# Patient Record
Sex: MALE | Race: OTHER | HISPANIC OR LATINO | URBAN - METROPOLITAN AREA
[De-identification: names, ages, dates, MRNs, and addresses within clinical notes are randomized per-mention and may not be internally consistent; named-entity substitution may affect disease eponyms.]

---

## 2018-03-25 ENCOUNTER — EMERGENCY (EMERGENCY)
Facility: HOSPITAL | Age: 33
LOS: 1 days | Discharge: HOME | End: 2018-03-25
Attending: EMERGENCY MEDICINE
Payer: COMMERCIAL

## 2018-03-25 VITALS
SYSTOLIC BLOOD PRESSURE: 113 MMHG | TEMPERATURE: 99 F | WEIGHT: 149.91 LBS | HEART RATE: 72 BPM | HEIGHT: 66 IN | OXYGEN SATURATION: 99 % | RESPIRATION RATE: 16 BRPM | DIASTOLIC BLOOD PRESSURE: 73 MMHG

## 2018-03-25 LAB
ALBUMIN SERPL ELPH-MCNC: 4.4 G/DL — SIGNIFICANT CHANGE UP (ref 3.5–5)
ALP SERPL-CCNC: 86 U/L — SIGNIFICANT CHANGE UP (ref 40–120)
ALT FLD-CCNC: 17 U/L DA — SIGNIFICANT CHANGE UP (ref 10–60)
ANION GAP SERPL CALC-SCNC: 11 MMOL/L — SIGNIFICANT CHANGE UP (ref 5–17)
AST SERPL-CCNC: 9 U/L — LOW (ref 10–40)
BASOPHILS # BLD AUTO: 0.1 K/UL — SIGNIFICANT CHANGE UP (ref 0–0.2)
BASOPHILS NFR BLD AUTO: 1.3 % — SIGNIFICANT CHANGE UP (ref 0–2)
BILIRUB SERPL-MCNC: 0.5 MG/DL — SIGNIFICANT CHANGE UP (ref 0.2–1.2)
BUN SERPL-MCNC: 17 MG/DL — SIGNIFICANT CHANGE UP (ref 7–18)
CALCIUM SERPL-MCNC: 9 MG/DL — SIGNIFICANT CHANGE UP (ref 8.4–10.5)
CHLORIDE SERPL-SCNC: 105 MMOL/L — SIGNIFICANT CHANGE UP (ref 96–108)
CO2 SERPL-SCNC: 25 MMOL/L — SIGNIFICANT CHANGE UP (ref 22–31)
CREAT SERPL-MCNC: 0.96 MG/DL — SIGNIFICANT CHANGE UP (ref 0.5–1.3)
EOSINOPHIL # BLD AUTO: 0.1 K/UL — SIGNIFICANT CHANGE UP (ref 0–0.5)
EOSINOPHIL NFR BLD AUTO: 1.1 % — SIGNIFICANT CHANGE UP (ref 0–6)
GLUCOSE SERPL-MCNC: 92 MG/DL — SIGNIFICANT CHANGE UP (ref 70–99)
HCT VFR BLD CALC: 44.5 % — SIGNIFICANT CHANGE UP (ref 39–50)
HGB BLD-MCNC: 15.1 G/DL — SIGNIFICANT CHANGE UP (ref 13–17)
LIDOCAIN IGE QN: 112 U/L — SIGNIFICANT CHANGE UP (ref 73–393)
LYMPHOCYTES # BLD AUTO: 2.3 K/UL — SIGNIFICANT CHANGE UP (ref 1–3.3)
LYMPHOCYTES # BLD AUTO: 31.2 % — SIGNIFICANT CHANGE UP (ref 13–44)
MCHC RBC-ENTMCNC: 30.3 PG — SIGNIFICANT CHANGE UP (ref 27–34)
MCHC RBC-ENTMCNC: 34 GM/DL — SIGNIFICANT CHANGE UP (ref 32–36)
MCV RBC AUTO: 89.1 FL — SIGNIFICANT CHANGE UP (ref 80–100)
MONOCYTES # BLD AUTO: 0.3 K/UL — SIGNIFICANT CHANGE UP (ref 0–0.9)
MONOCYTES NFR BLD AUTO: 4.7 % — SIGNIFICANT CHANGE UP (ref 2–14)
NEUTROPHILS # BLD AUTO: 4.5 K/UL — SIGNIFICANT CHANGE UP (ref 1.8–7.4)
NEUTROPHILS NFR BLD AUTO: 61.6 % — SIGNIFICANT CHANGE UP (ref 43–77)
PLATELET # BLD AUTO: 278 K/UL — SIGNIFICANT CHANGE UP (ref 150–400)
POTASSIUM SERPL-MCNC: 3.8 MMOL/L — SIGNIFICANT CHANGE UP (ref 3.5–5.3)
POTASSIUM SERPL-SCNC: 3.8 MMOL/L — SIGNIFICANT CHANGE UP (ref 3.5–5.3)
PROT SERPL-MCNC: 7.5 G/DL — SIGNIFICANT CHANGE UP (ref 6–8.3)
RBC # BLD: 4.99 M/UL — SIGNIFICANT CHANGE UP (ref 4.2–5.8)
RBC # FLD: 11.1 % — SIGNIFICANT CHANGE UP (ref 10.3–14.5)
SODIUM SERPL-SCNC: 141 MMOL/L — SIGNIFICANT CHANGE UP (ref 135–145)
WBC # BLD: 7.3 K/UL — SIGNIFICANT CHANGE UP (ref 3.8–10.5)
WBC # FLD AUTO: 7.3 K/UL — SIGNIFICANT CHANGE UP (ref 3.8–10.5)

## 2018-03-25 PROCEDURE — 99285 EMERGENCY DEPT VISIT HI MDM: CPT

## 2018-03-25 NOTE — ED PROVIDER NOTE - OBJECTIVE STATEMENT
31 y/o M w/ no PMhx presents c/o R sided abd pain, intermittent x 1 month. Pain not associated w/ meals. Unsure of what alleviates or worsens pain. Denies any other complaints. NKDA.

## 2018-03-26 VITALS
OXYGEN SATURATION: 100 % | HEART RATE: 69 BPM | RESPIRATION RATE: 16 BRPM | TEMPERATURE: 98 F | DIASTOLIC BLOOD PRESSURE: 61 MMHG | SYSTOLIC BLOOD PRESSURE: 112 MMHG

## 2018-03-26 LAB
APPEARANCE UR: CLEAR — SIGNIFICANT CHANGE UP
BILIRUB UR-MCNC: NEGATIVE — SIGNIFICANT CHANGE UP
COLOR SPEC: YELLOW — SIGNIFICANT CHANGE UP
DIFF PNL FLD: NEGATIVE — SIGNIFICANT CHANGE UP
GLUCOSE UR QL: NEGATIVE — SIGNIFICANT CHANGE UP
KETONES UR-MCNC: NEGATIVE — SIGNIFICANT CHANGE UP
LEUKOCYTE ESTERASE UR-ACNC: NEGATIVE — SIGNIFICANT CHANGE UP
NITRITE UR-MCNC: NEGATIVE — SIGNIFICANT CHANGE UP
PH UR: 6.5 — SIGNIFICANT CHANGE UP (ref 5–8)
PROT UR-MCNC: 15
SP GR SPEC: 1.01 — SIGNIFICANT CHANGE UP (ref 1.01–1.02)
UROBILINOGEN FLD QL: NEGATIVE — SIGNIFICANT CHANGE UP

## 2018-03-26 PROCEDURE — 74177 CT ABD & PELVIS W/CONTRAST: CPT

## 2018-03-26 PROCEDURE — 83690 ASSAY OF LIPASE: CPT

## 2018-03-26 PROCEDURE — 99284 EMERGENCY DEPT VISIT MOD MDM: CPT | Mod: 25

## 2018-03-26 PROCEDURE — 80053 COMPREHEN METABOLIC PANEL: CPT

## 2018-03-26 PROCEDURE — 81001 URINALYSIS AUTO W/SCOPE: CPT

## 2018-03-26 PROCEDURE — 85027 COMPLETE CBC AUTOMATED: CPT

## 2018-03-26 PROCEDURE — 87086 URINE CULTURE/COLONY COUNT: CPT

## 2018-03-26 PROCEDURE — 74177 CT ABD & PELVIS W/CONTRAST: CPT | Mod: 26

## 2018-03-27 LAB
CULTURE RESULTS: NO GROWTH — SIGNIFICANT CHANGE UP
SPECIMEN SOURCE: SIGNIFICANT CHANGE UP

## 2018-09-20 ENCOUNTER — EMERGENCY (EMERGENCY)
Facility: HOSPITAL | Age: 33
LOS: 1 days | Discharge: HOME | End: 2018-09-20
Attending: EMERGENCY MEDICINE
Payer: COMMERCIAL

## 2018-09-20 VITALS
HEART RATE: 75 BPM | WEIGHT: 156.09 LBS | DIASTOLIC BLOOD PRESSURE: 64 MMHG | HEIGHT: 66 IN | RESPIRATION RATE: 18 BRPM | SYSTOLIC BLOOD PRESSURE: 114 MMHG | OXYGEN SATURATION: 98 % | TEMPERATURE: 98 F

## 2018-09-20 LAB
ALBUMIN SERPL ELPH-MCNC: 4.5 G/DL — SIGNIFICANT CHANGE UP (ref 3.5–5)
ALP SERPL-CCNC: 73 U/L — SIGNIFICANT CHANGE UP (ref 40–120)
ALT FLD-CCNC: 28 U/L DA — SIGNIFICANT CHANGE UP (ref 10–60)
ANION GAP SERPL CALC-SCNC: 9 MMOL/L — SIGNIFICANT CHANGE UP (ref 5–17)
APPEARANCE UR: CLEAR — SIGNIFICANT CHANGE UP
APTT BLD: 35.6 SEC — SIGNIFICANT CHANGE UP (ref 27.5–37.4)
AST SERPL-CCNC: 14 U/L — SIGNIFICANT CHANGE UP (ref 10–40)
BACTERIA # UR AUTO: ABNORMAL /HPF
BASOPHILS # BLD AUTO: 0.1 K/UL — SIGNIFICANT CHANGE UP (ref 0–0.2)
BASOPHILS NFR BLD AUTO: 1.2 % — SIGNIFICANT CHANGE UP (ref 0–2)
BILIRUB DIRECT SERPL-MCNC: 0.2 MG/DL — SIGNIFICANT CHANGE UP (ref 0–0.2)
BILIRUB INDIRECT FLD-MCNC: 0.4 MG/DL — SIGNIFICANT CHANGE UP (ref 0.2–1)
BILIRUB SERPL-MCNC: 0.6 MG/DL — SIGNIFICANT CHANGE UP (ref 0.2–1.2)
BILIRUB UR-MCNC: NEGATIVE — SIGNIFICANT CHANGE UP
BUN SERPL-MCNC: 15 MG/DL — SIGNIFICANT CHANGE UP (ref 7–18)
CALCIUM SERPL-MCNC: 9.8 MG/DL — SIGNIFICANT CHANGE UP (ref 8.4–10.5)
CHLORIDE SERPL-SCNC: 105 MMOL/L — SIGNIFICANT CHANGE UP (ref 96–108)
CO2 SERPL-SCNC: 26 MMOL/L — SIGNIFICANT CHANGE UP (ref 22–31)
COLOR SPEC: YELLOW — SIGNIFICANT CHANGE UP
CREAT SERPL-MCNC: 0.96 MG/DL — SIGNIFICANT CHANGE UP (ref 0.5–1.3)
DIFF PNL FLD: NEGATIVE — SIGNIFICANT CHANGE UP
EOSINOPHIL # BLD AUTO: 0 K/UL — SIGNIFICANT CHANGE UP (ref 0–0.5)
EOSINOPHIL NFR BLD AUTO: 0.4 % — SIGNIFICANT CHANGE UP (ref 0–6)
ERYTHROCYTE [SEDIMENTATION RATE] IN BLOOD: 8 MM/HR — SIGNIFICANT CHANGE UP (ref 0–15)
GLUCOSE SERPL-MCNC: 85 MG/DL — SIGNIFICANT CHANGE UP (ref 70–99)
GLUCOSE UR QL: NEGATIVE — SIGNIFICANT CHANGE UP
HCT VFR BLD CALC: 43.6 % — SIGNIFICANT CHANGE UP (ref 39–50)
HGB BLD-MCNC: 14.7 G/DL — SIGNIFICANT CHANGE UP (ref 13–17)
INR BLD: 1.17 RATIO — HIGH (ref 0.88–1.16)
KETONES UR-MCNC: ABNORMAL
LEUKOCYTE ESTERASE UR-ACNC: NEGATIVE — SIGNIFICANT CHANGE UP
LIDOCAIN IGE QN: 69 U/L — LOW (ref 73–393)
LYMPHOCYTES # BLD AUTO: 2.6 K/UL — SIGNIFICANT CHANGE UP (ref 1–3.3)
LYMPHOCYTES # BLD AUTO: 27.6 % — SIGNIFICANT CHANGE UP (ref 13–44)
MCHC RBC-ENTMCNC: 29.8 PG — SIGNIFICANT CHANGE UP (ref 27–34)
MCHC RBC-ENTMCNC: 33.7 GM/DL — SIGNIFICANT CHANGE UP (ref 32–36)
MCV RBC AUTO: 88.3 FL — SIGNIFICANT CHANGE UP (ref 80–100)
MONOCYTES # BLD AUTO: 0.5 K/UL — SIGNIFICANT CHANGE UP (ref 0–0.9)
MONOCYTES NFR BLD AUTO: 5.1 % — SIGNIFICANT CHANGE UP (ref 2–14)
NEUTROPHILS # BLD AUTO: 6.2 K/UL — SIGNIFICANT CHANGE UP (ref 1.8–7.4)
NEUTROPHILS NFR BLD AUTO: 65.8 % — SIGNIFICANT CHANGE UP (ref 43–77)
NITRITE UR-MCNC: NEGATIVE — SIGNIFICANT CHANGE UP
PH UR: 6 — SIGNIFICANT CHANGE UP (ref 5–8)
PLATELET # BLD AUTO: 323 K/UL — SIGNIFICANT CHANGE UP (ref 150–400)
POTASSIUM SERPL-MCNC: 3.9 MMOL/L — SIGNIFICANT CHANGE UP (ref 3.5–5.3)
POTASSIUM SERPL-SCNC: 3.9 MMOL/L — SIGNIFICANT CHANGE UP (ref 3.5–5.3)
PROT SERPL-MCNC: 8.3 G/DL — SIGNIFICANT CHANGE UP (ref 6–8.3)
PROT UR-MCNC: NEGATIVE — SIGNIFICANT CHANGE UP
PROTHROM AB SERPL-ACNC: 12.8 SEC — HIGH (ref 9.8–12.7)
RBC # BLD: 4.94 M/UL — SIGNIFICANT CHANGE UP (ref 4.2–5.8)
RBC # FLD: 11.5 % — SIGNIFICANT CHANGE UP (ref 10.3–14.5)
RBC CASTS # UR COMP ASSIST: SIGNIFICANT CHANGE UP /HPF (ref 0–2)
SODIUM SERPL-SCNC: 140 MMOL/L — SIGNIFICANT CHANGE UP (ref 135–145)
SP GR SPEC: 1.01 — SIGNIFICANT CHANGE UP (ref 1.01–1.02)
UROBILINOGEN FLD QL: NEGATIVE — SIGNIFICANT CHANGE UP
WBC # BLD: 9.4 K/UL — SIGNIFICANT CHANGE UP (ref 3.8–10.5)
WBC # FLD AUTO: 9.4 K/UL — SIGNIFICANT CHANGE UP (ref 3.8–10.5)
WBC UR QL: SIGNIFICANT CHANGE UP /HPF (ref 0–5)

## 2018-09-20 PROCEDURE — 99284 EMERGENCY DEPT VISIT MOD MDM: CPT | Mod: 25

## 2018-09-20 PROCEDURE — 76705 ECHO EXAM OF ABDOMEN: CPT

## 2018-09-20 PROCEDURE — 80048 BASIC METABOLIC PNL TOTAL CA: CPT

## 2018-09-20 PROCEDURE — 85027 COMPLETE CBC AUTOMATED: CPT

## 2018-09-20 PROCEDURE — 87086 URINE CULTURE/COLONY COUNT: CPT

## 2018-09-20 PROCEDURE — 85652 RBC SED RATE AUTOMATED: CPT

## 2018-09-20 PROCEDURE — 80076 HEPATIC FUNCTION PANEL: CPT

## 2018-09-20 PROCEDURE — 85730 THROMBOPLASTIN TIME PARTIAL: CPT

## 2018-09-20 PROCEDURE — 83690 ASSAY OF LIPASE: CPT

## 2018-09-20 PROCEDURE — 85610 PROTHROMBIN TIME: CPT

## 2018-09-20 PROCEDURE — 76705 ECHO EXAM OF ABDOMEN: CPT | Mod: 26

## 2018-09-20 PROCEDURE — 81001 URINALYSIS AUTO W/SCOPE: CPT

## 2018-09-20 NOTE — ED ADULT TRIAGE NOTE - TEMPERATURE IN CELSIUS (DEGREES C)
Problem: Infection, Risk/Actual (Adult)  Goal: Identify Related Risk Factors and Signs and Symptoms  Related risk factors and signs and symptoms are identified upon initiation of Human Response Clinical Practice Guideline (CPG).   Outcome: Improving  VSS, afeb, LS are dim, 99% on RA.  Cont on IV Zosyn Q6H, Good output , voiding well after IV Lasix. LLE up on 2 pillows, improving no drainage, down to mild edema. Pt up w/ assisst x1 and walker. BG=89 and 159. Tele is Afib.       36.7

## 2018-09-21 LAB
CULTURE RESULTS: NO GROWTH — SIGNIFICANT CHANGE UP
SPECIMEN SOURCE: SIGNIFICANT CHANGE UP

## 2018-09-21 NOTE — ED PROVIDER NOTE - PHYSICAL EXAMINATION
PE:   GEN: Awake, alert, oriented, interactive, NAD, well appearing.   HEAD: Atraumatic, normocephalic  EYES: Sclera white, conjunctiva pink, PERRLA  CARDIAC: Reg rate and rhythm, S1,S2, no murmur/rub/gallop. Strong central and peripheral pulses, Brisk cap refill, no evident pedal edema.   RESP: Normal respiratory rate and effort, no reso distress, lungs cta b/l without accessory muscle use, wheeze, rhonchi, or rales.   ABD: soft, non-tender, nondistended  NEURO: AOx3, CN II-XII grossly intact without focal deficits   MSK: Moving all extremities spontaneously, no apparent deformities  SKIN: warm, dry

## 2018-09-21 NOTE — ED PROVIDER NOTE - NS ED ROS FT
Constitutional: - Fever, - Chills, - unexplained weight loss  Eyes: - Discharge, - Irritation, - Redness, - Visual changes, - Light sensitivity  EARS: - Ear Pain, - hearing loss  NOSE: - Congestion, - epistaxis  MOUTH/THROAT: - Vocal Changes, - Drooling, - Sore throat  NECK: - Lumps, - Stiffness, - Pain  CV: - Palpitations, - Chest Pain, - Edema   RESP:  - Cough, - Shortness of Breath, - Dyspnea on Exertion, - Wheezing  GI: - Diarrhea, - Constipation, - Bloody stools, + Nausea, - Vomiting, +Abdominal Pain  : - Dysuria, -Frequency, - Hematuria  MSK: - Myalgias, - focal weakness, - Injuries  SKIN: - Color change, - Rash  NEURO: - Change in behavior, - Dec. Alertness, - Headache, - Change in speech, - Seizure-like activity

## 2018-09-21 NOTE — ED ADULT NURSE NOTE - NSIMPLEMENTINTERV_GEN_ALL_ED
Implemented All Universal Safety Interventions:  [place] to call system. Call bell, personal items and telephone within reach. Instruct patient to call for assistance. Room bathroom lighting operational. Non-slip footwear when patient is off stretcher. Physically safe environment: no spills, clutter or unnecessary equipment. Stretcher in lowest position, wheels locked, appropriate side rails in place.

## 2018-09-21 NOTE — ED PROVIDER NOTE - PROGRESS NOTE DETAILS
Pt has appt with PCP next week and says he will speak with PCP regarding pain and need for abdominal MRI. Pt given return precautions and demonstrated understanding thereof.

## 2018-09-21 NOTE — ED PROVIDER NOTE - OBJECTIVE STATEMENT
33M h/o hepatic hemangioma p/w persistent RUQ pain. Was seen for same few months ago and had CT that showed hepatic hemangioma, was DCed with GI f/u, which pt has not gone to yet. Says he has appt with GI, but wanted to come to ED first to see how his liver "was doing". Says previously heavy daily drinker now occasional few drinks etoh, thinks pain worse after drinking. Pt denies worsening pain, v/d/c/melena/brbpr, unexplained weight loss, lower abd pain, dysuria/freq/hematuria, bruising, jaundice, tylenol/drug use, f/c

## 2018-09-21 NOTE — ED PROVIDER NOTE - MEDICAL DECISION MAKING DETAILS
months chronic abd pain, r/o HCC, biliary colic, cholecystitis, pancreatitis, likely dc with gi f/u. no emergent findings on workup, pt will need outpatient MRI to r/o liver mass

## 2021-08-16 NOTE — ED ADULT NURSE NOTE - PT NEEDS ASSIST
Other (Free Text): Discussed bx for definitive dx.  Patient elects to follow for now.  RTO if grows or changes.  Photo today, will recheck in 6 months. Render Risk Assessment In Note?: no Detail Level: Zone Note Text (......Xxx Chief Complaint.): This diagnosis correlates with the no

## 2022-03-30 ENCOUNTER — INPATIENT (INPATIENT)
Facility: HOSPITAL | Age: 37
LOS: 2 days | Discharge: HOME | DRG: 897 | End: 2022-04-02
Attending: HOSPITALIST | Admitting: STUDENT IN AN ORGANIZED HEALTH CARE EDUCATION/TRAINING PROGRAM
Payer: MEDICAID

## 2022-03-30 VITALS
TEMPERATURE: 99 F | HEART RATE: 139 BPM | SYSTOLIC BLOOD PRESSURE: 113 MMHG | HEIGHT: 67 IN | WEIGHT: 160.06 LBS | DIASTOLIC BLOOD PRESSURE: 67 MMHG | OXYGEN SATURATION: 96 % | RESPIRATION RATE: 22 BRPM

## 2022-03-30 DIAGNOSIS — F10.239 ALCOHOL DEPENDENCE WITH WITHDRAWAL, UNSPECIFIED: ICD-10-CM

## 2022-03-30 LAB
ALBUMIN SERPL ELPH-MCNC: 4.9 G/DL — SIGNIFICANT CHANGE UP (ref 3.3–5)
ALBUMIN SERPL ELPH-MCNC: 5 G/DL — SIGNIFICANT CHANGE UP (ref 3.3–5)
ALP SERPL-CCNC: 63 U/L — SIGNIFICANT CHANGE UP (ref 40–120)
ALP SERPL-CCNC: 64 U/L — SIGNIFICANT CHANGE UP (ref 40–120)
ALT FLD-CCNC: 83 U/L — HIGH (ref 10–45)
ALT FLD-CCNC: 87 U/L — HIGH (ref 10–45)
ANION GAP SERPL CALC-SCNC: 17 MMOL/L — SIGNIFICANT CHANGE UP (ref 5–17)
ANION GAP SERPL CALC-SCNC: 17 MMOL/L — SIGNIFICANT CHANGE UP (ref 5–17)
AST SERPL-CCNC: 81 U/L — HIGH (ref 10–40)
AST SERPL-CCNC: 87 U/L — HIGH (ref 10–40)
BASE EXCESS BLDV CALC-SCNC: 0.9 MMOL/L — SIGNIFICANT CHANGE UP (ref -2–2)
BASOPHILS # BLD AUTO: 0.05 K/UL — SIGNIFICANT CHANGE UP (ref 0–0.2)
BASOPHILS NFR BLD AUTO: 0.6 % — SIGNIFICANT CHANGE UP (ref 0–2)
BILIRUB DIRECT SERPL-MCNC: 0.2 MG/DL — SIGNIFICANT CHANGE UP (ref 0–0.3)
BILIRUB DIRECT SERPL-MCNC: 0.2 MG/DL — SIGNIFICANT CHANGE UP (ref 0–0.3)
BILIRUB INDIRECT FLD-MCNC: 0.8 MG/DL — SIGNIFICANT CHANGE UP (ref 0.2–1)
BILIRUB INDIRECT FLD-MCNC: 0.8 MG/DL — SIGNIFICANT CHANGE UP (ref 0.2–1)
BILIRUB SERPL-MCNC: 1 MG/DL — SIGNIFICANT CHANGE UP (ref 0.2–1.2)
BILIRUB SERPL-MCNC: 1 MG/DL — SIGNIFICANT CHANGE UP (ref 0.2–1.2)
BLOOD GAS VENOUS - CREATININE: SIGNIFICANT CHANGE UP MG/DL (ref 0.5–1.3)
BUN SERPL-MCNC: 10 MG/DL — SIGNIFICANT CHANGE UP (ref 7–23)
BUN SERPL-MCNC: 8 MG/DL — SIGNIFICANT CHANGE UP (ref 7–23)
CA-I SERPL-SCNC: 1.19 MMOL/L — SIGNIFICANT CHANGE UP (ref 1.15–1.33)
CALCIUM SERPL-MCNC: 10 MG/DL — SIGNIFICANT CHANGE UP (ref 8.4–10.5)
CALCIUM SERPL-MCNC: 9.5 MG/DL — SIGNIFICANT CHANGE UP (ref 8.4–10.5)
CHLORIDE BLDV-SCNC: 98 MMOL/L — SIGNIFICANT CHANGE UP (ref 96–108)
CHLORIDE SERPL-SCNC: 92 MMOL/L — LOW (ref 96–108)
CHLORIDE SERPL-SCNC: 97 MMOL/L — SIGNIFICANT CHANGE UP (ref 96–108)
CO2 BLDV-SCNC: 26 MMOL/L — SIGNIFICANT CHANGE UP (ref 22–26)
CO2 SERPL-SCNC: 22 MMOL/L — SIGNIFICANT CHANGE UP (ref 22–31)
CO2 SERPL-SCNC: 23 MMOL/L — SIGNIFICANT CHANGE UP (ref 22–31)
CREAT SERPL-MCNC: 0.69 MG/DL — SIGNIFICANT CHANGE UP (ref 0.5–1.3)
CREAT SERPL-MCNC: 0.8 MG/DL — SIGNIFICANT CHANGE UP (ref 0.5–1.3)
EGFR: 119 ML/MIN/1.73M2 — SIGNIFICANT CHANGE UP
EGFR: 125 ML/MIN/1.73M2 — SIGNIFICANT CHANGE UP
EOSINOPHIL # BLD AUTO: 0.02 K/UL — SIGNIFICANT CHANGE UP (ref 0–0.5)
EOSINOPHIL NFR BLD AUTO: 0.2 % — SIGNIFICANT CHANGE UP (ref 0–6)
ETHANOL SERPL-MCNC: SIGNIFICANT CHANGE UP MG/DL (ref 0–10)
GAS PNL BLDV: 134 MMOL/L — LOW (ref 136–145)
GAS PNL BLDV: SIGNIFICANT CHANGE UP
GAS PNL BLDV: SIGNIFICANT CHANGE UP
GLUCOSE BLDV-MCNC: 103 MG/DL — HIGH (ref 70–99)
GLUCOSE SERPL-MCNC: 109 MG/DL — HIGH (ref 70–99)
GLUCOSE SERPL-MCNC: 171 MG/DL — HIGH (ref 70–99)
HCO3 BLDV-SCNC: 25 MMOL/L — SIGNIFICANT CHANGE UP (ref 22–29)
HCT VFR BLD CALC: 40.7 % — SIGNIFICANT CHANGE UP (ref 39–50)
HCT VFR BLDA CALC: 44 % — SIGNIFICANT CHANGE UP (ref 39–51)
HGB BLD CALC-MCNC: 14.7 G/DL — SIGNIFICANT CHANGE UP (ref 12.6–17.4)
HGB BLD-MCNC: 14.6 G/DL — SIGNIFICANT CHANGE UP (ref 13–17)
IMM GRANULOCYTES NFR BLD AUTO: 0.6 % — SIGNIFICANT CHANGE UP (ref 0–1.5)
LACTATE BLDV-MCNC: 0.8 MMOL/L — SIGNIFICANT CHANGE UP (ref 0.7–2)
LYMPHOCYTES # BLD AUTO: 0.66 K/UL — LOW (ref 1–3.3)
LYMPHOCYTES # BLD AUTO: 7.6 % — LOW (ref 13–44)
MAGNESIUM SERPL-MCNC: 1.6 MG/DL — SIGNIFICANT CHANGE UP (ref 1.6–2.6)
MAGNESIUM SERPL-MCNC: 2.1 MG/DL — SIGNIFICANT CHANGE UP (ref 1.6–2.6)
MCHC RBC-ENTMCNC: 31.3 PG — SIGNIFICANT CHANGE UP (ref 27–34)
MCHC RBC-ENTMCNC: 35.9 GM/DL — SIGNIFICANT CHANGE UP (ref 32–36)
MCV RBC AUTO: 87.3 FL — SIGNIFICANT CHANGE UP (ref 80–100)
MONOCYTES # BLD AUTO: 0.48 K/UL — SIGNIFICANT CHANGE UP (ref 0–0.9)
MONOCYTES NFR BLD AUTO: 5.5 % — SIGNIFICANT CHANGE UP (ref 2–14)
NEUTROPHILS # BLD AUTO: 7.44 K/UL — HIGH (ref 1.8–7.4)
NEUTROPHILS NFR BLD AUTO: 85.5 % — HIGH (ref 43–77)
NRBC # BLD: 0 /100 WBCS — SIGNIFICANT CHANGE UP (ref 0–0)
PCO2 BLDV: 39 MMHG — LOW (ref 42–55)
PH BLDV: 7.42 — SIGNIFICANT CHANGE UP (ref 7.32–7.43)
PHOSPHATE SERPL-MCNC: 1.4 MG/DL — LOW (ref 2.5–4.5)
PHOSPHATE SERPL-MCNC: 3.5 MG/DL — SIGNIFICANT CHANGE UP (ref 2.5–4.5)
PLATELET # BLD AUTO: 167 K/UL — SIGNIFICANT CHANGE UP (ref 150–400)
PO2 BLDV: 65 MMHG — HIGH (ref 25–45)
POTASSIUM BLDV-SCNC: 3.6 MMOL/L — SIGNIFICANT CHANGE UP (ref 3.5–5.1)
POTASSIUM SERPL-MCNC: 3.8 MMOL/L — SIGNIFICANT CHANGE UP (ref 3.5–5.3)
POTASSIUM SERPL-MCNC: 4.2 MMOL/L — SIGNIFICANT CHANGE UP (ref 3.5–5.3)
POTASSIUM SERPL-SCNC: 3.8 MMOL/L — SIGNIFICANT CHANGE UP (ref 3.5–5.3)
POTASSIUM SERPL-SCNC: 4.2 MMOL/L — SIGNIFICANT CHANGE UP (ref 3.5–5.3)
PROT SERPL-MCNC: 7.1 G/DL — SIGNIFICANT CHANGE UP (ref 6–8.3)
PROT SERPL-MCNC: 7.4 G/DL — SIGNIFICANT CHANGE UP (ref 6–8.3)
RBC # BLD: 4.66 M/UL — SIGNIFICANT CHANGE UP (ref 4.2–5.8)
RBC # FLD: 12.2 % — SIGNIFICANT CHANGE UP (ref 10.3–14.5)
SAO2 % BLDV: 93.2 % — HIGH (ref 67–88)
SARS-COV-2 RNA SPEC QL NAA+PROBE: SIGNIFICANT CHANGE UP
SODIUM SERPL-SCNC: 132 MMOL/L — LOW (ref 135–145)
SODIUM SERPL-SCNC: 136 MMOL/L — SIGNIFICANT CHANGE UP (ref 135–145)
WBC # BLD: 8.7 K/UL — SIGNIFICANT CHANGE UP (ref 3.8–10.5)
WBC # FLD AUTO: 8.7 K/UL — SIGNIFICANT CHANGE UP (ref 3.8–10.5)

## 2022-03-30 PROCEDURE — 70450 CT HEAD/BRAIN W/O DYE: CPT | Mod: 26,MA

## 2022-03-30 PROCEDURE — 99223 1ST HOSP IP/OBS HIGH 75: CPT

## 2022-03-30 PROCEDURE — 71045 X-RAY EXAM CHEST 1 VIEW: CPT | Mod: 26

## 2022-03-30 PROCEDURE — 99285 EMERGENCY DEPT VISIT HI MDM: CPT

## 2022-03-30 RX ORDER — MAGNESIUM SULFATE 500 MG/ML
1 VIAL (ML) INJECTION ONCE
Refills: 0 | Status: DISCONTINUED | OUTPATIENT
Start: 2022-03-30 | End: 2022-03-30

## 2022-03-30 RX ORDER — THIAMINE MONONITRATE (VIT B1) 100 MG
100 TABLET ORAL DAILY
Refills: 0 | Status: DISCONTINUED | OUTPATIENT
Start: 2022-03-30 | End: 2022-04-02

## 2022-03-30 RX ORDER — DIAZEPAM 5 MG
10 TABLET ORAL ONCE
Refills: 0 | Status: DISCONTINUED | OUTPATIENT
Start: 2022-03-30 | End: 2022-03-30

## 2022-03-30 RX ORDER — SODIUM CHLORIDE 9 MG/ML
1000 INJECTION INTRAMUSCULAR; INTRAVENOUS; SUBCUTANEOUS ONCE
Refills: 0 | Status: COMPLETED | OUTPATIENT
Start: 2022-03-30 | End: 2022-03-30

## 2022-03-30 RX ORDER — THIAMINE MONONITRATE (VIT B1) 100 MG
100 TABLET ORAL ONCE
Refills: 0 | Status: COMPLETED | OUTPATIENT
Start: 2022-03-30 | End: 2022-03-30

## 2022-03-30 RX ADMIN — Medication 50 MILLIGRAM(S): at 18:41

## 2022-03-30 RX ADMIN — SODIUM CHLORIDE 1000 MILLILITER(S): 9 INJECTION INTRAMUSCULAR; INTRAVENOUS; SUBCUTANEOUS at 17:36

## 2022-03-30 RX ADMIN — SODIUM CHLORIDE 1000 MILLILITER(S): 9 INJECTION INTRAMUSCULAR; INTRAVENOUS; SUBCUTANEOUS at 15:12

## 2022-03-30 RX ADMIN — Medication 100 MILLIGRAM(S): at 15:08

## 2022-03-30 RX ADMIN — Medication 10 MILLIGRAM(S): at 15:07

## 2022-03-30 RX ADMIN — Medication 10 MILLIGRAM(S): at 18:32

## 2022-03-30 RX ADMIN — Medication 62.5 MILLIMOLE(S): at 18:32

## 2022-03-30 NOTE — ED PROVIDER NOTE - NS ED ROS FT
CONSTITUTIONAL: No weakness, fevers or chills  EYES/ENT: No visual changes;  No vertigo or throat pain   NECK: No pain or stiffness  RESPIRATORY: No cough, wheezing, hemoptysis; No shortness of breath  CARDIOVASCULAR: No chest pain or palpitations  GASTROINTESTINAL: No abdominal or epigastric pain. No nausea, vomiting, or hematemesis; No diarrhea or constipation. No melena or hematochezia.  GENITOURINARY: No dysuria, frequency or hematuria  NEUROLOGICAL: + seizures, tremulousness   SKIN: No itching, burning, rashes, or lesions   All other review of systems is negative unless indicated above.

## 2022-03-30 NOTE — H&P ADULT - HISTORY OF PRESENT ILLNESS
37 yo male presents with seizure. Patient typically has 1 bottle ETOH daily, relatively stable (kenzie or whisky, 750mg). Occasionally tries to stop drinking, but relapses in a few weeks. Brother-in-law also with ETOH use in household, but he does not drink with him. will drink with friends, but also will drink alone. Attempted to stop driking 2 days prior to admission. Developed tremors within 12 hours of drinking (has occurred before). 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  In ED, no further seizures, no other complaints. No fever, chills, chest pain, sOB, abdominal pain, n/v/d/c.

## 2022-03-30 NOTE — ED PROVIDER NOTE - CLINICAL SUMMARY MEDICAL DECISION MAKING FREE TEXT BOX
Patient is a 36 year old male with past medical history significant for alcoholism presents to the Emergency Department with chief complaint of seizures.  Patient was evaluated for metabolic and intra-cranial pathology.  Patient abuses alcohol and recently stopped drinking 2 days prior.  Patient received EKG, CT head without contrast, plain films, and labs.  Patient was given 10mg of IV diazepam upon presentation.  Patient with small abrasion on wrist, but up-to-date with tetanus vaccination. Patient is a 36 year old male with past medical history significant for alcoholism presents to the Emergency Department with chief complaint of seizures.  Patient was evaluated for metabolic and intra-cranial pathology.  Patient abuses alcohol and recently stopped drinking 2 days prior.  Patient received EKG, CT head without contrast, plain films, and labs.  Patient was given 10mg of IV diazepam upon presentation.  Patient with small abrasion on wrist, but up-to-date with tetanus vaccination.  Labs were negative for acute pathology.  CTH was negative.  Patient was placed on CIWA protocol and received 20mg total of diazepam.  Patient admitted to medical floors.

## 2022-03-30 NOTE — ED ADULT NURSE NOTE - ALCOHOL PRE SCREEN (AUDIT - C)
-baseline hypoxia (approx 4L O2); likely d/t IPF  -currently on Ofev, but per notes there were plans to change to Esbriet d/t GI bleed  -recently on high doses of steroids (7/31/19) for exacerbation of IPF; infectious work up negative  -was weaned to Pred 25mg daily but increased to Pred 50mg daily on 8/20  -CXR w/ what appears to be worsening infiltrates and given the paucity of infectious signs or symptoms, may need to consider AE IPF, which will require higher doses of steroids. (1mg/kg prednisone); Started on Pred 80mg daily  -CTA negative for PE on Monday  -echo w/ normal systolic fx; conc LVH, mild LAE, UVALDO  -continue steroids/abx for now     Statement Selected

## 2022-03-30 NOTE — H&P ADULT - NSHPLABSRESULTS_GEN_ALL_CORE
Reviewed labs:  No leukocytosis  Hyponatremia to 132. Mg 1.6, Phos 1.4 AG=17    CTH reviewd, no intracranial pathology  ECG not found, re-ordered  CXR personally reviewed: No consolidatoin or effusions noted

## 2022-03-30 NOTE — ED PROVIDER NOTE - ATTENDING CONTRIBUTION TO CARE
prob sz, 3 days no etoh  skin not moist, not tremulous, R frontal bruising  ct head, benzos, admit for w/d

## 2022-03-30 NOTE — H&P ADULT - PROBLEM SELECTOR PLAN 1
-patient with witnessed seizures  -given seizure, will place on high risk taper.   -Noted AST/ALT, <3x ULN. Normal platelets, bilirubin, albumin. Feel comfortable giving librium.  -aggressive phos repletion  -thiamine given. No nystagmus on exam, can continue po thiamine. If signs of wide based gait or confabulation can give IV thiamine.   -social work consult, patient wants to attempt to abstain from ETOH

## 2022-03-30 NOTE — H&P ADULT - NSHPSOCIALHISTORY_GEN_ALL_CORE
ETOH history as in HPI. No cigarettes, denies drugs..   Lives with family (brother, neice, mother)  Works in [employer]

## 2022-03-30 NOTE — ED PROVIDER NOTE - PROGRESS NOTE DETAILS
DO Miguel Angel: patient reports improved symptoms, but still mildly tremulous and tachycardic to 110.  additional 10mg of diazepam ordered.

## 2022-03-30 NOTE — ED PROVIDER NOTE - OBJECTIVE STATEMENT
Patient is a 36 year old male with past medical history significant for alcoholism presents to the Emergency Department with chief complaint of seizures.  Patient states he drinks 2L of vodka every 5 days.  Patient states he stopped drinking 2 days prior and had a witnessed seizure at work.  Patient states it was witnessed by co-workers who described generalized tonic-clonic seizure activity for 15-30 seconds, which remitted without medical intervention.  Patient states he feels tremulous and has never been admitted to the hospital for any alcohol-related pathology in the past.  Patient denies chest pain, shortness of breath, or other physical complaints.  No sick contacts or recent travel.

## 2022-03-30 NOTE — ED ADULT NURSE REASSESSMENT NOTE - NS ED NURSE REASSESS COMMENT FT1
Report received from RN Raeann. Pt received A&Ox3, IV intact and patent, pt tachycardic, pt denies pain/discomfort, bed locked and in lowest position, call bell within reach and pt instructed on use, safety and comfort measures maintained, family at bedside for support, RN at bedside performing CIWA exam w/ a score of 4, 18 G IV placed in L. forearm, pending bed assignment

## 2022-03-30 NOTE — ED ADULT NURSE NOTE - NSIMPLEMENTINTERV_GEN_ALL_ED
Implemented All Fall Risk Interventions:  [place] to call system. Call bell, personal items and telephone within reach. Instruct patient to call for assistance. Room bathroom lighting operational. Non-slip footwear when patient is off stretcher. Physically safe environment: no spills, clutter or unnecessary equipment. Stretcher in lowest position, wheels locked, appropriate side rails in place. Provide visual cue, wrist band, yellow gown, etc. Monitor gait and stability. Monitor for mental status changes and reorient to person, place, and time. Review medications for side effects contributing to fall risk. Reinforce activity limits and safety measures with patient and family.

## 2022-03-30 NOTE — ED PROVIDER NOTE - PHYSICAL EXAMINATION
PHYSICAL EXAM:  GENERAL: + mild distress  HEAD:  Atraumatic, Normocephalic  EYES: EOMI, PERRLA, conjunctiva and sclera clear  ENT: No erythema/pallor/petechiae/lesions; TMs clear b/l  NECK: Supple, No JVD  LUNG: CTA b/l; no r/r/w  HEART: + tachycardia  ABDOMEN: soft, NT/ND; BS audible   EXTREMITIES:  2+ Peripheral Pulses, brisk cap refill. No clubbing, cyanosis, or edema  NERVOUS SYSTEM:  AAOx3, speech clear. No sensory/motor deficits   MSK: FROM all 4 extremities, full and equal strength  SKIN: No rashes or lesions PHYSICAL EXAM:  GENERAL: + mild distress  HEAD:  + small 2cm hematoma on posterior occiput  EYES: EOMI, PERRLA, conjunctiva and sclera clear  ENT: No erythema/pallor/petechiae/lesions; TMs clear b/l  NECK: Supple, No JVD  LUNG: CTA b/l; no r/r/w  HEART: + tachycardia  ABDOMEN: soft, NT/ND; BS audible   EXTREMITIES:  2+ Peripheral Pulses, brisk cap refill. No clubbing, cyanosis, or edema  NERVOUS SYSTEM:  AAOx3, speech clear. No sensory/motor deficits; cranial nerves 2-12 grossly intact bilaterally, negative pronator drift, cerebellar signs intact - finger-to-nose exam, no meningismus, ambulates with steady gait; + tremulousness in bilateral upper extremities   MSK: FROM all 4 extremities, full and equal strength  SKIN: + abrasion on dorsal aspect of left hand without active bleeding

## 2022-03-31 DIAGNOSIS — Z29.9 ENCOUNTER FOR PROPHYLACTIC MEASURES, UNSPECIFIED: ICD-10-CM

## 2022-03-31 DIAGNOSIS — R74.01 ELEVATION OF LEVELS OF LIVER TRANSAMINASE LEVELS: ICD-10-CM

## 2022-03-31 LAB
ANION GAP SERPL CALC-SCNC: 16 MMOL/L — SIGNIFICANT CHANGE UP (ref 5–17)
BUN SERPL-MCNC: 9 MG/DL — SIGNIFICANT CHANGE UP (ref 7–23)
CALCIUM SERPL-MCNC: 9.6 MG/DL — SIGNIFICANT CHANGE UP (ref 8.4–10.5)
CHLORIDE SERPL-SCNC: 99 MMOL/L — SIGNIFICANT CHANGE UP (ref 96–108)
CO2 SERPL-SCNC: 24 MMOL/L — SIGNIFICANT CHANGE UP (ref 22–31)
CREAT SERPL-MCNC: 0.74 MG/DL — SIGNIFICANT CHANGE UP (ref 0.5–1.3)
EGFR: 122 ML/MIN/1.73M2 — SIGNIFICANT CHANGE UP
GLUCOSE SERPL-MCNC: 96 MG/DL — SIGNIFICANT CHANGE UP (ref 70–99)
MAGNESIUM SERPL-MCNC: 2.2 MG/DL — SIGNIFICANT CHANGE UP (ref 1.6–2.6)
PHOSPHATE SERPL-MCNC: 5.3 MG/DL — HIGH (ref 2.5–4.5)
POTASSIUM SERPL-MCNC: 3.6 MMOL/L — SIGNIFICANT CHANGE UP (ref 3.5–5.3)
POTASSIUM SERPL-SCNC: 3.6 MMOL/L — SIGNIFICANT CHANGE UP (ref 3.5–5.3)
SODIUM SERPL-SCNC: 139 MMOL/L — SIGNIFICANT CHANGE UP (ref 135–145)

## 2022-03-31 PROCEDURE — 99232 SBSQ HOSP IP/OBS MODERATE 35: CPT

## 2022-03-31 RX ORDER — HEPARIN SODIUM 5000 [USP'U]/ML
5000 INJECTION INTRAVENOUS; SUBCUTANEOUS EVERY 12 HOURS
Refills: 0 | Status: DISCONTINUED | OUTPATIENT
Start: 2022-03-31 | End: 2022-04-02

## 2022-03-31 RX ADMIN — Medication 50 MILLIGRAM(S): at 00:28

## 2022-03-31 RX ADMIN — Medication 50 MILLIGRAM(S): at 21:17

## 2022-03-31 RX ADMIN — HEPARIN SODIUM 5000 UNIT(S): 5000 INJECTION INTRAVENOUS; SUBCUTANEOUS at 17:34

## 2022-03-31 RX ADMIN — Medication 50 MILLIGRAM(S): at 12:21

## 2022-03-31 RX ADMIN — Medication 50 MILLIGRAM(S): at 06:30

## 2022-03-31 RX ADMIN — Medication 100 MILLIGRAM(S): at 12:21

## 2022-04-01 LAB
ALBUMIN SERPL ELPH-MCNC: 4.7 G/DL — SIGNIFICANT CHANGE UP (ref 3.3–5)
ALP SERPL-CCNC: 85 U/L — SIGNIFICANT CHANGE UP (ref 40–120)
ALT FLD-CCNC: 224 U/L — HIGH (ref 10–45)
ANION GAP SERPL CALC-SCNC: 12 MMOL/L — SIGNIFICANT CHANGE UP (ref 5–17)
AST SERPL-CCNC: 177 U/L — HIGH (ref 10–40)
BILIRUB SERPL-MCNC: 0.5 MG/DL — SIGNIFICANT CHANGE UP (ref 0.2–1.2)
BUN SERPL-MCNC: 11 MG/DL — SIGNIFICANT CHANGE UP (ref 7–23)
CALCIUM SERPL-MCNC: 9.2 MG/DL — SIGNIFICANT CHANGE UP (ref 8.4–10.5)
CHLORIDE SERPL-SCNC: 103 MMOL/L — SIGNIFICANT CHANGE UP (ref 96–108)
CO2 SERPL-SCNC: 23 MMOL/L — SIGNIFICANT CHANGE UP (ref 22–31)
CREAT SERPL-MCNC: 0.86 MG/DL — SIGNIFICANT CHANGE UP (ref 0.5–1.3)
EGFR: 117 ML/MIN/1.73M2 — SIGNIFICANT CHANGE UP
GLUCOSE SERPL-MCNC: 120 MG/DL — HIGH (ref 70–99)
MAGNESIUM SERPL-MCNC: 1.7 MG/DL — SIGNIFICANT CHANGE UP (ref 1.6–2.6)
PHOSPHATE SERPL-MCNC: 2.7 MG/DL — SIGNIFICANT CHANGE UP (ref 2.5–4.5)
POTASSIUM SERPL-MCNC: 3.6 MMOL/L — SIGNIFICANT CHANGE UP (ref 3.5–5.3)
POTASSIUM SERPL-SCNC: 3.6 MMOL/L — SIGNIFICANT CHANGE UP (ref 3.5–5.3)
PROT SERPL-MCNC: 7 G/DL — SIGNIFICANT CHANGE UP (ref 6–8.3)
SODIUM SERPL-SCNC: 138 MMOL/L — SIGNIFICANT CHANGE UP (ref 135–145)

## 2022-04-01 PROCEDURE — 99233 SBSQ HOSP IP/OBS HIGH 50: CPT

## 2022-04-01 RX ORDER — INFLUENZA VIRUS VACCINE 15; 15; 15; 15 UG/.5ML; UG/.5ML; UG/.5ML; UG/.5ML
0.5 SUSPENSION INTRAMUSCULAR ONCE
Refills: 0 | Status: DISCONTINUED | OUTPATIENT
Start: 2022-04-01 | End: 2022-04-02

## 2022-04-01 RX ADMIN — Medication 50 MILLIGRAM(S): at 05:12

## 2022-04-01 RX ADMIN — Medication 100 MILLIGRAM(S): at 11:51

## 2022-04-01 RX ADMIN — HEPARIN SODIUM 5000 UNIT(S): 5000 INJECTION INTRAVENOUS; SUBCUTANEOUS at 17:48

## 2022-04-01 RX ADMIN — Medication 50 MILLIGRAM(S): at 14:25

## 2022-04-01 RX ADMIN — HEPARIN SODIUM 5000 UNIT(S): 5000 INJECTION INTRAVENOUS; SUBCUTANEOUS at 05:12

## 2022-04-01 NOTE — PATIENT PROFILE ADULT - FALL HARM RISK - HARM RISK INTERVENTIONS

## 2022-04-01 NOTE — PATIENT PROFILE ADULT - HEALTH LITERACY
Subjective   Anahi Calix is a 78 y.o. female.     History of Present Illness     bp elevated 2 weeks.  Feels ok.   Was on lisinopril 20mg qd? And she says dr mcgee changed to 2.5mg bid.    Review of Systems   Constitutional: Negative for fatigue.   Skin:        Slight edema in legs.        Objective   Physical Exam   Constitutional: She is oriented to person, place, and time. She appears well-developed and well-nourished.   HENT:   Head: Normocephalic and atraumatic.   Right Ear: External ear normal.   Left Ear: External ear normal.   Nose: Nose normal.   Eyes: Conjunctivae and EOM are normal. Pupils are equal, round, and reactive to light.   Neck: Normal range of motion.   Pulmonary/Chest: Effort normal.   Musculoskeletal: Normal range of motion.   Neurological: She is alert and oriented to person, place, and time.   Psychiatric: She has a normal mood and affect. Her behavior is normal. Judgment and thought content normal.   Nursing note and vitals reviewed.      Assessment/Plan   Anahi was seen today for hypertension.    Diagnoses and all orders for this visit:    Essential hypertension  -     CBC (No Diff)  -     Comprehensive Metabolic Panel  -     TSH    Other orders  -     lisinopril (PRINIVIL,ZESTRIL) 10 MG tablet; Take 1 tablet by mouth Daily.      labwork 10/17 anemic after surgery.  Recheck cbc.   Make sure kidneys ok  Increase lisinopril to 10mg qd.   Call or return bp check 2 weeks.               no

## 2022-04-02 VITALS
DIASTOLIC BLOOD PRESSURE: 88 MMHG | SYSTOLIC BLOOD PRESSURE: 126 MMHG | TEMPERATURE: 98 F | HEART RATE: 88 BPM | OXYGEN SATURATION: 98 % | RESPIRATION RATE: 16 BRPM

## 2022-04-02 LAB
ALBUMIN SERPL ELPH-MCNC: 4.3 G/DL — SIGNIFICANT CHANGE UP (ref 3.3–5)
ALP SERPL-CCNC: 75 U/L — SIGNIFICANT CHANGE UP (ref 40–120)
ALT FLD-CCNC: 183 U/L — HIGH (ref 10–45)
AST SERPL-CCNC: 95 U/L — HIGH (ref 10–40)
BILIRUB DIRECT SERPL-MCNC: 0.1 MG/DL — SIGNIFICANT CHANGE UP (ref 0–0.3)
BILIRUB INDIRECT FLD-MCNC: 0.4 MG/DL — SIGNIFICANT CHANGE UP (ref 0.2–1)
BILIRUB SERPL-MCNC: 0.5 MG/DL — SIGNIFICANT CHANGE UP (ref 0.2–1.2)
PROT SERPL-MCNC: 6.6 G/DL — SIGNIFICANT CHANGE UP (ref 6–8.3)

## 2022-04-02 PROCEDURE — 85025 COMPLETE CBC W/AUTO DIFF WBC: CPT

## 2022-04-02 PROCEDURE — 83605 ASSAY OF LACTIC ACID: CPT

## 2022-04-02 PROCEDURE — 99285 EMERGENCY DEPT VISIT HI MDM: CPT | Mod: 25

## 2022-04-02 PROCEDURE — 85014 HEMATOCRIT: CPT

## 2022-04-02 PROCEDURE — 82565 ASSAY OF CREATININE: CPT

## 2022-04-02 PROCEDURE — 84100 ASSAY OF PHOSPHORUS: CPT

## 2022-04-02 PROCEDURE — 82435 ASSAY OF BLOOD CHLORIDE: CPT

## 2022-04-02 PROCEDURE — 84132 ASSAY OF SERUM POTASSIUM: CPT

## 2022-04-02 PROCEDURE — 82330 ASSAY OF CALCIUM: CPT

## 2022-04-02 PROCEDURE — 80076 HEPATIC FUNCTION PANEL: CPT

## 2022-04-02 PROCEDURE — 85018 HEMOGLOBIN: CPT

## 2022-04-02 PROCEDURE — 83735 ASSAY OF MAGNESIUM: CPT

## 2022-04-02 PROCEDURE — 96374 THER/PROPH/DIAG INJ IV PUSH: CPT

## 2022-04-02 PROCEDURE — 80053 COMPREHEN METABOLIC PANEL: CPT

## 2022-04-02 PROCEDURE — 70450 CT HEAD/BRAIN W/O DYE: CPT | Mod: MA

## 2022-04-02 PROCEDURE — 96361 HYDRATE IV INFUSION ADD-ON: CPT

## 2022-04-02 PROCEDURE — 84295 ASSAY OF SERUM SODIUM: CPT

## 2022-04-02 PROCEDURE — 71045 X-RAY EXAM CHEST 1 VIEW: CPT

## 2022-04-02 PROCEDURE — 80048 BASIC METABOLIC PNL TOTAL CA: CPT

## 2022-04-02 PROCEDURE — 93005 ELECTROCARDIOGRAM TRACING: CPT

## 2022-04-02 PROCEDURE — 80307 DRUG TEST PRSMV CHEM ANLYZR: CPT

## 2022-04-02 PROCEDURE — 82947 ASSAY GLUCOSE BLOOD QUANT: CPT

## 2022-04-02 PROCEDURE — U0003: CPT

## 2022-04-02 PROCEDURE — U0005: CPT

## 2022-04-02 PROCEDURE — 99239 HOSP IP/OBS DSCHRG MGMT >30: CPT

## 2022-04-02 PROCEDURE — 82803 BLOOD GASES ANY COMBINATION: CPT

## 2022-04-02 RX ADMIN — HEPARIN SODIUM 5000 UNIT(S): 5000 INJECTION INTRAVENOUS; SUBCUTANEOUS at 05:19

## 2022-04-02 RX ADMIN — Medication 100 MILLIGRAM(S): at 11:29

## 2022-04-02 RX ADMIN — Medication 50 MILLIGRAM(S): at 15:48

## 2022-04-02 RX ADMIN — Medication 1 TABLET(S): at 15:41

## 2022-04-02 RX ADMIN — Medication 50 MILLIGRAM(S): at 05:18

## 2022-04-02 NOTE — PROGRESS NOTE ADULT - PROBLEM SELECTOR PLAN 2
due to binge alcohol drinking  alcohol cessation counseling done
due to binge alcohol drinking  alcohol cessation counseling done  -f/up labs
due to binge alcohol drinking  alcohol cessation counseling done

## 2022-04-02 NOTE — DISCHARGE NOTE NURSING/CASE MANAGEMENT/SOCIAL WORK - NSDCPEFALRISK_GEN_ALL_CORE
For information on Fall & Injury Prevention, visit: [email or web address] OR  [email or web address] OR  https://www.cdc.gov/steadi/patient.html

## 2022-04-02 NOTE — DISCHARGE NOTE PROVIDER - HOSPITAL COURSE
35 yo male presents with seizure. Patient typically has 1 bottle ETOH daily, relatively stable (kenzie or whisky, 750mg). Occasionally tries to stop drinking, but relapses in a few weeks. Brother-in-law also with ETOH use in household, but he does not drink with him. will drink with friends, but also will drink alone. Attempted to stop driking 2 days prior to admission. Developed tremors within 12 hours of drinking (has occurred before). 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  In ED, no further seizures, no other complaints. No fever, chills, chest pain, sOB, abdominal pain, n/v/d/c. 35 yo male presents with seizure. Patient typically has 1 bottle ETOH daily, relatively stable (kenzie or whisky, 750mg). Occasionally tries to stop drinking, but relapses in a few weeks. Brother-in-law also with ETOH use in household, but he does not drink with him. will drink with friends, but also will drink alone. Attempted to stop driking 2 days prior to admission. Developed tremors within 12 hours of drinking (has occurred before). 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  In ED, no further seizures, no other complaints. No fever, chills, chest pain, sOB, abdominal pain, n/v/d/c.  He was treated with Ativan and tapering Librium--last dose will be today and then he will be discharged home today, spoke to Attending. 35 yo male presents with seizure. Patient typically has 1 bottle ETOH daily, relatively stable (kenzie or whisky, 750mg). Occasionally tries to stop drinking, but relapses in a few weeks. Brother-in-law also with ETOH use in household, but he does not drink with him. will drink with friends, but also will drink alone. Attempted to stop driking 2 days prior to admission. Developed tremors within 12 hours of drinking (has occurred before). 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  In ED, no further seizures, no other complaints. No fever, chills, chest pain, sOB, abdominal pain, n/v/d/c.  He was treated with Ativan and tapering Librium--last dose will be today and then he will be discharged home today, spoke to Attending. patient given outpatient resources from social work.

## 2022-04-02 NOTE — DISCHARGE NOTE PROVIDER - NSDCCPCAREPLAN_GEN_ALL_CORE_FT
PRINCIPAL DISCHARGE DIAGNOSIS  Diagnosis: Alcohol withdrawal  Assessment and Plan of Treatment: resolved, need to abstain from alcohol, follow up with PCP in 2-3 weeks      SECONDARY DISCHARGE DIAGNOSES  Diagnosis: Transaminitis  Assessment and Plan of Treatment: stable, follow up with PCP    Diagnosis: Alcohol withdrawal seizure  Assessment and Plan of Treatment: resolved, abstain from alcohol

## 2022-04-02 NOTE — PROGRESS NOTE ADULT - PROBLEM SELECTOR PLAN 1
-patient with witnessed seizures  prior MD:  -given seizure, will place on high risk taper.   -Noted AST/ALT, <3x ULN. Normal platelets, bilirubin, albumin. Feel comfortable giving librium.  -thiamine given. No nystagmus on exam, can continue po thiamine. If signs of wide based gait or confabulation can give IV thiamine.   -social work consult, patient wants to attempt to abstain from ETOH  -seizure precautions  -f/up labs
-patient with witnessed seizures  prior MD:  -given seizure, will place on high risk taper.   -Noted AST/ALT, <3x ULN. Normal platelets, bilirubin, albumin. Feel comfortable giving librium.  -thiamine given. No nystagmus on exam, can continue po thiamine. If signs of wide based gait or confabulation can give IV thiamine.   -social work consult, patient wants to attempt to abstain from ETOH  -seizure precautions
-patient with witnessed seizures  -given seizure, will place on high risk taper.   prior MD:  -Noted AST/ALT, <3x ULN. Normal platelets, bilirubin, albumin. Feel comfortable giving librium.  -thiamine given. No nystagmus on exam, can continue po thiamine. If signs of wide based gait or confabulation can give IV thiamine.   -social work consult, patient wants to attempt to abstain from ETOH  -seizure precautions

## 2022-04-02 NOTE — PROGRESS NOTE ADULT - ASSESSMENT
37 yo male admitted with ETOH withdrawal seizures. 
35 yo male admitted with ETOH withdrawal.
37 yo male admitted with ETOH withdrawal seizures.

## 2022-04-02 NOTE — PROGRESS NOTE ADULT - SUBJECTIVE AND OBJECTIVE BOX
Patient is a 36y old  Male who presents with a chief complaint of seizure (31 Mar 2022 13:47)      INTERVAL History of Present Illness/OVERNIGHT EVENTS: comfortable  librium taper finished 4/2 evening    MEDICATIONS  (STANDING):  chlordiazePOXIDE 50 milliGRAM(s) Oral every 8 hours  chlordiazePOXIDE   Oral   heparin   Injectable 5000 Unit(s) SubCutaneous every 12 hours  influenza   Vaccine 0.5 milliLiter(s) IntraMuscular once  thiamine 100 milliGRAM(s) Oral daily    MEDICATIONS  (PRN):  LORazepam     Tablet 2 milliGRAM(s) Oral every 2 hours PRN Symptom-triggered 2 point increase in CIWA-Ar  LORazepam   Injectable 2 milliGRAM(s) IV Push every 1 hour PRN Symptom-triggered: each CIWA -Ar score 8 or GREATER      Allergies    No Known Allergies    Intolerances        REVIEW OF SYSTEMS:  Negative unless otherwise specified above.    Vital Signs Last 24 Hrs  T(C): 37 (01 Apr 2022 10:04), Max: 37.1 (01 Apr 2022 04:43)  T(F): 98.6 (01 Apr 2022 10:04), Max: 98.8 (01 Apr 2022 06:30)  HR: 106 (01 Apr 2022 10:04) (89 - 106)  BP: 119/73 (01 Apr 2022 10:04) (102/60 - 121/71)  BP(mean): --  RR: 16 (01 Apr 2022 10:04) (16 - 18)  SpO2: 96% (01 Apr 2022 10:04) (96% - 99%)        PHYSICAL EXAM:  GENERAL: No apparent distress, appears stated age  HEAD:  Atraumatic, Normocephalic  EYES: Conjunctiva and sclera clear, no discharge  ENMT: Moist mucous membranes, no nasal discharge  NECK: Supple, no JVD  CHEST/LUNG: Clear to auscultation bilaterally, no wheeze or rales  HEART: Regular rhythm, no rubs or gallops  ABDOMEN: Soft, Nontender, Nondistended; Bowel sounds present  EXTREMITIES:  No cyanosis or edema  SKIN: No rash, no new discoloration  NERVOUS SYSTEM:  Alert & Oriented; Bilateral Lower extremity mobile, sensation to light touch intact      LABS:      Ca    9.6        31 Mar 2022 06:57          CAPILLARY BLOOD GLUCOSE          RADIOLOGY & ADDITIONAL TESTS:      Images reviewed personally    Consultant Notes Reviewed and Care Discussed with relevant Consultants.
Patient is a 36y old  Male who presents with a chief complaint of seizure (30 Mar 2022 18:27)      INTERVAL History of Present Illness/OVERNIGHT EVENTS: ambulatory without issues  informed about librium taper  motivated to quit alcohol    MEDICATIONS  (STANDING):  chlordiazePOXIDE 50 milliGRAM(s) Oral every 8 hours  chlordiazePOXIDE   Oral   heparin   Injectable 5000 Unit(s) SubCutaneous every 12 hours  thiamine 100 milliGRAM(s) Oral daily    MEDICATIONS  (PRN):  LORazepam     Tablet 2 milliGRAM(s) Oral every 2 hours PRN Symptom-triggered 2 point increase in CIWA-Ar  LORazepam   Injectable 2 milliGRAM(s) IV Push every 1 hour PRN Symptom-triggered: each CIWA -Ar score 8 or GREATER      Allergies    No Known Allergies    Intolerances        REVIEW OF SYSTEMS:  Negative unless otherwise specified above.    Vital Signs Last 24 Hrs  T(C): 36.9 (31 Mar 2022 12:16), Max: 37.4 (30 Mar 2022 19:33)  T(F): 98.4 (31 Mar 2022 12:16), Max: 99.3 (30 Mar 2022 19:33)  HR: 103 (31 Mar 2022 12:16) (80 - 139)  BP: 112/71 (31 Mar 2022 12:16) (104/70 - 132/88)  BP(mean): 99 (30 Mar 2022 19:33) (99 - 99)  RR: 18 (31 Mar 2022 12:16) (14 - 22)  SpO2: 97% (31 Mar 2022 12:16) (96% - 100%)    Height (cm): 170.2 (03-30 @ 14:02)  Weight (kg): 72.6 (03-30 @ 14:02)  BMI (kg/m2): 25.1 (03-30 @ 14:02)  BSA (m2): 1.84 (03-30 @ 14:02)    PHYSICAL EXAM:  GENERAL: No apparent distress, appears stated age  HEAD:  Atraumatic, Normocephalic  EYES: Conjunctiva and sclera clear, no discharge  ENMT: Moist mucous membranes, no nasal discharge  NECK: Supple, no JVD  CHEST/LUNG: Clear to auscultation bilaterally, no wheeze or rales  HEART: Regular rhythm, no rubs or gallops  ABDOMEN: Soft, Nontender, Nondistended; Bowel sounds present  EXTREMITIES:  No cyanosis or edema  SKIN: No rash, no new discoloration  NERVOUS SYSTEM:  Alert & Oriented; Bilateral Lower extremity mobile, sensation to light touch intact  No tremors  Steady gait      LABS:                        14.6   8.70  )-----------( 167      ( 30 Mar 2022 15:48 )             40.7     31 Mar 2022 06:57    139    |  99     |  9      ----------------------------<  96     3.6     |  24     |  0.74     Ca    9.6        31 Mar 2022 06:57  Phos  5.3       31 Mar 2022 06:57  Mg     2.2       31 Mar 2022 06:57    TPro  7.1    /  Alb  4.9    /  TBili  1.0    /  DBili  0.2    /  AST  87     /  ALT  83     /  AlkPhos  63     30 Mar 2022 19:38        CAPILLARY BLOOD GLUCOSE          RADIOLOGY & ADDITIONAL TESTS:      Images reviewed personally    Consultant Notes Reviewed and Care Discussed with relevant Consultants.
Patient is a 36y old  Male who presents with a chief complaint of seizure (02 Apr 2022 12:10)      SUBJECTIVE / OVERNIGHT EVENTS: Patient seen and examined at bedside. No acute events overnight. Patient out of window. Will end librium taper today. He has required 0 ativan PRN doses since admission.    REVIEW OF SYSTEMS: 11 point ROS negative except as above    MEDICATIONS  (STANDING):  chlordiazePOXIDE 50 milliGRAM(s) Oral every 12 hours  chlordiazePOXIDE 50 milliGRAM(s) Oral once  chlordiazePOXIDE   Oral   heparin   Injectable 5000 Unit(s) SubCutaneous every 12 hours  influenza   Vaccine 0.5 milliLiter(s) IntraMuscular once  multivitamin 1 Tablet(s) Oral daily  thiamine 100 milliGRAM(s) Oral daily    MEDICATIONS  (PRN):  LORazepam     Tablet 2 milliGRAM(s) Oral every 2 hours PRN Symptom-triggered 2 point increase in CIWA-Ar  LORazepam   Injectable 2 milliGRAM(s) IV Push every 1 hour PRN Symptom-triggered: each CIWA -Ar score 8 or GREATER      CAPILLARY BLOOD GLUCOSE        I&O's Summary    01 Apr 2022 07:01  -  02 Apr 2022 07:00  --------------------------------------------------------  IN: 480 mL / OUT: 0 mL / NET: 480 mL    02 Apr 2022 07:01  -  02 Apr 2022 13:43  --------------------------------------------------------  IN: 1000 mL / OUT: 1200 mL / NET: -200 mL        PHYSICAL EXAM:  Vital Signs Last 24 Hrs  T(C): 36.7 (02 Apr 2022 12:50), Max: 37.4 (01 Apr 2022 19:30)  T(F): 98.1 (02 Apr 2022 12:50), Max: 99.3 (01 Apr 2022 19:30)  HR: 88 (02 Apr 2022 12:50) (88 - 110)  BP: 126/88 (02 Apr 2022 12:50) (107/68 - 126/88)  BP(mean): --  RR: 16 (02 Apr 2022 12:50) (16 - 16)  SpO2: 98% (02 Apr 2022 12:50) (98% - 99%)    GEN: male in NAD, appears comfortable, no diaphoresis  EYES: No scleral injection, PERRL, EOMI  ENTM: neck supple & symmetric without tracheal deviation, moist membranes, no gross hearing impairment, thyroid gland not enlarged  CV: +S1/S2, no m/r/g, no abdominal bruit, no LE edema  RESP: breathing comfortably, no respiratory accessory muscle use, CTAB, no w/r/r  GI: normoactive BS, soft, NTND, no rebounding/guarding, no palpable masses    LABS:    04-01    138  |  103  |  11  ----------------------------<  120<H>  3.6   |  23  |  0.86    Ca    9.2      01 Apr 2022 14:29  Phos  2.7     04-01  Mg     1.7     04-01    TPro  6.6  /  Alb  4.3  /  TBili  0.5  /  DBili  0.1  /  AST  95<H>  /  ALT  183<H>  /  AlkPhos  75  04-02                RADIOLOGY & ADDITIONAL TESTS:  Results Reviewed:   Imaging Personally Reviewed:  Electrocardiogram Personally Reviewed:    COORDINATION OF CARE:  Care Discussed with Consultants/Other Providers [Y/N]:  Prior or Outpatient Records Reviewed [Y/N]:

## 2022-04-02 NOTE — DISCHARGE NOTE PROVIDER - CARE PROVIDER_API CALL
Daniel Cheema  INTERNAL MEDICINE  [address] [place] [id number]  Phone: [phone]  Fax: [phone]  Follow Up Time:

## 2022-04-02 NOTE — PROGRESS NOTE ADULT - NSPROGADDITIONALINFOA_GEN_ALL_CORE
possible dc 4/2 evening if no further issues
plan of care discussed with floor NP Reyes
Patient medically cleared for discharge after Librium and now out of window (tonight will be 3rd night). In addition, has required 0 PRN ativans.    Discharge Time: 35 minutes

## 2022-04-02 NOTE — DISCHARGE NOTE NURSING/CASE MANAGEMENT/SOCIAL WORK - PATIENT PORTAL LINK FT
You can access the FollowMyHealth Patient Portal offered by [facility] by registering at the following website: [email or web address]. By joining [email or web address]’s FollowMyHealth portal, you will also be able to view your health information using other applications (apps) compatible with our system.

## 2022-04-02 NOTE — DISCHARGE NOTE PROVIDER - NSDCMRMEDTOKEN_GEN_ALL_CORE_FT
mg oral tablet: 1 tab(s) orally every 6 hours, As Needed - for fever or pain   Multiple Vitamins oral tablet: 1 tab(s) orally once a day

## 2022-04-02 NOTE — PROGRESS NOTE ADULT - PROBLEM SELECTOR PLAN 3
Patient was a no show for appointment. A message was left stating that no more appointments were scheduled and patient would need to contact clinic to schedule more therapy sessions.     Chad Fields, PT, DPT
heparin SQ

## 2022-04-19 NOTE — PATIENT PROFILE ADULT - FALL HARM RISK - FALL HARM RISK
What Type Of Note Output Would You Prefer (Optional)?: Standard Output How Severe Is Your Rash?: moderate Is This A New Presentation, Or A Follow-Up?: Rash Other

## 2022-07-08 NOTE — ED PROVIDER NOTE - DATE/TIME 1
Jaren). His mother (Kacey) lives in [place]. Vivek has 4 siblings - Luis (33), Shade (30), Jake (25), and Rashmi (21-Sep-2018 00:24

## 2022-09-15 NOTE — ED ADULT NURSE NOTE - CHIEF COMPLAINT QUOTE
"CC: abdominal pain    HPI: 44 y.o. female with generalized abdominal pain associated with nausea, vomiting and diarrhea. Abdominal pain has been longstanding, started over one year ago and is constant but fluctuates in severity. She describes the discomfort as aching or bloating. The abdominal pain has been gradually worsening, is generalized and does not radiate. Reports worsening nausea and vomiting that is intermittent. Vomits up undigested food.     EGD 2021 unrevealing. Colonoscopy 2021 unrevealing. Path neg for celiac disease h.pylori, microscopic colitis, hyperplastic polyp. Plan repeat in 10 years.    Gastric emptying study showed delayed gastric emptying.     This was performed on opioids which she has been on daily for management of pain for the past one year.     Also endorses diarrhea that is watery and semi-solid which occurs 5 to 10 times per day (4-7 times per day).     For reflux she is taking Protonix 40 mg once daily.     Medical records reviewed. Additional history supplemented by nursing.     Past Medical History:   Diagnosis Date    Anxiety     Depression     Epilepsy     Headache     Lumbar herniated disc     PTSD (post-traumatic stress disorder)     Respiratory distress     pt was admitted to ICU post op due low O2    Thyroid nodule 03/29/2021    right superior pole (1.8 cm)     TIA (transient ischemic attack)      Review of Systems  General ROS: negative for chills, fever or weight loss  Cardiovascular ROS: no chest pain or dyspnea on exertion  Gastrointestinal ROS: + abdominal pain, +nausea, +vomiting intermittently, +change in bowel habits, no black/ bloody stools    Physical Examination  Ht 5' 7" (1.702 m)   Wt 82.9 kg (182 lb 12.2 oz)   LMP 02/23/2021   BMI 28.62 kg/m²   General appearance: alert, cooperative, no distress  HENT: Normocephalic, atraumatic, neck symmetrical, no nasal discharge   Lungs: clear to auscultation bilaterally, symmetric chest wall expansion bilaterally  Heart: " c/o right lower abd pain x 1 mos regular rate and rhythm without rub; no displacement of the PMI   Abdomen: soft, non-tender; bowel sounds normoactive; no organomegaly  Extremities: extremities symmetric; no clubbing, cyanosis, or edema  Neurologic: Alert and oriented X 3, normal strength, normal coordination and gait    Labs:  Lab Results   Component Value Date    WBC 12.15 08/15/2022    HGB 15.0 08/15/2022    HCT 42.4 08/15/2022    MCV 89 08/15/2022     08/15/2022     CMP  Sodium   Date Value Ref Range Status   08/15/2022 134 (L) 136 - 145 mmol/L Final     Potassium   Date Value Ref Range Status   08/15/2022 3.8 3.5 - 5.1 mmol/L Final     Chloride   Date Value Ref Range Status   08/15/2022 101 95 - 110 mmol/L Final     CO2   Date Value Ref Range Status   08/15/2022 22 (L) 23 - 29 mmol/L Final     Glucose   Date Value Ref Range Status   08/15/2022 105 70 - 110 mg/dL Final     BUN   Date Value Ref Range Status   08/15/2022 13 6 - 20 mg/dL Final     Creatinine   Date Value Ref Range Status   08/15/2022 0.9 0.5 - 1.4 mg/dL Final     Calcium   Date Value Ref Range Status   08/15/2022 9.8 8.7 - 10.5 mg/dL Final     Total Protein   Date Value Ref Range Status   08/15/2022 7.7 6.0 - 8.4 g/dL Final     Albumin   Date Value Ref Range Status   08/15/2022 3.9 3.5 - 5.2 g/dL Final     Total Bilirubin   Date Value Ref Range Status   08/15/2022 0.3 0.1 - 1.0 mg/dL Final     Comment:     For infants and newborns, interpretation of results should be based  on gestational age, weight and in agreement with clinical  observations.    Premature Infant recommended reference ranges:  Up to 24 hours.............<8.0 mg/dL  Up to 48 hours............<12.0 mg/dL  3-5 days..................<15.0 mg/dL  6-29 days.................<15.0 mg/dL       Alkaline Phosphatase   Date Value Ref Range Status   08/15/2022 60 55 - 135 U/L Final     AST   Date Value Ref Range Status   08/15/2022 18 10 - 40 U/L Final     ALT   Date Value Ref Range Status   08/15/2022 20 10 - 44 U/L  Final     Anion Gap   Date Value Ref Range Status   08/15/2022 11 8 - 16 mmol/L Final     eGFR if    Date Value Ref Range Status   09/25/2021 >60.0 >60 mL/min/1.73 m^2 Final     eGFR if non    Date Value Ref Range Status   09/25/2021 >60.0 >60 mL/min/1.73 m^2 Final     Comment:     Calculation used to obtain the estimated glomerular filtration  rate (eGFR) is the CKD-EPI equation.          Imaging:  NM Gastric emptying study:  Gastric emptying is abnormal with a markedly prolonged lag phase and a diminished poorly exponential degree of exponential emptying.  The linear fit T1/2 is prolonged measuring 209.62 minutes.  There is 18% gastric emptying at 58 minutes, 30% gastric emptying at 129 minutes, 39% gastric emptying at 179 minutes, and 61% gastric emptying at 239 minutes.  There should be greater than 50% gastric emptying at 129 minutes and greater than 90% gastric emptying at 239 minutes.  No gastroesophageal reflux was observed.     Abnormal gastric emptying study with a markedly prolonged lag phase and poorly exponential post lag phase gastric emptying.  The findings in the significant gastroparesis.     US Abd:  Impression:  Enlarged fatty liver.     Independently reviewed    Assessment:   Opioid induced delayed gastric motility  Irritable bowel syndrome with diarrhea  Chronic opioid use  Abdominal discomfort  Nausea and intermittent vomiting    Plan:  -This patient has been taking Norco daily for chronic back pain which is the most likely cause of delayed gastric motility  -Discussed that she should have repeat motility testing while off opioids for at least two weeks to ensure that gastric emptying is not delayed by opioids  -Would only use Reglan sparingly and as needed if symptoms   -Small, frequent pureed meals  -Anti-emetics as needed  -PPI daily  -Follow up with NP Price as needed for additional symptomatic management    40 minutes of total time spent on the encounter,  which includes face to face time and non-face to face time preparing to see the patient (eg, review of tests), obtaining and/or reviewing separately obtained history, documenting clinical information in the electronic or other health record, Independently interpreting results (not separately reported) and communicating results to the patient/family/caregiver, or care coordination (not separately reported).          Zeke Turner MD  [facility]  [address]  [place] [address]  Office: [phone]  Fax: [phone]

## 2022-10-19 ENCOUNTER — EMERGENCY (EMERGENCY)
Facility: HOSPITAL | Age: 37
LOS: 1 days | Discharge: HOME | End: 2022-10-19
Attending: EMERGENCY MEDICINE
Payer: MEDICAID

## 2022-10-19 VITALS
HEIGHT: 67 IN | DIASTOLIC BLOOD PRESSURE: 80 MMHG | HEART RATE: 98 BPM | RESPIRATION RATE: 18 BRPM | SYSTOLIC BLOOD PRESSURE: 116 MMHG | WEIGHT: 169.98 LBS | OXYGEN SATURATION: 95 % | TEMPERATURE: 98 F

## 2022-10-19 PROCEDURE — 99283 EMERGENCY DEPT VISIT LOW MDM: CPT | Mod: 25

## 2022-10-19 PROCEDURE — 12001 RPR S/N/AX/GEN/TRNK 2.5CM/<: CPT

## 2022-10-19 PROCEDURE — 90715 TDAP VACCINE 7 YRS/> IM: CPT

## 2022-10-19 PROCEDURE — 73130 X-RAY EXAM OF HAND: CPT | Mod: 26,LT

## 2022-10-19 PROCEDURE — 73130 X-RAY EXAM OF HAND: CPT

## 2022-10-19 PROCEDURE — 90471 IMMUNIZATION ADMIN: CPT

## 2022-10-19 RX ORDER — TETANUS TOXOID, REDUCED DIPHTHERIA TOXOID AND ACELLULAR PERTUSSIS VACCINE, ADSORBED 5; 2.5; 8; 8; 2.5 [IU]/.5ML; [IU]/.5ML; UG/.5ML; UG/.5ML; UG/.5ML
0.5 SUSPENSION INTRAMUSCULAR ONCE
Refills: 0 | Status: COMPLETED | OUTPATIENT
Start: 2022-10-19 | End: 2022-10-19

## 2022-10-19 RX ORDER — CEPHALEXIN 500 MG
1 CAPSULE ORAL
Qty: 21 | Refills: 0
Start: 2022-10-19 | End: 2022-10-25

## 2022-10-19 RX ORDER — CEPHALEXIN 500 MG
500 CAPSULE ORAL ONCE
Refills: 0 | Status: COMPLETED | OUTPATIENT
Start: 2022-10-19 | End: 2022-10-19

## 2022-10-19 RX ADMIN — Medication 500 MILLIGRAM(S): at 23:18

## 2022-10-19 RX ADMIN — TETANUS TOXOID, REDUCED DIPHTHERIA TOXOID AND ACELLULAR PERTUSSIS VACCINE, ADSORBED 0.5 MILLILITER(S): 5; 2.5; 8; 8; 2.5 SUSPENSION INTRAMUSCULAR at 21:34

## 2022-10-19 NOTE — ED PROVIDER NOTE - CLINICAL SUMMARY MEDICAL DECISION MAKING FREE TEXT BOX
Attending notes.  Laceration on dorsum of left hand secondary to a circular saw blade that kicked back.  Wound care, exposure to evaluate tendon rupture.  Repair.  Tdap is up-to-date.

## 2022-10-19 NOTE — ED PROVIDER NOTE - NS ED ATTENDING STATEMENT MOD
This was a shared visit with the DOM. I reviewed and verified the documentation and independently performed the documented:

## 2022-10-19 NOTE — ED ADULT NURSE NOTE - OBJECTIVE STATEMENT
38y/o male A&Ox3 speaking coherently independent p/w laceration on top of L hand from a small blade from . Open wound noted, no active bleeding. Pt states last tetanus shot was about 4/5 years ago. No loss of mobility/sensation. Does not appear to be in any acute distress. Safety and comfort measures provided. Bed locked and in lowest position, side rail up for safety. Call bell within reach.

## 2022-10-19 NOTE — ED PROVIDER NOTE - PROGRESS NOTE DETAILS
Pt xray with reading of small FB after initial flushing of wound. Pt received lidocaine and wound was flushed a second time and explored without visualization of questioned FB.  Believe FB too small for visualization/removal. Advised pt of finding. Spoke to Ortho (covering hand) in regards to partial tendon laceration on exam, pt with full range of motion with and without stress without focal deficits on exam. Ortho resident advised if no issues with flexion or extension on exam ED can perform laceration closure and pt can f/up in office. Laceration repaired, will d/c home with RX abx and hand f/up   Sandi Snider PA-C

## 2022-10-19 NOTE — ED PROVIDER NOTE - NSFOLLOWUPINSTRUCTIONS_ED_ALL_ED_FT
1. Please follow up with your Primary Care Doctor after discharge, bring a copy of your results to follow up appointment     2. Keep sutures dry x 24 hours, you may then clean gently with soap and water.  You may apply bacitracin ointment twice a day     3. Take antibiotics Keflex, 1 tab every 8 hours for the next week     4. Follow up with Orthopedic Hand Specialist after discharge for reevaluation and continued management. Please see office information below to call and schedule appointment:       Paige Harris)    [address], [facility] [address]    [place] [address]    Phone: [phone]    Fax: [phone]    5. We have reached out to our care coordinators to help schedule appointment and you should expect a call in the next 24-48 hours to expedite appointment     6. Recommend suture removal in 1 week. You may have sutures removed in the Orthopedic office or you may return to ED for suture removal     7. Return to ED  for any persistent/worsening or new symptoms including fevers, redness, swelling, discharge or any other concerns

## 2022-10-19 NOTE — ED PROVIDER NOTE - OBJECTIVE STATEMENT
37y M that is R hand dominant w/ no pertinent PMHx presents to the ED c/o lac to L first knuckle. Pt states cut himself w/ a circular metal blade from the kickback from a  while at work. Denies numbness, tingling. Last TDAP 5-6 years ago. NKDA. 37y M that is R hand dominant w/ no pertinent PMHx presents to the ED c/o lac to L first knuckle. Pt states cut himself w/ a circular metal blade from the kickback from a  while at work. Denies numbness, tingling. Unsure of last TDAP 5-6? years ago. NKDA. 37y M that is R hand dominant w/ no pertinent PMHx presents to the ED c/o lac to L first knuckle. Pt states cut himself w/ a circular metal blade from the kickback from a  while at work. Denies numbness, tingling. Unsure of last TDAP 5-6? years ago. NKDA.       Attending note.  Patient was seen in [facility].  Agree with above.  Patient was using a small circular saw with his right hand and kicked back cutting the dorsum of his left hand.  He states he saw some white ribbonlike structure under the skin.  He denies any numbness or paresthesia.  He has no loss of function of his left hand.  He reports tetanus in the last 10 years.  He has no allergies to medication.

## 2022-10-19 NOTE — ED PROVIDER NOTE - PATIENT PORTAL LINK FT
You can access the FollowMyHealth Patient Portal offered by [facility] by registering at the following website: [email or web address]. By joining [facility]’s FollowMyHealth portal, you will also be able to view your health information using other applications (apps) compatible with our system.

## 2022-10-19 NOTE — ED ADULT NURSE NOTE - NSIMPLEMENTINTERV_GEN_ALL_ED
Implemented All Universal Safety Interventions:  [place] to call system. Call bell, personal items and telephone within reach. Instruct patient to call for assistance. Room bathroom lighting operational. Non-slip footwear when patient is off stretcher. Physically safe environment: no spills, clutter or unnecessary equipment. Stretcher in lowest position, wheels locked, appropriate side rails in place.

## 2022-10-19 NOTE — ED PROVIDER NOTE - PHYSICAL EXAMINATION
Attending note.  Patient is alert and in no acute distress.  Examination of the left upper extremity reveals a 2 cm laceration on the dorsum of the left hand over the second metacarpal.  There is no visible foreign body.  Digits have full range of motion and are intact against resistance.  Sensation is intact and normal.

## 2023-01-27 ENCOUNTER — INPATIENT (INPATIENT)
Facility: HOSPITAL | Age: 38
LOS: 3 days | Discharge: HOME | DRG: 897 | End: 2023-01-31
Attending: STUDENT IN AN ORGANIZED HEALTH CARE EDUCATION/TRAINING PROGRAM | Admitting: HOSPITALIST
Payer: MEDICAID

## 2023-01-27 VITALS
TEMPERATURE: 99 F | HEIGHT: 66 IN | DIASTOLIC BLOOD PRESSURE: 83 MMHG | OXYGEN SATURATION: 96 % | SYSTOLIC BLOOD PRESSURE: 145 MMHG | HEART RATE: 128 BPM | RESPIRATION RATE: 20 BRPM | WEIGHT: 169.98 LBS

## 2023-01-27 DIAGNOSIS — R56.9 UNSPECIFIED CONVULSIONS: ICD-10-CM

## 2023-01-27 DIAGNOSIS — R74.01 ELEVATION OF LEVELS OF LIVER TRANSAMINASE LEVELS: ICD-10-CM

## 2023-01-27 DIAGNOSIS — R29.6 REPEATED FALLS: ICD-10-CM

## 2023-01-27 DIAGNOSIS — F10.239 ALCOHOL DEPENDENCE WITH WITHDRAWAL, UNSPECIFIED: ICD-10-CM

## 2023-01-27 DIAGNOSIS — Z29.9 ENCOUNTER FOR PROPHYLACTIC MEASURES, UNSPECIFIED: ICD-10-CM

## 2023-01-27 DIAGNOSIS — Z02.9 ENCOUNTER FOR ADMINISTRATIVE EXAMINATIONS, UNSPECIFIED: ICD-10-CM

## 2023-01-27 DIAGNOSIS — F10.939 ALCOHOL USE, UNSPECIFIED WITH WITHDRAWAL, UNSPECIFIED: ICD-10-CM

## 2023-01-27 LAB
ALBUMIN SERPL ELPH-MCNC: 5.1 G/DL — HIGH (ref 3.3–5)
ALP SERPL-CCNC: 82 U/L — SIGNIFICANT CHANGE UP (ref 40–120)
ALT FLD-CCNC: 141 U/L — HIGH (ref 10–45)
AMPHET UR-MCNC: NEGATIVE — SIGNIFICANT CHANGE UP
ANION GAP SERPL CALC-SCNC: 17 MMOL/L — SIGNIFICANT CHANGE UP (ref 5–17)
APTT BLD: 26.3 SEC — LOW (ref 27.5–35.5)
AST SERPL-CCNC: 130 U/L — HIGH (ref 10–40)
BARBITURATES UR SCN-MCNC: NEGATIVE — SIGNIFICANT CHANGE UP
BASE EXCESS BLDV CALC-SCNC: 1.6 MMOL/L — SIGNIFICANT CHANGE UP (ref -2–3)
BASOPHILS # BLD AUTO: 0.08 K/UL — SIGNIFICANT CHANGE UP (ref 0–0.2)
BASOPHILS NFR BLD AUTO: 0.7 % — SIGNIFICANT CHANGE UP (ref 0–2)
BENZODIAZ UR-MCNC: NEGATIVE — SIGNIFICANT CHANGE UP
BILIRUB SERPL-MCNC: 1.2 MG/DL — SIGNIFICANT CHANGE UP (ref 0.2–1.2)
BLOOD GAS VENOUS - CREATININE: SIGNIFICANT CHANGE UP MG/DL (ref 0.5–1.3)
BUN SERPL-MCNC: 11 MG/DL — SIGNIFICANT CHANGE UP (ref 7–23)
CA-I SERPL-SCNC: 1.19 MMOL/L — SIGNIFICANT CHANGE UP (ref 1.15–1.33)
CALCIUM SERPL-MCNC: 10.2 MG/DL — SIGNIFICANT CHANGE UP (ref 8.4–10.5)
CHLORIDE BLDV-SCNC: 97 MMOL/L — SIGNIFICANT CHANGE UP (ref 96–108)
CHLORIDE SERPL-SCNC: 94 MMOL/L — LOW (ref 96–108)
CK SERPL-CCNC: 1176 U/L — HIGH (ref 30–200)
CO2 BLDV-SCNC: 26 MMOL/L — SIGNIFICANT CHANGE UP (ref 22–26)
CO2 SERPL-SCNC: 22 MMOL/L — SIGNIFICANT CHANGE UP (ref 22–31)
COCAINE METAB.OTHER UR-MCNC: NEGATIVE — SIGNIFICANT CHANGE UP
CREAT SERPL-MCNC: 0.76 MG/DL — SIGNIFICANT CHANGE UP (ref 0.5–1.3)
EGFR: 120 ML/MIN/1.73M2 — SIGNIFICANT CHANGE UP
EOSINOPHIL # BLD AUTO: 0 K/UL — SIGNIFICANT CHANGE UP (ref 0–0.5)
EOSINOPHIL NFR BLD AUTO: 0 % — SIGNIFICANT CHANGE UP (ref 0–6)
ETHANOL SERPL-MCNC: <10 MG/DL — SIGNIFICANT CHANGE UP (ref 0–10)
FLUAV AG NPH QL: SIGNIFICANT CHANGE UP
FLUBV AG NPH QL: SIGNIFICANT CHANGE UP
GAS PNL BLDV: 128 MMOL/L — LOW (ref 136–145)
GAS PNL BLDV: SIGNIFICANT CHANGE UP
GAS PNL BLDV: SIGNIFICANT CHANGE UP
GLUCOSE BLDV-MCNC: 143 MG/DL — HIGH (ref 70–99)
GLUCOSE SERPL-MCNC: 138 MG/DL — HIGH (ref 70–99)
HCO3 BLDV-SCNC: 25 MMOL/L — SIGNIFICANT CHANGE UP (ref 22–29)
HCT VFR BLD CALC: 43.9 % — SIGNIFICANT CHANGE UP (ref 39–50)
HCT VFR BLDA CALC: 47 % — SIGNIFICANT CHANGE UP (ref 39–51)
HGB BLD CALC-MCNC: 15.7 G/DL — SIGNIFICANT CHANGE UP (ref 12.6–17.4)
HGB BLD-MCNC: 15 G/DL — SIGNIFICANT CHANGE UP (ref 13–17)
IMM GRANULOCYTES NFR BLD AUTO: 0.4 % — SIGNIFICANT CHANGE UP (ref 0–0.9)
INR BLD: 1.03 RATIO — SIGNIFICANT CHANGE UP (ref 0.88–1.16)
LACTATE BLDV-MCNC: 2.4 MMOL/L — HIGH (ref 0.5–2)
LYMPHOCYTES # BLD AUTO: 0.51 K/UL — LOW (ref 1–3.3)
LYMPHOCYTES # BLD AUTO: 4.2 % — LOW (ref 13–44)
MAGNESIUM SERPL-MCNC: 2.2 MG/DL — SIGNIFICANT CHANGE UP (ref 1.6–2.6)
MCHC RBC-ENTMCNC: 31.3 PG — SIGNIFICANT CHANGE UP (ref 27–34)
MCHC RBC-ENTMCNC: 34.2 GM/DL — SIGNIFICANT CHANGE UP (ref 32–36)
MCV RBC AUTO: 91.5 FL — SIGNIFICANT CHANGE UP (ref 80–100)
METHADONE UR-MCNC: NEGATIVE — SIGNIFICANT CHANGE UP
MONOCYTES # BLD AUTO: 0.8 K/UL — SIGNIFICANT CHANGE UP (ref 0–0.9)
MONOCYTES NFR BLD AUTO: 6.6 % — SIGNIFICANT CHANGE UP (ref 2–14)
NEUTROPHILS # BLD AUTO: 10.6 K/UL — HIGH (ref 1.8–7.4)
NEUTROPHILS NFR BLD AUTO: 88.1 % — HIGH (ref 43–77)
NRBC # BLD: 0 /100 WBCS — SIGNIFICANT CHANGE UP (ref 0–0)
OPIATES UR-MCNC: NEGATIVE — SIGNIFICANT CHANGE UP
OXYCODONE UR-MCNC: NEGATIVE — SIGNIFICANT CHANGE UP
PCO2 BLDV: 34 MMHG — LOW (ref 42–55)
PCP SPEC-MCNC: SIGNIFICANT CHANGE UP
PCP UR-MCNC: NEGATIVE — SIGNIFICANT CHANGE UP
PH BLDV: 7.47 — HIGH (ref 7.32–7.43)
PHOSPHATE SERPL-MCNC: 2.1 MG/DL — LOW (ref 2.5–4.5)
PLATELET # BLD AUTO: 189 K/UL — SIGNIFICANT CHANGE UP (ref 150–400)
PO2 BLDV: 71 MMHG — HIGH (ref 25–45)
POTASSIUM BLDV-SCNC: 4.2 MMOL/L — SIGNIFICANT CHANGE UP (ref 3.5–5.1)
POTASSIUM SERPL-MCNC: 4.1 MMOL/L — SIGNIFICANT CHANGE UP (ref 3.5–5.3)
POTASSIUM SERPL-SCNC: 4.1 MMOL/L — SIGNIFICANT CHANGE UP (ref 3.5–5.3)
PROT SERPL-MCNC: 7.9 G/DL — SIGNIFICANT CHANGE UP (ref 6–8.3)
PROTHROM AB SERPL-ACNC: 12 SEC — SIGNIFICANT CHANGE UP (ref 10.5–13.4)
RBC # BLD: 4.8 M/UL — SIGNIFICANT CHANGE UP (ref 4.2–5.8)
RBC # FLD: 12.5 % — SIGNIFICANT CHANGE UP (ref 10.3–14.5)
RSV RNA NPH QL NAA+NON-PROBE: SIGNIFICANT CHANGE UP
SAO2 % BLDV: 95.1 % — HIGH (ref 67–88)
SARS-COV-2 RNA SPEC QL NAA+PROBE: SIGNIFICANT CHANGE UP
SODIUM SERPL-SCNC: 133 MMOL/L — LOW (ref 135–145)
THC UR QL: NEGATIVE — SIGNIFICANT CHANGE UP
WBC # BLD: 12.04 K/UL — HIGH (ref 3.8–10.5)
WBC # FLD AUTO: 12.04 K/UL — HIGH (ref 3.8–10.5)

## 2023-01-27 PROCEDURE — 70450 CT HEAD/BRAIN W/O DYE: CPT | Mod: 26

## 2023-01-27 PROCEDURE — 99285 EMERGENCY DEPT VISIT HI MDM: CPT

## 2023-01-27 PROCEDURE — 99223 1ST HOSP IP/OBS HIGH 75: CPT | Mod: GC

## 2023-01-27 RX ORDER — SODIUM CHLORIDE 9 MG/ML
1000 INJECTION INTRAMUSCULAR; INTRAVENOUS; SUBCUTANEOUS ONCE
Refills: 0 | Status: COMPLETED | OUTPATIENT
Start: 2023-01-27 | End: 2023-01-27

## 2023-01-27 RX ORDER — SODIUM,POTASSIUM PHOSPHATES 278-250MG
2 POWDER IN PACKET (EA) ORAL EVERY 6 HOURS
Refills: 0 | Status: COMPLETED | OUTPATIENT
Start: 2023-01-27 | End: 2023-01-28

## 2023-01-27 RX ORDER — THIAMINE MONONITRATE (VIT B1) 100 MG
100 TABLET ORAL DAILY
Refills: 0 | Status: COMPLETED | OUTPATIENT
Start: 2023-01-27 | End: 2023-01-30

## 2023-01-27 RX ORDER — FOLIC ACID 0.8 MG
1 TABLET ORAL DAILY
Refills: 0 | Status: DISCONTINUED | OUTPATIENT
Start: 2023-01-27 | End: 2023-01-31

## 2023-01-27 RX ADMIN — Medication 2 TABLET(S): at 22:36

## 2023-01-27 RX ADMIN — SODIUM CHLORIDE 1000 MILLILITER(S): 9 INJECTION INTRAMUSCULAR; INTRAVENOUS; SUBCUTANEOUS at 13:34

## 2023-01-27 RX ADMIN — Medication 50 MILLIGRAM(S): at 23:15

## 2023-01-27 RX ADMIN — SODIUM CHLORIDE 1000 MILLILITER(S): 9 INJECTION INTRAMUSCULAR; INTRAVENOUS; SUBCUTANEOUS at 12:23

## 2023-01-27 RX ADMIN — Medication 1 MILLIGRAM(S): at 14:32

## 2023-01-27 RX ADMIN — SODIUM CHLORIDE 1000 MILLILITER(S): 9 INJECTION INTRAMUSCULAR; INTRAVENOUS; SUBCUTANEOUS at 14:44

## 2023-01-27 RX ADMIN — Medication 2 TABLET(S): at 15:36

## 2023-01-27 RX ADMIN — Medication 50 MILLIGRAM(S): at 15:46

## 2023-01-27 RX ADMIN — Medication 2 MILLIGRAM(S): at 12:23

## 2023-01-27 NOTE — H&P ADULT - NSHPPHYSICALEXAM_GEN_ALL_CORE
VITAL SIGNS:  T(F): 98.8 (01-27-23 @ 11:17)  HR: 122 (01-27-23 @ 12:16)  BP: 128/90 (01-27-23 @ 12:16)  RR: 18 (01-27-23 @ 12:16)  SpO2: 100% (01-27-23 @ 12:16)  Wt(kg): --    PHYSICAL EXAM:    Constitutional: WDWN, NAD  HEENT: PERRL, EOMI, sclera non-icteric, neck supple, trachea midline, no masses, no JVD, MMM, good dentition  Respiratory: CTA b/l, good air entry b/l, no wheezing, no rhonchi, no rales, without accessory muscle use and no intercostal retractions  Cardiovascular: RRR, normal S1S2, no M/R/G  Gastrointestinal: soft, NTND, no masses palpable, BS normal  Extremities: Warm, well perfused, pulses equal bilateral upper and lower extremities, no edema, no clubbing. Capillary refill <2 sec  Neurological: AAOx3, CN Grossly intact  Skin: Normal temperature, warm, dry VITAL SIGNS:  T(F): 98.8 (01-27-23 @ 11:17)  HR: 122 (01-27-23 @ 12:16)  BP: 128/90 (01-27-23 @ 12:16)  RR: 18 (01-27-23 @ 12:16)  SpO2: 100% (01-27-23 @ 12:16)  Wt(kg): --    PHYSICAL EXAM:    Constitutional: WDWN, NAD  HEENT: PERRL, EOMI, sclera non-icteric, neck supple, trachea midline, no masses, no JVD, MMM, good dentition, +tremulous tongue   Respiratory: CTA b/l, good air entry b/l, no wheezing, no rhonchi, no rales, without accessory muscle use and no intercostal retractions  Cardiovascular: RRR, normal S1S2, no M/R/G  Gastrointestinal: soft, NTND, no masses palpable, BS normal  Extremities: Warm, well perfused, pulses equal bilateral upper and lower extremities, no edema, no clubbing. Capillary refill <2 sec. No asterixes appreciated. +mild tremors in the b/l hands.   Neurological: AAOx4, CN Grossly intact, no pathology in finger to nose movements bilaterally.   Skin: Normal temperature, warm, dry  Psych: normal mood and affect   Endocrine: no dorsocervical fat pad, no acanthosis nigricans, no abdominal striae

## 2023-01-27 NOTE — H&P ADULT - PROBLEM SELECTOR PLAN 3
- patient with prior hx of alcohol withdrawal related seizure in Spring 2022. Per hx (collateral from patient's father) patient with 4 minutes of shaking seizure-like activity followed by 3 minutes of post-ictal period   - librium taper given high risk of seizures, Initial CIWA protocol, librium taper w/ Ativan IV PRNs  - reactive leukocytosis likely iso seizure  - Lactate on VBG of 2.4 on admission, f/u AM VBG w/ lactate for resolution. s/p 2L IVF   - f/u serum lactate, CK  - seizure precautions, fall precautions

## 2023-01-27 NOTE — ED PROVIDER NOTE - CLINICAL SUMMARY MEDICAL DECISION MAKING FREE TEXT BOX
37-year-old male, with past medical history of alcohol abuse, with prior hospitalizations for alcohol withdrawal and withdrawal related seizures, presenting after seizure-like episode today in the setting of increased alcohol drinking.  Denies any auditory/visual/tactile hallucinations, CP, abd pain, SOB, visual changes.    VS show tachycardia 128 bpm, otherwise vs wnl. PE shows tongue lac, and back abrasion. normal heart and lung sounds 37-year-old male, with past medical history of alcohol abuse, with prior hospitalizations for alcohol withdrawal and withdrawal related seizures, presenting after seizure-like episode today in the setting of recent binge drinking.  Denies any auditory/visual/tactile hallucinations, CP, abd pain, SOB, visual changes. No HA, NV, neck pain, back pain, abd pain.     VS show tachycardia 128 bpm, otherwise vs wnl. PE shows tongue lac, and L sided mid back abrasion. No scalp hematomas or TTP, no midline TTP CTL spine. normal heart and lung sounds. No chest wall crepitus. No abd TTP. A&Ox3, tremulous. CN intact. PERRL. motor and sensation WNL.     Will tx for acute alcohol WD. Pt placed on CIWA. Check labs for electrolyte abn, BZPs. Defer ct imaging for now given normal neuro exam, no HA, vision change. TBA.

## 2023-01-27 NOTE — H&P ADULT - NSHPREVIEWOFSYSTEMS_GEN_ALL_CORE
REVIEW OF SYSTEMS:    CONSTITUTIONAL:  No weakness, fevers or chills  EYES/ENT:  No visual changes;  No vertigo or throat pain   NECK:  No pain or stiffness  RESPIRATORY:  No cough, wheezing, hemoptysis; No shortness of breath  CARDIOVASCULAR:  No chest pain or palpitations  GASTROINTESTINAL:  No abdominal or epigastric pain. No nausea, vomiting, or hematemesis; No diarrhea or constipation. No melena or hematochezia.  GENITOURINARY:  No dysuria, frequency or hematuria  NEUROLOGICAL:  No numbness or weakness  SKIN:  No itching, rashes REVIEW OF SYSTEMS:    CONSTITUTIONAL:  No weakness, fevers or chills  EYES/ENT:  No visual changes;  No vertigo or throat pain   NECK:  No pain or stiffness, no sore throat   RESPIRATORY:  No cough, wheezing, hemoptysis; No shortness of breath  CARDIOVASCULAR:  No chest pain or palpitations, no leg swelling or cyanosis   GASTROINTESTINAL:  No abdominal or epigastric pain. No nausea, vomiting, or hematemesis; No diarrhea or constipation. No melena or hematochezia.  GENITOURINARY:  No dysuria, frequency or hematuria  NEUROLOGICAL:  No numbness or weakness, no tingling  ENDOCRINE: no polyuria or polydipsia, no recent changes in weight  HEME: no history of prolonged bruising or bleeding with surgeries or trauma, no history of bleeding disorders   SKIN:  No itching, rashes, no pruritis  PSYCH: no mood swings or changes in affect, no depressive thoughts

## 2023-01-27 NOTE — H&P ADULT - NSHPLABSRESULTS_GEN_ALL_CORE
LABS:                        15.0   12.04 )-----------( 189      ( 27 Jan 2023 12:29 )             43.9     01-27    133<L>  |  94<L>  |  11  ----------------------------<  138<H>  4.1   |  22  |  0.76    Ca    10.2      27 Jan 2023 12:29  Phos  2.1     01-27  Mg     2.2     01-27    TPro  7.9  /  Alb  5.1<H>  /  TBili  1.2  /  DBili  x   /  AST  130<H>  /  ALT  141<H>  /  AlkPhos  82  01-27          RADIOLOGY & ADDITIONAL TESTS:  Reviewed

## 2023-01-27 NOTE — H&P ADULT - PROBLEM SELECTOR PLAN 1
- Initiate librium taper w/ Ativan PRNS  - trend CMP qd, replete lytes PRN  - - last drink 5 pm yesterday, alcohol level of <10 on admission. Per history patient drinks 12 beers and one bottle of wine per day on average. History of prior admission in Spring 2022 for alcohol withdrawal and seizure.   - Initiate CIWA protocol   - Initiate librium taper w/ Ativan IV PRNS   - trend CMP qd, replete lytes PRN  - - last drink 5 pm yesterday, alcohol level of <10 on admission. Per history patient drinks 12 beers and one bottle of wine per day on average. History of prior admission in Spring 2022 for alcohol withdrawal and seizure.   - Initiate CIWA protocol   - Initiate librium taper w/ Ativan IV PRNS   - trend CMP qd, replete lytes PRN  --MVI, thiamine, folate

## 2023-01-27 NOTE — H&P ADULT - SOCIAL HISTORY: ALCOHOL USE
1 bottle of whisky or kenzie daily, last drink was yesterday at 5 pm Patient drinks 12 beers and a bottle of wine per day on average

## 2023-01-27 NOTE — ED PROVIDER NOTE - PHYSICAL EXAMINATION
LOS:     VITALS:   T(C): 37.1 (01-27-23 @ 11:17), Max: 37.1 (01-27-23 @ 11:17)  HR: 128 (01-27-23 @ 11:17) (128 - 128)  BP: 145/83 (01-27-23 @ 11:17) (145/83 - 145/83)  RR: 20 (01-27-23 @ 11:17) (20 - 20)  SpO2: 96% (01-27-23 @ 11:17) (96% - 96%)    GENERAL: Anxious, mildly tremulous  HEAD:  Atraumatic, Normocephalic  EYES: EOMI, PERRLA, conjunctiva and sclera clear  ENT: Moist mucous membranes; tongue laceration with blood - not actively bleeding  NECK: Supple, No JVD  CHEST/LUNG: Clear to auscultation bilaterally; No rales, rhonchi, wheezing, or rubs. Unlabored respirations  HEART: Regular rate and rhythm; No murmurs, rubs, or gallops  ABDOMEN: Soft, nontender, nondistended  BACK: abrasion on L back  EXTREMITIES:  No edema or tenderness to palpation  NERVOUS SYSTEM:  A&Ox3, no focal deficits   SKIN: No rashes or lesions

## 2023-01-27 NOTE — H&P ADULT - ASSESSMENT
37 year old male with hx of alcohol withdrawal seizures p/w alcohol withdrawal seizure prior to admission and in alcohol withdrawal last drink yesterday at 5 pm, started on Librium taper .  37 year old male with hx of alcohol withdrawal seizures p/w alcohol withdrawal seizure prior to admission and in alcohol withdrawal last drink yesterday at 5 pm, started on Librium taper with Ativan PRNs ordered. Admitted to medicine for management of  alcohol withdrawal. Per collateral and history, patient with unwitnessed fall 2/2 alcohol withdrawal seizure, currently awaiting CT head imaging.

## 2023-01-27 NOTE — ED ADULT NURSE NOTE - OBJECTIVE STATEMENT
Patient arrived in department accompanied by father who is at the bedside. Patient pleasantly cooperative and answering questions appropriately. Witnessed seizure reported with known hx of alcoholism. Some anxiety noted. Denies chest pain and shortness of breath. Nondiaphoretic. Respirations are even, unlabored, and without increased work of breathing. Mild nausea reported. No vomiting. 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 Denies auditory hallucinations.

## 2023-01-27 NOTE — H&P ADULT - ATTENDING COMMENTS
37M w/ PMHx EtOH use and withdrawal seizures presenting for similar complaint, last drink 1/26 late afternoon. Pt was on roof today, unclear if pt fell, however was seizing for 4 minutes per collateral from father. CIWA in ED 8 with improvement to 4.     #seizure i/s/o withdrawal  #EtOH withdrawal  #Transaminitis    -High risk CIWA w/ librium taper and PRN ativan  -MVI, thiamine, folate  -CTH to r/o trauma as unclear if pt fell  -SBIRT, SW consult  -repeat lytes and lactate in AM  -given transaminitis, f/u coags for Vicente to see if pt requires steroids    plan d/w HS 6

## 2023-01-27 NOTE — H&P ADULT - PROBLEM SELECTOR PLAN 2
- librium taper given high risk of seizures,  - reactive leukocytosis likely iso seizure  - f/u serum lactate, CK, s/p IVF resuscitation   - seizure precautions, fall precautions - unwitnessed fall 2/2 alcohol withdrawal seizure   -Per hx (collateral from patient's father) patient with 4 minutes of shaking seizure-like activity followed by 3 minutes of post-ictal period   - Neurological exam non-focal, however given unwitnessed fall, will obtain CT head imaging.

## 2023-01-27 NOTE — H&P ADULT - PROBLEM SELECTOR PLAN 4
- CTM trend LFTs  - RUQUS - AST of 130/ ALT of 141 on admission   - no TTP in the RUQ of the abdomen and no TTP in all other quadrants   - CTM trend LFTs, if no improvement in LFT's will consider RUQUS.   - f/u coagulation panel to calculate MDF

## 2023-01-27 NOTE — H&P ADULT - HISTORY OF PRESENT ILLNESS
: 37-year-old male, with past medical history of alcohol abuse, with prior hospitalizations for alcohol withdrawal and withdrawal related seizures, presenting after seizure-like episode today in the setting of increased alcohol drinking.  Patient and dad report that patient was at work today, on a roof, when he slid and started having seizure-like episode.  Patient did not fall off roof, did not hit head.  Patient reports feeling anxious, feeling nauseous, tongue laceration.  Patient had increased alcohol intake over the last 2 weeks, last drink was yesterday at around 5 PM, reports splitting 2 bottles of whiskey amongst the group of 5 friends.  Otherwise, has been feeling at baseline this week. Denies any auditory/visual/tactile hallucinations, CP, abd pain, SOB, visual changes.   37-year-old male, with past medical history of alcohol abuse, with prior hospitalizations for alcohol withdrawal and withdrawal related seizures, presenting after seizure-like episode today in the setting of alcohol intake. Patient's father was called for collateral regarding seizure-like activity. Per patient's father, patient was on a roof when the seizure-like activity occurred. Per patient and patient's father, patient did not fall off of the roof. Patient's father reported that the patient had 7 total minutes of seizure-like activity, reporting 4 minutes of shaking-seizure like activity followed by 3 minutes where the patient was in a post-ictal state. Patient does not recall being on the fall for the seizure and patient's father did not witness the patient fall. Per patient, no urinary or bowel incontinence occurred, however he did report biting his tongue Patient states that his last drink was approximately 5 pm yesterday. Patient states that he typically drinks 12 bottles of beer per day as well as one bottle of wine. Otherwise patient reports feeling at baseline this week, denies any auditory/tactile hallucinations, CP, abdominal pain, SOB, visual, changes, nausea, vomiting, hematemesis, headache. Patient denies changes taking any medications and reports taking multivitamin supplements that he gets from the grocery store.    Of note, patient's last hospitalization in Spring of 2022 in which he presented for alcohol withdrawal and alcohol withdrawal-related seizure. Patient denies any history of other seizures other than previously mentioned. Denies of FH of neurological disorders.

## 2023-01-27 NOTE — ED PROVIDER NOTE - NSICDXPASTMEDICALHX_GEN_ALL_CORE_FT
PAST MEDICAL HISTORY:  History of seizure due to alcohol withdrawal     No pertinent past medical history

## 2023-01-27 NOTE — H&P ADULT - PROBLEM SELECTOR PLAN 5
DVT ppx: SCDs for now iso of pending CT head imaging   Diet: Regular   Dispo:TBD     Discharge planning issues  Transitions of Care Status:  1.  Name of PCP:  2.  PCP Contacted on Admission: [ ] Y    [ ] N    3.  PCP contacted at Discharge: [ ] Y    [ ] N    [ ] N/A  4.  Post-Discharge Appointment Date and Location:  5.  Summary of Handoff given to PCP:

## 2023-01-27 NOTE — ED PROVIDER NOTE - OBJECTIVE STATEMENT
37-year-old male, with past medical history of alcohol abuse, with prior hospitalizations for alcohol withdrawal and withdrawal related seizures, presenting after seizure-like episode today in the setting of increased alcohol drinking.  Patient and dad report that patient was at work today, on a roof, when he slid and started having seizure-like episode.  Patient did not fall off roof, did not hit head.  Patient reports feeling anxious, feeling nauseous, tongue laceration.  Patient had increased alcohol intake over the last 2 weeks, last drink was yesterday at around 5 PM, reports splitting 2 bottles of whiskey amongst the group of 5 friends.  Otherwise, has been feeling at baseline this week. Denies any auditory/visual/tactile hallucinations, CP, abd pain, SOB, visual changes.

## 2023-01-27 NOTE — H&P ADULT - NSHPSOCIALHISTORY_GEN_ALL_CORE
Patient lives with mother and brother. Works as a . Requires no help or assistance in maintaining ADLs.

## 2023-01-28 LAB
ALBUMIN SERPL ELPH-MCNC: 4.7 G/DL — SIGNIFICANT CHANGE UP (ref 3.3–5)
ALP SERPL-CCNC: 68 U/L — SIGNIFICANT CHANGE UP (ref 40–120)
ALT FLD-CCNC: 118 U/L — HIGH (ref 10–45)
ANION GAP SERPL CALC-SCNC: 12 MMOL/L — SIGNIFICANT CHANGE UP (ref 5–17)
APTT BLD: 28.9 SEC — SIGNIFICANT CHANGE UP (ref 27.5–35.5)
AST SERPL-CCNC: 118 U/L — HIGH (ref 10–40)
BILIRUB DIRECT SERPL-MCNC: 0.3 MG/DL — SIGNIFICANT CHANGE UP (ref 0–0.3)
BILIRUB INDIRECT FLD-MCNC: 1.1 MG/DL — HIGH (ref 0.2–1)
BILIRUB SERPL-MCNC: 1.4 MG/DL — HIGH (ref 0.2–1.2)
BUN SERPL-MCNC: 8 MG/DL — SIGNIFICANT CHANGE UP (ref 7–23)
CALCIUM SERPL-MCNC: 9.8 MG/DL — SIGNIFICANT CHANGE UP (ref 8.4–10.5)
CHLORIDE SERPL-SCNC: 102 MMOL/L — SIGNIFICANT CHANGE UP (ref 96–108)
CO2 SERPL-SCNC: 24 MMOL/L — SIGNIFICANT CHANGE UP (ref 22–31)
CREAT SERPL-MCNC: 0.79 MG/DL — SIGNIFICANT CHANGE UP (ref 0.5–1.3)
EGFR: 119 ML/MIN/1.73M2 — SIGNIFICANT CHANGE UP
GLUCOSE SERPL-MCNC: 88 MG/DL — SIGNIFICANT CHANGE UP (ref 70–99)
HCT VFR BLD CALC: 42 % — SIGNIFICANT CHANGE UP (ref 39–50)
HGB BLD-MCNC: 14.4 G/DL — SIGNIFICANT CHANGE UP (ref 13–17)
INR BLD: 1.03 RATIO — SIGNIFICANT CHANGE UP (ref 0.88–1.16)
MAGNESIUM SERPL-MCNC: 2.3 MG/DL — SIGNIFICANT CHANGE UP (ref 1.6–2.6)
MCHC RBC-ENTMCNC: 31.8 PG — SIGNIFICANT CHANGE UP (ref 27–34)
MCHC RBC-ENTMCNC: 34.3 GM/DL — SIGNIFICANT CHANGE UP (ref 32–36)
MCV RBC AUTO: 92.7 FL — SIGNIFICANT CHANGE UP (ref 80–100)
NRBC # BLD: 0 /100 WBCS — SIGNIFICANT CHANGE UP (ref 0–0)
PHOSPHATE SERPL-MCNC: 5.5 MG/DL — HIGH (ref 2.5–4.5)
PLATELET # BLD AUTO: 172 K/UL — SIGNIFICANT CHANGE UP (ref 150–400)
POTASSIUM SERPL-MCNC: 4 MMOL/L — SIGNIFICANT CHANGE UP (ref 3.5–5.3)
POTASSIUM SERPL-SCNC: 4 MMOL/L — SIGNIFICANT CHANGE UP (ref 3.5–5.3)
PROT SERPL-MCNC: 7.1 G/DL — SIGNIFICANT CHANGE UP (ref 6–8.3)
PROTHROM AB SERPL-ACNC: 12 SEC — SIGNIFICANT CHANGE UP (ref 10.5–13.4)
RBC # BLD: 4.53 M/UL — SIGNIFICANT CHANGE UP (ref 4.2–5.8)
RBC # FLD: 12.6 % — SIGNIFICANT CHANGE UP (ref 10.3–14.5)
SODIUM SERPL-SCNC: 138 MMOL/L — SIGNIFICANT CHANGE UP (ref 135–145)
WBC # BLD: 5.17 K/UL — SIGNIFICANT CHANGE UP (ref 3.8–10.5)
WBC # FLD AUTO: 5.17 K/UL — SIGNIFICANT CHANGE UP (ref 3.8–10.5)

## 2023-01-28 PROCEDURE — 99232 SBSQ HOSP IP/OBS MODERATE 35: CPT | Mod: GC

## 2023-01-28 RX ORDER — ENOXAPARIN SODIUM 100 MG/ML
40 INJECTION SUBCUTANEOUS EVERY 24 HOURS
Refills: 0 | Status: DISCONTINUED | OUTPATIENT
Start: 2023-01-28 | End: 2023-01-31

## 2023-01-28 RX ADMIN — Medication 2 TABLET(S): at 05:42

## 2023-01-28 RX ADMIN — Medication 50 MILLIGRAM(S): at 17:57

## 2023-01-28 RX ADMIN — Medication 50 MILLIGRAM(S): at 21:48

## 2023-01-28 RX ADMIN — Medication 1 MILLIGRAM(S): at 12:13

## 2023-01-28 RX ADMIN — Medication 1 TABLET(S): at 12:13

## 2023-01-28 RX ADMIN — Medication 2 TABLET(S): at 09:21

## 2023-01-28 RX ADMIN — Medication 100 MILLIGRAM(S): at 12:13

## 2023-01-28 RX ADMIN — Medication 50 MILLIGRAM(S): at 12:13

## 2023-01-28 RX ADMIN — ENOXAPARIN SODIUM 40 MILLIGRAM(S): 100 INJECTION SUBCUTANEOUS at 12:14

## 2023-01-28 RX ADMIN — Medication 50 MILLIGRAM(S): at 05:43

## 2023-01-28 NOTE — PROGRESS NOTE ADULT - PROBLEM SELECTOR PLAN 1
- last drink 5 pm yesterday, alcohol level of <10 on admission. Per history patient drinks 12 beers and one bottle of wine per day on average. History of prior admission in Spring 2022 for alcohol withdrawal and seizure.   - Initiate CIWA protocol   - Initiate librium taper w/ Ativan IV PRNS   - trend CMP qd, replete lytes PRN  --MVI, thiamine, folate

## 2023-01-28 NOTE — PROGRESS NOTE ADULT - SUBJECTIVE AND OBJECTIVE BOX
**************************************  Aktiffraf Karen, PGY-1  **************************************    INTERVAL HPI/OVERNIGHT EVENTS:  Patient was seen and examined at bedside. As per nurse and patient, no o/n events, patient resting comfortably. No complaints at this time. Patient denies: fever, chills, dizziness, weakness, HA, Changes in vision, CP, palpitations, SOB, cough, N/V/D/C, dysuria, changes in bowel movements, LE edema. ROS otherwise negative.    VITAL SIGNS:  T(F): 98.5 (01-28-23 @ 04:59)  HR: 92 (01-28-23 @ 04:59)  BP: 112/70 (01-28-23 @ 04:59)  RR: 18 (01-28-23 @ 04:59)  SpO2: 97% (01-28-23 @ 04:59)  Wt(kg): --    PHYSICAL EXAM:    Constitutional: WDWN, NAD  HEENT: PERRL, EOMI, sclera non-icteric, neck supple, trachea midline, no masses, no JVD, MMM, good dentition  Respiratory: CTA b/l, good air entry b/l, no wheezing, no rhonchi, no rales, without accessory muscle use and no intercostal retractions  Cardiovascular: RRR, normal S1S2, no M/R/G  Gastrointestinal: soft, NTND, no masses palpable, BS normal  Extremities: Warm, well perfused, pulses equal bilateral upper and lower extremities, no edema, no clubbing. Capillary refill <2 sec  Neurological: AAOx3, CN Grossly intact  Skin: Normal temperature, warm, dry    MEDICATIONS  (STANDING):  chlordiazePOXIDE   Oral   chlordiazePOXIDE 50 milliGRAM(s) Oral every 6 hours  chlordiazePOXIDE 50 milliGRAM(s) Oral every 8 hours  folic acid 1 milliGRAM(s) Oral daily  multivitamin 1 Tablet(s) Oral daily  potassium phosphate / sodium phosphate Tablet (K-PHOS No. 2) 2 Tablet(s) Oral every 6 hours  thiamine 100 milliGRAM(s) Oral daily    MEDICATIONS  (PRN):  LORazepam   Injectable 2 milliGRAM(s) IV Push every 1 hour PRN Symptom-triggered: each CIWA -Ar score 8 or GREATER      Allergies    No Known Allergies    Intolerances        LABS:                        14.4   5.17  )-----------( 172      ( 28 Jan 2023 06:22 )             42.0     01-28    138  |  102  |  8   ----------------------------<  88  4.0   |  24  |  0.79    Ca    9.8      28 Jan 2023 06:23  Phos  5.5     01-28  Mg     2.3     01-28    TPro  7.1  /  Alb  4.7  /  TBili  1.4<H>  /  DBili  0.3  /  AST  118<H>  /  ALT  118<H>  /  AlkPhos  68  01-28    PT/INR - ( 28 Jan 2023 06:22 )   PT: 12.0 sec;   INR: 1.03 ratio         PTT - ( 28 Jan 2023 06:22 )  PTT:28.9 sec      RADIOLOGY & ADDITIONAL TESTS:  Reviewed

## 2023-01-28 NOTE — DISCHARGE NOTE PROVIDER - CARE PROVIDER_API CALL
Daniel Cheema  INTERNAL MEDICINE  [phone] [place] [id number]  Phone: [phone]  Fax: [phone]  Established Patient  Follow Up Time: 1 month

## 2023-01-28 NOTE — DISCHARGE NOTE PROVIDER - NSDCCPCAREPLAN_GEN_ALL_CORE_FT
PRINCIPAL DISCHARGE DIAGNOSIS  Diagnosis: Alcohol dependence with withdrawal  Assessment and Plan of Treatment: Alcohol Abuse  Alcohol intoxication occurs when the amount of alcohol that a person has consumed impairs his or her ability to mentally and physically function. Chronic alcohol consumption can also lead to a variety of health issues including neurological disease, stomach disease, heart disease, liver disease, etc. Do not drive after drinking alcohol. Drinking enough alcohol to end up in an Emergency Room suggests you may have an alcohol abuse problem. Seek help at a drug addiction center.  SEEK IMMEDIATE MEDICAL CARE IF YOU HAVE ANY OF THE FOLLOWING SYMPTOMS: seizures, vomiting blood, blood in your stool, lightheadedness/dizziness, or becoming shaky to tremulous when you stop drinking.         SECONDARY DISCHARGE DIAGNOSES  Diagnosis: Alcohol related seizure  Assessment and Plan of Treatment: Seizure  A seizure is abnormal electrical activity in the brain; the specific cause may or may not be found. Prior to a seizure you may experience a warning sensation (aura) that may include fear, nausea, dizziness, and visual changes such as flashing lights of spots. Common symptoms during the seizure may include an altered mental status, rhythmic jerking movements, drooling, grunting, loss of bladder or bowel control, or tongue biting. After a seizure, you may feel confused and sleepy.   Do not swim, drive, operate machinery, or engage in any risky activity during which a seizure could cause further injury to you or others. Teach friends and family what to do if you HAVE a seizure which includes laying you on the ground with your head on a cushion and turning you to the side to keep your breathing passages clear in case of vomiting.  SEEK IMMEDIATE MEDICAL CARE IF YOU HAVE ANY OF THE FOLLOWING SYMPTOMS: seizure lasting over 5 minutes, not waking up or persistent altered mental status after the seizure, or more frequent or worsening seizures.     PRINCIPAL DISCHARGE DIAGNOSIS  Diagnosis: Alcohol dependence with withdrawal  Assessment and Plan of Treatment: Alcohol Abuse  Alcohol intoxication occurs when the amount of alcohol that a person has consumed impairs his or her ability to mentally and physically function. Chronic alcohol consumption can also lead to a variety of health issues including neurological disease, stomach disease, heart disease, liver disease, etc. Do not drive after drinking alcohol. Drinking enough alcohol to end up in an Emergency Room suggests you may have an alcohol abuse problem. The seizure and symptoms you experienced were due to alcohol withdrawal which is a life threatening condition that can cause hallucinations, seizures, and death. You completed a course of medication for withdrawal while you were in the hospital called Librium. Please refrain from all alcohol consumption when you are discharged from the hospital.   SEEK IMMEDIATE MEDICAL CARE IF YOU HAVE ANY OF THE FOLLOWING SYMPTOMS: seizures, vomiting blood, blood in your stool, lightheadedness/dizziness, or becoming shaky to tremulous.   [facility]’s National Helpline, 2-452-030-HELP (9224) (also known as the Treatment Referral Routing Service), or TTY: 1-413.730.2797 is a confidential, free, 24-hour-a-day, 365-day-a-year, information service, in English and Micronesian, for individuals and family members facing mental and/or substance use disorders. This service provides referrals to local treatment facilities, support groups, and community-based organizations.  WHERE TO FIND SUPPORT:  Your health care provider.  SMART Recovery: www.smartrecovery.org  Therapy and support groups  Local treatment centers or chemical dependency counselors.  Local AA groups in your community: www.aa.org  WHERE TO FIND MORE INFORMATION  Centers for Disease Control and Prevention: www.cdc.gov  National Datil on Alcohol Abuse and Alcoholism: www.niaaa.nih.gov  Alcoholics Anonymous (AA): www.aa.org        SECONDARY DISCHARGE DIAGNOSES  Diagnosis: Alcohol related seizure  Assessment and Plan of Treatment: Seizure  A seizure is abnormal electrical activity in the brain; the specific cause may or may not be found. Prior to a seizure you may experience a warning sensation (aura) that may include fear, nausea, dizziness, and visual changes such as flashing lights of spots. Common symptoms during the seizure may include an altered mental status, rhythmic jerking movements, drooling, grunting, loss of bladder or bowel control, or tongue biting. After a seizure, you may feel confused and sleepy. The seizure you experienced is due to alcohol withdrawal. This puts your life at risk if you continue to drink alcohol.   Do not swim, drive, operate machinery, or engage in any risky activity during which a seizure could cause further injury to you or others. Teach friends and family what to do if you HAVE a seizure which includes laying you on the ground with your head on a cushion and turning you to the side to keep your breathing passages clear in case of vomiting.  SEEK IMMEDIATE MEDICAL CARE IF YOU HAVE ANY OF THE FOLLOWING SYMPTOMS: seizure lasting over 5 minutes, not waking up or persistent altered mental status after the seizure, or more frequent or worsening seizures.     PRINCIPAL DISCHARGE DIAGNOSIS  Diagnosis: Alcohol dependence with withdrawal  Assessment and Plan of Treatment: Alcohol Abuse  Alcohol intoxication occurs when the amount of alcohol that a person has consumed impairs his or her ability to mentally and physically function. Chronic alcohol consumption can also lead to a variety of health issues including neurological disease, stomach disease, heart disease, liver disease, etc. Do not drive after drinking alcohol. Drinking enough alcohol to end up in an Emergency Room suggests you may have an alcohol abuse problem. The seizure and symptoms you experienced were due to alcohol withdrawal which is a life threatening condition that can cause hallucinations, seizures, and death. You completed a course of medication for withdrawal while you were in the hospital called Librium. Please refrain from all alcohol consumption when you are discharged from the hospital.   .  SEEK IMMEDIATE MEDICAL CARE IF YOU HAVE ANY OF THE FOLLOWING SYMPTOMS: seizures, vomiting blood, blood in your stool, lightheadedness/dizziness, or becoming shaky to tremulous.   .  [facility]’s National Helpline, 6-197-913-HELP (7833) (also known as the Treatment Referral Routing Service), or TTY: 1-833.211.6152 is a confidential, free, 24-hour-a-day, 365-day-a-year, information service, in English and Estonian, for individuals and family members facing mental and/or substance use disorders. This service provides referrals to local treatment facilities, support groups, and community-based organizations.  .  WHERE TO FIND SUPPORT:  .  Your health care provider.  SMART Recovery: www.smartrecovery.org  Therapy and support groups  .  Local treatment centers or chemical dependency counselors.  Local AA groups in your community: www.aa.org  WHERE TO FIND MORE INFORMATION  Centers for Disease Control and Prevention: www.cdc.gov  National [place] on Alcohol Abuse and Alcoholism: www.niaaa.nih.gov  Alcoholics Anonymous (AA): www.aa.org      SECONDARY DISCHARGE DIAGNOSES  Diagnosis: Alcohol related seizure  Assessment and Plan of Treatment: A seizure is abnormal electrical activity in the brain; the specific cause may or may not be found. Prior to a seizure you may experience a warning sensation (aura) that may include fear, nausea, dizziness, and visual changes such as flashing lights of spots. Common symptoms during the seizure may include an altered mental status, rhythmic jerking movements, drooling, grunting, loss of bladder or bowel control, or tongue biting. After a seizure, you may feel confused and sleepy. The seizure you experienced is due to alcohol withdrawal. This puts your life at risk if you continue to drink alcohol.   .  Do not swim, drive, operate machinery, or engage in any risky activity during which a seizure could cause further injury to you or others. Teach friends and family what to do if you HAVE a seizure which includes laying you on the ground with your head on a cushion and turning you to the side to keep your breathing passages clear in case of vomiting.  .  SEEK IMMEDIATE MEDICAL CARE IF YOU HAVE ANY OF THE FOLLOWING SYMPTOMS: seizure lasting over 5 minutes, not waking up or persistent altered mental status after the seizure, or more frequent or worsening seizures.

## 2023-01-28 NOTE — DISCHARGE NOTE PROVIDER - NSDCFUADDAPPT_GEN_ALL_CORE_FT
APPTS ARE READY TO BE MADE: [X] YES    Best Family or Patient Contact (if needed):    Additional Information about above appointments (if needed):    1: Please follow-up with your primary care physician within 1-2 weeks of discharge.   2:   3:     Other comments or requests:    APPTS ARE READY TO BE MADE: [X] YES    Best Family or Patient Contact (if needed):    Additional Information about above appointments (if needed):    1: Please follow-up with your primary care physician within 1-2 weeks of discharge.   2:   3:     Other comments or requests:     Patient was scheduled with Daniel Pastor  on 2/13 9am at [address] [facility] [address] through the provider's office. Patient advised of appointment details.

## 2023-01-28 NOTE — PROGRESS NOTE ADULT - PROBLEM SELECTOR PLAN 5
DVT ppx: Lovenox given CTH demonstrating no bleed   Diet: Regular   Dispo:TBD     Discharge planning issues  Transitions of Care Status:  1.  Name of PCP:  2.  PCP Contacted on Admission: [ ] Y    [ ] N    3.  PCP contacted at Discharge: [ ] Y    [ ] N    [ ] N/A  4.  Post-Discharge Appointment Date and Location:  5.  Summary of Handoff given to PCP:

## 2023-01-28 NOTE — PROGRESS NOTE ADULT - PROBLEM SELECTOR PLAN 4
- AST of 130/ ALT of 141 on admission   - no TTP in the RUQ of the abdomen and no TTP in all other quadrants   - CTM trend LFTs, if no improvement in LFT's will consider RUQUS.   -MDF wnl, no need for steroids at this time

## 2023-01-28 NOTE — DISCHARGE NOTE PROVIDER - NSDCCPTREATMENT_GEN_ALL_CORE_FT
PRINCIPAL PROCEDURE  Procedure: CT head wo con  Findings and Treatment:   PROCEDURE DATE: 01/27/2023  INTERPRETATION: CLINICAL INFORMATION: Seizure. Unwitnessed fall.  TECHNIQUE:  Axial CT images were acquired through the head.  Intravenous contrast: None  Two-dimensional reformats were generated.  COMPARISON STUDY: 03/30/2022  FINDINGS:  There is no CT evidence of acute intracranial hemorrhage, subdural collection, mass effect or midline shift. Gray matter-white matter differentiation is grossly preserved.  The ventricles, sulci and extra-axial spaces appear within normal limits.  There is minimal mucosal thickening in the maxillary sinuses. The frontal sinuses, ethmoid air cells and sphenoid sinuses are grossly clear..  Well-developed mastoids are clear bilaterally..  The calvarium, skull base, and orbits appear within normal limits.  IMPRESSION:  Normal noncontrast head CT scan. No CT evidence of intracranial pathology.  --- End of Report ---

## 2023-01-28 NOTE — DISCHARGE NOTE PROVIDER - NSDCMRMEDTOKEN_GEN_ALL_CORE_FT
Multiple Vitamins oral tablet: 1 tab(s) orally once a day   folic acid 1 mg oral tablet: 1 tab(s) orally once a day  Multiple Vitamins oral tablet: 1 tab(s) orally once a day  thiamine 100 mg oral tablet: 1 tab(s) orally once a day

## 2023-01-28 NOTE — DISCHARGE NOTE PROVIDER - NSDCFUADDINST_GEN_ALL_CORE_FT
Please followup with your primary care doctor within 1-2 weeks of discharge. If you have any new or recurrent symptoms, please call your doctor or go to the ED

## 2023-01-28 NOTE — DISCHARGE NOTE PROVIDER - HOSPITAL COURSE
37 year old male with hx of alcohol withdrawal seizures p/w alcohol withdrawal seizure prior to admission and in alcohol withdrawal last drink the day prior to admission at approximately 5 pm, started on Librium taper with Ativan IV PRNs. Admitted to medicine for management of alcohol withdrawal with CIWA's remaining 5 and below. Per collateral and history, patient with unwitnessed fall 2/2 alcohol withdrawal seizure, however Neuro exam non-focal and CTH non-contrast unremarkable. Of note, patient with - Lactate on VBG of 2.4 on admission and CK >1100 receiving 2L IVF resuscitation with resolution of lactate. Patient with mild transaminitis of AST of 130/ALT of 141 on admission with MDF of 8.1 on admission therefore patient not given steroids. Patient continued on MVI, thiamine, and folate, completing Librium taper and medically stable at the time of discharge. 37 year old male with hx of alcohol withdrawal seizures p/w alcohol withdrawal seizure prior to admission and in alcohol withdrawal last drink the day prior to admission at approximately 5 pm, started on Librium taper with Ativan IV PRNs. Admitted to medicine for management of alcohol withdrawal with CIWA's remaining 5 and below. Per collateral and history, patient with unwitnessed fall 2/2 alcohol withdrawal seizure, however Neuro exam non-focal and CTH non-contrast unremarkable. Of note, patient with - Lactate on VBG of 2.4 on admission and CK >1100 receiving 2L IVF resuscitation with resolution of lactate. Patient with mild transaminitis of AST of 130/ALT of 141 on admission with MDF of 8.1 on admission therefore patient not given steroids. Patient continued on MVI, thiamine, and folate, completing Librium taper and medically stable at the time of discharge. SW was consulted, and provided patient with OP resources for discharge. 37 year old male with hx of alcohol withdrawal seizures p/w alcohol withdrawal seizure prior to admission and in alcohol withdrawal last drink the day prior to admission at approximately 5 pm, started on Librium taper with Ativan IV PRNs. Admitted to medicine for management of alcohol withdrawal with CIWA's remaining 5 and below. Per collateral and history, patient with unwitnessed fall 2/2 alcohol withdrawal seizure, however Neuro exam non-focal and CTH non-contrast unremarkable. Of note, patient with - Lactate on VBG of 2.4 on admission and CK >1100 receiving 2L IVF resuscitation with resolution of lactate. Patient with mild transaminitis of AST of 130/ALT of 141 on admission with MDF of 8.1 on admission therefore patient not given steroids. Patient continued on MVI, thiamine, and folate, completing Librium taper and medically stable at the time of discharge. SW was consulted, and provided patient with OP resources for discharge.    # EtOH withdrawal seizure  # EtOH use disorder  # transaminitis  # Hyponatremia  # tongue hematoma  # fall .

## 2023-01-28 NOTE — DISCHARGE NOTE PROVIDER - NSDCCAREPROVSEEN_GEN_ALL_CORE_FT
[facility] Team 1 Internal Medicine  Attending Physician, Fredrick Burch  Senior Resident Physician, Sally Hutton  Resident Physician, Diane Jones

## 2023-01-29 DIAGNOSIS — R22.0 LOCALIZED SWELLING, MASS AND LUMP, HEAD: ICD-10-CM

## 2023-01-29 LAB
ALBUMIN SERPL ELPH-MCNC: 4.7 G/DL — SIGNIFICANT CHANGE UP (ref 3.3–5)
ALP SERPL-CCNC: 74 U/L — SIGNIFICANT CHANGE UP (ref 40–120)
ALT FLD-CCNC: 126 U/L — HIGH (ref 10–45)
ANION GAP SERPL CALC-SCNC: 12 MMOL/L — SIGNIFICANT CHANGE UP (ref 5–17)
AST SERPL-CCNC: 113 U/L — HIGH (ref 10–40)
BILIRUB SERPL-MCNC: 0.9 MG/DL — SIGNIFICANT CHANGE UP (ref 0.2–1.2)
BUN SERPL-MCNC: 12 MG/DL — SIGNIFICANT CHANGE UP (ref 7–23)
CALCIUM SERPL-MCNC: 9.8 MG/DL — SIGNIFICANT CHANGE UP (ref 8.4–10.5)
CHLORIDE SERPL-SCNC: 104 MMOL/L — SIGNIFICANT CHANGE UP (ref 96–108)
CO2 SERPL-SCNC: 24 MMOL/L — SIGNIFICANT CHANGE UP (ref 22–31)
CREAT SERPL-MCNC: 0.79 MG/DL — SIGNIFICANT CHANGE UP (ref 0.5–1.3)
EGFR: 119 ML/MIN/1.73M2 — SIGNIFICANT CHANGE UP
GLUCOSE SERPL-MCNC: 81 MG/DL — SIGNIFICANT CHANGE UP (ref 70–99)
HCT VFR BLD CALC: 42.8 % — SIGNIFICANT CHANGE UP (ref 39–50)
HGB BLD-MCNC: 14.3 G/DL — SIGNIFICANT CHANGE UP (ref 13–17)
MAGNESIUM SERPL-MCNC: 2.4 MG/DL — SIGNIFICANT CHANGE UP (ref 1.6–2.6)
MCHC RBC-ENTMCNC: 31.8 PG — SIGNIFICANT CHANGE UP (ref 27–34)
MCHC RBC-ENTMCNC: 33.4 GM/DL — SIGNIFICANT CHANGE UP (ref 32–36)
MCV RBC AUTO: 95.3 FL — SIGNIFICANT CHANGE UP (ref 80–100)
NRBC # BLD: 0 /100 WBCS — SIGNIFICANT CHANGE UP (ref 0–0)
PHOSPHATE SERPL-MCNC: 5.3 MG/DL — HIGH (ref 2.5–4.5)
PLATELET # BLD AUTO: 177 K/UL — SIGNIFICANT CHANGE UP (ref 150–400)
POTASSIUM SERPL-MCNC: 3.9 MMOL/L — SIGNIFICANT CHANGE UP (ref 3.5–5.3)
POTASSIUM SERPL-SCNC: 3.9 MMOL/L — SIGNIFICANT CHANGE UP (ref 3.5–5.3)
PROT SERPL-MCNC: 7.1 G/DL — SIGNIFICANT CHANGE UP (ref 6–8.3)
RBC # BLD: 4.49 M/UL — SIGNIFICANT CHANGE UP (ref 4.2–5.8)
RBC # FLD: 12.6 % — SIGNIFICANT CHANGE UP (ref 10.3–14.5)
SODIUM SERPL-SCNC: 140 MMOL/L — SIGNIFICANT CHANGE UP (ref 135–145)
WBC # BLD: 5.12 K/UL — SIGNIFICANT CHANGE UP (ref 3.8–10.5)
WBC # FLD AUTO: 5.12 K/UL — SIGNIFICANT CHANGE UP (ref 3.8–10.5)

## 2023-01-29 PROCEDURE — 99232 SBSQ HOSP IP/OBS MODERATE 35: CPT | Mod: GC

## 2023-01-29 PROCEDURE — 99221 1ST HOSP IP/OBS SF/LOW 40: CPT

## 2023-01-29 RX ORDER — TETRACAINE/BENZOCAINE/BUTAMBEN 2%-14%-2%
1 OINTMENT (GRAM) TOPICAL THREE TIMES A DAY
Refills: 0 | Status: DISCONTINUED | OUTPATIENT
Start: 2023-01-29 | End: 2023-01-31

## 2023-01-29 RX ORDER — DEXAMETHASONE 0.5 MG/5ML
8 ELIXIR ORAL EVERY 8 HOURS
Refills: 0 | Status: DISCONTINUED | OUTPATIENT
Start: 2023-01-29 | End: 2023-01-30

## 2023-01-29 RX ADMIN — ENOXAPARIN SODIUM 40 MILLIGRAM(S): 100 INJECTION SUBCUTANEOUS at 11:47

## 2023-01-29 RX ADMIN — Medication 1 SPRAY(S): at 21:45

## 2023-01-29 RX ADMIN — Medication 1 TABLET(S): at 11:46

## 2023-01-29 RX ADMIN — Medication 1 SPRAY(S): at 15:34

## 2023-01-29 RX ADMIN — Medication 100 MILLIGRAM(S): at 11:49

## 2023-01-29 RX ADMIN — Medication 50 MILLIGRAM(S): at 05:27

## 2023-01-29 RX ADMIN — Medication 50 MILLIGRAM(S): at 17:49

## 2023-01-29 RX ADMIN — Medication 101.6 MILLIGRAM(S): at 21:51

## 2023-01-29 RX ADMIN — Medication 1 MILLIGRAM(S): at 11:46

## 2023-01-29 NOTE — CONSULT NOTE ADULT - PROBLEM/RECOMMENDATION-1
[Time Spent: ___ minutes] : I have spent [unfilled] minutes of time on the encounter.
DISPLAY PLAN FREE TEXT

## 2023-01-29 NOTE — CONSULT NOTE ADULT - ASSESSMENT
37-year-old male, with past medical history of alcohol abuse, with prior hospitalizations for alcohol withdrawal and withdrawal related seizures, presenting after seizure-like episode today in the setting of alcohol intake. Pt reportedly bit his tongue during a seizure episode. Exam revealed tongue swelling with diffused ecchymosis R>L, no active bleeding. Recommend  decadron for 48hrs and soft diet. swelling will go down with time.

## 2023-01-29 NOTE — PROGRESS NOTE ADULT - SUBJECTIVE AND OBJECTIVE BOX
Kathy EliasMayank | PGY-2  Internal Medicine    OVERNIGHT EVENTS: no overnight events      SUBJECTIVE: Patient was examined at bedside this morning. Appeared comfortable. Overnight vitals and monitoring results were reviewed. Reporting no complaints.       MEDICATIONS  (STANDING):  chlordiazePOXIDE   Oral   chlordiazePOXIDE 50 milliGRAM(s) Oral every 12 hours  enoxaparin Injectable 40 milliGRAM(s) SubCutaneous every 24 hours  folic acid 1 milliGRAM(s) Oral daily  multivitamin 1 Tablet(s) Oral daily  thiamine 100 milliGRAM(s) Oral daily    MEDICATIONS  (PRN):  LORazepam   Injectable 2 milliGRAM(s) IV Push every 1 hour PRN Symptom-triggered: each CIWA -Ar score 8 or GREATER        T(F): 99 (01-28-23 @ 20:34), Max: 99 (01-28-23 @ 20:34)  HR: 91 (01-28-23 @ 20:34) (90 - 104)  BP: 117/69 (01-28-23 @ 20:34) (112/77 - 125/77)  BP(mean): --  RR: 18 (01-28-23 @ 20:34) (18 - 18)  SpO2: 98% (01-28-23 @ 20:34) (97% - 99%)    PHYSICAL EXAM:     GENERAL: NAD, lying in bed comfortably  HEAD:  Atraumatic, Normocephalic  EYES: EOMI, PERRLA, conjunctiva and sclera clear, no nystagmus noted  ENT: Moist mucous membranes,   NECK: Supple, No JVD, trachea midline  CHEST/LUNG: Clear to auscultation bilaterally; No rales, rhonchi, wheezing, or rubs. Unlabored respirations  HEART: Regular rate and rhythm; No murmurs, rubs, or gallops, normal S1/S2  ABDOMEN: normal bowel sounds; Soft, nontender, nondistended, no organomegaly   EXTREMITIES:  2+ Peripheral Pulses, brisk capillary refill. No clubbing, cyanosis, or edema  MSK: No gross deformities noted   Neurological:  A&Ox3, no focal deficits   SKIN: No rashes or lesions  PSYCH: Normal mood, affect     TELEMETRY:    LABS:                        14.4   5.17  )-----------( 172      ( 28 Jan 2023 06:22 )             42.0     01-28    138  |  102  |  8   ----------------------------<  88  4.0   |  24  |  0.79    Ca    9.8      28 Jan 2023 06:23  Phos  5.5     01-28  Mg     2.3     01-28    TPro  7.1  /  Alb  4.7  /  TBili  1.4<H>  /  DBili  0.3  /  AST  118<H>  /  ALT  118<H>  /  AlkPhos  68  01-28    CARDIAC MARKERS ( 27 Jan 2023 12:29 )  x     / x     / 1176 U/L / x     / x          PT/INR - ( 28 Jan 2023 06:22 )   PT: 12.0 sec;   INR: 1.03 ratio         PTT - ( 28 Jan 2023 06:22 )  PTT:28.9 sec    Creatinine Trend: 0.79<--, 0.76<--  I&O's Summary    BNP    RADIOLOGY & ADDITIONAL STUDIES:             Kathy FlemingJhoanMayank | PGY-2  Internal Medicine    OVERNIGHT EVENTS: no overnight events      SUBJECTIVE: Patient was examined at bedside this morning. Appeared comfortable. Overnight vitals and monitoring results were reviewed. Reporting no complaints.       MEDICATIONS  (STANDING):  chlordiazePOXIDE   Oral   chlordiazePOXIDE 50 milliGRAM(s) Oral every 12 hours  enoxaparin Injectable 40 milliGRAM(s) SubCutaneous every 24 hours  folic acid 1 milliGRAM(s) Oral daily  multivitamin 1 Tablet(s) Oral daily  thiamine 100 milliGRAM(s) Oral daily    MEDICATIONS  (PRN):  LORazepam   Injectable 2 milliGRAM(s) IV Push every 1 hour PRN Symptom-triggered: each CIWA -Ar score 8 or GREATER        T(F): 99 (01-28-23 @ 20:34), Max: 99 (01-28-23 @ 20:34)  HR: 91 (01-28-23 @ 20:34) (90 - 104)  BP: 117/69 (01-28-23 @ 20:34) (112/77 - 125/77)  BP(mean): --  RR: 18 (01-28-23 @ 20:34) (18 - 18)  SpO2: 98% (01-28-23 @ 20:34) (97% - 99%)    PHYSICAL EXAM:     GENERAL: NAD, lying in bed comfortably  HEAD:  Atraumatic, Normocephalic  EYES: EOMI, PERRLA, conjunctiva and sclera clear, no nystagmus noted  ENT: Moist mucous membranes, swollen tounge  CHEST/LUNG: Clear to auscultation bilaterally; No rales, rhonchi, wheezing, or rubs. Unlabored respirations  HEART: Regular rate and rhythm; No murmurs, rubs, or gallops, normal S1/S2  ABDOMEN: normal bowel sounds; Soft, nontender, nondistended, no organomegaly   EXTREMITIES:  2+ Peripheral Pulses, brisk capillary refill. No clubbing, cyanosis, or edema  Neurological:  A&Ox3, no focal deficits   PSYCH: Normal mood, affect     TELEMETRY:    LABS:                        14.4   5.17  )-----------( 172      ( 28 Jan 2023 06:22 )             42.0     01-28    138  |  102  |  8   ----------------------------<  88  4.0   |  24  |  0.79    Ca    9.8      28 Jan 2023 06:23  Phos  5.5     01-28  Mg     2.3     01-28    TPro  7.1  /  Alb  4.7  /  TBili  1.4<H>  /  DBili  0.3  /  AST  118<H>  /  ALT  118<H>  /  AlkPhos  68  01-28    CARDIAC MARKERS ( 27 Jan 2023 12:29 )  x     / x     / 1176 U/L / x     / x          PT/INR - ( 28 Jan 2023 06:22 )   PT: 12.0 sec;   INR: 1.03 ratio         PTT - ( 28 Jan 2023 06:22 )  PTT:28.9 sec    Creatinine Trend: 0.79<--, 0.76<--  I&O's Summary    BNP    RADIOLOGY & ADDITIONAL STUDIES:

## 2023-01-29 NOTE — CONSULT NOTE ADULT - SUBJECTIVE AND OBJECTIVE BOX
CC: Tongue swelling    HPI: 37-year-old male, with past medical history of alcohol abuse, with prior hospitalizations for alcohol withdrawal and withdrawal related seizures, presenting after seizure-like episode today in the setting of alcohol intake. Patient's father was called for collateral regarding seizure-like activity. Per patient's father, patient was on a roof when the seizure-like activity occurred. Per patient and patient's father, patient did not fall off of the roof. Patient's father reported that the patient had 7 total minutes of seizure-like activity, reporting 4 minutes of shaking-seizure like activity followed by 3 minutes where the patient was in a post-ictal state. Patient does not recall being on the fall for the seizure and patient's father did not witness the patient fall. Per patient, no urinary or bowel incontinence occurred, however he did report biting his tongue Patient states that his last drink was approximately 5 pm yesterday. Patient states that he typically drinks 12 bottles of beer per day as well as one bottle of wine. ENT was consulted for tongue swelling. Pt reports difficulty chewing foods and has to swallow very slowly. Pt admits to mild pain in his tongue,  but denies SOB, cough, fevers.        PAST MEDICAL & SURGICAL HISTORY:  No pertinent past medical history      History of seizure due to alcohol withdrawal      No significant past surgical history      No significant past surgical history        Allergies    No Known Allergies    Intolerances      MEDICATIONS  (STANDING):  chlordiazePOXIDE   Oral   chlordiazePOXIDE 50 milliGRAM(s) Oral every 12 hours  enoxaparin Injectable 40 milliGRAM(s) SubCutaneous every 24 hours  folic acid 1 milliGRAM(s) Oral daily  multivitamin 1 Tablet(s) Oral daily  thiamine 100 milliGRAM(s) Oral daily    MEDICATIONS  (PRN):  LORazepam   Injectable 2 milliGRAM(s) IV Push every 1 hour PRN Symptom-triggered: each CIWA -Ar score 8 or GREATER  tetracaine/benzocaine/butamben Spray 1 Spray(s) Topical three times a day PRN tongue pain      Social History: no tobacco use    Family history: no pertinent FHx    ROS:   ENT: all negative except as noted in HPI   CV: denies palpitations  Pulm: denies SOB, cough, hemoptysis  GI: denies change in apetite, indigestion, n/v  : denies pertinent urinary symptoms, urgency  Neuro: denies numbness/tingling, loss of sensation  Psych: denies anxiety  MS: denies muscle weakness, instability  Heme: denies easy bruising or bleeding  Endo: denies heat/cold intolerance, excessive sweating  Vascular: denies LE edema    Vital Signs Last 24 Hrs  T(C): 36.9 (29 Jan 2023 16:17), Max: 37.3 (29 Jan 2023 11:51)  T(F): 98.4 (29 Jan 2023 16:17), Max: 99.1 (29 Jan 2023 11:51)  HR: 98 (29 Jan 2023 16:17) (91 - 101)  BP: 112/70 (29 Jan 2023 16:17) (106/73 - 117/69)  BP(mean): --  RR: 18 (29 Jan 2023 16:17) (18 - 18)  SpO2: 96% (29 Jan 2023 16:17) (96% - 99%)    Parameters below as of 29 Jan 2023 16:17  Patient On (Oxygen Delivery Method): room air                              14.3   5.12  )-----------( 177      ( 29 Jan 2023 06:55 )             42.8    01-29    140  |  104  |  12  ----------------------------<  81  3.9   |  24  |  0.79    Ca    9.8      29 Jan 2023 06:55  Phos  5.3     01-29  Mg     2.4     01-29    TPro  7.1  /  Alb  4.7  /  TBili  0.9  /  DBili  x   /  AST  113<H>  /  ALT  126<H>  /  AlkPhos  74  01-29   PT/INR - ( 28 Jan 2023 06:22 )   PT: 12.0 sec;   INR: 1.03 ratio         PTT - ( 28 Jan 2023 06:22 )  PTT:28.9 sec    PHYSICAL EXAM:  Gen: NAD  Skin: No rashes, bruises, or lesions  Head: Normocephalic, Atraumatic  Face: no edema, erythema, or fluctuance. Parotid glands soft without mass  Eyes: no scleral injection  Nose: Nares bilaterally patent, no discharge  Mouth: Tongue swelling R>L with diffused ecchymosis, slightly tender to palpation, no active bleeding, No Stridor / Drooling / Trismus.  Mucosa moist, uvula midline, oropharynx clear  Neck: Flat, supple, no lymphadenopathy, trachea midline, no masses  Lymphatic: No lymphadenopathy  Resp: breathing easily, no stridor  CV: no peripheral edema/cyanosis  GI: nondistended   Peripheral vascular: no JVD or edema  Neuro: facial nerve intact, no facial droop

## 2023-01-29 NOTE — PROGRESS NOTE ADULT - PROBLEM SELECTOR PLAN 3
- patient with prior hx of alcohol withdrawal related seizure in Spring 2022. Per hx (collateral from patient's father) patient with 4 minutes of shaking seizure-like activity followed by 3 minutes of post-ictal period   - librium taper given high risk of seizures, Initial CIWA protocol, librium taper w/ Ativan IV PRNs  - reactive leukocytosis likely iso seizure  - Lactate on VBG of 2.4 on admission, f/u AM VBG w/ lactate for resolution. s/p 2L IVF   - f/u serum lactate, CK  - seizure precautions, fall precautions - patient with prior hx of alcohol withdrawal related seizure in Spring 2022. Per hx (collateral from patient's father) patient with 4 minutes of shaking seizure-like activity followed by 3 minutes of post-ictal period   - librium taper given high risk of seizures, Initial CIWA protocol, librium taper w/ Ativan IV PRNs  - reactive leukocytosis likely iso seizure  - Lactate on VBG of 2.4 on admission, f/u AM VBG w/ lactate for resolution. s/p 2L IVF   - f/u serum lactate, CK  - seizure precautions, fall precautions  - ENT c/s for tongue swelling from bite during seizure, f/u recs

## 2023-01-30 LAB
ALBUMIN SERPL ELPH-MCNC: 4.8 G/DL — SIGNIFICANT CHANGE UP (ref 3.3–5)
ALP SERPL-CCNC: 75 U/L — SIGNIFICANT CHANGE UP (ref 40–120)
ALT FLD-CCNC: 127 U/L — HIGH (ref 10–45)
ANION GAP SERPL CALC-SCNC: 12 MMOL/L — SIGNIFICANT CHANGE UP (ref 5–17)
AST SERPL-CCNC: 87 U/L — HIGH (ref 10–40)
BILIRUB SERPL-MCNC: 0.6 MG/DL — SIGNIFICANT CHANGE UP (ref 0.2–1.2)
BUN SERPL-MCNC: 11 MG/DL — SIGNIFICANT CHANGE UP (ref 7–23)
CALCIUM SERPL-MCNC: 9.8 MG/DL — SIGNIFICANT CHANGE UP (ref 8.4–10.5)
CHLORIDE SERPL-SCNC: 104 MMOL/L — SIGNIFICANT CHANGE UP (ref 96–108)
CO2 SERPL-SCNC: 23 MMOL/L — SIGNIFICANT CHANGE UP (ref 22–31)
CREAT SERPL-MCNC: 0.73 MG/DL — SIGNIFICANT CHANGE UP (ref 0.5–1.3)
EGFR: 122 ML/MIN/1.73M2 — SIGNIFICANT CHANGE UP
GLUCOSE SERPL-MCNC: 136 MG/DL — HIGH (ref 70–99)
HCT VFR BLD CALC: 44.3 % — SIGNIFICANT CHANGE UP (ref 39–50)
HGB BLD-MCNC: 14.6 G/DL — SIGNIFICANT CHANGE UP (ref 13–17)
MAGNESIUM SERPL-MCNC: 2 MG/DL — SIGNIFICANT CHANGE UP (ref 1.6–2.6)
MCHC RBC-ENTMCNC: 31.3 PG — SIGNIFICANT CHANGE UP (ref 27–34)
MCHC RBC-ENTMCNC: 33 GM/DL — SIGNIFICANT CHANGE UP (ref 32–36)
MCV RBC AUTO: 94.9 FL — SIGNIFICANT CHANGE UP (ref 80–100)
NRBC # BLD: 0 /100 WBCS — SIGNIFICANT CHANGE UP (ref 0–0)
PHOSPHATE SERPL-MCNC: 3.4 MG/DL — SIGNIFICANT CHANGE UP (ref 2.5–4.5)
PLATELET # BLD AUTO: 205 K/UL — SIGNIFICANT CHANGE UP (ref 150–400)
POTASSIUM SERPL-MCNC: 4.1 MMOL/L — SIGNIFICANT CHANGE UP (ref 3.5–5.3)
POTASSIUM SERPL-SCNC: 4.1 MMOL/L — SIGNIFICANT CHANGE UP (ref 3.5–5.3)
PROT SERPL-MCNC: 7.7 G/DL — SIGNIFICANT CHANGE UP (ref 6–8.3)
RBC # BLD: 4.67 M/UL — SIGNIFICANT CHANGE UP (ref 4.2–5.8)
RBC # FLD: 12.4 % — SIGNIFICANT CHANGE UP (ref 10.3–14.5)
SODIUM SERPL-SCNC: 139 MMOL/L — SIGNIFICANT CHANGE UP (ref 135–145)
WBC # BLD: 3.54 K/UL — LOW (ref 3.8–10.5)
WBC # FLD AUTO: 3.54 K/UL — LOW (ref 3.8–10.5)

## 2023-01-30 PROCEDURE — 99232 SBSQ HOSP IP/OBS MODERATE 35: CPT | Mod: GC

## 2023-01-30 RX ORDER — ONDANSETRON 8 MG/1
4 TABLET, FILM COATED ORAL EVERY 8 HOURS
Refills: 0 | Status: DISCONTINUED | OUTPATIENT
Start: 2023-01-30 | End: 2023-01-31

## 2023-01-30 RX ORDER — DEXAMETHASONE 0.5 MG/5ML
10 ELIXIR ORAL EVERY 8 HOURS
Refills: 0 | Status: DISCONTINUED | OUTPATIENT
Start: 2023-01-30 | End: 2023-01-31

## 2023-01-30 RX ADMIN — Medication 101.6 MILLIGRAM(S): at 05:15

## 2023-01-30 RX ADMIN — ENOXAPARIN SODIUM 40 MILLIGRAM(S): 100 INJECTION SUBCUTANEOUS at 11:21

## 2023-01-30 RX ADMIN — Medication 102 MILLIGRAM(S): at 13:10

## 2023-01-30 RX ADMIN — Medication 1 SPRAY(S): at 21:15

## 2023-01-30 RX ADMIN — Medication 1 SPRAY(S): at 05:15

## 2023-01-30 RX ADMIN — Medication 1 TABLET(S): at 11:22

## 2023-01-30 RX ADMIN — Medication 102 MILLIGRAM(S): at 21:15

## 2023-01-30 RX ADMIN — Medication 100 MILLIGRAM(S): at 11:23

## 2023-01-30 RX ADMIN — Medication 1 MILLIGRAM(S): at 11:22

## 2023-01-30 RX ADMIN — Medication 1 SPRAY(S): at 16:09

## 2023-01-30 RX ADMIN — Medication 50 MILLIGRAM(S): at 05:15

## 2023-01-30 NOTE — PROGRESS NOTE ADULT - PROBLEM SELECTOR PLAN 3
- unwitnessed fall 2/2 alcohol withdrawal seizure   -Per hx (collateral from patient's father) patient with 4 minutes of shaking seizure-like activity followed by 3 minutes of post-ictal period   - Neurological exam non-focal, CTH wnl

## 2023-01-30 NOTE — PROGRESS NOTE ADULT - SUBJECTIVE AND OBJECTIVE BOX
CC: Tongue swelling    HPI: 37-year-old male, with past medical history of alcohol abuse, with prior hospitalizations for alcohol withdrawal and withdrawal related seizures, presenting after seizure-like episode today in the setting of alcohol intake. Patient's father was called for collateral regarding seizure-like activity. Per patient's father, patient was on a roof when the seizure-like activity occurred. Per patient and patient's father, patient did not fall off of the roof. Patient's father reported that the patient had 7 total minutes of seizure-like activity, reporting 4 minutes of shaking-seizure like activity followed by 3 minutes where the patient was in a post-ictal state. Patient does not recall being on the fall for the seizure and patient's father did not witness the patient fall. Per patient, no urinary or bowel incontinence occurred, however he did report biting his tongue Patient states that his last drink was approximately 5 pm yesterday. Patient states that he typically drinks 12 bottles of beer per day as well as one bottle of wine. ENT was consulted for tongue swelling. Pt reports difficulty chewing foods and has to swallow very slowly. Pt admits to mild pain in his tongue,  but denies SOB, cough, fevers. Pt states some improvement with steroids overnight       PAST MEDICAL & SURGICAL HISTORY:  No pertinent past medical history      History of seizure due to alcohol withdrawal      No significant past surgical history      No significant past surgical history        Allergies    No Known Allergies    Intolerances      MEDICATIONS  (STANDING):  chlordiazePOXIDE   Oral   dexAMETHasone  IVPB 10 milliGRAM(s) IV Intermittent every 8 hours  enoxaparin Injectable 40 milliGRAM(s) SubCutaneous every 24 hours  folic acid 1 milliGRAM(s) Oral daily  multivitamin 1 Tablet(s) Oral daily  thiamine 100 milliGRAM(s) Oral daily    MEDICATIONS  (PRN):  LORazepam   Injectable 2 milliGRAM(s) IV Push every 1 hour PRN Symptom-triggered: each CIWA -Ar score 8 or GREATER  ondansetron Injectable 4 milliGRAM(s) IV Push every 8 hours PRN Nausea and/or Vomiting  tetracaine/benzocaine/butamben Spray 1 Spray(s) Topical three times a day PRN tongue pain      social history: see consult     family history: see consult     ROS:   ENT: all negative except as noted in HPI   Pulm: denies SOB, cough, hemoptysis  Neuro: denies numbness/tingling, loss of sensation  Endo: denies heat/cold intolerance, excessive sweating      Vital Signs Last 24 Hrs  T(C): 36.9 (30 Jan 2023 08:00), Max: 37.3 (29 Jan 2023 11:51)  T(F): 98.4 (30 Jan 2023 08:00), Max: 99.1 (29 Jan 2023 11:51)  HR: 100 (30 Jan 2023 08:00) (75 - 101)  BP: 102/68 (30 Jan 2023 08:00) (102/68 - 117/74)  BP(mean): --  RR: 18 (30 Jan 2023 08:00) (18 - 18)  SpO2: 97% (30 Jan 2023 08:00) (95% - 97%)    Parameters below as of 30 Jan 2023 08:00  Patient On (Oxygen Delivery Method): room air                              14.6   3.54  )-----------( 205      ( 30 Jan 2023 07:48 )             44.3    01-30    139  |  104  |  11  ----------------------------<  136<H>  4.1   |  23  |  0.73    Ca    9.8      30 Jan 2023 07:48  Phos  3.4     01-30  Mg     2.0     01-30    TPro  7.7  /  Alb  4.8  /  TBili  0.6  /  DBili  x   /  AST  87<H>  /  ALT  127<H>  /  AlkPhos  75  01-30       PHYSICAL EXAM:  Gen: NAD  Skin: No rashes, bruises, or lesions  Head: Normocephalic, Atraumatic  Face: no edema, erythema, or fluctuance. Parotid glands soft without mass  Eyes: no scleral injection  Nose: Nares bilaterally patent, no discharge  Mouth: Tongue swelling R>L with diffused ecchymosis, slightly tender to palpation, no active bleeding, No Stridor / Drooling / Trismus.  Mucosa moist, uvula midline, oropharynx clear  Neck: Flat, supple, no lymphadenopathy, trachea midline, no masses  Lymphatic: No lymphadenopathy  Resp: breathing easily, no stridor  CV: no peripheral edema/cyanosis  GI: nondistended   Peripheral vascular: no JVD or edema  Neuro: facial nerve intact, no facial droop

## 2023-01-30 NOTE — PROGRESS NOTE ADULT - PROBLEM SELECTOR PLAN 1
- last drink 5 pm prior to admission, alcohol level of <10 on admission. Per history patient drinks 12 beers and one bottle of wine per day on average. History of prior admission in Spring 2022 for alcohol withdrawal and seizure.   - [facility] protocol   - c/w librium taper w/ Ativan IV PRNS   - trend CMP qd, replete lytes PRN  --MVI, thiamine, folate - last drink 5 pm prior to admission, alcohol level of <10 on admission. Per history patient drinks 12 beers and one bottle of wine per day on average. History of prior admission in Spring 2022 for alcohol withdrawal and seizure.   - CIWA protocol   - plan to end librium taper w/ Ativan IV PRNs today, as patient's CIWAs have been consistently 0   - trend CMP qd, replete lytes PRN  - MVI, thiamine, folate  - 1/30 SW saw pt, provided him with recommendations for OP resources

## 2023-01-30 NOTE — PROGRESS NOTE ADULT - PROBLEM SELECTOR PLAN 5
DVT ppx: Lovenox given CTH demonstrating no bleed   Diet: Regular   Dispo: Pending clinical course    Discharge planning issues  Transitions of Care Status:  1.  Name of PCP:  2.  PCP Contacted on Admission: [ ] Y    [ ] N    3.  PCP contacted at Discharge: [ ] Y    [ ] N    [ ] N/A  4.  Post-Discharge Appointment Date and Location:  5.  Summary of Handoff given to PCP: DVT ppx: Lovenox given CTH demonstrating no bleed   Diet: Soft  Dispo: Pending clinical course    Discharge planning issues  Transitions of Care Status:  1.  Name of PCP:  2.  PCP Contacted on Admission: [ ] Y    [ ] N    3.  PCP contacted at Discharge: [ ] Y    [ ] N    [ ] N/A  4.  Post-Discharge Appointment Date and Location:  5.  Summary of Handoff given to PCP:

## 2023-01-30 NOTE — PROGRESS NOTE ADULT - SUBJECTIVE AND OBJECTIVE BOX
Internal Medicine   Sally Hutton | PGY-2    OVERNIGHT EVENTS:      SUBJECTIVE:       MEDICATIONS  (STANDING):  chlordiazePOXIDE   Oral   dexAMETHasone  IVPB 8 milliGRAM(s) IV Intermittent every 8 hours  enoxaparin Injectable 40 milliGRAM(s) SubCutaneous every 24 hours  folic acid 1 milliGRAM(s) Oral daily  multivitamin 1 Tablet(s) Oral daily  thiamine 100 milliGRAM(s) Oral daily    MEDICATIONS  (PRN):  LORazepam   Injectable 2 milliGRAM(s) IV Push every 1 hour PRN Symptom-triggered: each CIWA -Ar score 8 or GREATER  ondansetron Injectable 4 milliGRAM(s) IV Push every 8 hours PRN Nausea and/or Vomiting  tetracaine/benzocaine/butamben Spray 1 Spray(s) Topical three times a day PRN tongue pain        T(F): 98.4 (01-30-23 @ 05:21), Max: 99.1 (01-29-23 @ 11:51)  HR: 75 (01-30-23 @ 05:21) (75 - 101)  BP: 117/74 (01-30-23 @ 05:21) (106/73 - 117/74)  BP(mean): --  RR: 18 (01-30-23 @ 05:21) (18 - 18)  SpO2: 95% (01-30-23 @ 05:21) (95% - 97%)    PHYSICAL EXAM:     GENERAL: NAD, lying in bed comfortably.  HEAD:  Atraumatic, normocephalic.  EYES: EOMI, PERRLA, conjunctiva and sclera clear, no nystagmus noted.  ENT: Moist mucous membranes.   NECK: Supple. No JVD. Trachea midline.  CHEST/LUNG: CTAB. No rales, rhonchi, wheezing, or rubs. Unlabored respirations.  HEART: RRR, no M/R/G, normal S1/S2.  ABDOMEN: Soft, nontender, nondistended, no organomegaly. Normoactive bowel sounds.  EXTREMITIES:  2+ peripheral pulses b/l, brisk capillary refill. No clubbing, cyanosis, or edema.  MSK: No gross deformities noted.   NEURO:  AAOx3, no focal deficits.   SKIN: No rashes or lesions.  PSYCH: Normal mood, affect.    TELEMETRY:    LABS:                        14.3   5.12  )-----------( 177      ( 29 Jan 2023 06:55 )             42.8     01-29    140  |  104  |  12  ----------------------------<  81  3.9   |  24  |  0.79    Ca    9.8      29 Jan 2023 06:55  Phos  5.3     01-29  Mg     2.4     01-29    TPro  7.1  /  Alb  4.7  /  TBili  0.9  /  DBili  x   /  AST  113<H>  /  ALT  126<H>  /  AlkPhos  74  01-29            Creatinine Trend: 0.79<--, 0.79<--, 0.76<--  I&O's Summary    29 Jan 2023 07:01  -  30 Jan 2023 07:00  --------------------------------------------------------  IN: 350 mL / OUT: 0 mL / NET: 350 mL      BNP    RADIOLOGY & ADDITIONAL STUDIES:             Internal Medicine   Sallyavel Hutton | PGY-2    OVERNIGHT EVENTS: GUDELIA      SUBJECTIVE: Patient was seen and examined at bedside this morning. Denies any palpitations, tremors, fevers, AH/VH. Denies nausea/vomiting/diarrhea, headache, shortness of breath, abdominal pain or chest pain. Patient responding appropriately to questions and able to make needs known.       MEDICATIONS  (STANDING):  chlordiazePOXIDE   Oral   dexAMETHasone  IVPB 8 milliGRAM(s) IV Intermittent every 8 hours  enoxaparin Injectable 40 milliGRAM(s) SubCutaneous every 24 hours  folic acid 1 milliGRAM(s) Oral daily  multivitamin 1 Tablet(s) Oral daily  thiamine 100 milliGRAM(s) Oral daily    MEDICATIONS  (PRN):  LORazepam   Injectable 2 milliGRAM(s) IV Push every 1 hour PRN Symptom-triggered: each CIWA -Ar score 8 or GREATER  ondansetron Injectable 4 milliGRAM(s) IV Push every 8 hours PRN Nausea and/or Vomiting  tetracaine/benzocaine/butamben Spray 1 Spray(s) Topical three times a day PRN tongue pain        T(F): 98.4 (01-30-23 @ 05:21), Max: 99.1 (01-29-23 @ 11:51)  HR: 75 (01-30-23 @ 05:21) (75 - 101)  BP: 117/74 (01-30-23 @ 05:21) (106/73 - 117/74)  BP(mean): --  RR: 18 (01-30-23 @ 05:21) (18 - 18)  SpO2: 95% (01-30-23 @ 05:21) (95% - 97%)    PHYSICAL EXAM:     GENERAL: NAD, lying in bed comfortably.  HEAD:  Atraumatic, normocephalic.  ENT: Moist mucous membranes.   NECK: Supple.  CHEST/LUNG: CTAB. No rales, rhonchi, wheezing, or rubs. Unlabored respirations.  HEART: RRR, no M/R/G, normal S1/S2.  ABDOMEN: Soft, nontender, nondistended, no organomegaly. Normoactive bowel sounds.  EXTREMITIES:  2+ peripheral pulses b/l, brisk capillary refill. No clubbing, cyanosis, or edema.  MSK: No gross deformities noted.   NEURO:  AAOx3, no focal deficits.   SKIN: No rashes or lesions.  PSYCH: Normal mood, affect.    TELEMETRY:    LABS:                        14.3   5.12  )-----------( 177      ( 29 Jan 2023 06:55 )             42.8     01-29    140  |  104  |  12  ----------------------------<  81  3.9   |  24  |  0.79    Ca    9.8      29 Jan 2023 06:55  Phos  5.3     01-29  Mg     2.4     01-29    TPro  7.1  /  Alb  4.7  /  TBili  0.9  /  DBili  x   /  AST  113<H>  /  ALT  126<H>  /  AlkPhos  74  01-29            Creatinine Trend: 0.79<--, 0.79<--, 0.76<--  I&O's Summary    29 Jan 2023 07:01  -  30 Jan 2023 07:00  --------------------------------------------------------  IN: 350 mL / OUT: 0 mL / NET: 350 mL      BNP    RADIOLOGY & ADDITIONAL STUDIES:

## 2023-01-30 NOTE — PROGRESS NOTE ADULT - PROBLEM SELECTOR PLAN 1
Decadron 10mg IV Q* for 48hrs if there is no contraindication  Soft diet   We'll continue to follow. Finish course of decadron   Soft diet   No further ENT intervention at this time  Call with questions or concerns

## 2023-01-30 NOTE — PROGRESS NOTE ADULT - PROBLEM SELECTOR PLAN 4
- AST of 130/ ALT of 141 on admission   - no TTP in the RUQ of the abdomen and no TTP in all other quadrants   - CTM trend LFTs, if no improvement in LFT's will consider RUQUS.   -MDF wnl, no need for steroids at this time - AST of 130/ ALT of 141 on admission   - no TTP in the RUQ of the abdomen and no TTP in all other quadrants   - CTM trend LFTs, if no improvement in LFT's will consider RUQUS.   - MDF wnl, no need for steroids at this time

## 2023-01-31 VITALS — DIASTOLIC BLOOD PRESSURE: 75 MMHG | SYSTOLIC BLOOD PRESSURE: 125 MMHG

## 2023-01-31 DIAGNOSIS — E87.1 HYPO-OSMOLALITY AND HYPONATREMIA: ICD-10-CM

## 2023-01-31 LAB
ALBUMIN SERPL ELPH-MCNC: 5 G/DL — SIGNIFICANT CHANGE UP (ref 3.3–5)
ALP SERPL-CCNC: 74 U/L — SIGNIFICANT CHANGE UP (ref 40–120)
ALT FLD-CCNC: 110 U/L — HIGH (ref 10–45)
AMPHET UR-MCNC: NEGATIVE NG/ML — SIGNIFICANT CHANGE UP
ANION GAP SERPL CALC-SCNC: 13 MMOL/L — SIGNIFICANT CHANGE UP (ref 5–17)
AST SERPL-CCNC: 49 U/L — HIGH (ref 10–40)
BARBITURATES UR QL SCN: NEGATIVE NG/ML — SIGNIFICANT CHANGE UP
BARBITURATES UR-MCNC: NEGATIVE NG/ML — SIGNIFICANT CHANGE UP
BENZODIAZ UR-MCNC: NEGATIVE NG/ML — SIGNIFICANT CHANGE UP
BILIRUB SERPL-MCNC: 0.6 MG/DL — SIGNIFICANT CHANGE UP (ref 0.2–1.2)
BUN SERPL-MCNC: 10 MG/DL — SIGNIFICANT CHANGE UP (ref 7–23)
CALCIUM SERPL-MCNC: 10.2 MG/DL — SIGNIFICANT CHANGE UP (ref 8.4–10.5)
CHLORIDE SERPL-SCNC: 103 MMOL/L — SIGNIFICANT CHANGE UP (ref 96–108)
CO2 SERPL-SCNC: 24 MMOL/L — SIGNIFICANT CHANGE UP (ref 22–31)
COCAINE METAB.OTHER UR-MCNC: NEGATIVE NG/ML — SIGNIFICANT CHANGE UP
CREAT SERPL-MCNC: 0.78 MG/DL — SIGNIFICANT CHANGE UP (ref 0.5–1.3)
CREATININE, URINE THERAPEUTIC: 53.6 MG/DL — SIGNIFICANT CHANGE UP (ref 20–300)
EGFR: 119 ML/MIN/1.73M2 — SIGNIFICANT CHANGE UP
FENTANYL RESULT, UR: NEGATIVE NG/ML — SIGNIFICANT CHANGE UP
FENTANYL UR QL SCN: NEGATIVE NG/ML — SIGNIFICANT CHANGE UP
GLUCOSE SERPL-MCNC: 137 MG/DL — HIGH (ref 70–99)
HCT VFR BLD CALC: 46.2 % — SIGNIFICANT CHANGE UP (ref 39–50)
HGB BLD-MCNC: 15.7 G/DL — SIGNIFICANT CHANGE UP (ref 13–17)
Lab: SIGNIFICANT CHANGE UP
MAGNESIUM SERPL-MCNC: 2.2 MG/DL — SIGNIFICANT CHANGE UP (ref 1.6–2.6)
MCHC RBC-ENTMCNC: 32 PG — SIGNIFICANT CHANGE UP (ref 27–34)
MCHC RBC-ENTMCNC: 34 GM/DL — SIGNIFICANT CHANGE UP (ref 32–36)
MCV RBC AUTO: 94.3 FL — SIGNIFICANT CHANGE UP (ref 80–100)
METHADONE UR-MCNC: NEGATIVE NG/ML — SIGNIFICANT CHANGE UP
NRBC # BLD: 0 /100 WBCS — SIGNIFICANT CHANGE UP (ref 0–0)
OPIATES UR-MCNC: NEGATIVE NG/ML — SIGNIFICANT CHANGE UP
OXYCODONE UR QL SCN: NEGATIVE NG/ML — SIGNIFICANT CHANGE UP
PCP UR-MCNC: NEGATIVE NG/ML — SIGNIFICANT CHANGE UP
PH, URINE RESULT: 7.3 — SIGNIFICANT CHANGE UP (ref 4.5–8.9)
PHOSPHATE SERPL-MCNC: 4.2 MG/DL — SIGNIFICANT CHANGE UP (ref 2.5–4.5)
PLATELET # BLD AUTO: 253 K/UL — SIGNIFICANT CHANGE UP (ref 150–400)
POTASSIUM SERPL-MCNC: 4.1 MMOL/L — SIGNIFICANT CHANGE UP (ref 3.5–5.3)
POTASSIUM SERPL-SCNC: 4.1 MMOL/L — SIGNIFICANT CHANGE UP (ref 3.5–5.3)
PROT SERPL-MCNC: 7.9 G/DL — SIGNIFICANT CHANGE UP (ref 6–8.3)
RBC # BLD: 4.9 M/UL — SIGNIFICANT CHANGE UP (ref 4.2–5.8)
RBC # FLD: 12.6 % — SIGNIFICANT CHANGE UP (ref 10.3–14.5)
SODIUM SERPL-SCNC: 140 MMOL/L — SIGNIFICANT CHANGE UP (ref 135–145)
THC UR QL: NEGATIVE NG/ML — SIGNIFICANT CHANGE UP
WBC # BLD: 8.83 K/UL — SIGNIFICANT CHANGE UP (ref 3.8–10.5)
WBC # FLD AUTO: 8.83 K/UL — SIGNIFICANT CHANGE UP (ref 3.8–10.5)

## 2023-01-31 PROCEDURE — 80053 COMPREHEN METABOLIC PANEL: CPT

## 2023-01-31 PROCEDURE — 83735 ASSAY OF MAGNESIUM: CPT

## 2023-01-31 PROCEDURE — 83605 ASSAY OF LACTIC ACID: CPT

## 2023-01-31 PROCEDURE — 82565 ASSAY OF CREATININE: CPT

## 2023-01-31 PROCEDURE — 84132 ASSAY OF SERUM POTASSIUM: CPT

## 2023-01-31 PROCEDURE — 70450 CT HEAD/BRAIN W/O DYE: CPT

## 2023-01-31 PROCEDURE — 85014 HEMATOCRIT: CPT

## 2023-01-31 PROCEDURE — 99239 HOSP IP/OBS DSCHRG MGMT >30: CPT | Mod: GC

## 2023-01-31 PROCEDURE — 85027 COMPLETE CBC AUTOMATED: CPT

## 2023-01-31 PROCEDURE — 96361 HYDRATE IV INFUSION ADD-ON: CPT

## 2023-01-31 PROCEDURE — 85730 THROMBOPLASTIN TIME PARTIAL: CPT

## 2023-01-31 PROCEDURE — 82435 ASSAY OF BLOOD CHLORIDE: CPT

## 2023-01-31 PROCEDURE — 82330 ASSAY OF CALCIUM: CPT

## 2023-01-31 PROCEDURE — 99285 EMERGENCY DEPT VISIT HI MDM: CPT

## 2023-01-31 PROCEDURE — 96375 TX/PRO/DX INJ NEW DRUG ADDON: CPT

## 2023-01-31 PROCEDURE — 80307 DRUG TEST PRSMV CHEM ANLYZR: CPT

## 2023-01-31 PROCEDURE — 84295 ASSAY OF SERUM SODIUM: CPT

## 2023-01-31 PROCEDURE — 85025 COMPLETE CBC W/AUTO DIFF WBC: CPT

## 2023-01-31 PROCEDURE — 96374 THER/PROPH/DIAG INJ IV PUSH: CPT

## 2023-01-31 PROCEDURE — 82248 BILIRUBIN DIRECT: CPT

## 2023-01-31 PROCEDURE — 84100 ASSAY OF PHOSPHORUS: CPT

## 2023-01-31 PROCEDURE — 36415 COLL VENOUS BLD VENIPUNCTURE: CPT

## 2023-01-31 PROCEDURE — 82947 ASSAY GLUCOSE BLOOD QUANT: CPT

## 2023-01-31 PROCEDURE — 82550 ASSAY OF CK (CPK): CPT

## 2023-01-31 PROCEDURE — 82803 BLOOD GASES ANY COMBINATION: CPT

## 2023-01-31 PROCEDURE — 87637 SARSCOV2&INF A&B&RSV AMP PRB: CPT

## 2023-01-31 PROCEDURE — 85610 PROTHROMBIN TIME: CPT

## 2023-01-31 PROCEDURE — 85018 HEMOGLOBIN: CPT

## 2023-01-31 RX ORDER — FOLIC ACID 0.8 MG
1 TABLET ORAL
Qty: 30 | Refills: 0
Start: 2023-01-31 | End: 2023-03-01

## 2023-01-31 RX ORDER — THIAMINE MONONITRATE (VIT B1) 100 MG
1 TABLET ORAL
Qty: 30 | Refills: 0
Start: 2023-01-31 | End: 2023-03-01

## 2023-01-31 RX ADMIN — Medication 1 MILLIGRAM(S): at 12:03

## 2023-01-31 RX ADMIN — Medication 102 MILLIGRAM(S): at 05:02

## 2023-01-31 RX ADMIN — Medication 1 TABLET(S): at 12:03

## 2023-01-31 RX ADMIN — Medication 1 SPRAY(S): at 05:01

## 2023-01-31 NOTE — PROGRESS NOTE ADULT - PROBLEM SELECTOR PROBLEM 1
Tongue swelling
Alcohol dependence with withdrawal

## 2023-01-31 NOTE — PROGRESS NOTE ADULT - PROBLEM SELECTOR PLAN 4
- AST of 130/ ALT of 141 on admission   - no TTP in the RUQ of the abdomen and no TTP in all other quadrants   - CTM trend LFTs, if no improvement in LFT's will consider RUQUS.   - MDF wnl, no need for steroids at this time

## 2023-01-31 NOTE — PROGRESS NOTE ADULT - ATTENDING COMMENTS
36 y/o M with h/o ETOH use disorder with complicated withdrawals (seizure), presenting with fall iso etoh withdrawal seizure. Completed taper without further evidence of seizure or withdrawal. stable for d/c with outpatient f/u and ETOH related services. d/c time spent by provider 41 min  # EtOH withdrawal seizure  # EtOH use disorder  # transaminitis  # Hyponatremia  # tongue hematoma  # fall
above plans discussed with Dr. Jones    # EtOH withdrawal seizure  # EtOH use disorder  # transaminitis  # fall    - pt presents with fall in setting of EtOH withdrawal seizure and this is his 2nd episode   - started on librium taper as high risk  - so far, CIWA 0 last 24 hrs (last drink 1/26 evening)  - continue to monitor closely but if remains stable, can rapidly taper librium  - LFTs trending down, continue to monitor  - pt interested in detox rehab, SW consult    Linda Bobo MD  Division of Hospital Medicine  Contact via [employer] Teams  Office: [phone]
above plans discussed with Dr. Fleming    # EtOH withdrawal seizure  # EtOH use disorder  # transaminitis  # tongue hematoma  # fall    - pt presents with fall in setting of EtOH withdrawal seizure and this is his 2nd episode   - started on librium taper as high risk and has remained stable  - so far, CIWA 0 last 24 hrs (last drink 1/26 evening)  - continue to monitor closely but if remains stable, can rapidly taper librium  - tongue hematoma s/o biting during seizure; consult ENT for further evaluation  - lidocaine spray for symptom relief  - LFTs trending down, continue to monitor  - pt interested in detox rehab, SW consult    Linda Bobo MD  Division of Hospital Medicine  Contact via [employer] Teams  Office: [phone]
38 y/o M with h/o ETOH use disorder with complicated withdrawals (seizure), presenting with fall iso etoh withdrawal seizure.   # EtOH withdrawal seizure  # EtOH use disorder  # transaminitis  #Hyponatremia  # tongue hematoma  # fall  -CIWA 0 on high dose librium taper, completing  -on steroids for tongue hematoma with improvement  -Transaminitis stable, hyponatremia resolved  -SW provided resources  -d/c within the next 24 hours, d/c time spent by provider 43 min

## 2023-01-31 NOTE — DISCHARGE NOTE NURSING/CASE MANAGEMENT/SOCIAL WORK - NSDCFUADDAPPT_GEN_ALL_CORE_FT
APPTS ARE READY TO BE MADE: [X] YES    Best Family or Patient Contact (if needed):    Additional Information about above appointments (if needed):    1: Please follow-up with your primary care physician within 1-2 weeks of discharge.   2:   3:     Other comments or requests:     Patient was scheduled with Daniel Pastor  on 2/13 9am at [address] through the provider's office. Patient advised of appointment details.

## 2023-01-31 NOTE — DISCHARGE NOTE NURSING/CASE MANAGEMENT/SOCIAL WORK - NSDCVIVACCINE_GEN_ALL_CORE_FT
Tdap; 19-Oct-2022 21:34; Hannah Cao (RN); [employer]; [id number] (Exp. Date: 29-Nov-2024); IntraMuscular; Deltoid Left.; 0.5 milliLiter(s); VIS (VIS Published: 09-May-2013, VIS Presented: 19-Oct-2022);

## 2023-01-31 NOTE — PROGRESS NOTE ADULT - PROBLEM SELECTOR PLAN 1
- last drink 5 pm prior to admission, alcohol level of <10 on admission. Per history patient drinks 12 beers and one bottle of wine per day on average. History of prior admission in Spring 2022 for alcohol withdrawal and seizure.   - CIWA protocol   - plan to end librium taper w/ Ativan IV PRNs today, as patient's CIWAs have been consistently 0   - trend CMP qd, replete lytes PRN  - MVI, thiamine, folate  - 1/30 SW saw pt, provided him with recommendations for OP resources

## 2023-01-31 NOTE — PROGRESS NOTE ADULT - PROBLEM SELECTOR PLAN 2
- patient with prior hx of alcohol withdrawal related seizure in Spring 2022. Per hx (collateral from patient's father) patient with 4 minutes of shaking seizure-like activity followed by 3 minutes of post-ictal period   - librium taper given high risk of seizures, Initial CIWA protocol, librium taper w/ Ativan IV PRNs  - reactive leukocytosis likely iso seizure  - Lactate on VBG of 2.4 on admission, f/u AM VBG w/ lactate for resolution. s/p 2L IVF   - serum lactate 2.4, CK 1176  - seizure precautions, fall precautions  - ENT c/s for tongue swelling from bite during seizure  >>started decadron 10mg q8 x 48h (1/29-)
- unwitnessed fall 2/2 alcohol withdrawal seizure   -Per hx (collateral from patient's father) patient with 4 minutes of shaking seizure-like activity followed by 3 minutes of post-ictal period   - Neurological exam non-focal, CTH wnl
- patient with prior hx of alcohol withdrawal related seizure in Spring 2022. Per hx (collateral from patient's father) patient with 4 minutes of shaking seizure-like activity followed by 3 minutes of post-ictal period   - librium taper given high risk of seizures, Initial CIWA protocol, librium taper w/ Ativan IV PRNs  - reactive leukocytosis likely iso seizure  - Lactate on VBG of 2.4 on admission, f/u AM VBG w/ lactate for resolution. s/p 2L IVF   - serum lactate 2.4, CK 1176  - seizure precautions, fall precautions  - ENT c/s for tongue swelling from bite during seizure  >>started decadron 10mg q8 x 48h (1/29-)
- unwitnessed fall 2/2 alcohol withdrawal seizure   -Per hx (collateral from patient's father) patient with 4 minutes of shaking seizure-like activity followed by 3 minutes of post-ictal period   - Neurological exam non-focal, CTH wnl

## 2023-01-31 NOTE — PROGRESS NOTE ADULT - ASSESSMENT
37 year old male with hx of alcohol withdrawal seizures p/w alcohol withdrawal seizure prior to admission and in alcohol withdrawal last drink 01/26 at 5 pm, started on Librium taper with Ativan PRNs ordered. Admitted to medicine for management of  alcohol withdrawal. Per collateral and history, patient with unwitnessed fall 2/2 alcohol withdrawal seizure, CT head imaging wnl. 
37 year old male with hx of alcohol withdrawal seizures p/w alcohol withdrawal seizure prior to admission and in alcohol withdrawal last drink 01/26 at 5 pm, started on Librium taper with Ativan PRNs ordered. Admitted to medicine for management of  alcohol withdrawal. Per collateral and history, patient with unwitnessed fall 2/2 alcohol withdrawal seizure, CT head imaging wnl. 
37-year-old male, with past medical history of alcohol abuse, with prior hospitalizations for alcohol withdrawal and withdrawal related seizures, presenting after seizure-like episode today in the setting of alcohol intake. Pt reportedly bit his tongue during a seizure episode. Exam revealed tongue swelling with diffused ecchymosis R>L, no active bleeding. Recommend  decadron for 48hrs and soft diet. Swelling improving with steroids  
37 year old male with hx of alcohol withdrawal seizures p/w alcohol withdrawal seizure prior to admission and in alcohol withdrawal last drink 01/26 at 5 pm, started on Librium taper with Ativan PRNs ordered. Admitted to medicine for management of  alcohol withdrawal. Per collateral and history, patient with unwitnessed fall 2/2 alcohol withdrawal seizure, CT head imaging wnl. 
37 year old male with hx of alcohol withdrawal seizures p/w alcohol withdrawal seizure prior to admission and in alcohol withdrawal last drink 01/26 at 5 pm, started on Librium taper with Ativan PRNs ordered. Admitted to medicine for management of  alcohol withdrawal. Per collateral and history, patient with unwitnessed fall 2/2 alcohol withdrawal seizure, CT head imaging wnl.

## 2023-01-31 NOTE — DISCHARGE NOTE NURSING/CASE MANAGEMENT/SOCIAL WORK - NSDCPEFALRISK_GEN_ALL_CORE
For information on Fall & Injury Prevention, visit: [email or web address] OR  [email or web address] OR  https://www.cdc.gov/steadi/patient.html

## 2023-01-31 NOTE — PROGRESS NOTE ADULT - SUBJECTIVE AND OBJECTIVE BOX
**************************************  Camilaraf Karen, PGY-1  **************************************    INTERVAL HPI/OVERNIGHT EVENTS:  Patient was seen and examined at bedside. As per nurse and patient, no o/n events, patient resting comfortably. No complaints at this time. Patient denies: fever, chills, dizziness, weakness, HA, Changes in vision, CP, palpitations, SOB, cough, N/V/D/C, dysuria, changes in bowel movements, LE edema. ROS otherwise negative.    VITAL SIGNS:  T(F): 97.7 (01-31-23 @ 04:10)  HR: 85 (01-31-23 @ 04:10)  BP: 116/72 (01-31-23 @ 04:10)  RR: 18 (01-31-23 @ 04:10)  SpO2: 98% (01-31-23 @ 04:10)  Wt(kg): --    PHYSICAL EXAM:    Constitutional: WDWN, NAD  HEENT: PERRL, EOMI, sclera non-icteric, neck supple, trachea midline, no masses, no JVD, MMM, good dentition  Respiratory: CTA b/l, good air entry b/l, no wheezing, no rhonchi, no rales, without accessory muscle use and no intercostal retractions  Cardiovascular: RRR, normal S1S2, no M/R/G  Gastrointestinal: soft, NTND, no masses palpable, BS normal  Extremities: Warm, well perfused, pulses equal bilateral upper and lower extremities, no edema, no clubbing. Capillary refill <2 sec  Neurological: AAOx3, CN Grossly intact  Skin: Normal temperature, warm, dry    MEDICATIONS  (STANDING):  dexAMETHasone  IVPB 10 milliGRAM(s) IV Intermittent every 8 hours  enoxaparin Injectable 40 milliGRAM(s) SubCutaneous every 24 hours  folic acid 1 milliGRAM(s) Oral daily  multivitamin 1 Tablet(s) Oral daily    MEDICATIONS  (PRN):  ondansetron Injectable 4 milliGRAM(s) IV Push every 8 hours PRN Nausea and/or Vomiting  tetracaine/benzocaine/butamben Spray 1 Spray(s) Topical three times a day PRN tongue pain      Allergies    No Known Allergies    Intolerances        LABS:                        14.6   3.54  )-----------( 205      ( 30 Jan 2023 07:48 )             44.3     01-30    139  |  104  |  11  ----------------------------<  136<H>  4.1   |  23  |  0.73    Ca    9.8      30 Jan 2023 07:48  Phos  3.4     01-30  Mg     2.0     01-30    TPro  7.7  /  Alb  4.8  /  TBili  0.6  /  DBili  x   /  AST  87<H>  /  ALT  127<H>  /  AlkPhos  75  01-30          RADIOLOGY & ADDITIONAL TESTS:  Reviewed

## 2023-01-31 NOTE — DISCHARGE NOTE NURSING/CASE MANAGEMENT/SOCIAL WORK - PATIENT PORTAL LINK FT
You can access the FollowMyHealth Patient Portal offered by [facility] by registering at the following website: [email or web address]. By joining [employer]’s FollowMyHealth portal, you will also be able to view your health information using other applications (apps) compatible with our system.

## 2023-01-31 NOTE — PROGRESS NOTE ADULT - PROBLEM SELECTOR PLAN 5
DVT ppx: Lovenox given CTH demonstrating no bleed   Diet: Soft  Dispo: Pending clinical course    Discharge planning issues  Transitions of Care Status:  1.  Name of PCP:  2.  PCP Contacted on Admission: [ ] Y    [ ] N    3.  PCP contacted at Discharge: [ ] Y    [ ] N    [ ] N/A  4.  Post-Discharge Appointment Date and Location:  5.  Summary of Handoff given to PCP:

## 2023-01-31 NOTE — PROGRESS NOTE ADULT - REASON FOR ADMISSION
alcohol induced seizures

## 2023-03-15 ENCOUNTER — EMERGENCY (EMERGENCY)
Facility: HOSPITAL | Age: 38
LOS: 1 days | Discharge: HOME | End: 2023-03-15
Attending: EMERGENCY MEDICINE
Payer: MEDICAID

## 2023-03-15 VITALS
HEIGHT: 66 IN | RESPIRATION RATE: 20 BRPM | HEART RATE: 125 BPM | OXYGEN SATURATION: 95 % | DIASTOLIC BLOOD PRESSURE: 71 MMHG | TEMPERATURE: 98 F | WEIGHT: 178.35 LBS | SYSTOLIC BLOOD PRESSURE: 134 MMHG

## 2023-03-15 VITALS
SYSTOLIC BLOOD PRESSURE: 118 MMHG | RESPIRATION RATE: 18 BRPM | OXYGEN SATURATION: 98 % | HEART RATE: 99 BPM | DIASTOLIC BLOOD PRESSURE: 76 MMHG | TEMPERATURE: 98 F

## 2023-03-15 PROBLEM — Z87.898 PERSONAL HISTORY OF OTHER SPECIFIED CONDITIONS: Chronic | Status: ACTIVE | Noted: 2023-01-27

## 2023-03-15 LAB
ALBUMIN SERPL ELPH-MCNC: 4.1 G/DL — SIGNIFICANT CHANGE UP (ref 3.5–5)
ALP SERPL-CCNC: 86 U/L — SIGNIFICANT CHANGE UP (ref 40–120)
ALT FLD-CCNC: 65 U/L DA — HIGH (ref 10–60)
ANION GAP SERPL CALC-SCNC: 11 MMOL/L — SIGNIFICANT CHANGE UP (ref 5–17)
AST SERPL-CCNC: 61 U/L — HIGH (ref 10–40)
BASOPHILS # BLD AUTO: 0.11 K/UL — SIGNIFICANT CHANGE UP (ref 0–0.2)
BASOPHILS NFR BLD AUTO: 1.5 % — SIGNIFICANT CHANGE UP (ref 0–2)
BILIRUB SERPL-MCNC: 0.4 MG/DL — SIGNIFICANT CHANGE UP (ref 0.2–1.2)
BUN SERPL-MCNC: 5 MG/DL — LOW (ref 7–18)
CALCIUM SERPL-MCNC: 9.3 MG/DL — SIGNIFICANT CHANGE UP (ref 8.4–10.5)
CHLORIDE SERPL-SCNC: 102 MMOL/L — SIGNIFICANT CHANGE UP (ref 96–108)
CK SERPL-CCNC: 341 U/L — HIGH (ref 35–232)
CO2 SERPL-SCNC: 21 MMOL/L — LOW (ref 22–31)
CREAT SERPL-MCNC: 0.9 MG/DL — SIGNIFICANT CHANGE UP (ref 0.5–1.3)
EGFR: 114 ML/MIN/1.73M2 — SIGNIFICANT CHANGE UP
EOSINOPHIL # BLD AUTO: 0.01 K/UL — SIGNIFICANT CHANGE UP (ref 0–0.5)
EOSINOPHIL NFR BLD AUTO: 0.1 % — SIGNIFICANT CHANGE UP (ref 0–6)
ETHANOL SERPL-MCNC: 47 MG/DL — HIGH (ref 0–10)
GLUCOSE SERPL-MCNC: 153 MG/DL — HIGH (ref 70–99)
HCT VFR BLD CALC: 48.4 % — SIGNIFICANT CHANGE UP (ref 39–50)
HGB BLD-MCNC: 16.6 G/DL — SIGNIFICANT CHANGE UP (ref 13–17)
IMM GRANULOCYTES NFR BLD AUTO: 0.7 % — SIGNIFICANT CHANGE UP (ref 0–0.9)
LYMPHOCYTES # BLD AUTO: 1.62 K/UL — SIGNIFICANT CHANGE UP (ref 1–3.3)
LYMPHOCYTES # BLD AUTO: 21.9 % — SIGNIFICANT CHANGE UP (ref 13–44)
MAGNESIUM SERPL-MCNC: 2 MG/DL — SIGNIFICANT CHANGE UP (ref 1.6–2.6)
MCHC RBC-ENTMCNC: 31 PG — SIGNIFICANT CHANGE UP (ref 27–34)
MCHC RBC-ENTMCNC: 34.3 GM/DL — SIGNIFICANT CHANGE UP (ref 32–36)
MCV RBC AUTO: 90.5 FL — SIGNIFICANT CHANGE UP (ref 80–100)
MONOCYTES # BLD AUTO: 0.42 K/UL — SIGNIFICANT CHANGE UP (ref 0–0.9)
MONOCYTES NFR BLD AUTO: 5.7 % — SIGNIFICANT CHANGE UP (ref 2–14)
NEUTROPHILS # BLD AUTO: 5.19 K/UL — SIGNIFICANT CHANGE UP (ref 1.8–7.4)
NEUTROPHILS NFR BLD AUTO: 70.1 % — SIGNIFICANT CHANGE UP (ref 43–77)
NRBC # BLD: 0 /100 WBCS — SIGNIFICANT CHANGE UP (ref 0–0)
PLATELET # BLD AUTO: 272 K/UL — SIGNIFICANT CHANGE UP (ref 150–400)
POTASSIUM SERPL-MCNC: 4.6 MMOL/L — SIGNIFICANT CHANGE UP (ref 3.5–5.3)
POTASSIUM SERPL-SCNC: 4.6 MMOL/L — SIGNIFICANT CHANGE UP (ref 3.5–5.3)
PROT SERPL-MCNC: 8 G/DL — SIGNIFICANT CHANGE UP (ref 6–8.3)
RBC # BLD: 5.35 M/UL — SIGNIFICANT CHANGE UP (ref 4.2–5.8)
RBC # FLD: 12.5 % — SIGNIFICANT CHANGE UP (ref 10.3–14.5)
SODIUM SERPL-SCNC: 134 MMOL/L — LOW (ref 135–145)
TROPONIN I, HIGH SENSITIVITY RESULT: 3.3 NG/L — SIGNIFICANT CHANGE UP
WBC # BLD: 7.4 K/UL — SIGNIFICANT CHANGE UP (ref 3.8–10.5)
WBC # FLD AUTO: 7.4 K/UL — SIGNIFICANT CHANGE UP (ref 3.8–10.5)

## 2023-03-15 PROCEDURE — 84484 ASSAY OF TROPONIN QUANT: CPT

## 2023-03-15 PROCEDURE — 96374 THER/PROPH/DIAG INJ IV PUSH: CPT

## 2023-03-15 PROCEDURE — 99284 EMERGENCY DEPT VISIT MOD MDM: CPT | Mod: 25

## 2023-03-15 PROCEDURE — 36415 COLL VENOUS BLD VENIPUNCTURE: CPT

## 2023-03-15 PROCEDURE — 80053 COMPREHEN METABOLIC PANEL: CPT

## 2023-03-15 PROCEDURE — 85025 COMPLETE CBC W/AUTO DIFF WBC: CPT

## 2023-03-15 PROCEDURE — 80307 DRUG TEST PRSMV CHEM ANLYZR: CPT

## 2023-03-15 PROCEDURE — 83735 ASSAY OF MAGNESIUM: CPT

## 2023-03-15 PROCEDURE — 99285 EMERGENCY DEPT VISIT HI MDM: CPT

## 2023-03-15 PROCEDURE — 82550 ASSAY OF CK (CPK): CPT

## 2023-03-15 RX ORDER — SODIUM CHLORIDE 9 MG/ML
1000 INJECTION INTRAMUSCULAR; INTRAVENOUS; SUBCUTANEOUS ONCE
Refills: 0 | Status: COMPLETED | OUTPATIENT
Start: 2023-03-15 | End: 2023-03-15

## 2023-03-15 RX ADMIN — SODIUM CHLORIDE 1000 MILLILITER(S): 9 INJECTION INTRAMUSCULAR; INTRAVENOUS; SUBCUTANEOUS at 16:50

## 2023-03-15 RX ADMIN — Medication 50 MILLIGRAM(S): at 16:50

## 2023-03-15 RX ADMIN — SODIUM CHLORIDE 1000 MILLILITER(S): 9 INJECTION INTRAMUSCULAR; INTRAVENOUS; SUBCUTANEOUS at 12:07

## 2023-03-15 RX ADMIN — Medication 2 MILLIGRAM(S): at 12:07

## 2023-03-15 NOTE — ED ADULT NURSE NOTE - NS TRANSFER PATIENT BELONGINGS
Clothing O-T Plasty Text: The defect edges were debeveled with a #15 scalpel blade.  Given the location of the defect, shape of the defect and the proximity to free margins an O-T plasty was deemed most appropriate.  Using a sterile surgical marker, an appropriate O-T plasty was drawn incorporating the defect and placing the expected incisions within the relaxed skin tension lines where possible.    The area thus outlined was incised deep to adipose tissue with a #15 scalpel blade.  The skin margins were undermined to an appropriate distance in all directions utilizing iris scissors.

## 2023-03-15 NOTE — ED PROVIDER NOTE - OBJECTIVE STATEMENT
37 year old male with PMHx of alcohol abuse is presenting for sensation of shaking and anxiety with concerns for seizure. Patient reports that has multiple histories of seizure due to alcohol use in the past and didn't want it to happen again. Patient has been heavily drinking alcohol for the past week. 37 year old male with PMHx of alcohol abuse is presenting for sensation of shaking and anxiety with concerns for developing a seizure. Patient reports that has multiple histories of seizure due to alcohol use in the past and didn't want it to happen again. Patient has been heavily drinking alcohol for the past week.

## 2023-03-15 NOTE — ED ADULT TRIAGE NOTE - CCCP TRG CHIEF CMPLNT
PT Daily Note-Current


Subjective


Patient is very reluctant to participate with PT, however, with much 

encouragement, patient agrees.





Pain





   Numeric Pain Scale:  0-No Pain


   Location:  No Pain Reported





Mental Status


Patient Orientation:  Normal For Age


Attachments:  Oxygen, IV





Transfers


              Functional [place] Measure


0=Not Assessed/NA   4=Minimal Assistance


1=Total Assistance   5=Supervision or Setup


2=Maximal Assistance   6=Modified [place]


3=Moderate Assistance   7=Complete IndependenceIRFPAI Quality Coding Scale











6 Independent with activity with or without an assistive device


 


5  Patient requires set up or clean up by helper.  Patient completes activity  

by  themselves


 


4 Supervision or touching assist (CGA). [place] provide cues , steadying assist


 


3 The helper provides less than half the effort to complete the activity


 


2 The helper provides more than half the effort to complete the activity


 


1 Dependent.  The helper does all the effort to complete an activity 


 


7 Patient refused to complete or attempt activity


 


9 The patient did not perform the activity before the current illness or injury


 


88 Not attempted due to Medical conditions or safety concerns








Transfers (B, C, W/C) (FIM):  5


Scootin


Rollin


Supine to/from Sit:  5


Sit to/from Stand:  5





Gait Training


Gait (FIM):  1


Distance (FIM):  1=up to 49 ft


Distance:  45' x 2


Gait Level of Assist:  5


Gait Persons Needed:  1


Gait Assistive Device:  FWW


very slow, steady





Exercises


Seated Therapy Exercises:  Ankle pumps, Long arc quads


Seated Reps:  15





Assessment


Patient fatigues very quickly and requires much motivation to continue to 

remain up in recliner and to continue to participate with PT.  PT to increase 

activity as tolerated by patient.





PT Long Term Goals


Long Term Goals


PT Long Term Goals Time Frame:  2017


Transfers (B,C,W/C) (FIM):  6


Gait (FIM):  6


Gait distance (FIM):  3=150 ft


Distance:  150'


Gait Level of Assist:  6


Gait Assistive Device:  FWW





PT Plan


Treatment/Plan


Treatment Plan:  Continue Plan of Care


Treatment Plan:  Education, Functional Activity Atif, Functional Strength, Gait

, Safety, Therapeutic Exercise, Transfers


Treatment Duration:  2017


Visits Per Week:  5-6





Time/GCodes


Time In:  941


Time Out:  1004


Total Billed Treatment Time:  23


Total Billed Treatment


1 visit


FA x 2 23 min








KAVITA PERDOMO PT 2017 11:18
seizures

## 2023-03-15 NOTE — ED PROVIDER NOTE - NSFOLLOWUPINSTRUCTIONS_ED_ALL_ED_FT
You were seen in the emergency department for tremors and anxiety.    Please follow-up with your primary care doctor in the next 24 to 48 hours.    If you have any worsening symptoms, severe headache, chest pain, trouble breathing, feel shaky or have a seizure, please return to the emergency department.

## 2023-03-15 NOTE — ED PROVIDER NOTE - PROGRESS NOTE DETAILS
patient signed out to me pending reassessment. clinically sober and in no distress. does not appear to be in withdrawal. stable for outpatient followup. Raúl Chavez

## 2023-03-15 NOTE — ED ADULT NURSE NOTE - ED STAT RN HANDOFF DETAILS
Report given to PAT Mak. Pt resting in bed, no acute distress noted, denies chest pain, no SOB noted.

## 2023-03-15 NOTE — ED ADULT NURSE NOTE - OBJECTIVE STATEMENT
Pt s/p seizure at home. Pt states he has been binge drinking beer and wine x 1 week. Pt states this has happened before x 1 year ago when he started drinking this heavily. No acute distress noted, denies falls, denies chest pain, no SOB noted. EKG completed.

## 2023-03-15 NOTE — ED ADULT TRIAGE NOTE - CHIEF COMPLAINT QUOTE
As per pt, c/o seizures s/p alcohol use since 11pm last night. Pt reports have having beer every hour x1 week. Mild tongue fasciculation and slight BL hand tremors noted. Pt reports, h/o of seizure after drinking alcohol. Pt denies all other symptoms.

## 2023-03-15 NOTE — ED PROVIDER NOTE - CPE EDP MUSC NORM
No complaints of abdominal pain  tolerating PO  Received Coumadin 15 mg last night  /84  HR 72  Lungs clear  Irregular rhythm  No edema    INR 1.2    Imp:  Chronic AF  rate controlled  Rec:  Coumadin 15 mg tonight and tomorrow then decrease to 12.5 mg qd  Check INR on Monday 8/29 normal...

## 2023-03-15 NOTE — ED PROVIDER NOTE - PATIENT PORTAL LINK FT
You can access the FollowMyHealth Patient Portal offered by [facility] by registering at the following website: [email or web address]. By joining [employer]’s FollowMyHealth portal, you will also be able to view your health information using other applications (apps) compatible with our system.

## 2023-03-15 NOTE — ED ADULT NURSE NOTE - CAS ELECT INFOMATION PROVIDED
Discharge papers and instructions were provided by Charge Nurse Thanh to the patient./DC instructions

## 2023-07-07 ENCOUNTER — EMERGENCY (EMERGENCY)
Facility: HOSPITAL | Age: 38
LOS: 1 days | Discharge: HOME | End: 2023-07-07
Attending: EMERGENCY MEDICINE
Payer: MEDICAID

## 2023-07-07 VITALS
RESPIRATION RATE: 18 BRPM | DIASTOLIC BLOOD PRESSURE: 82 MMHG | TEMPERATURE: 98 F | WEIGHT: 169.98 LBS | HEART RATE: 105 BPM | SYSTOLIC BLOOD PRESSURE: 124 MMHG | HEIGHT: 66 IN | OXYGEN SATURATION: 99 %

## 2023-07-07 VITALS
OXYGEN SATURATION: 99 % | TEMPERATURE: 99 F | DIASTOLIC BLOOD PRESSURE: 93 MMHG | HEART RATE: 90 BPM | SYSTOLIC BLOOD PRESSURE: 135 MMHG | RESPIRATION RATE: 20 BRPM

## 2023-07-07 PROCEDURE — 99285 EMERGENCY DEPT VISIT HI MDM: CPT | Mod: 25

## 2023-07-07 PROCEDURE — 99284 EMERGENCY DEPT VISIT MOD MDM: CPT

## 2023-07-07 PROCEDURE — 73130 X-RAY EXAM OF HAND: CPT

## 2023-07-07 PROCEDURE — 73130 X-RAY EXAM OF HAND: CPT | Mod: 26,RT

## 2023-07-07 PROCEDURE — 12031 INTMD RPR S/A/T/EXT 2.5 CM/<: CPT

## 2023-07-07 RX ORDER — IBUPROFEN 200 MG
400 TABLET ORAL ONCE
Refills: 0 | Status: COMPLETED | OUTPATIENT
Start: 2023-07-07 | End: 2023-07-07

## 2023-07-07 RX ORDER — ACETAMINOPHEN 500 MG
650 TABLET ORAL ONCE
Refills: 0 | Status: COMPLETED | OUTPATIENT
Start: 2023-07-07 | End: 2023-07-07

## 2023-07-07 RX ORDER — CEPHALEXIN 500 MG
500 CAPSULE ORAL ONCE
Refills: 0 | Status: COMPLETED | OUTPATIENT
Start: 2023-07-07 | End: 2023-07-07

## 2023-07-07 RX ORDER — TETANUS TOXOID, REDUCED DIPHTHERIA TOXOID AND ACELLULAR PERTUSSIS VACCINE, ADSORBED 5; 2.5; 8; 8; 2.5 [IU]/.5ML; [IU]/.5ML; UG/.5ML; UG/.5ML; UG/.5ML
0.5 SUSPENSION INTRAMUSCULAR ONCE
Refills: 0 | Status: DISCONTINUED | OUTPATIENT
Start: 2023-07-07 | End: 2023-07-07

## 2023-07-07 RX ORDER — CEPHALEXIN 500 MG
1 CAPSULE ORAL
Qty: 28 | Refills: 0
Start: 2023-07-07 | End: 2023-07-13

## 2023-07-07 RX ADMIN — Medication 500 MILLIGRAM(S): at 11:39

## 2023-07-07 RX ADMIN — Medication 400 MILLIGRAM(S): at 11:39

## 2023-07-07 RX ADMIN — Medication 650 MILLIGRAM(S): at 11:40

## 2023-07-07 RX ADMIN — Medication 50 MILLIGRAM(S): at 11:59

## 2023-07-07 NOTE — ED PROVIDER NOTE - PATIENT PORTAL LINK FT
You can access the FollowMyHealth Patient Portal offered by [facility] by registering at the following website: [email or web address]. By joining [email or web address] [email or web address] portal, you will also be able to view your health information using other applications (apps) compatible with our system.

## 2023-07-07 NOTE — ED PROVIDER NOTE - PHYSICAL EXAMINATION
Const: not in acute distress  Eyes: no conjunctival injection  HEENT: Head NCAT, Moist MM.  Neck: Trachea midline.   CVS: +S1/S2, Peripheral pulses 2+ and equal in all extremities.  RESP: Unlabored respiratory effort. Clear to auscultation bilaterally.  GI: Nontender/Nondistended, No CVA tenderness b/l.   MSK: Exposed tendon of right middle finger.  Limited ability to extend right middle finger.  Patient able to extend right index finger.  Sensation intact in both right index and middle fingers, No Lower Extremities edema b/l.   Skin: Lacerations of right index and middle fingers  Neuro: Motor & Sensation grossly intact.  Psych: Awake, Alert, & Cooperative

## 2023-07-07 NOTE — ED PROVIDER NOTE - OBJECTIVE STATEMENT
27-year-old male with past medical history of alcohol use disorder (with history of DTs in the past), presenting with lacerations to his right index and middle finger.  Patient states that he was using a saw when it lacerated his fingers.  Patient has not taken anything for pain.  Patient reports last tetanus was 2 years ago when he had a laceration on his left hand.  Patient is right-handed.Patient states that he went into DTs back in February.  Stopped drinking until 2 weeks ago.  Patient states he has been drinking around a sixpack every day for the past 2 weeks.  Patient states last drink at 8 AM which was a beer. 37-year-old male with past medical history of alcohol use disorder (with history of DTs in the past), presenting with lacerations to his right index and middle finger.  Patient states that he was using a saw when it lacerated his fingers.  Patient has not taken anything for pain.  Patient reports last tetanus was 2 years ago when he had a laceration on his left hand.  Patient is right-handed.Patient states that he went into DTs back in February.  Stopped drinking until 2 weeks ago.  Patient states he has been drinking around a sixpack every day for the past 2 weeks.  Patient states last drink at 8 AM which was a beer.

## 2023-07-07 NOTE — ED ADULT NURSE NOTE - NSFALLUNIVINTERV_ED_ALL_ED
Bed/Stretcher in lowest position, wheels locked, appropriate side rails in place/Call bell, personal items and telephone in reach/Instruct patient to call for assistance before getting out of bed/chair/stretcher/Non-slip footwear applied when patient is off stretcher/[place] to call system/Physically safe environment - no spills, clutter or unnecessary equipment/Purposeful proactive rounding/Room/bathroom lighting operational, light cord in reach

## 2023-07-07 NOTE — ED PROVIDER NOTE - CLINICAL SUMMARY MEDICAL DECISION MAKING FREE TEXT BOX
27-year-old male with past medical history of alcohol use disorder (with history of DTs in the past), presenting with lacerations to his right index and middle finger. Vitals tachycardia to the 105, hemodynamically stable.  Physical exam with laceration to right index and right middle fingers. Will x-ray fingers, give antibiotics, and pain control.  Will speak with hand surgery concerning seeing patient in hospital versus superficial closure in setting outpatient. 37-year-old male with past medical history of alcohol use disorder (with history of DTs in the past), presenting with lacerations to his right index and middle finger. Vitals tachycardia to the 105, hemodynamically stable.  Physical exam with laceration to right index and right middle fingers. Will x-ray fingers, give antibiotics, and pain control.  Will speak with hand surgery concerning seeing patient in hospital versus superficial closure in setting outpatient. 37-year-old male with past medical history of alcohol use disorder (with history of DTs in the past), presenting with lacerations to his right index and middle finger. Vitals tachycardia to the 105, hemodynamically stable.  Physical exam with laceration to right index and right middle fingers. Will x-ray fingers, give antibiotics, and pain control.  Will speak with hand surgery concerning seeing patient in hospital versus superficial closure in setting outpatient.    Attending Statement: Agree with the above.  No real overt e/o withdrawal though patient is high risk by hx.  Librium ordered.  Probable R 3rd digit extensor tendon laceration given difficulty extending at MCP.  No clinical e/o 2nd digit tendon injury.  No concern for neurovascular injury.  Doubt fx but needs XRs given mechanism.  Tdap utd.  Hand c/s.  Ppx abx.  --BMM

## 2023-07-07 NOTE — CHART NOTE - NSCHARTNOTEFT_GEN_A_CORE
EMERGENCY : SW consulted by Attending MD to provide patient with outpatient resources for ETOH use. LMSW reviewed patient's chart. As per chart review patient is a "37-year-old male with past medical history of alcohol use disorder (with history of DTs in the past), presenting with lacerations to his right index and middle finger." As per Attending MD, patient is cleared for discharge at this time and is not in need of admission.     LMSW met with patient at bedside and introduced self and role to which he verbalized understanding. Patient is A&Ox4 at this time. Patient confirms independence with adl's and ambulation. Patient states he resides in an apartment in [place], [place]. He declines a caregiver but identifies his emergency contact as his brother Marquis PH: [phone]. LMSW addressed ETOH use with patient. See SBIRT for further information. Patient endorses drinking 6 or more beers per day. States he was sober about 3 weeks ago when he relapsed. Patient feels he self medicates his anxiety with alcohol use. Patient endorses history of withdrawals and hospitalizations for withdrawal. Patient declines any history of treatment programs, states he would stop on his own when he would stop. As per ED MD, patient to be medically cleared for discharge at this time. LMSW provided education on harmful ETOH use to which patient was receptive. LMSW also provided SBIRT and detox community resources to patient to which he was receptive and states he will further explore resources. Patient also receptive to LMSW making referral to a SBIRT counselor for further assistance with ETOH use treatment programs. Pt states no further needs for LMSW. Contact info provided and ongoing social work availability ensured. ED MD made aware of above. LMSW made referral to SBIRT counselor via recap and received confirmation of receipt of referral from SBIRT team who states they will reach out to patient. LMSW made ED MD aware of above information to which ED MD states patient remains cleared for discharge, no admission required. Patient's friend Pooja at bedside to transport patient home. Patient states no further needs for LMSW at present. Contact information provided and ongoing social work availability ensured. SW continues to remain available for any further needs.

## 2023-07-07 NOTE — ED PROVIDER NOTE - PROGRESS NOTE DETAILS
MD Greer (PGY-2) Consulted hand surgery due to tendon laceration.  Plastic surgery to come into the ED to repair. MD Greer (PGY-2) plastic surgery repaired torn extensor tendon.  Patient with full ligament tear in right middle finger and partial ligament tear of right index finger.  Plastics repaired tendons in the emergency room.  Patient with extensor splint placed.  Patient will be discharged with antibiotics for a week.  Patient to follow-up with plastic surgery in a week.  Social work was consulted for concerns for alcohol use disorder.  Social work giving patient detox facilities.  Return precautions given. MD Greer (PGY-2) social work provided resources to patient's.  Patient stable for discharge at this time.

## 2023-07-07 NOTE — ED PROVIDER NOTE - CHIEF COMPLAINT
The patient is a 27y Male complaining of lacerations. The patient is a 37y Male complaining of lacerations.

## 2023-07-07 NOTE — ED ADULT NURSE NOTE - OBJECTIVE STATEMENT
26 y/o M with PMH of alcohol abuse s/p D/Ts presents to ED complaining of laceration. 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 1 on R middle and 1 on right index finger. Pt able to move all fingers. Full sensation in whole hand. Bleeding is controlled. Pts last drink was today at 8 am. Pt feels slightly anxious now. Last withdrawal seizure was in february.

## 2023-07-07 NOTE — ED PROVIDER NOTE - CARE PROVIDER_API CALL
Ayan Flynn  Plastic Surgery  [address], [address]  [place] [address]  Phone: [phone]  Fax: [phone]  Follow Up Time: 4-6 Days

## 2023-07-07 NOTE — ED PROVIDER NOTE - NSFOLLOWUPINSTRUCTIONS_ED_ALL_ED_FT
You were seen in the Emergency Department for laceration of the index and middle fingers on your right hand.  You received x-rays which did not show an acute fracture.  You were also evaluated by our hand surgeon given the concern for tendon tear.  Our hand surgeon repaired the tear in your index and middle finger.  You will be prescribed Keflex 500 mg to be taken 4 times a day for 7 days.  You will follow-up with the hand surgeon in a week.    1) Advance activity as tolerated.   2) Continue all previously prescribed medications as directed.    3) Follow-up with the hand surgeon in a week.- take copies of your results.    4) Return to the Emergency Department for worsening or persistent symptoms, worsening numbness or tingling to the fingers, changes in skin color at your fingertips, fever > 100.4, nausea vomiting, worsening anxiety, tremors and/or ANY NEW OR CONCERNING SYMPTOMS.

## 2023-07-07 NOTE — SBIRT NOTE ADULT - NSSBIRTALCPASSREFTXDET_GEN_A_CORE
LMSW met with patient at bedside, patient A&Ox4 at this time. Patient endorses drinking 6 or more beers per day. States he was sober about 3 weeks ago when he relapsed. Patient feels he self medicates his anxiety with alcohol use. Patient endorses history of withdrawals and hospitalizations for withdrawal. Patient declines any history of treatment programs, states he would stop on his own when he would stop. As per ED MD, patient to be medically cleared for discharge at this time. LMSW provided education on harmful ETOH use to which patient was receptive. LMSW also provided SBIRT and detox community resoures to patient to which he was receptive and states he will further explore resources. Patient also receptive to LMSW making referral to a SBIRT counselor for further assistance with ETOH use treatment programs. Pt states no further needs for LMSW. Contact info provided and ongoing social work availability ensured. ED MD made aware of above.

## 2023-08-11 NOTE — ED PROCEDURE NOTE - CPROC ED POST PROC CARE GUIDE1
ED
Verbal/written post procedure instructions were given to patient/caregiver./Instructed patient/caregiver to follow-up with primary care physician./Instructed patient/caregiver regarding signs and symptoms of infection./Keep the cast/splint/dressing clean and dry.

## 2023-10-17 NOTE — ED ADULT NURSE NOTE - TEMPLATE LIST FOR HEAD TO TOE ASSESSMENT
General Itraconazole Counseling:  I discussed with the patient the risks of itraconazole including but not limited to liver damage, nausea/vomiting, neuropathy, and severe allergy.  The patient understands that this medication is best absorbed when taken with acidic beverages such as non-diet cola or ginger ale.  The patient understands that monitoring is required including baseline LFTs and repeat LFTs at intervals.  The patient understands that they are to contact us or the primary physician if concerning signs are noted.

## 2024-03-21 ENCOUNTER — INPATIENT (INPATIENT)
Facility: HOSPITAL | Age: 39
LOS: 20 days | Discharge: ANOTHER HOSPITAL | DRG: 870 | End: 2024-04-11
Attending: STUDENT IN AN ORGANIZED HEALTH CARE EDUCATION/TRAINING PROGRAM | Admitting: STUDENT IN AN ORGANIZED HEALTH CARE EDUCATION/TRAINING PROGRAM
Payer: MEDICAID

## 2024-03-21 VITALS
WEIGHT: 179.9 LBS | HEART RATE: 160 BPM | DIASTOLIC BLOOD PRESSURE: 67 MMHG | HEIGHT: 66 IN | OXYGEN SATURATION: 94 % | SYSTOLIC BLOOD PRESSURE: 99 MMHG | RESPIRATION RATE: 18 BRPM

## 2024-03-21 DIAGNOSIS — F10.939 ALCOHOL USE, UNSPECIFIED WITH WITHDRAWAL, UNSPECIFIED: ICD-10-CM

## 2024-03-21 PROBLEM — Z87.898 PERSONAL HISTORY OF OTHER SPECIFIED CONDITIONS: Chronic | Status: ACTIVE | Noted: 2023-07-07

## 2024-03-21 LAB
ALBUMIN SERPL ELPH-MCNC: 2.6 G/DL — LOW (ref 3.5–5)
ALBUMIN SERPL ELPH-MCNC: 3 G/DL — LOW (ref 3.5–5)
ALP SERPL-CCNC: 316 U/L — HIGH (ref 40–120)
ALP SERPL-CCNC: 377 U/L — HIGH (ref 40–120)
ALT FLD-CCNC: 37 U/L DA — SIGNIFICANT CHANGE UP (ref 10–60)
ALT FLD-CCNC: 41 U/L DA — SIGNIFICANT CHANGE UP (ref 10–60)
ANION GAP SERPL CALC-SCNC: 11 MMOL/L — SIGNIFICANT CHANGE UP (ref 5–17)
ANION GAP SERPL CALC-SCNC: 18 MMOL/L — HIGH (ref 5–17)
APAP SERPL-MCNC: <2 UG/ML — LOW (ref 10–30)
APTT BLD: 35.1 SEC — SIGNIFICANT CHANGE UP (ref 24.5–35.6)
APTT BLD: 36.7 SEC — HIGH (ref 24.5–35.6)
AST SERPL-CCNC: 160 U/L — HIGH (ref 10–40)
AST SERPL-CCNC: 175 U/L — HIGH (ref 10–40)
BASOPHILS # BLD AUTO: 0.04 K/UL — SIGNIFICANT CHANGE UP (ref 0–0.2)
BASOPHILS NFR BLD AUTO: 0.2 % — SIGNIFICANT CHANGE UP (ref 0–2)
BILIRUB DIRECT SERPL-MCNC: 5.1 MG/DL — HIGH (ref 0–0.3)
BILIRUB INDIRECT FLD-MCNC: 1.5 MG/DL — HIGH (ref 0.2–1)
BILIRUB SERPL-MCNC: 6.6 MG/DL — HIGH (ref 0.2–1.2)
BILIRUB SERPL-MCNC: 6.6 MG/DL — HIGH (ref 0.2–1.2)
BLD GP AB SCN SERPL QL: SIGNIFICANT CHANGE UP
BUN SERPL-MCNC: 18 MG/DL — SIGNIFICANT CHANGE UP (ref 7–18)
BUN SERPL-MCNC: 20 MG/DL — HIGH (ref 7–18)
CALCIUM SERPL-MCNC: 8 MG/DL — LOW (ref 8.4–10.5)
CALCIUM SERPL-MCNC: 9.4 MG/DL — SIGNIFICANT CHANGE UP (ref 8.4–10.5)
CHLORIDE SERPL-SCNC: 105 MMOL/L — SIGNIFICANT CHANGE UP (ref 96–108)
CHLORIDE SERPL-SCNC: 96 MMOL/L — SIGNIFICANT CHANGE UP (ref 96–108)
CO2 SERPL-SCNC: 22 MMOL/L — SIGNIFICANT CHANGE UP (ref 22–31)
CO2 SERPL-SCNC: 24 MMOL/L — SIGNIFICANT CHANGE UP (ref 22–31)
CREAT SERPL-MCNC: 1.38 MG/DL — HIGH (ref 0.5–1.3)
CREAT SERPL-MCNC: 1.77 MG/DL — HIGH (ref 0.5–1.3)
EGFR: 50 ML/MIN/1.73M2 — LOW
EGFR: 68 ML/MIN/1.73M2 — SIGNIFICANT CHANGE UP
EOSINOPHIL # BLD AUTO: 0.07 K/UL — SIGNIFICANT CHANGE UP (ref 0–0.5)
EOSINOPHIL NFR BLD AUTO: 0.3 % — SIGNIFICANT CHANGE UP (ref 0–6)
ETHANOL SERPL-MCNC: 9 MG/DL — SIGNIFICANT CHANGE UP (ref 0–10)
GLUCOSE SERPL-MCNC: 156 MG/DL — HIGH (ref 70–99)
GLUCOSE SERPL-MCNC: 266 MG/DL — HIGH (ref 70–99)
HCOV PNL SPEC NAA+PROBE: DETECTED
HCT VFR BLD CALC: 38.2 % — LOW (ref 39–50)
HGB BLD-MCNC: 13.7 G/DL — SIGNIFICANT CHANGE UP (ref 13–17)
IMM GRANULOCYTES NFR BLD AUTO: 1 % — HIGH (ref 0–0.9)
INR BLD: 1.54 RATIO — HIGH (ref 0.85–1.18)
INR BLD: 1.55 RATIO — HIGH (ref 0.85–1.18)
LACTATE SERPL-SCNC: 10 MMOL/L — CRITICAL HIGH (ref 0.7–2)
LACTATE SERPL-SCNC: 5.6 MMOL/L — CRITICAL HIGH (ref 0.7–2)
LIDOCAIN IGE QN: 479 U/L — HIGH (ref 13–75)
LYMPHOCYTES # BLD AUTO: 1.33 K/UL — SIGNIFICANT CHANGE UP (ref 1–3.3)
LYMPHOCYTES # BLD AUTO: 5.5 % — LOW (ref 13–44)
MAGNESIUM SERPL-MCNC: 1.7 MG/DL — SIGNIFICANT CHANGE UP (ref 1.6–2.6)
MANUAL SMEAR VERIFICATION: SIGNIFICANT CHANGE UP
MCHC RBC-ENTMCNC: 33.6 PG — SIGNIFICANT CHANGE UP (ref 27–34)
MCHC RBC-ENTMCNC: 35.9 GM/DL — SIGNIFICANT CHANGE UP (ref 32–36)
MCV RBC AUTO: 93.6 FL — SIGNIFICANT CHANGE UP (ref 80–100)
MONOCYTES # BLD AUTO: 1.6 K/UL — HIGH (ref 0–0.9)
MONOCYTES NFR BLD AUTO: 6.6 % — SIGNIFICANT CHANGE UP (ref 2–14)
NEUTROPHILS # BLD AUTO: 20.8 K/UL — HIGH (ref 1.8–7.4)
NEUTROPHILS NFR BLD AUTO: 86.4 % — HIGH (ref 43–77)
NRBC # BLD: 0 /100 WBCS — SIGNIFICANT CHANGE UP (ref 0–0)
PHOSPHATE SERPL-MCNC: 3.8 MG/DL — SIGNIFICANT CHANGE UP (ref 2.5–4.5)
PLAT MORPH BLD: NORMAL — SIGNIFICANT CHANGE UP
PLATELET # BLD AUTO: 197 K/UL — SIGNIFICANT CHANGE UP (ref 150–400)
PLATELET COUNT - ESTIMATE: NORMAL — SIGNIFICANT CHANGE UP
POTASSIUM SERPL-MCNC: 3.3 MMOL/L — LOW (ref 3.5–5.3)
POTASSIUM SERPL-MCNC: 3.4 MMOL/L — LOW (ref 3.5–5.3)
POTASSIUM SERPL-SCNC: 3.3 MMOL/L — LOW (ref 3.5–5.3)
POTASSIUM SERPL-SCNC: 3.4 MMOL/L — LOW (ref 3.5–5.3)
PROT SERPL-MCNC: 6.8 G/DL — SIGNIFICANT CHANGE UP (ref 6–8.3)
PROT SERPL-MCNC: 7.5 G/DL — SIGNIFICANT CHANGE UP (ref 6–8.3)
PROTHROM AB SERPL-ACNC: 17.3 SEC — HIGH (ref 9.5–13)
PROTHROM AB SERPL-ACNC: 17.4 SEC — HIGH (ref 9.5–13)
RAPID RVP RESULT: DETECTED
RBC # BLD: 4.08 M/UL — LOW (ref 4.2–5.8)
RBC # FLD: 15 % — HIGH (ref 10.3–14.5)
RBC BLD AUTO: NORMAL — SIGNIFICANT CHANGE UP
SALICYLATES SERPL-MCNC: <1.7 MG/DL — LOW (ref 2.8–20)
SARS-COV-2 RNA SPEC QL NAA+PROBE: SIGNIFICANT CHANGE UP
SODIUM SERPL-SCNC: 136 MMOL/L — SIGNIFICANT CHANGE UP (ref 135–145)
SODIUM SERPL-SCNC: 140 MMOL/L — SIGNIFICANT CHANGE UP (ref 135–145)
TROPONIN I, HIGH SENSITIVITY RESULT: 10.7 NG/L — SIGNIFICANT CHANGE UP
WBC # BLD: 24.09 K/UL — HIGH (ref 3.8–10.5)
WBC # FLD AUTO: 24.09 K/UL — HIGH (ref 3.8–10.5)

## 2024-03-21 PROCEDURE — 71045 X-RAY EXAM CHEST 1 VIEW: CPT | Mod: 26

## 2024-03-21 PROCEDURE — 74176 CT ABD & PELVIS W/O CONTRAST: CPT | Mod: 26,MC

## 2024-03-21 PROCEDURE — 76705 ECHO EXAM OF ABDOMEN: CPT | Mod: 26

## 2024-03-21 PROCEDURE — 70450 CT HEAD/BRAIN W/O DYE: CPT | Mod: 26,MC

## 2024-03-21 PROCEDURE — 99291 CRITICAL CARE FIRST HOUR: CPT

## 2024-03-21 RX ORDER — CEFEPIME 1 G/1
2000 INJECTION, POWDER, FOR SOLUTION INTRAMUSCULAR; INTRAVENOUS ONCE
Refills: 0 | Status: COMPLETED | OUTPATIENT
Start: 2024-03-21 | End: 2024-03-21

## 2024-03-21 RX ORDER — POTASSIUM CHLORIDE 20 MEQ
10 PACKET (EA) ORAL
Refills: 0 | Status: COMPLETED | OUTPATIENT
Start: 2024-03-21 | End: 2024-03-22

## 2024-03-21 RX ORDER — MAGNESIUM SULFATE 500 MG/ML
1 VIAL (ML) INJECTION ONCE
Refills: 0 | Status: COMPLETED | OUTPATIENT
Start: 2024-03-21 | End: 2024-03-21

## 2024-03-21 RX ORDER — CEFTRIAXONE 500 MG/1
1000 INJECTION, POWDER, FOR SOLUTION INTRAMUSCULAR; INTRAVENOUS EVERY 24 HOURS
Refills: 0 | Status: DISCONTINUED | OUTPATIENT
Start: 2024-03-21 | End: 2024-03-23

## 2024-03-21 RX ORDER — THIAMINE MONONITRATE (VIT B1) 100 MG
500 TABLET ORAL ONCE
Refills: 0 | Status: COMPLETED | OUTPATIENT
Start: 2024-03-21 | End: 2024-03-21

## 2024-03-21 RX ORDER — SENNA PLUS 8.6 MG/1
2 TABLET ORAL AT BEDTIME
Refills: 0 | Status: DISCONTINUED | OUTPATIENT
Start: 2024-03-21 | End: 2024-03-21

## 2024-03-21 RX ORDER — HEPARIN SODIUM 5000 [USP'U]/ML
5000 INJECTION INTRAVENOUS; SUBCUTANEOUS EVERY 12 HOURS
Refills: 0 | Status: DISCONTINUED | OUTPATIENT
Start: 2024-03-21 | End: 2024-04-11

## 2024-03-21 RX ORDER — SODIUM CHLORIDE 9 MG/ML
1000 INJECTION, SOLUTION INTRAVENOUS
Refills: 0 | Status: DISCONTINUED | OUTPATIENT
Start: 2024-03-21 | End: 2024-03-23

## 2024-03-21 RX ORDER — SODIUM CHLORIDE 9 MG/ML
1000 INJECTION INTRAMUSCULAR; INTRAVENOUS; SUBCUTANEOUS ONCE
Refills: 0 | Status: COMPLETED | OUTPATIENT
Start: 2024-03-21 | End: 2024-03-21

## 2024-03-21 RX ORDER — FOLIC ACID 0.8 MG
1 TABLET ORAL ONCE
Refills: 0 | Status: COMPLETED | OUTPATIENT
Start: 2024-03-21 | End: 2024-03-21

## 2024-03-21 RX ORDER — INFLUENZA VIRUS VACCINE 15; 15; 15; 15 UG/.5ML; UG/.5ML; UG/.5ML; UG/.5ML
0.5 SUSPENSION INTRAMUSCULAR ONCE
Refills: 0 | Status: DISCONTINUED | OUTPATIENT
Start: 2024-03-21 | End: 2024-04-11

## 2024-03-21 RX ORDER — FOLIC ACID 0.8 MG
1 TABLET ORAL DAILY
Refills: 0 | Status: DISCONTINUED | OUTPATIENT
Start: 2024-03-21 | End: 2024-03-28

## 2024-03-21 RX ORDER — POLYETHYLENE GLYCOL 3350 17 G/17G
17 POWDER, FOR SOLUTION ORAL DAILY
Refills: 0 | Status: DISCONTINUED | OUTPATIENT
Start: 2024-03-21 | End: 2024-03-21

## 2024-03-21 RX ORDER — CHLORHEXIDINE GLUCONATE 213 G/1000ML
1 SOLUTION TOPICAL
Refills: 0 | Status: DISCONTINUED | OUTPATIENT
Start: 2024-03-21 | End: 2024-04-02

## 2024-03-21 RX ORDER — SODIUM CHLORIDE 9 MG/ML
1000 INJECTION, SOLUTION INTRAVENOUS ONCE
Refills: 0 | Status: COMPLETED | OUTPATIENT
Start: 2024-03-21 | End: 2024-03-21

## 2024-03-21 RX ORDER — THIAMINE MONONITRATE (VIT B1) 100 MG
500 TABLET ORAL EVERY 8 HOURS
Refills: 0 | Status: COMPLETED | OUTPATIENT
Start: 2024-03-21 | End: 2024-03-24

## 2024-03-21 RX ORDER — PANTOPRAZOLE SODIUM 20 MG/1
40 TABLET, DELAYED RELEASE ORAL DAILY
Refills: 0 | Status: DISCONTINUED | OUTPATIENT
Start: 2024-03-21 | End: 2024-03-28

## 2024-03-21 RX ADMIN — Medication 2 MILLIGRAM(S): at 19:56

## 2024-03-21 RX ADMIN — SODIUM CHLORIDE 1000 MILLILITER(S): 9 INJECTION INTRAMUSCULAR; INTRAVENOUS; SUBCUTANEOUS at 14:09

## 2024-03-21 RX ADMIN — Medication 2 MILLIGRAM(S): at 19:00

## 2024-03-21 RX ADMIN — SODIUM CHLORIDE 1000 MILLILITER(S): 9 INJECTION, SOLUTION INTRAVENOUS at 19:02

## 2024-03-21 RX ADMIN — Medication 2 MILLIGRAM(S): at 13:30

## 2024-03-21 RX ADMIN — Medication 2 MILLIGRAM(S): at 20:44

## 2024-03-21 RX ADMIN — Medication 500 MILLIGRAM(S): at 16:45

## 2024-03-21 RX ADMIN — CEFEPIME 2000 MILLIGRAM(S): 1 INJECTION, POWDER, FOR SOLUTION INTRAMUSCULAR; INTRAVENOUS at 16:45

## 2024-03-21 RX ADMIN — SODIUM CHLORIDE 1000 MILLILITER(S): 9 INJECTION INTRAMUSCULAR; INTRAVENOUS; SUBCUTANEOUS at 16:45

## 2024-03-21 RX ADMIN — HEPARIN SODIUM 5000 UNIT(S): 5000 INJECTION INTRAVENOUS; SUBCUTANEOUS at 19:02

## 2024-03-21 RX ADMIN — Medication 100 GRAM(S): at 22:24

## 2024-03-21 RX ADMIN — Medication 1 MILLIGRAM(S): at 14:24

## 2024-03-21 RX ADMIN — Medication 100 MILLIEQUIVALENT(S): at 23:50

## 2024-03-21 RX ADMIN — Medication 50 MILLIGRAM(S): at 14:00

## 2024-03-21 RX ADMIN — Medication 105 MILLIGRAM(S): at 14:23

## 2024-03-21 RX ADMIN — CEFEPIME 100 MILLIGRAM(S): 1 INJECTION, POWDER, FOR SOLUTION INTRAMUSCULAR; INTRAVENOUS at 15:04

## 2024-03-21 RX ADMIN — SODIUM CHLORIDE 100 MILLILITER(S): 9 INJECTION, SOLUTION INTRAVENOUS at 21:54

## 2024-03-21 RX ADMIN — CEFTRIAXONE 100 MILLIGRAM(S): 500 INJECTION, POWDER, FOR SOLUTION INTRAMUSCULAR; INTRAVENOUS at 19:02

## 2024-03-21 RX ADMIN — Medication 105 MILLIGRAM(S): at 22:35

## 2024-03-21 RX ADMIN — SODIUM CHLORIDE 1000 MILLILITER(S): 9 INJECTION INTRAMUSCULAR; INTRAVENOUS; SUBCUTANEOUS at 14:08

## 2024-03-21 NOTE — ED PROVIDER NOTE - CLINICAL SUMMARY MEDICAL DECISION MAKING FREE TEXT BOX
Patient presenting with acute confusion with icterus concerning for acute hepatic failure.  Could be secondary to chronic alcohol abuse.  Will begin treatment of alcohol withdrawal.  Also found to have high white count so acute infectious process is considered and cultures antibiotics and lactate were ordered.  Will require admission.

## 2024-03-21 NOTE — ED PROVIDER NOTE - OBJECTIVE STATEMENT
38-year-old man presenting with confusion/change in mental status associated with jaundice.  Patient is unable to give clear history.  Family at bedside reports that this morning they noticed that his eyes were yellow and that he was more confused.  He does have a history of heavy alcohol abuse unknown last drink.  Has been admitted for alcohol withdrawal seizures in the past.  No reported trauma.

## 2024-03-21 NOTE — ED ADULT TRIAGE NOTE - CHIEF COMPLAINT QUOTE
Pt presents from s/p binging whiskey x 4 days. Pt noted to have yellowed scleras. Pt A&Ox3. Pt tachy to 160s in triage.

## 2024-03-21 NOTE — H&P ADULT - NSHPPHYSICALEXAM_GEN_ALL_CORE
GENERAL: Mildly anxious, with sweating  HEAD:  Atraumatic, Normocephalic  EYES: EOMI, PERRLA, scleral icterus  ENMT: No tonsillar erythema, exudates, or enlargement; Moist mucous membranes, No lesions  NECK: Supple, normal appearance, No JVD; Normal thyroid; Trachea midline  NERVOUS SYSTEM:  Alert & Oriented X3, however slow to respond. Intermittently confused No focal neuro deficits  CHEST/LUNG: Lungs clear to auscultation bilaterally, No rales, rhonchi, wheezing   HEART: Rapid rate and regular rhythm; No murmurs, rubs, or gallops  ABDOMEN: Distended, tense. Nontender; Bowel sounds present  EXTREMITIES:  2+ Peripheral Pulses, No clubbing, cyanosis, or edema  SKIN: No rashes or lesions;  Good capillary refill

## 2024-03-21 NOTE — ED PROVIDER NOTE - NSICDXPASTMEDICALHX_GEN_ALL_CORE_FT
PAST MEDICAL HISTORY:  History of alcohol use disorder     History of seizure due to alcohol withdrawal     No pertinent past medical history

## 2024-03-21 NOTE — ED ADULT NURSE NOTE - NSFALLRISKINTERV_ED_ALL_ED
Assistance OOB with selected safe patient handling equipment if applicable/Assistance with ambulation/Communicate fall risk and risk factors to all staff, patient, and family/Monitor gait and stability/Monitor for mental status changes and reorient to person, place, and time, as needed/Provide visual cue: yellow wristband, yellow gown, etc/Reinforce activity limits and safety measures with patient and family/Toileting schedule using arm’s reach rule for commode and bathroom/Use of alarms - bed, stretcher, chair and/or video monitoring/Call bell, personal items and telephone in reach/Instruct patient to call for assistance before getting out of bed/chair/stretcher/Non-slip footwear applied when patient is off stretcher/[place] to call system/Physically safe environment - no spills, clutter or unnecessary equipment/Purposeful Proactive Rounding/Room/bathroom lighting operational, light cord in reach

## 2024-03-21 NOTE — ED ADULT NURSE REASSESSMENT NOTE - NS ED NURSE REASSESS COMMENT FT1
2000 pm pt very agitated   , pulling lines , trying to climb out of bed ,  and RN at the bedside , ativan x2 iv given . with little relief . close monitoring continued  .

## 2024-03-21 NOTE — H&P ADULT - ASSESSMENT
38M, PMHx of alcohol withdrawal seizure with alcohol use disorder, who p/w confusion starting this morning. Admitted for high risk alcohol withdrawal with persistent sinus tachycardia.    Assessment:   # Alcohol use disorder  # Alcohol withdrawal  # Acute metabolic encephalopathy  # Atypical pneumonia  # Hx of alcohol seizures  # Possible pancreatitis  # Liver steatosis  # Alcoholic hepatitis  # Sinus tachycardia  # SIRS  # ARABELLA      Plan:   NEURO:  # Acute metabolic encephalopathy  # Alcohol withdrawal  - Hx of alcohol use disorder  - Last drink early this AM. BAL 9 on admission  - Pt drinks 1.5L of whiskey per day  - Patient confabulates, per patient's father  - P/w AMS to the ED, intermittently confused despite being AAOx3 with some assistance  - CT head negative for acute pathology  - CIWA 7 after given Ativan and Librium in ED  - NPO for now  - CIWA protocol  - Folic acid, high dose thiamine  - F/u ammonia level for AMS    # Alcohol use disorder  # Hx of alcohol seizures  - Pt has been in and out of alcohol rehab  - Most recently came out 6 months ago  - SW consult needed      CARDIO:  # Sinus tachycardia  - P/w sinus tachycardia  - Likely due to alcohol withdrawal  - EKG = sinus tachycardia  - F/u troponin       PULM:  # Atypical pneumonia  - CT = Multifocal bilateral peripheral opacities suggesting atypical pneumonia. Probable compression atelectasis right lung base  - Complained of some nonproductive cough  - Start Ceftriaxone      GI:  # Alcoholic hepatitis  - P/w T bili 6.6, , ALT 41,   - Maddrey score = borderline at 31.4 points (32 point is the cutoff for steroid treatment)  - NPO for now  - D5+ LR maintenance IVF    # Constipation  - Pt endorses constipation  - Senna, miralax    # Possible pancreatitis  - Lipase 470, possible mid abdominal pain. Negative imaging.  - Already on IVF + NPO status    # Liver steatosis  # Liver lesion  - Likely due to alcohol use disorder  - CT = Has hypodense foci with central hyperdensity involving the pericholecystic region and segment 6, indeterminate.   - Rec outpt MRI      RENAL:  # ARABELLA  - P/w SCr 1.77 (baseline 0.8)  - S/p 2L IVF in ED  - F/u SCr repeat     # Lactic acidosis  - P/w Lactate 10  - Due to dehydration, infection-  - IVF  - Trend lactate 10 -> 5.6    # Hypokalemia  - K 3.3  - Repleting    INFECTIOUS:  # SIRS  - P/w Lactate 10, tachycardia, tachypnea>20  - Possibly due to aspiration pneumonia?  - S/p Cefepime in ED  - C/w with Ceftriaxone  - F/u cultures  - F/u legionella      HEMO:  # Leukocytosis  - As above in sepsis      ENDO:  No issues      PPX:  - Hep SC      Dispo: 38M, PMHx of alcohol withdrawal seizure with alcohol use disorder, who p/w confusion starting this morning. Admitted for high risk alcohol withdrawal with persistent sinus tachycardia.    Assessment:   # Alcohol use disorder  # Alcohol withdrawal  # Acute metabolic encephalopathy  # Atypical pneumonia  # Hx of alcohol seizures  # Possible pancreatitis  # Liver steatosis  # Alcoholic hepatitis  # Sinus tachycardia  # SIRS  # ARABELLA      Plan:   NEURO:  # Acute metabolic encephalopathy  # Alcohol withdrawal  - Hx of alcohol use disorder  - Last drink early this AM. BAL 9 on admission  - Pt drinks 1.5L of whiskey per day  - Patient confabulates, per patient's father  - P/w AMS to the ED, intermittently confused despite being AAOx3 with some assistance  - CT head negative for acute pathology  - CIWA 7 after given Ativan and Librium in ED  - NPO for now  - CIWA protocol with Ativan taper  - Folic acid, high dose thiamine  - F/u ammonia level for AMS    # Alcohol use disorder  # Hx of alcohol seizures  - Pt has been in and out of alcohol rehab  - Most recently came out 6 months ago  - SW consult needed      CARDIO:  # Sinus tachycardia  - P/w sinus tachycardia  - Likely due to alcohol withdrawal  - EKG = sinus tachycardia  - F/u troponin       PULM:  # Atypical pneumonia  - CT = Multifocal bilateral peripheral opacities suggesting atypical pneumonia. Probable compression atelectasis right lung base  - Complained of some nonproductive cough  - Start Ceftriaxone      GI:  # Alcoholic hepatitis  - P/w T bili 6.6, , ALT 41,   - Maddrey score = borderline at 31.4 points (32 point is the cutoff for steroid treatment)  - NPO for now  - D5+ LR maintenance IVF    # Constipation  - Pt endorses constipation  - Senna, miralax    # Possible pancreatitis  - Lipase 470, possible mid abdominal pain. Negative imaging.  - Already on IVF + NPO status    # Liver steatosis  # Liver lesion  - Likely due to alcohol use disorder  - CT = Has hypodense foci with central hyperdensity involving the pericholecystic region and segment 6, indeterminate.   - Rec outpt MRI      RENAL:  # ARABELLA  - P/w SCr 1.77 (baseline 0.8)  - S/p 2L IVF in ED  - F/u SCr repeat     # Lactic acidosis  - P/w Lactate 10  - Due to dehydration, infection-  - IVF  - Trend lactate 10 -> 5.6    # Hypokalemia  - K 3.3  - Repleting    INFECTIOUS:  # SIRS  - P/w Lactate 10, tachycardia, tachypnea>20  - Possibly due to aspiration pneumonia?  - S/p Cefepime in ED  - C/w with Ceftriaxone  - F/u cultures  - F/u legionella      HEMO:  # Leukocytosis  - As above in sepsis      ENDO:  No issues      PPX:  - Hep SC      Dispo:

## 2024-03-21 NOTE — ED PROVIDER NOTE - PHYSICAL EXAMINATION
Exam:  General: Patient confused, ill appearing  HEENT: airway patent bilateral scleral icterus, + fasiculations  Cardiac: regular tachycardia S1/S2 with strong peripheral pulses  Respiratory: lungs clear without respiratory distress  GI: abdomen soft, non tender, distended  Neuro: tremulous

## 2024-03-21 NOTE — H&P ADULT - ATTENDING COMMENTS
38 y/o M PMH of EtOH abuse c/b withdrawal seizures presenting with jaundice and mental status change admitted for alcohol withdrawal c/b alcohol hepatitis.     Assessment  Alcohol Withdrawal with high risk for DT  Alcoholic Hepatitis   Acute Encephalopathy   SIRS  Pancreatitis   ARABELLA  Lactic Acidosis  Sinus Tachycardia    Plan  - Lorazepam taper, monitor CIWA. High dose thiamine, folic acid  - Check NH3   -Trend LFTs. MDF currently <32. Hold corticosteroids   - Start ceftriaxone patient meets SIRS with potential source PNA. f/u BCx, send legionella.   -Trend lactate  - NPO w/ dextrose mIVF  - GI/DVT ppx   - Admit to ICU 36 y/o M PMH of EtOH abuse c/b withdrawal seizures presenting with jaundice and mental status change admitted for alcohol withdrawal c/b alcohol hepatitis.     Assessment  Alcohol Withdrawal with high risk for DT  Alcoholic Hepatitis   Acute Encephalopathy   SIRS  Pancreatitis   ARABELLA  Lactic Acidosis  Sinus Tachycardia    Plan  - Lorazepam taper, monitor CIWA. High dose thiamine, folic acid  - Check NH3. Start lactulose if elevated  - Trend LFTs. MDF currently <32. Hold corticosteroids   - Start ceftriaxone patient meets SIRS with potential source PNA. f/u BCx, send legionella.   - Trend lactate  -  NPO w/ dextrose mIVF  -  GI/DVT ppx   -  Admit to ICU 38 y/o M PMH of EtOH abuse c/b withdrawal seizures presenting with jaundice and mental status change admitted for alcohol withdrawal c/b alcohol hepatitis.     Assessment  Alcohol Withdrawal with high risk for DT  Alcoholic Hepatitis   Acute Encephalopathy   SIRS  Pancreatitis   ARABELLA  Lactic Acidosis  Sinus Tachycardia    Plan  -Lorazepam taper, monitor CIWA. High dose thiamine, folic acid  -Check NH3. Start lactulose if elevated  -Trend LFTs. MDF currently <32. Hold corticosteroids   -Start ceftriaxone patient meets SIRS with potential source PNA. f/u BCx, send legionella, Flu/Covid.   -Trend lactate  - NPO w/ dextrose mIVF  - GI/DVT ppx   - Admit to ICU 36 y/o M PMH of EtOH abuse c/b withdrawal seizures presenting with jaundice and mental status change admitted for alcohol withdrawal c/b alcohol hepatitis.     Patient received 2L of crystalloid in ER, Lorazepam 2 mg and 50 mg of Librium sustaining HR close to 140. CT imaging reviewed.  Assessment  Alcohol Withdrawal with high risk for DT  Alcoholic Hepatitis   Acute Encephalopathy   SIRS  Pancreatitis   ARABELLA  Lactic Acidosis  Sinus Tachycardia    Plan  -Lorazepam taper, monitor CIWA. High dose thiamine, folic acid  -Check NH3. Start lactulose if elevated  -Trend LFTs. MDF currently <32. Hold corticosteroids   -Start ceftriaxone patient meets SIRS with potential source PNA. f/u BCx, send legionella, Flu/Covid.   -Trend lactate  - NPO w/ dextrose mIVF  - GI/DVT ppx   - Admit to ICU 38 y/o M PMH of EtOH abuse c/b withdrawal seizures presenting with jaundice and mental status change admitted for alcohol withdrawal c/b alcohol hepatitis.     Patient received 2L of crystalloid in ER, Lorazepam 2 mg and 50 mg of Librium sustaining HR close to 140. CT imaging reviewed.    Assessment  Alcohol Withdrawal with high risk for DT  Alcoholic Hepatitis   Acute Encephalopathy   SIRS  Pancreatitis   ARABELLA  Lactic Acidosis  Sinus Tachycardia    Plan  -Lorazepam taper, monitor CIWA. High dose thiamine, folic acid  -Check NH3. Start lactulose if elevated  -Trend LFTs. MDF currently <32. Hold corticosteroids   -Start ceftriaxone patient meets SIRS with potential source PNA. f/u BCx, send legionella, Flu/Covid.   -Trend lactate  - NPO w/ dextrose mIVF  - GI/DVT ppx   - Admit to ICU 36 y/o M PMH of EtOH abuse c/b withdrawal seizures presenting with jaundice and mental status change admitted for alcohol withdrawal c/b alcohol hepatitis.     Patient received 2L of crystalloid in ER, Lorazepam 2 mg and 50 mg of Librium sustaining HR close to 140. CT imaging reviewed.    Assessment  Alcohol Withdrawal with high risk for DT  Alcoholic Hepatitis   Acute Encephalopathy   SIRS  Pancreatitis   ARABELLA  Lactic Acidosis  Sinus Tachycardia    Plan  -Lorazepam taper, monitor CIWA. High dose thiamine, folic acid  -Check NH3. Start lactulose if elevated  -Trend LFTs. MDF currently <32. Hold corticosteroids   -Start ceftriaxone patient meets SIRS with potential source PNA. f/u BCx, send legionella, RVP.   -Trend lactate  -NPO w/ dextrose mIVF  -GI/DVT ppx   - Admit to ICU

## 2024-03-21 NOTE — ED ADULT NURSE REASSESSMENT NOTE - NS ED NURSE REASSESS COMMENT FT1
2100 pm  pt resting now . complete care done . IV started in RAC #20 angiocath , good blood return . report given to  ICU RN . waiting for transport to take pt  to icu

## 2024-03-21 NOTE — H&P ADULT - HISTORY OF PRESENT ILLNESS
38M, PMHx of alcohol withdrawl seizure with alcohol use disorder, who p/w confusion starting this morning. According to the patient, he came out of the alcohol rehab few days ago. Last drink was last night, and he said that his friend gave him a pill to take, and he took it. He currently endorses constipation and denies other Patient denies headache, fever, chills, nausea, vomit, chest pain, shortness of breath, cough, lightheadedness, abdominal pain, diarrhea, constipation, dark/bloody stool, dysuria, hematuria, loss of strength, or loss of sensation.      According to the father at bedside, the patient last came out of alcohol rehab 6 months ago. This morning, the patient's eyes were yellow and that he was more confused, saying things that did not make sense. Also, since last week, he noticed that the patient was developing increased abdominal swelling. Since yesterday, patient has been only at home drinking alcohol. There was no friend who came over. Patient also drank till 9am this morning.

## 2024-03-21 NOTE — PATIENT PROFILE ADULT - FALL HARM RISK - HARM RISK INTERVENTIONS
Assistance with ambulation/Assistance OOB with selected safe patient handling equipment/Discuss with provider need for PT consult/Monitor for mental status changes/Monitor gait and stability/Move patient closer to nurses' station/Reinforce activity limits and safety measures with patient and family/Reorient to person, place and time as needed/Tailored Fall Risk Interventions/Toileting schedule using arm’s reach rule for commode and bathroom/Use of alarms - bed, chair and/or voice tab/Visual Cue: Yellow wristband and red socks/Bed in lowest position, wheels locked, appropriate side rails in place/Call bell, personal items and telephone in reach/Instruct patient to call for assistance before getting out of bed or chair/Non-slip footwear when patient is out of bed/[place] to call system/Physically safe environment - no spills, clutter or unnecessary equipment/Purposeful Proactive Rounding/Room/bathroom lighting operational, light cord in reach

## 2024-03-21 NOTE — ED ADULT NURSE NOTE - OBJECTIVE STATEMENT
Pt sent to ED for ingestion on unknown substance; pt presents anxious, tachycardic and jaundiced. Placed on telebox 14; abdomen firm and distended; male family member at the bedside; fall precautions in place.

## 2024-03-21 NOTE — PATIENT PROFILE ADULT - FUNCTIONAL ASSESSMENT - BASIC MOBILITY 6.
Chronic stable  Continue medications at same dose  Low Na diet  Exercise, weight loss  Check BP and keep log for next visit      2-calculated by average/Not able to assess (calculate score using [facility] averaging method)

## 2024-03-22 LAB
AFP-TM SERPL-MCNC: 2.7 NG/ML — SIGNIFICANT CHANGE UP
ALBUMIN SERPL ELPH-MCNC: 2.3 G/DL — LOW (ref 3.5–5)
ALBUMIN SERPL ELPH-MCNC: 2.3 G/DL — LOW (ref 3.5–5)
ALP SERPL-CCNC: 268 U/L — HIGH (ref 40–120)
ALP SERPL-CCNC: 272 U/L — HIGH (ref 40–120)
ALT FLD-CCNC: 33 U/L DA — SIGNIFICANT CHANGE UP (ref 10–60)
ALT FLD-CCNC: 36 U/L DA — SIGNIFICANT CHANGE UP (ref 10–60)
AMMONIA BLD-MCNC: 107 UMOL/L — HIGH (ref 11–32)
AMPHET UR-MCNC: NEGATIVE — SIGNIFICANT CHANGE UP
ANION GAP SERPL CALC-SCNC: 4 MMOL/L — LOW (ref 5–17)
ANION GAP SERPL CALC-SCNC: 6 MMOL/L — SIGNIFICANT CHANGE UP (ref 5–17)
ANISOCYTOSIS BLD QL: SLIGHT — SIGNIFICANT CHANGE UP
APPEARANCE UR: ABNORMAL
APTT BLD: 35.2 SEC — SIGNIFICANT CHANGE UP (ref 24.5–35.6)
AST SERPL-CCNC: 124 U/L — HIGH (ref 10–40)
AST SERPL-CCNC: 134 U/L — HIGH (ref 10–40)
BACTERIA # UR AUTO: ABNORMAL /HPF
BARBITURATES UR SCN-MCNC: NEGATIVE — SIGNIFICANT CHANGE UP
BASE EXCESS BLDV CALC-SCNC: -0.3 MMOL/L — SIGNIFICANT CHANGE UP
BASOPHILS # BLD AUTO: 0 K/UL — SIGNIFICANT CHANGE UP (ref 0–0.2)
BASOPHILS # BLD AUTO: 0 K/UL — SIGNIFICANT CHANGE UP (ref 0–0.2)
BASOPHILS NFR BLD AUTO: 0 % — SIGNIFICANT CHANGE UP (ref 0–2)
BASOPHILS NFR BLD AUTO: 0 % — SIGNIFICANT CHANGE UP (ref 0–2)
BENZODIAZ UR-MCNC: POSITIVE
BILIRUB SERPL-MCNC: 5.4 MG/DL — HIGH (ref 0.2–1.2)
BILIRUB SERPL-MCNC: 5.6 MG/DL — HIGH (ref 0.2–1.2)
BILIRUB UR-MCNC: ABNORMAL
BLD GP AB SCN SERPL QL: SIGNIFICANT CHANGE UP
BUN SERPL-MCNC: 24 MG/DL — HIGH (ref 7–18)
BUN SERPL-MCNC: 25 MG/DL — HIGH (ref 7–18)
CALCIUM SERPL-MCNC: 8 MG/DL — LOW (ref 8.4–10.5)
CALCIUM SERPL-MCNC: 8.1 MG/DL — LOW (ref 8.4–10.5)
CHLORIDE SERPL-SCNC: 109 MMOL/L — HIGH (ref 96–108)
CHLORIDE SERPL-SCNC: 110 MMOL/L — HIGH (ref 96–108)
CHOLEST SERPL-MCNC: 128 MG/DL — SIGNIFICANT CHANGE UP
CO2 SERPL-SCNC: 26 MMOL/L — SIGNIFICANT CHANGE UP (ref 22–31)
CO2 SERPL-SCNC: 29 MMOL/L — SIGNIFICANT CHANGE UP (ref 22–31)
COCAINE METAB.OTHER UR-MCNC: NEGATIVE — SIGNIFICANT CHANGE UP
COLOR SPEC: ABNORMAL
CREAT SERPL-MCNC: 0.79 MG/DL — SIGNIFICANT CHANGE UP (ref 0.5–1.3)
CREAT SERPL-MCNC: 0.85 MG/DL — SIGNIFICANT CHANGE UP (ref 0.5–1.3)
DIFF PNL FLD: ABNORMAL
EGFR: 115 ML/MIN/1.73M2 — SIGNIFICANT CHANGE UP
EGFR: 118 ML/MIN/1.73M2 — SIGNIFICANT CHANGE UP
EOSINOPHIL # BLD AUTO: 0 K/UL — SIGNIFICANT CHANGE UP (ref 0–0.5)
EOSINOPHIL # BLD AUTO: 0.19 K/UL — SIGNIFICANT CHANGE UP (ref 0–0.5)
EOSINOPHIL NFR BLD AUTO: 0 % — SIGNIFICANT CHANGE UP (ref 0–6)
EOSINOPHIL NFR BLD AUTO: 1 % — SIGNIFICANT CHANGE UP (ref 0–6)
EPI CELLS # UR: PRESENT
GAS PNL BLDA: SIGNIFICANT CHANGE UP
GAS PNL BLDA: SIGNIFICANT CHANGE UP
GLUCOSE BLDC GLUCOMTR-MCNC: 114 MG/DL — HIGH (ref 70–99)
GLUCOSE BLDC GLUCOMTR-MCNC: 160 MG/DL — HIGH (ref 70–99)
GLUCOSE SERPL-MCNC: 150 MG/DL — HIGH (ref 70–99)
GLUCOSE SERPL-MCNC: 168 MG/DL — HIGH (ref 70–99)
GLUCOSE UR QL: 250 MG/DL
HAV IGM SER-ACNC: SIGNIFICANT CHANGE UP
HBV CORE IGM SER-ACNC: SIGNIFICANT CHANGE UP
HBV SURFACE AG SER-ACNC: SIGNIFICANT CHANGE UP
HCO3 BLDV-SCNC: 28 MMOL/L — SIGNIFICANT CHANGE UP (ref 22–29)
HCT VFR BLD CALC: 31.6 % — LOW (ref 39–50)
HCT VFR BLD CALC: 32 % — LOW (ref 39–50)
HCV AB S/CO SERPL IA: 0.06 S/CO — SIGNIFICANT CHANGE UP (ref 0–0.99)
HCV AB SERPL-IMP: SIGNIFICANT CHANGE UP
HDLC SERPL-MCNC: 10 MG/DL — LOW
HGB BLD-MCNC: 10.8 G/DL — LOW (ref 13–17)
HGB BLD-MCNC: 10.9 G/DL — LOW (ref 13–17)
INR BLD: 1.45 RATIO — HIGH (ref 0.85–1.18)
KETONES UR-MCNC: ABNORMAL MG/DL
LACTATE SERPL-SCNC: 1.6 MMOL/L — SIGNIFICANT CHANGE UP (ref 0.7–2)
LACTATE SERPL-SCNC: 1.9 MMOL/L — SIGNIFICANT CHANGE UP (ref 0.7–2)
LEGIONELLA AG UR QL: NEGATIVE — SIGNIFICANT CHANGE UP
LEUKOCYTE ESTERASE UR-ACNC: ABNORMAL
LIPID PNL WITH DIRECT LDL SERPL: 58 MG/DL — SIGNIFICANT CHANGE UP
LYMPHOCYTES # BLD AUTO: 13 % — SIGNIFICANT CHANGE UP (ref 13–44)
LYMPHOCYTES # BLD AUTO: 19 % — SIGNIFICANT CHANGE UP (ref 13–44)
LYMPHOCYTES # BLD AUTO: 2.29 K/UL — SIGNIFICANT CHANGE UP (ref 1–3.3)
LYMPHOCYTES # BLD AUTO: 3.61 K/UL — HIGH (ref 1–3.3)
MAGNESIUM SERPL-MCNC: 2.2 MG/DL — SIGNIFICANT CHANGE UP (ref 1.6–2.6)
MANUAL SMEAR VERIFICATION: SIGNIFICANT CHANGE UP
MANUAL SMEAR VERIFICATION: SIGNIFICANT CHANGE UP
MCHC RBC-ENTMCNC: 33 PG — SIGNIFICANT CHANGE UP (ref 27–34)
MCHC RBC-ENTMCNC: 33.4 PG — SIGNIFICANT CHANGE UP (ref 27–34)
MCHC RBC-ENTMCNC: 33.8 GM/DL — SIGNIFICANT CHANGE UP (ref 32–36)
MCHC RBC-ENTMCNC: 34.5 GM/DL — SIGNIFICANT CHANGE UP (ref 32–36)
MCV RBC AUTO: 95.8 FL — SIGNIFICANT CHANGE UP (ref 80–100)
MCV RBC AUTO: 99.1 FL — SIGNIFICANT CHANGE UP (ref 80–100)
METHADONE UR-MCNC: NEGATIVE — SIGNIFICANT CHANGE UP
MONOCYTES # BLD AUTO: 0.71 K/UL — SIGNIFICANT CHANGE UP (ref 0–0.9)
MONOCYTES # BLD AUTO: 0.95 K/UL — HIGH (ref 0–0.9)
MONOCYTES NFR BLD AUTO: 4 % — SIGNIFICANT CHANGE UP (ref 2–14)
MONOCYTES NFR BLD AUTO: 5 % — SIGNIFICANT CHANGE UP (ref 2–14)
MRSA PCR RESULT.: SIGNIFICANT CHANGE UP
NEUTROPHILS # BLD AUTO: 13.67 K/UL — HIGH (ref 1.8–7.4)
NEUTROPHILS # BLD AUTO: 14.64 K/UL — HIGH (ref 1.8–7.4)
NEUTROPHILS NFR BLD AUTO: 72 % — SIGNIFICANT CHANGE UP (ref 43–77)
NEUTROPHILS NFR BLD AUTO: 83 % — HIGH (ref 43–77)
NITRITE UR-MCNC: POSITIVE
NON HDL CHOLESTEROL: 118 MG/DL — SIGNIFICANT CHANGE UP
NRBC # BLD: 0 /100 WBCS — SIGNIFICANT CHANGE UP (ref 0–0)
NRBC # BLD: 0 /100 WBCS — SIGNIFICANT CHANGE UP (ref 0–0)
OPIATES UR-MCNC: NEGATIVE — SIGNIFICANT CHANGE UP
PCO2 BLDV: 65 MMHG — HIGH (ref 42–55)
PCP SPEC-MCNC: SIGNIFICANT CHANGE UP
PCP UR-MCNC: NEGATIVE — SIGNIFICANT CHANGE UP
PH BLDV: 7.25 — LOW (ref 7.32–7.43)
PH UR: 5.5 — SIGNIFICANT CHANGE UP (ref 5–8)
PHOSPHATE SERPL-MCNC: 3.7 MG/DL — SIGNIFICANT CHANGE UP (ref 2.5–4.5)
PLAT MORPH BLD: NORMAL — SIGNIFICANT CHANGE UP
PLAT MORPH BLD: NORMAL — SIGNIFICANT CHANGE UP
PLATELET # BLD AUTO: 152 K/UL — SIGNIFICANT CHANGE UP (ref 150–400)
PLATELET # BLD AUTO: 154 K/UL — SIGNIFICANT CHANGE UP (ref 150–400)
PLATELET COUNT - ESTIMATE: NORMAL — SIGNIFICANT CHANGE UP
PLATELET COUNT - ESTIMATE: NORMAL — SIGNIFICANT CHANGE UP
PO2 BLDV: 49 MMHG — SIGNIFICANT CHANGE UP
POIKILOCYTOSIS BLD QL AUTO: SLIGHT — SIGNIFICANT CHANGE UP
POTASSIUM SERPL-MCNC: 3.3 MMOL/L — LOW (ref 3.5–5.3)
POTASSIUM SERPL-MCNC: 3.9 MMOL/L — SIGNIFICANT CHANGE UP (ref 3.5–5.3)
POTASSIUM SERPL-SCNC: 3.3 MMOL/L — LOW (ref 3.5–5.3)
POTASSIUM SERPL-SCNC: 3.9 MMOL/L — SIGNIFICANT CHANGE UP (ref 3.5–5.3)
PROT SERPL-MCNC: 6.2 G/DL — SIGNIFICANT CHANGE UP (ref 6–8.3)
PROT SERPL-MCNC: 6.2 G/DL — SIGNIFICANT CHANGE UP (ref 6–8.3)
PROT UR-MCNC: 100 MG/DL
PROTHROM AB SERPL-ACNC: 16.3 SEC — HIGH (ref 9.5–13)
RBC # BLD: 3.23 M/UL — LOW (ref 4.2–5.8)
RBC # BLD: 3.3 M/UL — LOW (ref 4.2–5.8)
RBC # FLD: 15.8 % — HIGH (ref 10.3–14.5)
RBC # FLD: 16.4 % — HIGH (ref 10.3–14.5)
RBC BLD AUTO: ABNORMAL
RBC BLD AUTO: NORMAL — SIGNIFICANT CHANGE UP
RBC CASTS # UR COMP ASSIST: 4 /HPF — SIGNIFICANT CHANGE UP (ref 0–4)
S AUREUS DNA NOSE QL NAA+PROBE: SIGNIFICANT CHANGE UP
SAO2 % BLDV: 74.8 % — SIGNIFICANT CHANGE UP
SODIUM SERPL-SCNC: 141 MMOL/L — SIGNIFICANT CHANGE UP (ref 135–145)
SODIUM SERPL-SCNC: 143 MMOL/L — SIGNIFICANT CHANGE UP (ref 135–145)
SP GR SPEC: 1.03 — HIGH (ref 1–1.03)
THC UR QL: NEGATIVE — SIGNIFICANT CHANGE UP
TRIGL SERPL-MCNC: 301 MG/DL — HIGH
UROBILINOGEN FLD QL: 1 MG/DL — SIGNIFICANT CHANGE UP (ref 0.2–1)
VARIANT LYMPHS # BLD: 3 % — SIGNIFICANT CHANGE UP (ref 0–6)
WBC # BLD: 17.64 K/UL — HIGH (ref 3.8–10.5)
WBC # BLD: 18.99 K/UL — HIGH (ref 3.8–10.5)
WBC # FLD AUTO: 17.64 K/UL — HIGH (ref 3.8–10.5)
WBC # FLD AUTO: 18.99 K/UL — HIGH (ref 3.8–10.5)
WBC UR QL: 20 /HPF — HIGH (ref 0–5)

## 2024-03-22 PROCEDURE — 36620 INSERTION CATHETER ARTERY: CPT

## 2024-03-22 PROCEDURE — 99221 1ST HOSP IP/OBS SF/LOW 40: CPT

## 2024-03-22 PROCEDURE — 74019 RADEX ABDOMEN 2 VIEWS: CPT | Mod: 26

## 2024-03-22 PROCEDURE — 36556 INSERT NON-TUNNEL CV CATH: CPT

## 2024-03-22 PROCEDURE — 71045 X-RAY EXAM CHEST 1 VIEW: CPT | Mod: 26,76

## 2024-03-22 PROCEDURE — 99291 CRITICAL CARE FIRST HOUR: CPT | Mod: 25

## 2024-03-22 PROCEDURE — 31500 INSERT EMERGENCY AIRWAY: CPT

## 2024-03-22 RX ORDER — CHLORHEXIDINE GLUCONATE 213 G/1000ML
1 SOLUTION TOPICAL
Refills: 0 | Status: DISCONTINUED | OUTPATIENT
Start: 2024-03-22 | End: 2024-03-23

## 2024-03-22 RX ORDER — SODIUM CHLORIDE 9 MG/ML
1000 INJECTION, SOLUTION INTRAVENOUS ONCE
Refills: 0 | Status: COMPLETED | OUTPATIENT
Start: 2024-03-22 | End: 2024-03-22

## 2024-03-22 RX ORDER — ACETAMINOPHEN 500 MG
1000 TABLET ORAL ONCE
Refills: 0 | Status: COMPLETED | OUTPATIENT
Start: 2024-03-22 | End: 2024-03-22

## 2024-03-22 RX ORDER — METRONIDAZOLE 500 MG
500 TABLET ORAL EVERY 8 HOURS
Refills: 0 | Status: DISCONTINUED | OUTPATIENT
Start: 2024-03-22 | End: 2024-03-22

## 2024-03-22 RX ORDER — LACTULOSE 10 G/15ML
200 SOLUTION ORAL ONCE
Refills: 0 | Status: COMPLETED | OUTPATIENT
Start: 2024-03-22 | End: 2024-03-22

## 2024-03-22 RX ORDER — PHENYLEPHRINE HYDROCHLORIDE 10 MG/ML
0.5 INJECTION INTRAVENOUS
Qty: 160 | Refills: 0 | Status: DISCONTINUED | OUTPATIENT
Start: 2024-03-22 | End: 2024-03-23

## 2024-03-22 RX ORDER — CHLORHEXIDINE GLUCONATE 213 G/1000ML
15 SOLUTION TOPICAL EVERY 12 HOURS
Refills: 0 | Status: DISCONTINUED | OUTPATIENT
Start: 2024-03-22 | End: 2024-03-25

## 2024-03-22 RX ORDER — PROPOFOL 10 MG/ML
15 INJECTION, EMULSION INTRAVENOUS
Qty: 1000 | Refills: 0 | Status: DISCONTINUED | OUTPATIENT
Start: 2024-03-22 | End: 2024-03-25

## 2024-03-22 RX ORDER — SUCCINYLCHOLINE CHLORIDE 100 MG/5ML
100 SYRINGE (ML) INTRAVENOUS ONCE
Refills: 0 | Status: COMPLETED | OUTPATIENT
Start: 2024-03-22 | End: 2024-03-22

## 2024-03-22 RX ORDER — ETOMIDATE 2 MG/ML
20 INJECTION INTRAVENOUS ONCE
Refills: 0 | Status: COMPLETED | OUTPATIENT
Start: 2024-03-22 | End: 2024-03-22

## 2024-03-22 RX ORDER — LACTULOSE 10 G/15ML
10 SOLUTION ORAL
Refills: 0 | Status: DISCONTINUED | OUTPATIENT
Start: 2024-03-22 | End: 2024-03-23

## 2024-03-22 RX ORDER — FENTANYL CITRATE 50 UG/ML
50 INJECTION INTRAVENOUS EVERY 6 HOURS
Refills: 0 | Status: DISCONTINUED | OUTPATIENT
Start: 2024-03-22 | End: 2024-03-24

## 2024-03-22 RX ORDER — POTASSIUM CHLORIDE 20 MEQ
10 PACKET (EA) ORAL
Refills: 0 | Status: COMPLETED | OUTPATIENT
Start: 2024-03-22 | End: 2024-03-22

## 2024-03-22 RX ORDER — FENTANYL CITRATE 50 UG/ML
50 INJECTION INTRAVENOUS ONCE
Refills: 0 | Status: DISCONTINUED | OUTPATIENT
Start: 2024-03-22 | End: 2024-03-22

## 2024-03-22 RX ORDER — SODIUM CHLORIDE 9 MG/ML
10 INJECTION INTRAMUSCULAR; INTRAVENOUS; SUBCUTANEOUS
Refills: 0 | Status: DISCONTINUED | OUTPATIENT
Start: 2024-03-22 | End: 2024-03-27

## 2024-03-22 RX ADMIN — Medication 100 MILLIEQUIVALENT(S): at 15:19

## 2024-03-22 RX ADMIN — HEPARIN SODIUM 5000 UNIT(S): 5000 INJECTION INTRAVENOUS; SUBCUTANEOUS at 18:20

## 2024-03-22 RX ADMIN — Medication 105 MILLIGRAM(S): at 06:04

## 2024-03-22 RX ADMIN — CHLORHEXIDINE GLUCONATE 1 APPLICATION(S): 213 SOLUTION TOPICAL at 05:59

## 2024-03-22 RX ADMIN — Medication 2 MILLIGRAM(S): at 06:04

## 2024-03-22 RX ADMIN — PROPOFOL 7.74 MICROGRAM(S)/KG/MIN: 10 INJECTION, EMULSION INTRAVENOUS at 18:20

## 2024-03-22 RX ADMIN — CEFTRIAXONE 100 MILLIGRAM(S): 500 INJECTION, POWDER, FOR SOLUTION INTRAMUSCULAR; INTRAVENOUS at 19:31

## 2024-03-22 RX ADMIN — PHENYLEPHRINE HYDROCHLORIDE 8.06 MICROGRAM(S)/KG/MIN: 10 INJECTION INTRAVENOUS at 17:34

## 2024-03-22 RX ADMIN — Medication 100 MILLIEQUIVALENT(S): at 00:49

## 2024-03-22 RX ADMIN — Medication 100 MILLIEQUIVALENT(S): at 18:19

## 2024-03-22 RX ADMIN — Medication 2 MILLIGRAM(S): at 01:00

## 2024-03-22 RX ADMIN — PROPOFOL 7.74 MICROGRAM(S)/KG/MIN: 10 INJECTION, EMULSION INTRAVENOUS at 14:25

## 2024-03-22 RX ADMIN — Medication 2 MILLIGRAM(S): at 08:53

## 2024-03-22 RX ADMIN — ETOMIDATE 20 MILLIGRAM(S): 2 INJECTION INTRAVENOUS at 14:02

## 2024-03-22 RX ADMIN — PROPOFOL 7.74 MICROGRAM(S)/KG/MIN: 10 INJECTION, EMULSION INTRAVENOUS at 21:18

## 2024-03-22 RX ADMIN — SODIUM CHLORIDE 10 MILLILITER(S): 9 INJECTION INTRAMUSCULAR; INTRAVENOUS; SUBCUTANEOUS at 21:09

## 2024-03-22 RX ADMIN — Medication 105 MILLIGRAM(S): at 21:18

## 2024-03-22 RX ADMIN — SODIUM CHLORIDE 100 MILLILITER(S): 9 INJECTION, SOLUTION INTRAVENOUS at 15:13

## 2024-03-22 RX ADMIN — Medication 1 MILLIGRAM(S): at 15:18

## 2024-03-22 RX ADMIN — FENTANYL CITRATE 50 MICROGRAM(S): 50 INJECTION INTRAVENOUS at 15:19

## 2024-03-22 RX ADMIN — Medication 400 MILLIGRAM(S): at 23:20

## 2024-03-22 RX ADMIN — HEPARIN SODIUM 5000 UNIT(S): 5000 INJECTION INTRAVENOUS; SUBCUTANEOUS at 06:04

## 2024-03-22 RX ADMIN — LACTULOSE 200 GRAM(S): 10 SOLUTION ORAL at 07:59

## 2024-03-22 RX ADMIN — Medication 2 MILLIGRAM(S): at 07:55

## 2024-03-22 RX ADMIN — Medication 1000 MILLIGRAM(S): at 18:50

## 2024-03-22 RX ADMIN — PANTOPRAZOLE SODIUM 40 MILLIGRAM(S): 20 TABLET, DELAYED RELEASE ORAL at 13:17

## 2024-03-22 RX ADMIN — Medication 100 MILLIEQUIVALENT(S): at 01:38

## 2024-03-22 RX ADMIN — Medication 400 MILLIGRAM(S): at 18:20

## 2024-03-22 RX ADMIN — Medication 100 MILLIEQUIVALENT(S): at 17:34

## 2024-03-22 RX ADMIN — CHLORHEXIDINE GLUCONATE 15 MILLILITER(S): 213 SOLUTION TOPICAL at 18:20

## 2024-03-22 RX ADMIN — Medication 100 MILLIGRAM(S): at 14:03

## 2024-03-22 RX ADMIN — FENTANYL CITRATE 50 MICROGRAM(S): 50 INJECTION INTRAVENOUS at 15:49

## 2024-03-22 RX ADMIN — SODIUM CHLORIDE 1000 MILLILITER(S): 9 INJECTION, SOLUTION INTRAVENOUS at 15:13

## 2024-03-22 RX ADMIN — Medication 105 MILLIGRAM(S): at 14:22

## 2024-03-22 RX ADMIN — PROPOFOL 7.74 MICROGRAM(S)/KG/MIN: 10 INJECTION, EMULSION INTRAVENOUS at 23:20

## 2024-03-22 RX ADMIN — SODIUM CHLORIDE 100 MILLILITER(S): 9 INJECTION, SOLUTION INTRAVENOUS at 06:16

## 2024-03-22 RX ADMIN — Medication 100 MILLIGRAM(S): at 13:17

## 2024-03-22 NOTE — PROGRESS NOTE ADULT - SUBJECTIVE AND OBJECTIVE BOX
Patient is a 38y old  Male who presents with a chief complaint of Alcohol withdrawal (21 Mar 2024 17:34)    INTERVAL HISTORY/ OVERNIGHT EVENTS:   PRESSORS: [ ] YES [ ] NO  WHICH:    ANTIBIOTICS:                  DATE STARTED:  ANTIBIOTICS:                  DATE STARTED:  ANTIBIOTICS:                  DATE STARTED:    Antimicrobial:  cefTRIAXone   IVPB 1000 milliGRAM(s) IV Intermittent every 24 hours    Cardiovascular:    Pulmonary:    Hematalogic:  heparin   Injectable 5000 Unit(s) SubCutaneous every 12 hours    Other:  chlorhexidine 2% Cloths 1 Application(s) Topical <User Schedule>  dextrose 5% + lactated ringers. 1000 milliLiter(s) IV Continuous <Continuous>  folic acid Injectable 1 milliGRAM(s) IV Push daily  influenza   Vaccine 0.5 milliLiter(s) IntraMuscular once  lactulose Syrup 10 Gram(s) Oral two times a day  LORazepam   Injectable 2 milliGRAM(s) IV Push every 4 hours  LORazepam   Injectable 1.5 milliGRAM(s) IV Push every 4 hours  LORazepam   Injectable 2 milliGRAM(s) IV Push every 2 hours PRN  LORazepam   Injectable   IV Push   pantoprazole  Injectable 40 milliGRAM(s) IV Push daily  thiamine IVPB 500 milliGRAM(s) IV Intermittent every 8 hours    cefTRIAXone   IVPB 1000 milliGRAM(s) IV Intermittent every 24 hours  chlorhexidine 2% Cloths 1 Application(s) Topical <User Schedule>  dextrose 5% + lactated ringers. 1000 milliLiter(s) IV Continuous <Continuous>  folic acid Injectable 1 milliGRAM(s) IV Push daily  heparin   Injectable 5000 Unit(s) SubCutaneous every 12 hours  influenza   Vaccine 0.5 milliLiter(s) IntraMuscular once  lactulose Syrup 10 Gram(s) Oral two times a day  LORazepam   Injectable 2 milliGRAM(s) IV Push every 4 hours  LORazepam   Injectable 1.5 milliGRAM(s) IV Push every 4 hours  LORazepam   Injectable 2 milliGRAM(s) IV Push every 2 hours PRN  LORazepam   Injectable   IV Push   pantoprazole  Injectable 40 milliGRAM(s) IV Push daily  thiamine IVPB 500 milliGRAM(s) IV Intermittent every 8 hours    Drug Dosing Weight  Height (cm): 167.6 (21 Mar 2024 12:45)  Weight (kg): 86 (21 Mar 2024 21:30)  BMI (kg/m2): 30.6 (21 Mar 2024 21:30)  BSA (m2): 1.95 (21 Mar 2024 21:30)    CENTRAL LINE: [ ] YES [ ] NO  LOCATION:     BLACK: [ ] YES [ ] NO      A-LINE:  [ ] YES [ ] NO  LOCATION:         ICU Vital Signs Last 24 Hrs  T(C): 36.8 (22 Mar 2024 04:00), Max: 36.8 (22 Mar 2024 04:00)  T(F): 98.2 (22 Mar 2024 04:00), Max: 98.2 (22 Mar 2024 04:00)  HR: 121 (22 Mar 2024 07:00) (117 - 160)  BP: 92/73 (22 Mar 2024 07:00) (92/73 - 164/109)  BP(mean): 80 (22 Mar 2024 07:00) (71 - 123)  ABP: --  ABP(mean): --  RR: 18 (22 Mar 2024 07:00) (16 - 25)  SpO2: 97% (22 Mar 2024 07:00) (94% - 97%)    O2 Parameters below as of 22 Mar 2024 07:00  Patient On (Oxygen Delivery Method): nasal cannula  O2 Flow (L/min): 2                - @ 07:01  -  03-22 @ 07:00  --------------------------------------------------------  IN: 1300 mL / OUT: 550 mL / NET: 750 mL            PHYSICAL EXAM:    GENERAL: NAD, well-groomed, well-developed  EYES: EOMI, PERRLA,   NECK: Supple, No JVD; Normal thyroid; Trachea midline  NERVOUS SYSTEM:  Alert & Oriented X3,  Motor Strength 5/5 B/L upper and lower extremities; DTRs 2+ intact and symmetric  CHEST/LUNG: No rales, rhonchi, wheezing   HEART: Regular rate and rhythm; No murmurs,   ABDOMEN: Soft, Nontender, Nondistended; Bowel sounds present  EXTREMITIES:  2+ Peripheral Pulses, No clubbing, cyanosis, or edema      LABS:                        10.9   18.99 )-----------( 152      ( 22 Mar 2024 03:22 )             31.6     03-22    141  |  109<H>  |  24<H>  ----------------------------<  150<H>  3.9   |  26  |  0.79    Ca    8.1<L>      22 Mar 2024 03:22  Phos  3.7       Mg     2.2         TPro  6.2  /  Alb  2.3<L>  /  TBili  5.4<H>  /  DBili  x   /  AST  134<H>  /  ALT  36  /  AlkPhos  272<H>      PT/INR - ( 21 Mar 2024 17:01 )   PT: 17.3 sec;   INR: 1.54 ratio         PTT - ( 21 Mar 2024 17:01 )  PTT:35.1 sec  Urinalysis Basic - ( 22 Mar 2024 06:46 )    Color: Orange / Appearance: Turbid / S.034 / pH: x  Gluc: x / Ketone: Trace mg/dL  / Bili: Large / Urobili: 1.0 mg/dL   Blood: x / Protein: 100 mg/dL / Nitrite: Positive   Leuk Esterase: Small / RBC: 4 /HPF / WBC 20 /HPF   Sq Epi: x / Non Sq Epi: x / Bacteria: Many /HPF      CAPILLARY BLOOD GLUCOSE            RADIOLOGY & ADDITIONAL STUDIES REVIEWED:  ***     Patient is a 38y old  Male who presents with a chief complaint of Alcohol withdrawal (21 Mar 2024 17:34)    INTERVAL HISTORY/ OVERNIGHT EVENTS:   Retention of >1.5L of urine for which pt had straight cath overnight. Pt seen and examined at bedside, noted to have tachycardia and tachypnea, increased work of breath along with excessive sweating. Abdominal examination positive for distention, tense with presence of abdominal sound. Pt was provided with Lactulose enema by the night team for elevated ammonia level and constipation . Pt was provided with 4mg IV ativan stat for alcohol withdrawal symptoms.     PRESSORS: [ ] YES [x ] NO  WHICH:    ANTIBIOTICS:                  DATE STARTED:  ANTIBIOTICS:                  DATE STARTED:  ANTIBIOTICS:                  DATE STARTED:    Antimicrobial:  cefTRIAXone   IVPB 1000 milliGRAM(s) IV Intermittent every 24 hours    Cardiovascular:    Pulmonary:    Hematalogic:  heparin   Injectable 5000 Unit(s) SubCutaneous every 12 hours    Other:  chlorhexidine 2% Cloths 1 Application(s) Topical <User Schedule>  dextrose 5% + lactated ringers. 1000 milliLiter(s) IV Continuous <Continuous>  folic acid Injectable 1 milliGRAM(s) IV Push daily  influenza   Vaccine 0.5 milliLiter(s) IntraMuscular once  lactulose Syrup 10 Gram(s) Oral two times a day  LORazepam   Injectable 2 milliGRAM(s) IV Push every 4 hours  LORazepam   Injectable 1.5 milliGRAM(s) IV Push every 4 hours  LORazepam   Injectable 2 milliGRAM(s) IV Push every 2 hours PRN  LORazepam   Injectable   IV Push   pantoprazole  Injectable 40 milliGRAM(s) IV Push daily  thiamine IVPB 500 milliGRAM(s) IV Intermittent every 8 hours    cefTRIAXone   IVPB 1000 milliGRAM(s) IV Intermittent every 24 hours  chlorhexidine 2% Cloths 1 Application(s) Topical <User Schedule>  dextrose 5% + lactated ringers. 1000 milliLiter(s) IV Continuous <Continuous>  folic acid Injectable 1 milliGRAM(s) IV Push daily  heparin   Injectable 5000 Unit(s) SubCutaneous every 12 hours  influenza   Vaccine 0.5 milliLiter(s) IntraMuscular once  lactulose Syrup 10 Gram(s) Oral two times a day  LORazepam   Injectable 2 milliGRAM(s) IV Push every 4 hours  LORazepam   Injectable 1.5 milliGRAM(s) IV Push every 4 hours  LORazepam   Injectable 2 milliGRAM(s) IV Push every 2 hours PRN  LORazepam   Injectable   IV Push   pantoprazole  Injectable 40 milliGRAM(s) IV Push daily  thiamine IVPB 500 milliGRAM(s) IV Intermittent every 8 hours    Drug Dosing Weight  Height (cm): 167.6 (21 Mar 2024 12:45)  Weight (kg): 86 (21 Mar 2024 21:30)  BMI (kg/m2): 30.6 (21 Mar 2024 21:30)  BSA (m2): 1.95 (21 Mar 2024 21:30)    CENTRAL LINE: [ ] YES [ x] NO  LOCATION:     BLACK: [ ] YES [x ] NO      A-LINE:  [ ] YES [x ] NO  LOCATION:         ICU Vital Signs Last 24 Hrs  T(C): 36.8 (22 Mar 2024 04:00), Max: 36.8 (22 Mar 2024 04:00)  T(F): 98.2 (22 Mar 2024 04:00), Max: 98.2 (22 Mar 2024 04:00)  HR: 121 (22 Mar 2024 07:00) (117 - 160)  BP: 92/73 (22 Mar 2024 07:00) (92/73 - 164/109)  BP(mean): 80 (22 Mar 2024 07:00) (71 - 123)  ABP: --  ABP(mean): --  RR: 18 (22 Mar 2024 07:00) (16 - 25)  SpO2: 97% (22 Mar 2024 07:00) (94% - 97%)    O2 Parameters below as of 22 Mar 2024 07:00  Patient On (Oxygen Delivery Method): nasal cannula  O2 Flow (L/min): 2          - @ 07:01  -  - @ 07:00  --------------------------------------------------------  IN: 1300 mL / OUT: 550 mL / NET: 750 mL          PHYSICAL EXAM:    GENERAL: Mildly anxious, with sweating, obese with short neck   HEAD:  Atraumatic, Normocephalic  EYES: EOMI, PERRLA, scleral icterus  ENMT: No tonsillar erythema, exudates, or enlargement; Moist mucous membranes, No lesions  NECK: Supple, normal appearance, No JVD; Normal thyroid; Trachea midline  NERVOUS SYSTEM:  Alert & Oriented X3, however slow to respond. Intermittently confused No focal neuro deficits  CHEST/LUNG: Lungs clear to auscultation bilaterally, No rales, rhonchi, wheezing   HEART: Rapid rate and regular rhythm; No murmurs, rubs, or gallops  ABDOMEN: very Distended, tense, unable to appreciate for mass, Nontender; Bowel sounds present  EXTREMITIES: , No clubbing, cyanosis, or edema  SKIN: No rashes or lesions;     LABS:                        10.9   18.99 )-----------( 152      ( 22 Mar 2024 03:22 )             31.6     03-22    141  |  109<H>  |  24<H>  ----------------------------<  150<H>  3.9   |  26  |  0.79    Ca    8.1<L>      22 Mar 2024 03:22  Phos  3.7     03  Mg     2.2         TPro  6.2  /  Alb  2.3<L>  /  TBili  5.4<H>  /  DBili  x   /  AST  134<H>  /  ALT  36  /  AlkPhos  272<H>  -22    PT/INR - ( 21 Mar 2024 17:01 )   PT: 17.3 sec;   INR: 1.54 ratio         PTT - ( 21 Mar 2024 17:01 )  PTT:35.1 sec  Urinalysis Basic - ( 22 Mar 2024 06:46 )    Color: Orange / Appearance: Turbid / S.034 / pH: x  Gluc: x / Ketone: Trace mg/dL  / Bili: Large / Urobili: 1.0 mg/dL   Blood: x / Protein: 100 mg/dL / Nitrite: Positive   Leuk Esterase: Small / RBC: 4 /HPF / WBC 20 /HPF   Sq Epi: x / Non Sq Epi: x / Bacteria: Many /HPF      CAPILLARY BLOOD GLUCOSE            RADIOLOGY & ADDITIONAL STUDIES REVIEWED:  ***

## 2024-03-22 NOTE — PROGRESS NOTE ADULT - SUBJECTIVE AND OBJECTIVE BOX
39 yo man, surgery consulted for abdominal distention / elevated intra-abdominal pressure.   On exam - he is sedated but responds to pain, abdomen - distended, no masses or hernias. Rectal exam - empty vault, no blood, no mass.   Oliguric but no elevated peak inspiratory pressures.   CT shows dilated colon, likely ileus.   No free fluid on CT or bedside sono.   Latest IAP  - 16.     Plan to continue supportive care. Consider neuromuscular paralysis if IAP increases. Surgery will follow.

## 2024-03-22 NOTE — CHART NOTE - NSCHARTNOTEFT_GEN_A_CORE
Consent for intubation, central line and arterial line taken from patient's sister who stated she is the healthcare proxy. As per pt's sister, pt would NOT want to have any blood transfusion. This matter was confirmed with patient's father via the phone at [phone], he as well stated that he agrees with the pt's sister decision, this conversation was witnessed by the Co-resident Dr. Quinn.

## 2024-03-22 NOTE — PROGRESS NOTE ADULT - ASSESSMENT
38M, PMHx of alcohol withdrawal seizure with alcohol use disorder, who p/w confusion starting this morning. Admitted for high risk alcohol withdrawal with persistent sinus tachycardia.    Assessment:   # Alcohol use disorder  # Alcohol withdrawal  # Acute metabolic encephalopathy  # Atypical pneumonia  # Hx of alcohol seizures  # Possible pancreatitis  # Liver steatosis  # Alcoholic hepatitis  # Sinus tachycardia  # SIRS  # ARABELLA      Plan:   NEURO:  # Acute metabolic encephalopathy  # Alcohol withdrawal  - Hx of alcohol use disorder  - Last drink early this AM. BAL 9 on admission  - Pt drinks 1.5L of whiskey per day  - Patient confabulates, per patient's father  - P/w AMS to the ED, intermittently confused despite being AAOx3 with some assistance  - CT head negative for acute pathology  - CIWA 7 after given Ativan and Librium in ED  - NPO for now  - CIWA protocol with Ativan taper  - Folic acid, high dose thiamine  - F/u ammonia level for AMS    # Alcohol use disorder  # Hx of alcohol seizures  - Pt has been in and out of alcohol rehab  - Most recently came out 6 months ago  - SW consult needed      CARDIO:  # Sinus tachycardia  - P/w sinus tachycardia  - Likely due to alcohol withdrawal  - EKG = sinus tachycardia  - F/u troponin       PULM:  # Atypical pneumonia  - CT = Multifocal bilateral peripheral opacities suggesting atypical pneumonia. Probable compression atelectasis right lung base  - Complained of some nonproductive cough  - Start Ceftriaxone      GI:  # Alcoholic hepatitis  - P/w T bili 6.6, , ALT 41,   - Maddrey score = borderline at 31.4 points (32 point is the cutoff for steroid treatment)  - NPO for now  - D5+ LR maintenance IVF    # Constipation  - Pt endorses constipation  - Senna, miralax    # Possible pancreatitis  - Lipase 470, possible mid abdominal pain. Negative imaging.  - Already on IVF + NPO status    # Liver steatosis  # Liver lesion  - Likely due to alcohol use disorder  - CT = Has hypodense foci with central hyperdensity involving the pericholecystic region and segment 6, indeterminate.   - Rec outpt MRI      RENAL:  # ARABELLA  - P/w SCr 1.77 (baseline 0.8)  - S/p 2L IVF in ED  - F/u SCr repeat     # Lactic acidosis  - P/w Lactate 10  - Due to dehydration, infection-  - IVF  - Trend lactate 10 -> 5.6    # Hypokalemia  - K 3.3  - Repleting    INFECTIOUS:  # SIRS  - P/w Lactate 10, tachycardia, tachypnea>20  - Possibly due to aspiration pneumonia?  - S/p Cefepime in ED  - C/w with Ceftriaxone  - F/u cultures  - F/u legionella      HEMO:  # Leukocytosis  - As above in sepsis      ENDO:  No issues      PPX:  - Hep SC      Dispo: 38M, PMHx of alcohol withdrawal seizure with alcohol use disorder, who p/w confusion starting this morning. Admitted for high risk alcohol withdrawal with persistent sinus tachycardia.    Assessment:   # Alcohol use disorder  # Alcohol withdrawal  # Acute metabolic encephalopathy  # Atypical pneumonia  # Hx of alcohol seizures  # Possible pancreatitis  # Liver steatosis  # Alcoholic hepatitis  # Sinus tachycardia  # SIRS  # ARABELLA      Plan:   NEURO:  # Acute metabolic encephalopathy  # Alcohol withdrawal  s/p Ativan however will pursue intubation for protecting the airways     # Alcohol use disorder  # Hx of alcohol seizures  - Pt has been in and out of alcohol rehab  - Most recently came out 6 months ago - as per sister pt was only hospitalized and not in rehab   - SW consult order placed       CARDIO:  # Sinus tachycardia  - P/w sinus tachycardia  - Likely due to alcohol withdrawal  - EKG = sinus tachycardia  - trop neg       PULM:  # increased work of breathing and tachypnea   likely in the setting of alcohol withdrawal and agitation   anticipating intubation for airway protection    atypical pneumonia   #coronavirus positive however not COVID  CT chest : Multifocal bilateral peripheral opacities suggesting atypical pneumonia. Probable compression atelectasis right lung base, related to elevated right hemidiaphragm.  clinically not impressive on Rocephin for UTI       GI:  # Alcoholic hepatitis   #transaminitis   - P/w T bili 6.6, , ALT 41,   - Maddrey score = borderline at 31.4 points (32 point is the cutoff for steroid treatment)  - MELD-Na score 18 points 3-4% 90day mortality   - NPO for now  - D5+ LR maintenance IVF    # Constipation  #?acute abdomen with increased abdominal pressure of 25   surgery consulted - no indication for surgery at this time , recs paralyzing the patient   c/w suction     # Possible pancreatitis  - Lipase 470, possible mid abdominal pain. Negative imaging.  - c/w IVF and keep NPO anticipating sedation     # Liver steatosis  # Liver lesion  - Likely due to alcohol use disorder  - CT = Has hypodense foci with central hyperdensity involving the pericholecystic region and segment 6, indeterminate.   - Rec outpt MRI, hepatology consult once more stable for outpt follow up       RENAL:  # ARABELLA  - P/w SCr 1.77 (baseline 0.8)  - S/p 23L IVF in ED  - urinary retention 1.5 L s/p straight cath overnight   - repeat SCr on 3/22 improved to 0.85     # Lactic acidosis  - P/w Lactate 10  - Due to dehydration, infection-  s/p 3L IVF -> Lactate 1.6    # Hypokalemia  - K 3.3 s/p Kcl 10 mEq x3 ->3.9   - repeat 3.3 will replete IV     INFECTIOUS:  # SIRS  - P/w Lactate 10, tachycardia, tachypnea>20  - CXR neg for consolidation or effusion  - Positive coronavirus however not COVID   - legionella neg   - U/A positive   - S/p Cefepime in ED  - C/w with Ceftriaxone  - F/u cultures        HEMO:  # Leukocytosis slightly trended down to 17   - As above in sepsis      ENDO:  No issues      PPX:  - Hep SC  - PPI     Dispo: ICU

## 2024-03-22 NOTE — CONSULT NOTE ADULT - SUBJECTIVE AND OBJECTIVE BOX
Patient is a 38y old  Male who presents with a chief complaint of Alcohol withdrawal (22 Mar 2024 07:36)      HPI  38M, PMHx of alcohol withdrawl seizure with alcohol use disorder, who p/w confusion starting this morning. According to the patient, he came out of the alcohol rehab few days ago. Last drink was last night, and he said that his friend gave him a pill to take, and he took it. He currently endorses constipation and denies other Patient denies headache, fever, chills, nausea, vomit, chest pain, shortness of breath, cough, lightheadedness, abdominal pain, diarrhea, constipation, dark/bloody stool, dysuria, hematuria, loss of strength, or loss of sensation.      According to the father at bedside, the patient last came out of alcohol rehab 6 months ago. This morning, the patient's eyes were yellow and that he was more confused, saying things that did not make sense. Also, since last week, he noticed that the patient was developing increased abdominal swelling. Since yesterday, patient has been only at home drinking alcohol. There was no friend who came over. Patient also drank till 9am this morning.    Called see and alesha 38y.o. Male w/PMH significant for EtOH abuse for increased intraabd pressure. Pt admitted to ICU yesterday. Per ICU team, pt's intraabd pressure was 25mm Hg yesterday. Pt's mental status changed this morning, as well as increased abd distention. Pt currently intubated, with NGT to low suction. Roman placed yesterday with 510mL UO recorded. Bladder pressure was noted to be 26mm Hg.    PAST MEDICAL & SURGICAL HISTORY:  No pertinent past medical history      History of seizure due to alcohol withdrawal      History of alcohol use disorder      No significant past surgical history      No significant past surgical history    MEDICATIONS  (STANDING):  cefTRIAXone   IVPB 1000 milliGRAM(s) IV Intermittent every 24 hours  chlorhexidine 2% Cloths 1 Application(s) Topical <User Schedule>  dextrose 5% + lactated ringers. 1000 milliLiter(s) (100 mL/Hr) IV Continuous <Continuous>  folic acid Injectable 1 milliGRAM(s) IV Push daily  heparin   Injectable 5000 Unit(s) SubCutaneous every 12 hours  influenza   Vaccine 0.5 milliLiter(s) IntraMuscular once  lactulose Syrup 10 Gram(s) Oral two times a day  LORazepam   Injectable 2 milliGRAM(s) IV Push every 4 hours  LORazepam   Injectable 1.5 milliGRAM(s) IV Push every 4 hours  LORazepam   Injectable   IV Push   metroNIDAZOLE  IVPB 500 milliGRAM(s) IV Intermittent every 8 hours  pantoprazole  Injectable 40 milliGRAM(s) IV Push daily  thiamine IVPB 500 milliGRAM(s) IV Intermittent every 8 hours    MEDICATIONS  (PRN):  LORazepam   Injectable 2 milliGRAM(s) IV Push every 2 hours PRN CIWA-Ar score 8 or greater    Allergies    No Known Allergies    Intolerances    Vital Signs Last 24 Hrs  T(C): 36.6 (22 Mar 2024 09:00), Max: 36.8 (22 Mar 2024 04:00)  T(F): 97.8 (22 Mar 2024 09:00), Max: 98.2 (22 Mar 2024 04:00)  HR: 127 (22 Mar 2024 12:00) (117 - 160)  BP: 102/77 (22 Mar 2024 12:00) (90/63 - 164/109)  BP(mean): 86 (22 Mar 2024 12:00) (71 - 123)  RR: 16 (22 Mar 2024 12:00) (16 - 30)  SpO2: 99% (22 Mar 2024 12:00) (94% - 100%)    Parameters below as of 22 Mar 2024 07:00  Patient On (Oxygen Delivery Method): nasal cannula  O2 Flow (L/min): 2    Physical:  Gen: Intubated, sedated. NAD  Abd: Distended, soft, no tenderness elicited.    I&O's Detail    21 Mar 2024 07:01  -  22 Mar 2024 07:00  --------------------------------------------------------  IN:    dextrose 5% + lactated ringers: 900 mL    IV PiggyBack: 100 mL    IV PiggyBack: 200 mL    IV PiggyBack: 200 mL  Total IN: 1400 mL    OUT:    Intermittent Catheterization - Urethral (mL): 550 mL    Voided (mL): 0 mL  Total OUT: 550 mL    Total NET: 850 mL      22 Mar 2024 07:01  -  22 Mar 2024 12:44  --------------------------------------------------------  IN:    dextrose 5% + lactated ringers: 500 mL  Total IN: 500 mL    OUT:    Indwelling Catheter - Urethral (mL): 60 mL  Total OUT: 60 mL    Total NET: 440 mL    LABS:                        10.9   18.99 )-----------( 152      ( 22 Mar 2024 03:22 )             31.6              03-22    141  |  109<H>  |  24<H>  ----------------------------<  150<H>  3.9   |  26  |  0.79    Ca    8.1<L>      22 Mar 2024 03:22  Phos  3.7       Mg     2.2         TPro  6.2  /  Alb  2.3<L>  /  TBili  5.4<H>  /  DBili  x   /  AST  134<H>  /  ALT  36  /  AlkPhos  272<H>              PT/INR - ( 21 Mar 2024 17:01 )   PT: 17.3 sec;   INR: 1.54 ratio         PTT - ( 21 Mar 2024 17:01 )  PTT:35.1 sec  Urinalysis Basic - ( 22 Mar 2024 06:46 )    Color: Orange / Appearance: Turbid / S.034 / pH: x  Gluc: x / Ketone: Trace mg/dL  / Bili: Large / Urobili: 1.0 mg/dL   Blood: x / Protein: 100 mg/dL / Nitrite: Positive   Leuk Esterase: Small / RBC: 4 /HPF / WBC 20 /HPF   Sq Epi: x / Non Sq Epi: x / Bacteria: Many /HPF    RADIOLOGY & ADDITIONAL STUDIES:  < from: CT Abdomen and Pelvis No Cont (24 @ 16:28) >  LIVER: Liver is enlarged measuring approximately 29 cm. There is severe   hepatic steatosis. There are hypodense fociwith central hyperdensity   involving the pericholecystic region and segment 6, indeterminate.   Consider further evaluation with MR imaging.  BILE DUCTS: Normal caliber.  GALLBLADDER: Hyperdense content, possibly sludge or vicarious excretion   of contrast.  SPLEEN: Within normal limits.  PANCREAS: Within normal limits.  ADRENALS: Within normal limits.  KIDNEYS/URETERS: Within normal limits.    BLADDER: Under distended, unremarkable.  REPRODUCTIVE ORGANS: No significant enlargement of the prostate.    BOWEL: No bowel obstruction. Appendix is not visualized. No evidence of   inflammation in the pericecal region. Nonspecific mild distention of the   transverse colon.  PERITONEUM: No ascites.  VESSELS: Within normal limits.  RETROPERITONEUM/LYMPH NODES: No lymphadenopathy.  ABDOMINAL WALL: Within normal limits.  BONES: Within normal limits.    < end of copied text >    < from: Xray Chest 1 View- PORTABLE-Urgent (22 @ 14:48) >  FINDINGS:    Normal heart size.    Clear lungs.    No pleural effusion or pneumothorax.    < end of copied text >

## 2024-03-22 NOTE — CONSULT NOTE ADULT - ASSESSMENT
38y.o. Male with EtOH withdrawal with distended abd, increased intraabd pressure    -Rec paralyzing pt  -Monitor I/O's  -NGT to LWS  -Con't supportive care by ICU team  -No acute surgical intervention indicated at present time  -Case d/w attending    This note and its recommendations herein are preliminary until such time as cosigned by an attending.

## 2024-03-22 NOTE — CHART NOTE - NSCHARTNOTEFT_GEN_A_CORE
Consulted surgery around 11:50AM for patient Guero with increasing abdominal tension and abdominal xray showing loss of haustra of colon. Bladder pressure showing P 26mmHg. Asked surgery to review scans and assess patient.     Called surgery again around 12:08PM regarding patient. Notified patient already has a sump tube returning less than 100cc. Per surgery, they "reviewed the CT scan and Xray but lactate is coming down." No current recommendation given by surgery.

## 2024-03-23 LAB
A1C WITH ESTIMATED AVERAGE GLUCOSE RESULT: 7.9 % — HIGH (ref 4–5.6)
ALBUMIN SERPL ELPH-MCNC: 2.2 G/DL — LOW (ref 3.5–5)
ALP SERPL-CCNC: 254 U/L — HIGH (ref 40–120)
ALT FLD-CCNC: 34 U/L DA — SIGNIFICANT CHANGE UP (ref 10–60)
AMMONIA BLD-MCNC: 172 UMOL/L — HIGH (ref 11–32)
ANION GAP SERPL CALC-SCNC: 8 MMOL/L — SIGNIFICANT CHANGE UP (ref 5–17)
ANISOCYTOSIS BLD QL: SLIGHT — SIGNIFICANT CHANGE UP
APTT BLD: 36.4 SEC — HIGH (ref 24.5–35.6)
AST SERPL-CCNC: 115 U/L — HIGH (ref 10–40)
BASOPHILS # BLD AUTO: 0 K/UL — SIGNIFICANT CHANGE UP (ref 0–0.2)
BASOPHILS NFR BLD AUTO: 0 % — SIGNIFICANT CHANGE UP (ref 0–2)
BILIRUB SERPL-MCNC: 5.5 MG/DL — HIGH (ref 0.2–1.2)
BUN SERPL-MCNC: 22 MG/DL — HIGH (ref 7–18)
CALCIUM SERPL-MCNC: 7.7 MG/DL — LOW (ref 8.4–10.5)
CHLORIDE SERPL-SCNC: 109 MMOL/L — HIGH (ref 96–108)
CO2 SERPL-SCNC: 25 MMOL/L — SIGNIFICANT CHANGE UP (ref 22–31)
CREAT SERPL-MCNC: 0.9 MG/DL — SIGNIFICANT CHANGE UP (ref 0.5–1.3)
DACRYOCYTES BLD QL SMEAR: SIGNIFICANT CHANGE UP
EGFR: 114 ML/MIN/1.73M2 — SIGNIFICANT CHANGE UP
ELLIPTOCYTES BLD QL SMEAR: SLIGHT — SIGNIFICANT CHANGE UP
EOSINOPHIL # BLD AUTO: 0 K/UL — SIGNIFICANT CHANGE UP (ref 0–0.5)
EOSINOPHIL NFR BLD AUTO: 0 % — SIGNIFICANT CHANGE UP (ref 0–6)
ESTIMATED AVERAGE GLUCOSE: 180 MG/DL — HIGH (ref 68–114)
GAS PNL BLDA: SIGNIFICANT CHANGE UP
GLUCOSE BLDC GLUCOMTR-MCNC: 107 MG/DL — HIGH (ref 70–99)
GLUCOSE BLDC GLUCOMTR-MCNC: 153 MG/DL — HIGH (ref 70–99)
GLUCOSE BLDC GLUCOMTR-MCNC: 154 MG/DL — HIGH (ref 70–99)
GLUCOSE SERPL-MCNC: 184 MG/DL — HIGH (ref 70–99)
HCT VFR BLD CALC: 30.9 % — LOW (ref 39–50)
HGB BLD-MCNC: 10.4 G/DL — LOW (ref 13–17)
INR BLD: 1.5 RATIO — HIGH (ref 0.85–1.18)
LYMPHOCYTES # BLD AUTO: 25 % — SIGNIFICANT CHANGE UP (ref 13–44)
LYMPHOCYTES # BLD AUTO: 5.66 K/UL — HIGH (ref 1–3.3)
MAGNESIUM SERPL-MCNC: 1.9 MG/DL — SIGNIFICANT CHANGE UP (ref 1.6–2.6)
MANUAL SMEAR VERIFICATION: SIGNIFICANT CHANGE UP
MCHC RBC-ENTMCNC: 33.7 GM/DL — SIGNIFICANT CHANGE UP (ref 32–36)
MCHC RBC-ENTMCNC: 34 PG — SIGNIFICANT CHANGE UP (ref 27–34)
MCV RBC AUTO: 101 FL — HIGH (ref 80–100)
MONOCYTES # BLD AUTO: 1.13 K/UL — HIGH (ref 0–0.9)
MONOCYTES NFR BLD AUTO: 5 % — SIGNIFICANT CHANGE UP (ref 2–14)
NEUTROPHILS # BLD AUTO: 15.86 K/UL — HIGH (ref 1.8–7.4)
NEUTROPHILS NFR BLD AUTO: 70 % — SIGNIFICANT CHANGE UP (ref 43–77)
NRBC # BLD: 0 /100 WBCS — SIGNIFICANT CHANGE UP (ref 0–0)
PHOSPHATE SERPL-MCNC: 2.5 MG/DL — SIGNIFICANT CHANGE UP (ref 2.5–4.5)
PLAT MORPH BLD: NORMAL — SIGNIFICANT CHANGE UP
PLATELET # BLD AUTO: 152 K/UL — SIGNIFICANT CHANGE UP (ref 150–400)
PLATELET COUNT - ESTIMATE: NORMAL — SIGNIFICANT CHANGE UP
POIKILOCYTOSIS BLD QL AUTO: SLIGHT — SIGNIFICANT CHANGE UP
POTASSIUM SERPL-MCNC: 3.2 MMOL/L — LOW (ref 3.5–5.3)
POTASSIUM SERPL-SCNC: 3.2 MMOL/L — LOW (ref 3.5–5.3)
PROT SERPL-MCNC: 6 G/DL — SIGNIFICANT CHANGE UP (ref 6–8.3)
PROTHROM AB SERPL-ACNC: 16.9 SEC — HIGH (ref 9.5–13)
RBC # BLD: 3.06 M/UL — LOW (ref 4.2–5.8)
RBC # FLD: 17.6 % — HIGH (ref 10.3–14.5)
RBC BLD AUTO: ABNORMAL
SODIUM SERPL-SCNC: 142 MMOL/L — SIGNIFICANT CHANGE UP (ref 135–145)
WBC # BLD: 22.65 K/UL — HIGH (ref 3.8–10.5)
WBC # FLD AUTO: 22.65 K/UL — HIGH (ref 3.8–10.5)

## 2024-03-23 PROCEDURE — 71045 X-RAY EXAM CHEST 1 VIEW: CPT | Mod: 26

## 2024-03-23 PROCEDURE — 99232 SBSQ HOSP IP/OBS MODERATE 35: CPT

## 2024-03-23 PROCEDURE — 74018 RADEX ABDOMEN 1 VIEW: CPT | Mod: 26

## 2024-03-23 PROCEDURE — 76770 US EXAM ABDO BACK WALL COMP: CPT | Mod: 26

## 2024-03-23 PROCEDURE — 99291 CRITICAL CARE FIRST HOUR: CPT

## 2024-03-23 RX ORDER — INSULIN LISPRO 100/ML
VIAL (ML) SUBCUTANEOUS
Refills: 0 | Status: DISCONTINUED | OUTPATIENT
Start: 2024-03-23 | End: 2024-03-23

## 2024-03-23 RX ORDER — NALOXEGOL OXALATE 12.5 MG/1
12.5 TABLET, FILM COATED ORAL ONCE
Refills: 0 | Status: DISCONTINUED | OUTPATIENT
Start: 2024-03-23 | End: 2024-03-23

## 2024-03-23 RX ORDER — ACETAMINOPHEN 500 MG
1000 TABLET ORAL ONCE
Refills: 0 | Status: COMPLETED | OUTPATIENT
Start: 2024-03-23 | End: 2024-03-23

## 2024-03-23 RX ORDER — PHENOBARBITAL 60 MG
260 TABLET ORAL ONCE
Refills: 0 | Status: DISCONTINUED | OUTPATIENT
Start: 2024-03-23 | End: 2024-03-23

## 2024-03-23 RX ORDER — INSULIN LISPRO 100/ML
VIAL (ML) SUBCUTANEOUS EVERY 6 HOURS
Refills: 0 | Status: DISCONTINUED | OUTPATIENT
Start: 2024-03-23 | End: 2024-03-30

## 2024-03-23 RX ORDER — PIPERACILLIN AND TAZOBACTAM 4; .5 G/20ML; G/20ML
3.38 INJECTION, POWDER, LYOPHILIZED, FOR SOLUTION INTRAVENOUS EVERY 8 HOURS
Refills: 0 | Status: COMPLETED | OUTPATIENT
Start: 2024-03-23 | End: 2024-03-30

## 2024-03-23 RX ORDER — PHENOBARBITAL 60 MG
130 TABLET ORAL
Refills: 0 | Status: DISCONTINUED | OUTPATIENT
Start: 2024-03-23 | End: 2024-03-26

## 2024-03-23 RX ORDER — NOREPINEPHRINE BITARTRATE/D5W 8 MG/250ML
0.05 PLASTIC BAG, INJECTION (ML) INTRAVENOUS
Qty: 16 | Refills: 0 | Status: DISCONTINUED | OUTPATIENT
Start: 2024-03-23 | End: 2024-03-27

## 2024-03-23 RX ORDER — INSULIN LISPRO 100/ML
VIAL (ML) SUBCUTANEOUS AT BEDTIME
Refills: 0 | Status: DISCONTINUED | OUTPATIENT
Start: 2024-03-23 | End: 2024-03-23

## 2024-03-23 RX ORDER — POTASSIUM CHLORIDE 20 MEQ
20 PACKET (EA) ORAL
Refills: 0 | Status: COMPLETED | OUTPATIENT
Start: 2024-03-23 | End: 2024-03-23

## 2024-03-23 RX ADMIN — Medication 1000 MILLIGRAM(S): at 19:46

## 2024-03-23 RX ADMIN — Medication 400 MILLIGRAM(S): at 19:19

## 2024-03-23 RX ADMIN — PIPERACILLIN AND TAZOBACTAM 25 GRAM(S): 4; .5 INJECTION, POWDER, LYOPHILIZED, FOR SOLUTION INTRAVENOUS at 21:25

## 2024-03-23 RX ADMIN — Medication 105 MILLIGRAM(S): at 05:33

## 2024-03-23 RX ADMIN — SODIUM CHLORIDE 100 MILLILITER(S): 9 INJECTION, SOLUTION INTRAVENOUS at 04:20

## 2024-03-23 RX ADMIN — CHLORHEXIDINE GLUCONATE 15 MILLILITER(S): 213 SOLUTION TOPICAL at 05:11

## 2024-03-23 RX ADMIN — PHENYLEPHRINE HYDROCHLORIDE 8.06 MICROGRAM(S)/KG/MIN: 10 INJECTION INTRAVENOUS at 08:06

## 2024-03-23 RX ADMIN — CHLORHEXIDINE GLUCONATE 15 MILLILITER(S): 213 SOLUTION TOPICAL at 18:49

## 2024-03-23 RX ADMIN — Medication 4.03 MICROGRAM(S)/KG/MIN: at 11:22

## 2024-03-23 RX ADMIN — PROPOFOL 7.74 MICROGRAM(S)/KG/MIN: 10 INJECTION, EMULSION INTRAVENOUS at 21:25

## 2024-03-23 RX ADMIN — Medication 105 MILLIGRAM(S): at 21:25

## 2024-03-23 RX ADMIN — Medication 1 MILLIGRAM(S): at 11:11

## 2024-03-23 RX ADMIN — PANTOPRAZOLE SODIUM 40 MILLIGRAM(S): 20 TABLET, DELAYED RELEASE ORAL at 11:11

## 2024-03-23 RX ADMIN — Medication 1000 MILLIGRAM(S): at 00:38

## 2024-03-23 RX ADMIN — PROPOFOL 7.74 MICROGRAM(S)/KG/MIN: 10 INJECTION, EMULSION INTRAVENOUS at 04:20

## 2024-03-23 RX ADMIN — HEPARIN SODIUM 5000 UNIT(S): 5000 INJECTION INTRAVENOUS; SUBCUTANEOUS at 18:46

## 2024-03-23 RX ADMIN — Medication 408 MILLIGRAM(S): at 14:12

## 2024-03-23 RX ADMIN — PROPOFOL 7.74 MICROGRAM(S)/KG/MIN: 10 INJECTION, EMULSION INTRAVENOUS at 18:46

## 2024-03-23 RX ADMIN — PROPOFOL 7.74 MICROGRAM(S)/KG/MIN: 10 INJECTION, EMULSION INTRAVENOUS at 23:23

## 2024-03-23 RX ADMIN — PROPOFOL 7.74 MICROGRAM(S)/KG/MIN: 10 INJECTION, EMULSION INTRAVENOUS at 10:25

## 2024-03-23 RX ADMIN — Medication 105 MILLIGRAM(S): at 13:30

## 2024-03-23 RX ADMIN — Medication 1: at 18:49

## 2024-03-23 RX ADMIN — Medication 50 MILLIEQUIVALENT(S): at 06:57

## 2024-03-23 RX ADMIN — CHLORHEXIDINE GLUCONATE 1 APPLICATION(S): 213 SOLUTION TOPICAL at 05:13

## 2024-03-23 RX ADMIN — Medication 50 MILLIEQUIVALENT(S): at 08:53

## 2024-03-23 RX ADMIN — CHLORHEXIDINE GLUCONATE 1 APPLICATION(S): 213 SOLUTION TOPICAL at 05:12

## 2024-03-23 RX ADMIN — HEPARIN SODIUM 5000 UNIT(S): 5000 INJECTION INTRAVENOUS; SUBCUTANEOUS at 05:11

## 2024-03-23 RX ADMIN — Medication 1000 MILLIGRAM(S): at 11:41

## 2024-03-23 RX ADMIN — Medication 400 MILLIGRAM(S): at 11:11

## 2024-03-23 RX ADMIN — PROPOFOL 7.74 MICROGRAM(S)/KG/MIN: 10 INJECTION, EMULSION INTRAVENOUS at 02:02

## 2024-03-23 RX ADMIN — PROPOFOL 7.74 MICROGRAM(S)/KG/MIN: 10 INJECTION, EMULSION INTRAVENOUS at 06:56

## 2024-03-23 RX ADMIN — Medication 50 MILLIEQUIVALENT(S): at 10:25

## 2024-03-23 RX ADMIN — PIPERACILLIN AND TAZOBACTAM 25 GRAM(S): 4; .5 INJECTION, POWDER, LYOPHILIZED, FOR SOLUTION INTRAVENOUS at 13:30

## 2024-03-23 NOTE — PROGRESS NOTE ADULT - ASSESSMENT
38M, PMHx of alcohol withdrawal seizure with alcohol use disorder, who p/w confusion starting this morning. Admitted for high risk alcohol withdrawal with persistent sinus tachycardia.    Assessment:   # Alcohol use disorder  # Alcohol withdrawal  # Acute metabolic encephalopathy  # Atypical pneumonia  # Hx of alcohol seizures  # Possible pancreatitis  # Liver steatosis  # Alcoholic hepatitis  # Sinus tachycardia  # SIRS  # ARABELLA      Plan:   NEURO:  # Acute metabolic encephalopathy  # Alcohol withdrawal  s/p Ativan however will pursue intubation for protecting the airways     # Alcohol use disorder  # Hx of alcohol seizures  - Pt has been in and out of alcohol rehab  - Most recently came out 6 months ago - as per sister pt was only hospitalized and not in rehab   - SW consult order placed       CARDIO:  # Sinus tachycardia  - P/w sinus tachycardia  - Likely due to alcohol withdrawal  - EKG = sinus tachycardia  - trop neg       PULM:  # increased work of breathing and tachypnea   likely in the setting of alcohol withdrawal and agitation   anticipating intubation for airway protection    atypical pneumonia   #coronavirus positive however not COVID  CT chest : Multifocal bilateral peripheral opacities suggesting atypical pneumonia. Probable compression atelectasis right lung base, related to elevated right hemidiaphragm.  clinically not impressive on Rocephin for UTI       GI:  # Alcoholic hepatitis   #transaminitis   - P/w T bili 6.6, , ALT 41,   - Maddrey score = borderline at 31.4 points (32 point is the cutoff for steroid treatment)  - MELD-Na score 18 points 3-4% 90day mortality   - NPO for now  - D5+ LR maintenance IVF    # Constipation  #?acute abdomen with increased abdominal pressure of 25   surgery consulted - no indication for surgery at this time , recs paralyzing the patient   c/w suction     # Possible pancreatitis  - Lipase 470, possible mid abdominal pain. Negative imaging.  - c/w IVF and keep NPO anticipating sedation     # Liver steatosis  # Liver lesion  - Likely due to alcohol use disorder  - CT = Has hypodense foci with central hyperdensity involving the pericholecystic region and segment 6, indeterminate.   - Rec outpt MRI, hepatology consult once more stable for outpt follow up       RENAL:  # ARABELLA  - P/w SCr 1.77 (baseline 0.8)  - S/p 23L IVF in ED  - urinary retention 1.5 L s/p straight cath overnight   - repeat SCr on 3/22 improved to 0.85     # Lactic acidosis  - P/w Lactate 10  - Due to dehydration, infection-  s/p 3L IVF -> Lactate 1.6    # Hypokalemia  - K 3.3 s/p Kcl 10 mEq x3 ->3.9   - repeat 3.3 will replete IV     INFECTIOUS:  # SIRS  - P/w Lactate 10, tachycardia, tachypnea>20  - CXR neg for consolidation or effusion  - Positive coronavirus however not COVID   - legionella neg   - U/A positive   - S/p Cefepime in ED  - C/w with Ceftriaxone  - F/u cultures        HEMO:  # Leukocytosis slightly trended down to 17   - As above in sepsis      ENDO:  No issues      PPX:  - Hep SC  - PPI     Dispo: ICU  38M, PMHx of alcohol withdrawal seizure with alcohol use disorder, who p/w confusion starting this morning. Admitted for high risk alcohol withdrawal with persistent sinus tachycardia.    Assessment:   # Alcohol use disorder  # Alcohol withdrawal  # Acute metabolic encephalopathy  # Atypical pneumonia  # Hx of alcohol seizures  # Possible pancreatitis  # Liver steatosis  # Alcoholic hepatitis  # Sinus tachycardia  # SIRS  # ARABELLA      Plan:   =============NEURO:=============  # Acute metabolic encephalopathy  # Alcohol withdrawal  s/p Ativan however will pursue intubation for protecting the airways     # Alcohol use disorder  # Hx of alcohol seizures  - Pt has been in and out of alcohol rehab  - Most recently came out 6 months ago - as per sister pt was only hospitalized and not in rehab   - SW consult order placed       CARDIO:  # Sinus tachycardia  - P/w sinus tachycardia  - Likely due to alcohol withdrawal  - EKG = sinus tachycardia  - trop neg       PULM:  # increased work of breathing and tachypnea   likely in the setting of alcohol withdrawal and agitation   anticipating intubation for airway protection    atypical pneumonia   #coronavirus positive however not COVID  CT chest : Multifocal bilateral peripheral opacities suggesting atypical pneumonia. Probable compression atelectasis right lung base, related to elevated right hemidiaphragm.  clinically not impressive on Rocephin for UTI       GI:  # Alcoholic hepatitis   #transaminitis   - P/w T bili 6.6, , ALT 41,   - Maddrey score = borderline at 31.4 points (32 point is the cutoff for steroid treatment)  - MELD-Na score 18 points 3-4% 90day mortality   - NPO for now  - D5+ LR maintenance IVF    # Constipation  #?acute abdomen with increased abdominal pressure of 25   surgery consulted - no indication for surgery at this time , recs paralyzing the patient   c/w suction     # Possible pancreatitis  - Lipase 470, possible mid abdominal pain. Negative imaging.  - c/w IVF and keep NPO anticipating sedation     # Liver steatosis  # Liver lesion  - Likely due to alcohol use disorder  - CT = Has hypodense foci with central hyperdensity involving the pericholecystic region and segment 6, indeterminate.   - Rec outpt MRI, hepatology consult once more stable for outpt follow up       RENAL:  # ARABELLA  - P/w SCr 1.77 (baseline 0.8)  - S/p 23L IVF in ED  - urinary retention 1.5 L s/p straight cath overnight   - repeat SCr on 3/22 improved to 0.85     # Lactic acidosis  - P/w Lactate 10  - Due to dehydration, infection-  s/p 3L IVF -> Lactate 1.6    # Hypokalemia  - K 3.3 s/p Kcl 10 mEq x3 ->3.9   - repeat 3.3 will replete IV     INFECTIOUS:  # SIRS  - P/w Lactate 10, tachycardia, tachypnea>20  - CXR neg for consolidation or effusion  - Positive coronavirus however not COVID   - legionella neg   - U/A positive   - S/p Cefepime in ED  - C/w with Ceftriaxone  - F/u cultures        HEMO:  # Leukocytosis slightly trended down to 17   - As above in sepsis      ENDO:  No issues      PPX:  - Hep SC  - PPI     Dispo: ICU  38M, PMHx of alcohol withdrawal seizure with alcohol use disorder, who p/w confusion starting this morning. Admitted for high risk alcohol withdrawal with persistent sinus tachycardia.    Assessment:   # Alcohol use disorder  # Alcohol withdrawal  # Acute metabolic encephalopathy  # Atypical pneumonia  # Hx of alcohol seizures  # Possible pancreatitis  # Liver steatosis  # Alcoholic hepatitis  # Sinus tachycardia  # SIRS  # ARABELLA      Plan:   =============NEURO:=============  # Acute metabolic encephalopathy  # Alcohol withdrawal  s/p Ativan however will pursue intubation for protecting the airways     # Alcohol use disorder  # Hx of alcohol seizures  - Pt has been in and out of alcohol rehab  - Most recently came out 6 months ago - as per sister pt was only hospitalized and not in rehab   - SW consult order placed     #intubated (3/22)    =============CARDIO:================  # Sinus tachycardia  - P/w sinus tachycardia  - Likely due to alcohol withdrawal  - EKG = sinus tachycardia  - trop neg       ==========PULM:=============  # Intubated (3/22)    #Atypical pneumonia   # coronavirus positive however not COVID  CT chest : Multifocal bilateral peripheral opacities suggesting atypical pneumonia. Probable compression atelectasis right lung base, related to elevated right hemidiaphragm.  - f/u CXR       ==========GI:==========  # Alcoholic hepatitis   #transaminitis   - P/w T bili 6.6, , ALT 41,   - Maddrey score = borderline at 31.4 points (32 point is the cutoff for steroid treatment)  - MELD-Na score 18 points 3-4% 90day mortality   - NPO for now  - D5+ LR maintenance IVF    # Constipation  #?acute abdomen with increased abdominal pressure of 25   surgery consulted - no indication for surgery at this time , recs paralyzing the patient   c/w suction     # Possible pancreatitis  - Lipase 470, possible mid abdominal pain. Negative imaging.  - c/w IVF and keep NPO anticipating sedation     # Liver steatosis  # Liver lesion  - Likely due to alcohol use disorder  - CT = Has hypodense foci with central hyperdensity involving the pericholecystic region and segment 6, indeterminate.   - Rec outpt MRI, hepatology consult once more stable for outpt follow up       RENAL:  # ARABELLA  - P/w SCr 1.77 (baseline 0.8)  - S/p 23L IVF in ED  - urinary retention 1.5 L s/p straight cath overnight   - repeat SCr on 3/22 improved to 0.85     # Lactic acidosis  - P/w Lactate 10  - Due to dehydration, infection-  s/p 3L IVF -> Lactate 1.6    # Hypokalemia  - K 3.3 s/p Kcl 10 mEq x3 ->3.9   - repeat 3.3 will replete IV     INFECTIOUS:  # SIRS  - P/w Lactate 10, tachycardia, tachypnea>20  - CXR neg for consolidation or effusion  - Positive coronavirus however not COVID   - legionella neg   - U/A positive   - S/p Cefepime in ED  - C/w with Ceftriaxone  - F/u cultures        HEMO:  # Leukocytosis slightly trended down to 17   - As above in sepsis      ENDO:  No issues      PPX:  - Hep SC  - PPI     Dispo: ICU  38M, PMHx of alcohol withdrawal seizure with alcohol use disorder, who p/w confusion starting this morning. Admitted for high risk alcohol withdrawal with persistent sinus tachycardia.    Assessment:   # Alcohol use disorder  # Alcohol withdrawal  # Acute metabolic encephalopathy  # Atypical pneumonia  # Hx of alcohol seizures  # Possible pancreatitis  # Liver steatosis  # Alcoholic hepatitis  # Sinus tachycardia  # SIRS  # ARABELLA      Plan:   =============NEURO:=============  # Acute metabolic encephalopathy  # Alcohol withdrawal  s/p Ativan however will pursue intubation for protecting the airways     # Alcohol use disorder  # Hx of alcohol seizures  - Pt has been in and out of alcohol rehab  - Most recently came out 6 months ago - as per sister pt was only hospitalized and not in rehab   - SW consult order placed     #intubated (3/22)    =============CARDIO:================  # Sinus tachycardia  - P/w sinus tachycardia  - Likely due to alcohol withdrawal  - EKG = sinus tachycardia  - trop neg       ==========PULM:=============  # Intubated (3/22)    #Atypical pneumonia   # coronavirus positive however not COVID  CT chest : Multifocal bilateral peripheral opacities suggesting atypical pneumonia. Probable compression atelectasis right lung base, related to elevated right hemidiaphragm.  - f/u CXR       ==========GI:==========  # Alcoholic hepatitis   #transaminitis   P/w T bili 6.6, , ALT 41,   Maddrey score = borderline at 31.4 points (32 point is the cutoff for steroid treatment)  MELD-Na score 17 points 6 % 90day mortality   - NPO for now      # Constipation  #?acute abdomen with increased abdominal pressure of 25   surgery consulted - no indication for surgery at this time , recs paralyzing the patient   - c/w suction     # Possible pancreatitis  Lipase 470, possible mid abdominal pain. Negative imaging.  - c/w NPO    # Liver steatosis    # Liver lesion  Likely due to alcohol use disorder  CT = Has hypodense foci with central hyperdensity involving the pericholecystic region and segment 6, indeterminate.   - Rec outpt MRI, hepatology consult once more stable for outpt follow up       =======RENAL:==========  # ARABELLA  P/w SCr 1.77 (baseline 0.8)  S/p 23L IVF in ED  urinary retention 1.5 L s/p straight cath overnight   repeat SCr on 3/22 improved to 0.85   RESOLVED    # Lactic acidosis  P/w Lactate 10  Due to dehydration, infection-  s/p 3L IVF -> Lactate 1.6  RESOLVED    # Hypokalemia  - K 3.3 s/p Kcl 10 mEq x3 ->3.9   - repeat 3.3 will replete IV     INFECTIOUS:  # SIRS  -p/w Lactate 10, tachycardia, tachypnea>20  CXR neg for consolidation or effusion  Positive coronavirus however not COVID   s/p Cefepime in ED  d/c'd Ceftriaoxone (3/23)  3/23 Started Zosyn   legionella neg   U/A positive   - C/w with Zosyn  - F/u cultures      ==============HEMO:===========  # Leukocytosis slightly trended down to 22k  - As above in sepsis      =======ENDO:=======  A1c 7.9  - FS, SSI  - monitor       =========PPX:========  - Hep SC  - PPI     Dispo: ICU

## 2024-03-23 NOTE — PROGRESS NOTE ADULT - SUBJECTIVE AND OBJECTIVE BOX
39 yo man, surgery consulted for abdominal distention / elevated intra-abdominal pressure.  ICU team reporting less distended today, intra abd pressures measurine approx 18 today, was approx 25 yesterday.  multiple loose bm reported after enema yesterday.  no bpr reported.  Sedated, on pressor and propofol,  T(C): 38.3 (23 Mar 2024 08:15), Max: 38.4 (22 Mar 2024 20:45)  T(F): 100.9 (23 Mar 2024 08:15), Max: 101.1 (22 Mar 2024 20:45)  HR: 94 (23 Mar 2024 08:17) (90 - 133)  BP: 96/59 (23 Mar 2024 00:00) (79/54 - 119/70)  BP(mean): 70 (23 Mar 2024 00:00) (60 - 86)  ABP: 101/61 (23 Mar 2024 08:15) (86/51 - 122/78)  ABP(mean): 76 (23 Mar 2024 08:15) (9 - 94)  RR: 15 (23 Mar 2024 08:15) (11 - 20)  SpO2: 100% (23 Mar 2024 08:17) (97% - 100%)    O2 Parameters below as of 23 Mar 2024 06:00  Patient On (Oxygen Delivery Method): ventilator      sedated but responds to pain, and agitated with CYNTHIA, vault empty,   abdomen - distended, no masses or hernias.tympany to perdussion upper abd, no guarding no rebound tenderness ilicited.   Rectal exam - empty vault, no blood, no mass. pt agitated with exam,  cxr reviewed initially no free air evident  CT showed dilated colon, likely ileus.                           10.4   22.65 )-----------( 152      ( 23 Mar 2024 08:25 )             30.9   03-23    142  |  109<H>  |  22<H>  ----------------------------<  184<H>  3.2<L>   |  25  |  0.90    Ca    7.7<L>      23 Mar 2024 03:15  Phos  2.5     03-23  Mg     1.9     03-23    TPro  6.0  /  Alb  2.2<L>  /  TBili  5.5<H>  /  DBili  x   /  AST  115<H>  /  ALT  34  /  AlkPhos  254<H>  03-23

## 2024-03-23 NOTE — DIETITIAN INITIAL EVALUATION ADULT - NSFNSGIIOFT_GEN_A_CORE
03-22-24 @ 07:01  -  03-23-24 @ 07:00  --------------------------------------------------------  OUT:    Nasogastric/Oral tube (mL): 250 mL  Total OUT: 250 mL    Total NET: -250 mL

## 2024-03-23 NOTE — CONSULT NOTE ADULT - GASTROINTESTINAL
soft/normal active bowel sounds/no guarding/no rigidity/no organomegaly/no masses palpable/distended/ascites

## 2024-03-23 NOTE — DIETITIAN INITIAL EVALUATION ADULT - MALNUTRITION
PCM (at least mild) related to acute illness on chronic ETOH abuse as evidenced by NPO day #3, ?PTA w/ 1.5 liter whiskey/ day (suspected replacing nourishing especially/protein) (note: weight increase w/ pt w/ abdomina distension).

## 2024-03-23 NOTE — PROGRESS NOTE ADULT - ASSESSMENT
abd distention  intra abd pressures improving  likely ileus  most recent lactate wnl  hypokalemia  elevated lft's    GI consult/f/u  mgt per icu team  surgery will follow, no indication for Operative intervention at this time

## 2024-03-23 NOTE — DIETITIAN INITIAL EVALUATION ADULT - OTHER INFO
Pt seen, intubated. Propofol @ 34.9 ml/hr (if x24 hrs=921 kcals fat).Pt noted w/ abdominal distension (less than yesterday, likely ileus w/ BMs after enema)

## 2024-03-23 NOTE — DIETITIAN INITIAL EVALUATION ADULT - PERTINENT MEDS FT
MEDICATIONS  (STANDING):  chlorhexidine 0.12% Liquid 15 milliLiter(s) Oral Mucosa every 12 hours  chlorhexidine 2% Cloths 1 Application(s) Topical <User Schedule>  folic acid Injectable 1 milliGRAM(s) IV Push daily  heparin   Injectable 5000 Unit(s) SubCutaneous every 12 hours  influenza   Vaccine 0.5 milliLiter(s) IntraMuscular once  insulin lispro (ADMELOG) corrective regimen sliding scale   SubCutaneous three times a day before meals  insulin lispro (ADMELOG) corrective regimen sliding scale   SubCutaneous at bedtime  norepinephrine Infusion 0.05 MICROgram(s)/kG/Min (4.03 mL/Hr) IV Continuous <Continuous>  pantoprazole  Injectable 40 milliGRAM(s) IV Push daily  piperacillin/tazobactam IVPB.. 3.375 Gram(s) IV Intermittent every 8 hours  propofol Infusion 15 MICROgram(s)/kG/Min (7.74 mL/Hr) IV Continuous <Continuous>  thiamine IVPB 500 milliGRAM(s) IV Intermittent every 8 hours    MEDICATIONS  (PRN):  fentaNYL    Injectable 50 MICROGram(s) IV Push every 6 hours PRN Moderate Pain (4 - 6)  sodium chloride 0.9% lock flush 10 milliLiter(s) IV Push every 1 hour PRN Pre/post blood products, medications, blood draw, and to maintain line patency

## 2024-03-23 NOTE — CONSULT NOTE ADULT - ASSESSMENT
Septic  Shock  UTI  Pneumonia - likely aspiration  ? Spontaneous Bacterial Peritonitis  Leukocytosis  Fevers      Plan - Cont Zosyn 3.375gms iv q8hrs  await culture results.

## 2024-03-23 NOTE — PROGRESS NOTE ADULT - SUBJECTIVE AND OBJECTIVE BOX
Patient is a 38y old  Male who presents with a chief complaint of Alcohol withdrawal (22 Mar 2024 21:09)      INTERVAL HPI/OVERNIGHT EVENTS:   No overnight events   Afebrile, hemodynamically stable     Subjective: Patient seen and examined at bedside.    ICU Vital Signs Last 24 Hrs  T(C): 38 (23 Mar 2024 04:00), Max: 38.4 (22 Mar 2024 20:45)  T(F): 100.4 (23 Mar 2024 04:00), Max: 101.1 (22 Mar 2024 20:45)  HR: 93 (23 Mar 2024 04:00) (90 - 133)  BP: 96/59 (23 Mar 2024 00:00) (79/54 - 132/97)  BP(mean): 70 (23 Mar 2024 00:00) (60 - 109)  ABP: 96/61 (23 Mar 2024 04:00) (86/51 - 122/78)  ABP(mean): 75 (23 Mar 2024 04:00) (9 - 94)  RR: 12 (23 Mar 2024 04:00) (11 - 30)  SpO2: 100% (23 Mar 2024 04:00) (94% - 100%)    O2 Parameters below as of 23 Mar 2024 00:00  Patient On (Oxygen Delivery Method): ventilator          I&O's Summary    21 Mar 2024 07:01  -  22 Mar 2024 07:00  --------------------------------------------------------  IN: 1400 mL / OUT: 550 mL / NET: 850 mL    22 Mar 2024 07:01  -  23 Mar 2024 04:28  --------------------------------------------------------  IN: 3110 mL / OUT: 600 mL / NET: 2510 mL      Mode: AC/ CMV (Assist Control/ Continuous Mandatory Ventilation)  RR (machine): 12  TV (machine): 420  FiO2: 40  PEEP: 5  ITime: 1  MAP: 7  PIP: 19      PHYSICAL EXAM:  GENERAL: No acute distress   HEAD:  Atraumatic, Normocephalic  EYES: EOMI, PERRLA, conjunctiva and sclera clear  ENMT: No tonsillar erythema, exudates, or enlargement; Moist mucous membranes  NECK: Supple, No JVD, Normal thyroid  HEART: Regular rate and rhythm; No murmurs, rubs, or gallops  RESPIRATORY: CTA B/L, No W/R/R  ABDOMEN: Soft, Nontender, Nondistended; Bowel sounds present  NEUROLOGY: A&Ox3, nonfocal, moving all extremities  EXTREMITIES:  2+ Peripheral Pulses, No clubbing, cyanosis, or edema  SKIN: warm, dry, normal color, no rash or abnormal lesions    LABS:                        10.8   17.64 )-----------( 154      ( 22 Mar 2024 12:50 )             32.0     03-22    143  |  110<H>  |  25<H>  ----------------------------<  168<H>  3.3<L>   |  29  |  0.85    Ca    8.0<L>      22 Mar 2024 12:54  Phos  3.7     03-22  Mg     2.2     03-22    TPro  6.2  /  Alb  2.3<L>  /  TBili  5.6<H>  /  DBili  x   /  AST  124<H>  /  ALT  33  /  AlkPhos  268<H>  03-22    PT/INR - ( 22 Mar 2024 12:54 )   PT: 16.3 sec;   INR: 1.45 ratio         PTT - ( 22 Mar 2024 12:54 )  PTT:35.2 sec  Urinalysis Basic - ( 22 Mar 2024 12:54 )    Color: x / Appearance: x / SG: x / pH: x  Gluc: 168 mg/dL / Ketone: x  / Bili: x / Urobili: x   Blood: x / Protein: x / Nitrite: x   Leuk Esterase: x / RBC: x / WBC x   Sq Epi: x / Non Sq Epi: x / Bacteria: x      CAPILLARY BLOOD GLUCOSE      POCT Blood Glucose.: 114 mg/dL (22 Mar 2024 17:19)  POCT Blood Glucose.: 160 mg/dL (22 Mar 2024 12:55)    ABG - ( 23 Mar 2024 03:27 )  pH, Arterial: 7.35  pH, Blood: x     /  pCO2: 42    /  pO2: 128   / HCO3: 23    / Base Excess: -2.4  /  SaO2: 100                 Consultant(s) Notes Reviewed:  [x ] YES  [ ] NO    MEDICATIONS  (STANDING):  cefTRIAXone   IVPB 1000 milliGRAM(s) IV Intermittent every 24 hours  chlorhexidine 0.12% Liquid 15 milliLiter(s) Oral Mucosa every 12 hours  chlorhexidine 2% Cloths 1 Application(s) Topical <User Schedule>  chlorhexidine 4% Liquid 1 Application(s) Topical <User Schedule>  dextrose 5% + lactated ringers. 1000 milliLiter(s) (100 mL/Hr) IV Continuous <Continuous>  folic acid Injectable 1 milliGRAM(s) IV Push daily  heparin   Injectable 5000 Unit(s) SubCutaneous every 12 hours  influenza   Vaccine 0.5 milliLiter(s) IntraMuscular once  lactulose Syrup 10 Gram(s) Oral two times a day  pantoprazole  Injectable 40 milliGRAM(s) IV Push daily  phenylephrine    Infusion 0.5 MICROgram(s)/kG/Min (8.06 mL/Hr) IV Continuous <Continuous>  propofol Infusion 15 MICROgram(s)/kG/Min (7.74 mL/Hr) IV Continuous <Continuous>  thiamine IVPB 500 milliGRAM(s) IV Intermittent every 8 hours    MEDICATIONS  (PRN):  fentaNYL    Injectable 50 MICROGram(s) IV Push every 6 hours PRN Moderate Pain (4 - 6)  sodium chloride 0.9% lock flush 10 milliLiter(s) IV Push every 1 hour PRN Pre/post blood products, medications, blood draw, and to maintain line patency      Care Discussed with Consultants/Other Providers [ x] YES  [ ] NO    RADIOLOGY & ADDITIONAL TESTS: Patient is a 38y old  Male who presents with a chief complaint of Alcohol withdrawal (23 Mar 2024 04:27)      INTERVAL HPI/OVERNIGHT EVENTS:  No overnight events. Patient was examined in the morning at bedside and he is in NAD. Patient is sedated and intubated     REVIEW OF SYSTEMS:  Unable to obtain due to patient being intubated and sedated     ICU Vital Signs Last 24 Hrs  T(C): 38.3 (23 Mar 2024 08:15), Max: 38.4 (22 Mar 2024 20:45)  T(F): 100.9 (23 Mar 2024 08:15), Max: 101.1 (22 Mar 2024 20:45)  HR: 93 (23 Mar 2024 08:15) (90 - 133)  BP: 96/59 (23 Mar 2024 00:00) (79/54 - 119/70)  BP(mean): 70 (23 Mar 2024 00:00) (60 - 88)  ABP: 101/61 (23 Mar 2024 08:15) (86/51 - 122/78)  ABP(mean): 76 (23 Mar 2024 08:15) (9 - 94)  RR: 15 (23 Mar 2024 08:15) (11 - 20)  SpO2: 99% (23 Mar 2024 08:15) (97% - 100%)    O2 Parameters below as of 23 Mar 2024 06:00  Patient On (Oxygen Delivery Method): ventilator          I&O's Summary    22 Mar 2024 07:01  -  23 Mar 2024 07:00  --------------------------------------------------------  IN: 3622.7 mL / OUT: 1050 mL / NET: 2572.7 mL      Mode: AC/ CMV (Assist Control/ Continuous Mandatory Ventilation)  RR (machine): 12  TV (machine): 420  FiO2: 40  PEEP: 5  ITime: 1  MAP: 7  PIP: 19          PRESSORS: [ x] YES [ ] NO    Antimicrobial:  cefTRIAXone   IVPB 1000 milliGRAM(s) IV Intermittent every 24 hours    Cardiovascular:  phenylephrine    Infusion 0.5 MICROgram(s)/kG/Min IV Continuous <Continuous>    Pulmonary:    Hematalogic:  heparin   Injectable 5000 Unit(s) SubCutaneous every 12 hours    Other:  chlorhexidine 0.12% Liquid 15 milliLiter(s) Oral Mucosa every 12 hours  chlorhexidine 2% Cloths 1 Application(s) Topical <User Schedule>  chlorhexidine 4% Liquid 1 Application(s) Topical <User Schedule>  dextrose 5% + lactated ringers. 1000 milliLiter(s) IV Continuous <Continuous>  fentaNYL    Injectable 50 MICROGram(s) IV Push every 6 hours PRN  folic acid Injectable 1 milliGRAM(s) IV Push daily  influenza   Vaccine 0.5 milliLiter(s) IntraMuscular once  pantoprazole  Injectable 40 milliGRAM(s) IV Push daily  potassium chloride  20 mEq/100 mL IVPB 20 milliEquivalent(s) IV Intermittent every 2 hours  propofol Infusion 15 MICROgram(s)/kG/Min IV Continuous <Continuous>  sodium chloride 0.9% lock flush 10 milliLiter(s) IV Push every 1 hour PRN  thiamine IVPB 500 milliGRAM(s) IV Intermittent every 8 hours    cefTRIAXone   IVPB 1000 milliGRAM(s) IV Intermittent every 24 hours  chlorhexidine 0.12% Liquid 15 milliLiter(s) Oral Mucosa every 12 hours  chlorhexidine 2% Cloths 1 Application(s) Topical <User Schedule>  chlorhexidine 4% Liquid 1 Application(s) Topical <User Schedule>  dextrose 5% + lactated ringers. 1000 milliLiter(s) IV Continuous <Continuous>  fentaNYL    Injectable 50 MICROGram(s) IV Push every 6 hours PRN  folic acid Injectable 1 milliGRAM(s) IV Push daily  heparin   Injectable 5000 Unit(s) SubCutaneous every 12 hours  influenza   Vaccine 0.5 milliLiter(s) IntraMuscular once  pantoprazole  Injectable 40 milliGRAM(s) IV Push daily  phenylephrine    Infusion 0.5 MICROgram(s)/kG/Min IV Continuous <Continuous>  potassium chloride  20 mEq/100 mL IVPB 20 milliEquivalent(s) IV Intermittent every 2 hours  propofol Infusion 15 MICROgram(s)/kG/Min IV Continuous <Continuous>  sodium chloride 0.9% lock flush 10 milliLiter(s) IV Push every 1 hour PRN  thiamine IVPB 500 milliGRAM(s) IV Intermittent every 8 hours    Drug Dosing Weight  Height (cm): 167.6 (21 Mar 2024 12:45)  Weight (kg): 86 (21 Mar 2024 21:30)  BMI (kg/m2): 30.6 (21 Mar 2024 21:30)  BSA (m2): 1.95 (21 Mar 2024 21:30)    BLACK: [ ] YES [ ] NO    DATE INSERTED:    CENTRAL LINE: [ ] YES [ ] NO  LOCATION:   DATE INSERTED:    A-LINE:  [ ] YES [ ] NO  LOCATION:   DATE INSERTED:      PMH -reviewed admission note, no change since admission  PAST MEDICAL & SURGICAL HISTORY:  No pertinent past medical history      History of seizure due to alcohol withdrawal      History of alcohol use disorder      No significant past surgical history      No significant past surgical history              PHYSICAL EXAM:  GENERAL: NAD,   HEAD:  Atraumatic, Normocephalic  EYES: EOMI, PERRLA, conjunctiva and sclera clear  ENMT: Moist mucous membranes, No lesions  NECK: Supple, normal appearance,   NERVOUS SYSTEM:  Alert & Oriented X3, No Asterixis  CHEST/LUNG: No chest deformity; No rales, rhonchi, wheezing   HEART: Regular rate and rhythm; No murmurs,  ABDOMEN: Soft, Nontender, Nondistended; Bowel sounds present  EXTREMITIES:  2+ Peripheral Pulses, No clubbing, cyanosis, or edema  SKIN: No rashes or lesions;      LABS:  CBC Full  -  ( 23 Mar 2024 08:25 )  WBC Count : x  RBC Count : 3.06 M/uL  Hemoglobin : 10.4 g/dL  Hematocrit : 30.9 %  Platelet Count - Automated : 152 K/uL  Mean Cell Volume : 101.0 fl  Mean Cell Hemoglobin : 34.0 pg  Mean Cell Hemoglobin Concentration : 33.7 gm/dL  Auto Neutrophil # : x  Auto Lymphocyte # : x  Auto Monocyte # : x  Auto Eosinophil # : x  Auto Basophil # : x  Auto Neutrophil % : x  Auto Lymphocyte % : x  Auto Monocyte % : x  Auto Eosinophil % : x  Auto Basophil % : x    03-23    142  |  109<H>  |  22<H>  ----------------------------<  184<H>  3.2<L>   |  25  |  0.90    Ca    7.7<L>      23 Mar 2024 03:15  Phos  2.5     03-23  Mg     1.9     03-23    TPro  6.0  /  Alb  2.2<L>  /  TBili  5.5<H>  /  DBili  x   /  AST  115<H>  /  ALT  34  /  AlkPhos  254<H>  03-23    PT/INR - ( 23 Mar 2024 03:15 )   PT: 16.9 sec;   INR: 1.50 ratio         PTT - ( 23 Mar 2024 03:15 )  PTT:36.4 sec  Urinalysis Basic - ( 23 Mar 2024 03:15 )    Color: x / Appearance: x / SG: x / pH: x  Gluc: 184 mg/dL / Ketone: x  / Bili: x / Urobili: x   Blood: x / Protein: x / Nitrite: x   Leuk Esterase: x / RBC: x / WBC x   Sq Epi: x / Non Sq Epi: x / Bacteria: x      Culture Results:   No growth at 24 hours (03-21 @ 14:22)  Culture Results:   No growth at 24 hours (03-21 @ 14:12)      RADIOLOGY & ADDITIONAL STUDIES REVIEWED:  ***    [ ]GOALS OF CARE DISCUSSION WITH PATIENT/FAMILY/PROXY:    CRITICAL CARE TIME SPENT: 35 minutes Patient is a 38y old  Male who presents with a chief complaint of Alcohol withdrawal (23 Mar 2024 04:27)      INTERVAL HPI/OVERNIGHT EVENTS:  No overnight events. Patient was examined in the morning at bedside and he is in NAD. Patient is sedated and intubated     REVIEW OF SYSTEMS:  Unable to obtain due to patient being intubated and sedated     ICU Vital Signs Last 24 Hrs  T(C): 38.3 (23 Mar 2024 08:15), Max: 38.4 (22 Mar 2024 20:45)  T(F): 100.9 (23 Mar 2024 08:15), Max: 101.1 (22 Mar 2024 20:45)  HR: 93 (23 Mar 2024 08:15) (90 - 133)  BP: 96/59 (23 Mar 2024 00:00) (79/54 - 119/70)  BP(mean): 70 (23 Mar 2024 00:00) (60 - 88)  ABP: 101/61 (23 Mar 2024 08:15) (86/51 - 122/78)  ABP(mean): 76 (23 Mar 2024 08:15) (9 - 94)  RR: 15 (23 Mar 2024 08:15) (11 - 20)  SpO2: 99% (23 Mar 2024 08:15) (97% - 100%)    O2 Parameters below as of 23 Mar 2024 06:00  Patient On (Oxygen Delivery Method): ventilator          I&O's Summary    22 Mar 2024 07:01  -  23 Mar 2024 07:00  --------------------------------------------------------  IN: 3622.7 mL / OUT: 1050 mL / NET: 2572.7 mL      Mode: AC/ CMV (Assist Control/ Continuous Mandatory Ventilation)  RR (machine): 12  TV (machine): 420  FiO2: 40  PEEP: 5  ITime: 1  MAP: 7  PIP: 19          PRESSORS: [ x] YES [ ] NO    Antimicrobial:  cefTRIAXone   IVPB 1000 milliGRAM(s) IV Intermittent every 24 hours    Cardiovascular:  phenylephrine    Infusion 0.5 MICROgram(s)/kG/Min IV Continuous <Continuous>    Pulmonary:    Hematalogic:  heparin   Injectable 5000 Unit(s) SubCutaneous every 12 hours    Other:  chlorhexidine 0.12% Liquid 15 milliLiter(s) Oral Mucosa every 12 hours  chlorhexidine 2% Cloths 1 Application(s) Topical <User Schedule>  chlorhexidine 4% Liquid 1 Application(s) Topical <User Schedule>  dextrose 5% + lactated ringers. 1000 milliLiter(s) IV Continuous <Continuous>  fentaNYL    Injectable 50 MICROGram(s) IV Push every 6 hours PRN  folic acid Injectable 1 milliGRAM(s) IV Push daily  influenza   Vaccine 0.5 milliLiter(s) IntraMuscular once  pantoprazole  Injectable 40 milliGRAM(s) IV Push daily  potassium chloride  20 mEq/100 mL IVPB 20 milliEquivalent(s) IV Intermittent every 2 hours  propofol Infusion 15 MICROgram(s)/kG/Min IV Continuous <Continuous>  sodium chloride 0.9% lock flush 10 milliLiter(s) IV Push every 1 hour PRN  thiamine IVPB 500 milliGRAM(s) IV Intermittent every 8 hours    cefTRIAXone   IVPB 1000 milliGRAM(s) IV Intermittent every 24 hours  chlorhexidine 0.12% Liquid 15 milliLiter(s) Oral Mucosa every 12 hours  chlorhexidine 2% Cloths 1 Application(s) Topical <User Schedule>  chlorhexidine 4% Liquid 1 Application(s) Topical <User Schedule>  dextrose 5% + lactated ringers. 1000 milliLiter(s) IV Continuous <Continuous>  fentaNYL    Injectable 50 MICROGram(s) IV Push every 6 hours PRN  folic acid Injectable 1 milliGRAM(s) IV Push daily  heparin   Injectable 5000 Unit(s) SubCutaneous every 12 hours  influenza   Vaccine 0.5 milliLiter(s) IntraMuscular once  pantoprazole  Injectable 40 milliGRAM(s) IV Push daily  phenylephrine    Infusion 0.5 MICROgram(s)/kG/Min IV Continuous <Continuous>  potassium chloride  20 mEq/100 mL IVPB 20 milliEquivalent(s) IV Intermittent every 2 hours  propofol Infusion 15 MICROgram(s)/kG/Min IV Continuous <Continuous>  sodium chloride 0.9% lock flush 10 milliLiter(s) IV Push every 1 hour PRN  thiamine IVPB 500 milliGRAM(s) IV Intermittent every 8 hours    Drug Dosing Weight  Height (cm): 167.6 (21 Mar 2024 12:45)  Weight (kg): 86 (21 Mar 2024 21:30)  BMI (kg/m2): 30.6 (21 Mar 2024 21:30)  BSA (m2): 1.95 (21 Mar 2024 21:30)    BLACK: [ x] YES [ ] NO    DATE INSERTED: 3/22/24    CENTRAL LINE: [ x] YES [ ] NO  LOCATION:   DATE INSERTED: 3/22/24    A-LINE:  [x ] YES [ ] NO  LOCATION:   DATE INSERTED: 3/22/24      PMH -reviewed admission note, no change since admission  PAST MEDICAL & SURGICAL HISTORY:  No pertinent past medical history      History of seizure due to alcohol withdrawal      History of alcohol use disorder      No significant past surgical history      No significant past surgical history              PHYSICAL EXAM:  GENERAL: NAD,   HEAD:  Atraumatic, Normocephalic, lying in bed comfortably   EYES: , PERRLA, Icterus  ENMT: Moist mucous membranes, No lesions  NECK: Supple, normal appearance,   NERVOUS SYSTEM:  Alert & Oriented X0,  CHEST/LUNG: No chest deformity; No rales, rhonchi, wheezing   HEART: Regular rate and rhythm; No murmurs,  ABDOMEN: + Distended abdomen, Soft, Nontender Bowel sounds present  EXTREMITIES:  2+ Peripheral Pulses, No clubbing, cyanosis, or edema  SKIN: No rashes or lesions;      LABS:  CBC Full  -  ( 23 Mar 2024 08:25 )  WBC Count : x  RBC Count : 3.06 M/uL  Hemoglobin : 10.4 g/dL  Hematocrit : 30.9 %  Platelet Count - Automated : 152 K/uL  Mean Cell Volume : 101.0 fl  Mean Cell Hemoglobin : 34.0 pg  Mean Cell Hemoglobin Concentration : 33.7 gm/dL  Auto Neutrophil # : x  Auto Lymphocyte # : x  Auto Monocyte # : x  Auto Eosinophil # : x  Auto Basophil # : x  Auto Neutrophil % : x  Auto Lymphocyte % : x  Auto Monocyte % : x  Auto Eosinophil % : x  Auto Basophil % : x    03-23    142  |  109<H>  |  22<H>  ----------------------------<  184<H>  3.2<L>   |  25  |  0.90    Ca    7.7<L>      23 Mar 2024 03:15  Phos  2.5     03-23  Mg     1.9     03-23    TPro  6.0  /  Alb  2.2<L>  /  TBili  5.5<H>  /  DBili  x   /  AST  115<H>  /  ALT  34  /  AlkPhos  254<H>  03-23    PT/INR - ( 23 Mar 2024 03:15 )   PT: 16.9 sec;   INR: 1.50 ratio         PTT - ( 23 Mar 2024 03:15 )  PTT:36.4 sec  Urinalysis Basic - ( 23 Mar 2024 03:15 )    Color: x / Appearance: x / SG: x / pH: x  Gluc: 184 mg/dL / Ketone: x  / Bili: x / Urobili: x   Blood: x / Protein: x / Nitrite: x   Leuk Esterase: x / RBC: x / WBC x   Sq Epi: x / Non Sq Epi: x / Bacteria: x      Culture Results:   No growth at 24 hours (03-21 @ 14:22)  Culture Results:   No growth at 24 hours (03-21 @ 14:12)      RADIOLOGY & ADDITIONAL STUDIES REVIEWED:  ***    [ ]GOALS OF CARE DISCUSSION WITH PATIENT/FAMILY/PROXY:    CRITICAL CARE TIME SPENT: 35 minutes

## 2024-03-23 NOTE — DIETITIAN INITIAL EVALUATION ADULT - ADD RECOMMEND
PCM (at least mild). Vent. NPO day #3. Consider trickle feeds as medically feasible (w/ Glucerna 1.5)w/ slow increase (10 ml/hr/ per 24hrs as tolerated. MD to monitor (replace electrolytes). Further TF recs if/ when needed (pt continues intubated w/ TF). RD available.

## 2024-03-23 NOTE — DIETITIAN INITIAL EVALUATION ADULT - PERTINENT LABORATORY DATA
03-23    142  |  109<H>  |  22<H>  ----------------------------<  184<H>  3.2<L>   |  25  |  0.90    Ca    7.7<L>      23 Mar 2024 03:15  Phos  2.5     03-23  Mg     1.9     03-23    TPro  6.0  /  Alb  2.2<L>  /  TBili  5.5<H>  /  DBili  x   /  AST  115<H>  /  ALT  34  /  AlkPhos  254<H>  03-23  POCT Blood Glucose.: 107 mg/dL (03-23-24 @ 11:10)  A1C with Estimated Average Glucose Result: 7.9 % (03-23-24 @ 03:15)

## 2024-03-23 NOTE — CONSULT NOTE ADULT - SUBJECTIVE AND OBJECTIVE BOX
HPI:  38M, PMHx of alcohol withdrawl seizure with alcohol use disorder, who p/w confusion starting this morning. According to the patient, he came out of the alcohol rehab few days ago. Last drink was last night, and he said that his friend gave him a pill to take, and he took it. He currently endorses constipation and denies other Patient denies headache, fever, chills, nausea, vomit, chest pain, shortness of breath, cough, lightheadedness, abdominal pain, diarrhea, constipation, dark/bloody stool, dysuria, hematuria, loss of strength, or loss of sensation.      According to the father at bedside, the patient last came out of alcohol rehab 6 months ago. This morning, the patient's eyes were yellow and that he was more confused, saying things that did not make sense. Also, since last week, he noticed that the patient was developing increased abdominal swelling. Since yesterday, patient has been only at home drinking alcohol. There was no friend who came over. Patient also drank till 9am this morning.   (21 Mar 2024 17:34)      PAST MEDICAL & SURGICAL HISTORY:  No pertinent past medical history      History of seizure due to alcohol withdrawal      History of alcohol use disorder      No significant past surgical history      No significant past surgical history          No Known Allergies      Meds:  chlorhexidine 0.12% Liquid 15 milliLiter(s) Oral Mucosa every 12 hours  chlorhexidine 2% Cloths 1 Application(s) Topical <User Schedule>  fentaNYL    Injectable 50 MICROGram(s) IV Push every 6 hours PRN  folic acid Injectable 1 milliGRAM(s) IV Push daily  heparin   Injectable 5000 Unit(s) SubCutaneous every 12 hours  influenza   Vaccine 0.5 milliLiter(s) IntraMuscular once  insulin lispro (ADMELOG) corrective regimen sliding scale   SubCutaneous every 6 hours  norepinephrine Infusion 0.05 MICROgram(s)/kG/Min IV Continuous <Continuous>  pantoprazole  Injectable 40 milliGRAM(s) IV Push daily  piperacillin/tazobactam IVPB.. 3.375 Gram(s) IV Intermittent every 8 hours  propofol Infusion 15 MICROgram(s)/kG/Min IV Continuous <Continuous>  sodium chloride 0.9% lock flush 10 milliLiter(s) IV Push every 1 hour PRN  thiamine IVPB 500 milliGRAM(s) IV Intermittent every 8 hours      SOCIAL HISTORY:  Smoker:  YES / NO        PACK YEARS:                         WHEN QUIT?  ETOH use:  YES / NO               FREQUENCY / QUANTITY:  Ilicit Drug use:  YES / NO  Occupation:  Assisted device use (Cane / Walker):  Live with:    FAMILY HISTORY:      VITALS:  Vital Signs Last 24 Hrs  T(C): 38.1 (23 Mar 2024 14:30), Max: 38.4 (22 Mar 2024 20:45)  T(F): 100.6 (23 Mar 2024 14:30), Max: 101.1 (22 Mar 2024 20:45)  HR: 106 (23 Mar 2024 15:54) (78 - 115)  BP: 88/64 (23 Mar 2024 12:00) (88/64 - 119/70)  BP(mean): 73 (23 Mar 2024 12:00) (70 - 85)  RR: 15 (23 Mar 2024 14:30) (11 - 19)  SpO2: 100% (23 Mar 2024 15:54) (99% - 100%)    Parameters below as of 23 Mar 2024 06:00  Patient On (Oxygen Delivery Method): ventilator        LABS/DIAGNOSTIC TESTS:                          10.4   22.65 )-----------( 152      ( 23 Mar 2024 08:25 )             30.9     WBC Count: 22.65 K/uL (03-23 @ 08:25)  WBC Count: 17.64 K/uL (03-22 @ 12:50)  WBC Count: 18.99 K/uL (03-22 @ 03:22)  WBC Count: 24.09 K/uL (03-21 @ 13:20)      03-23    142  |  109<H>  |  22<H>  ----------------------------<  184<H>  3.2<L>   |  25  |  0.90    Ca    7.7<L>      23 Mar 2024 03:15  Phos  2.5     03-23  Mg     1.9     03-23    TPro  6.0  /  Alb  2.2<L>  /  TBili  5.5<H>  /  DBili  x   /  AST  115<H>  /  ALT  34  /  AlkPhos  254<H>  03-23      Urinalysis + Microscopic Examination (03.22.24 @ 06:46)   pH Urine: 5.5  Urine Appearance: Turbid  Color: Orange  Specific Gravity: 1.034  Protein, Urine: 100 mg/dL  Glucose Qualitative, Urine: 250 mg/dL  Ketone - Urine: Trace mg/dL  Blood, Urine: Small  Bilirubin: Large  Urobilinogen: 1.0 mg/dL  Leukocyte Esterase Concentration: Small  Nitrite: Positive  White Blood Cell - Urine: 20 /HPF  Red Blood Cell - Urine: 4 /HPF  Bacteria: Many /HPF  Squamous Epithelial Cells: Present      LIVER FUNCTIONS - ( 23 Mar 2024 03:15 )  Alb: 2.2 g/dL / Pro: 6.0 g/dL / ALK PHOS: 254 U/L / ALT: 34 U/L DA / AST: 115 U/L / GGT: x             PT/INR - ( 23 Mar 2024 03:15 )   PT: 16.9 sec;   INR: 1.50 ratio         PTT - ( 23 Mar 2024 03:15 )  PTT:36.4 sec    LACTATE:    ABG - ABG - ( 23 Mar 2024 03:27 )  pH, Arterial: 7.35  pH, Blood: x     /  pCO2: 42    /  pO2: 128   / HCO3: 23    / Base Excess: -2.4  /  SaO2: 100                 CULTURES:   .Blood Blood  03-21 @ 14:22   No growth at 24 hours  --  --      .Blood Blood  03-21 @ 14:12   No growth at 24 hours  --  --          RADIOLOGY:< from: US Kidney and Bladder (03.23.24 @ 10:44) >  ACC: [id number] EXAM:  US KIDNEYS AND BLADDER   ORDERED BY: YASEMIN BATES     PROCEDURE DATE:  03/23/2024          INTERPRETATION:  CLINICAL INFORMATION: Given history is a 38-year-old   male with decreased urine output    COMPARISON: CT abdomen pelvis 3/21/2024 and right upper quadrant   ultrasound 3/21/2024.    TECHNIQUE: Sonography of the kidneys and bladder.    FINDINGS:  Right kidney: 13.5 cm in length. It is normal in contour and   echotexture.. No renal mass, hydronephrosis or calculi.    Left kidney: 12.6 cm in length. It is normal in contour and echotexture..   No renal mass, hydronephrosis or calculi.    Urinary bladder: The urinary bladder cannot be evaluated as it is   decompressed around a Roman catheter.    Incidental imaging of the liver demonstrates hepatic steatosis.    IMPRESSION:  Normal renal ultrasound.    Hepatic steatosis    Please read above.    --- End of Report ---          < end of copied text >  ---------------------------------------------------------------------------------------------------------------------------------------------------    ACC: [id number] EXAM:  XR CHEST PORTABLE URGENT 1V   ORDERED BY: KATRINA BROWN     PROCEDURE DATE:  03/22/2024          INTERPRETATION:  Exam:XR CHEST URGENT    clinical history:s/p central line    Tip of the right-sided central venous catheter overlies SVC/right atrial   junction. No pneumothorax. Endotracheal tube low in position at the   royal.    IMPRESSION: Line placement as above. Endotracheal tube is low in position    --- End of Report ---            MOOSE REN MD; Attending Radiologist  This document has been electronically signed. Mar 23 2024 11:27AM    < end of copied text >  -------------------------------------------------------------------------------------------------------------------------------------------------------------  ACC: [id number] EXAM:  CT ABDOMEN AND PELVIS   ORDERED BY: FLOYD BERGMAN     PROCEDURE DATE:  03/21/2024          INTERPRETATION:  CLINICAL INFORMATION: Possible pancreatitis.    COMPARISON: 03/26/2018.    CONTRAST/COMPLICATIONS:  IV Contrast: NONE  Oral Contrast: NONE  Complications: None reported at time of study completion    PROCEDURE:  CT of the Abdomen and Pelvis was performed.  Sagittal and coronal reformats were performed.    FINDINGS:  LOWER CHEST: Multifocal bilateral peripheral opacities suggesting   atypical pneumonia. Probable compression atelectasis right lung base,   related to elevated right hemidiaphragm.    LIVER: Liver is enlarged measuring approximately 29 cm. There is severe   hepatic steatosis. There are hypodense fociwith central hyperdensity   involving the pericholecystic region and segment 6, indeterminate.   Consider further evaluation with MR imaging.  BILE DUCTS: Normal caliber.  GALLBLADDER: Hyperdense content, possibly sludge or vicarious excretion   of contrast.  SPLEEN: Within normal limits.  PANCREAS: Within normal limits.  ADRENALS: Within normal limits.  KIDNEYS/URETERS: Within normal limits.    BLADDER: Under distended, unremarkable.  REPRODUCTIVE ORGANS: No significant enlargement of the prostate.    BOWEL: No bowel obstruction. Appendix is not visualized. No evidence of   inflammation in the pericecal region. Nonspecific mild distention of the   transverse colon.  PERITONEUM: No ascites.  VESSELS: Within normal limits.  RETROPERITONEUM/LYMPH NODES: No lymphadenopathy.  ABDOMINAL WALL: Within normal limits.  BONES: Within normal limits.    IMPRESSION:  Significantly enlarged liver demonstrating severe steatosis. Additional   heterogeneous foci in the pericholecystic region and segment 6are   indeterminate. Consider correlation with MR imaging.    Additional findings as above.        --- End of Report ---            MOJGAN VALDEZ MD; Attending Radiologist  This document has been electronically signed. Mar 21 2024  4:52PM    < end of copied text >  ----------------------------------------------------------------------------------------------------------------------  ACC: [id number] EXAM:  US GALLBLADDER   ORDERED BY: FLOYD BERGMAN     PROCEDURE DATE:  03/21/2024          INTERPRETATION:  Right upper quadrant pain.    Right upper quadrant ultrasound.    Gallbladder sludge noted. No gallstones wall thickening or  pericholecystic fluid. Negative Harrington's sign. No biliary dilatation.   Common duct 6 mm.  Liver is enlarged (26.3 cm) with increased echotexture consistent with   steatosis. Pancreas not adequately visualized.  Right kidney unremarkable.  No ascites.    IMPRESSION:  Gallbladder sludge without secondary signs of acute cholecystitis or   biliary dilatation.    Enlarged fatty liver    --- End of Report ---            KATHARINE BURCIAGA MD; Attending Radiologist  This document has been electronicallysigned. Mar 21 2024  3:59PM    < end of copied text >        ROS  [  ] UNABLE TO ELICIT               HPI:  38M, PMHx of alcohol withdrawl seizure with alcohol use disorder, who p/w confusion starting this morning. According to the patient, he came out of the alcohol rehab few days ago. Last drink was last night, and he said that his friend gave him a pill to take, and he took it. He currently endorses constipation and denies other Patient denies headache, fever, chills, nausea, vomit, chest pain, shortness of breath, cough, lightheadedness, abdominal pain, diarrhea, constipation, dark/bloody stool, dysuria, hematuria, loss of strength, or loss of sensation.    According to the father at bedside, the patient last came out of alcohol rehab 6 months ago. This morning, the patient's eyes were yellow and that he was more confused, saying things that did not make sense. Also, since last week, he noticed that the patient was developing increased abdominal swelling. Since yesterday, patient has been only at home drinking alcohol. There was no friend who came over. Patient also drank till 9am this morning.   (21 Mar 2024 17:34)        History as above, asked to see this patient who was brought with withdrawal seizures and jaundiced from alcohol abuse. He was found to be febrile here to 101.1, he was also found to have pulmonary infiltrates and a positive u/a and a high WBC count. He is currently in the ICU sedated , intubated and vent dependent on a FIO2 of 30% and PEEP of 5, he is on 1 pressor. He has abdominal distention and his brown colored urine in his Roman. He is currently on Zosyn to treat his septic shock from a UTI and Pneumonia.          PAST MEDICAL & SURGICAL HISTORY:  No pertinent past medical history      History of seizure due to alcohol withdrawal      History of alcohol use disorder      No significant past surgical history      No significant past surgical history          No Known Allergies      Meds:  chlorhexidine 0.12% Liquid 15 milliLiter(s) Oral Mucosa every 12 hours  chlorhexidine 2% Cloths 1 Application(s) Topical <User Schedule>  fentaNYL    Injectable 50 MICROGram(s) IV Push every 6 hours PRN  folic acid Injectable 1 milliGRAM(s) IV Push daily  heparin   Injectable 5000 Unit(s) SubCutaneous every 12 hours  influenza   Vaccine 0.5 milliLiter(s) IntraMuscular once  insulin lispro (ADMELOG) corrective regimen sliding scale   SubCutaneous every 6 hours  norepinephrine Infusion 0.05 MICROgram(s)/kG/Min IV Continuous <Continuous>  pantoprazole  Injectable 40 milliGRAM(s) IV Push daily  piperacillin/tazobactam IVPB.. 3.375 Gram(s) IV Intermittent every 8 hours  propofol Infusion 15 MICROgram(s)/kG/Min IV Continuous <Continuous>  sodium chloride 0.9% lock flush 10 milliLiter(s) IV Push every 1 hour PRN  thiamine IVPB 500 milliGRAM(s) IV Intermittent every 8 hours      SOCIAL HISTORY:  Smoker:  unknown  ETOH use:  YES, abuses  Ilicit Drug use:  unknown      FAMILY HISTORY: unknown      VITALS:  Vital Signs Last 24 Hrs  T(C): 38.1 (23 Mar 2024 14:30), Max: 38.4 (22 Mar 2024 20:45)  T(F): 100.6 (23 Mar 2024 14:30), Max: 101.1 (22 Mar 2024 20:45)  HR: 106 (23 Mar 2024 15:54) (78 - 115)  BP: 88/64 (23 Mar 2024 12:00) (88/64 - 119/70)  BP(mean): 73 (23 Mar 2024 12:00) (70 - 85)  RR: 15 (23 Mar 2024 14:30) (11 - 19)  SpO2: 100% (23 Mar 2024 15:54) (99% - 100%)    Parameters below as of 23 Mar 2024 06:00  Patient On (Oxygen Delivery Method): ventilator        LABS/DIAGNOSTIC TESTS:                          10.4   22.65 )-----------( 152      ( 23 Mar 2024 08:25 )             30.9     WBC Count: 22.65 K/uL (03-23 @ 08:25)  WBC Count: 17.64 K/uL (03-22 @ 12:50)  WBC Count: 18.99 K/uL (03-22 @ 03:22)  WBC Count: 24.09 K/uL (03-21 @ 13:20)      03-23    142  |  109<H>  |  22<H>  ----------------------------<  184<H>  3.2<L>   |  25  |  0.90    Ca    7.7<L>      23 Mar 2024 03:15  Phos  2.5     03-23  Mg     1.9     03-23    TPro  6.0  /  Alb  2.2<L>  /  TBili  5.5<H>  /  DBili  x   /  AST  115<H>  /  ALT  34  /  AlkPhos  254<H>  03-23      Urinalysis + Microscopic Examination (03.22.24 @ 06:46)   pH Urine: 5.5  Urine Appearance: Turbid  Color: Orange  Specific Gravity: 1.034  Protein, Urine: 100 mg/dL  Glucose Qualitative, Urine: 250 mg/dL  Ketone - Urine: Trace mg/dL  Blood, Urine: Small  Bilirubin: Large  Urobilinogen: 1.0 mg/dL  Leukocyte Esterase Concentration: Small  Nitrite: Positive  White Blood Cell - Urine: 20 /HPF  Red Blood Cell - Urine: 4 /HPF  Bacteria: Many /HPF  Squamous Epithelial Cells: Present      LIVER FUNCTIONS - ( 23 Mar 2024 03:15 )  Alb: 2.2 g/dL / Pro: 6.0 g/dL / ALK PHOS: 254 U/L / ALT: 34 U/L DA / AST: 115 U/L / GGT: x             PT/INR - ( 23 Mar 2024 03:15 )   PT: 16.9 sec;   INR: 1.50 ratio         PTT - ( 23 Mar 2024 03:15 )  PTT:36.4 sec    LACTATE:    ABG - ABG - ( 23 Mar 2024 03:27 )  pH, Arterial: 7.35  pH, Blood: x     /  pCO2: 42    /  pO2: 128   / HCO3: 23    / Base Excess: -2.4  /  SaO2: 100                 CULTURES:   .Blood Blood  03-21 @ 14:22   No growth at 24 hours  --  --      .Blood Blood  03-21 @ 14:12   No growth at 24 hours  --  --          RADIOLOGY:< from: US Kidney and Bladder (03.23.24 @ 10:44) >  ACC: [id number] EXAM:  US KIDNEYS AND BLADDER   ORDERED BY: YASEMIN BATES     PROCEDURE DATE:  03/23/2024          INTERPRETATION:  CLINICAL INFORMATION: Given history is a 38-year-old   male with decreased urine output    COMPARISON: CT abdomen pelvis 3/21/2024 and right upper quadrant   ultrasound 3/21/2024.    TECHNIQUE: Sonography of the kidneys and bladder.    FINDINGS:  Right kidney: 13.5 cm in length. It is normal in contour and   echotexture.. No renal mass, hydronephrosis or calculi.    Left kidney: 12.6 cm in length. It is normal in contour and echotexture..   No renal mass, hydronephrosis or calculi.    Urinary bladder: The urinary bladder cannot be evaluated as it is   decompressed around a Roman catheter.    Incidental imaging of the liver demonstrates hepatic steatosis.    IMPRESSION:  Normal renal ultrasound.    Hepatic steatosis    Please read above.    --- End of Report ---          < end of copied text >  ---------------------------------------------------------------------------------------------------------------------------------------------------    ACC: [id number] EXAM:  XR CHEST PORTABLE URGENT 1V   ORDERED BY: KATRINA BROWN     PROCEDURE DATE:  03/22/2024          INTERPRETATION:  Exam:XR CHEST URGENT    clinical history:s/p central line    Tip of the right-sided central venous catheter overlies SVC/right atrial   junction. No pneumothorax. Endotracheal tube low in position at the   royal.    IMPRESSION: Line placement as above. Endotracheal tube is low in position    --- End of Report ---            MOOSE REN MD; Attending Radiologist  This document has been electronically signed. Mar 23 2024 11:27AM    < end of copied text >  -------------------------------------------------------------------------------------------------------------------------------------------------------------  ACC: [id number] EXAM:  CT ABDOMEN AND PELVIS   ORDERED BY: FLOYD MADALYN     PROCEDURE DATE:  03/21/2024          INTERPRETATION:  CLINICAL INFORMATION: Possible pancreatitis.    COMPARISON: 03/26/2018.    CONTRAST/COMPLICATIONS:  IV Contrast: NONE  Oral Contrast: NONE  Complications: None reported at time of study completion    PROCEDURE:  CT of the Abdomen and Pelvis was performed.  Sagittal and coronal reformats were performed.    FINDINGS:  LOWER CHEST: Multifocal bilateral peripheral opacities suggesting   atypical pneumonia. Probable compression atelectasis right lung base,   related to elevated right hemidiaphragm.    LIVER: Liver is enlarged measuring approximately 29 cm. There is severe   hepatic steatosis. There are hypodense fociwith central hyperdensity   involving the pericholecystic region and segment 6, indeterminate.   Consider further evaluation with MR imaging.  BILE DUCTS: Normal caliber.  GALLBLADDER: Hyperdense content, possibly sludge or vicarious excretion   of contrast.  SPLEEN: Within normal limits.  PANCREAS: Within normal limits.  ADRENALS: Within normal limits.  KIDNEYS/URETERS: Within normal limits.    BLADDER: Under distended, unremarkable.  REPRODUCTIVE ORGANS: No significant enlargement of the prostate.    BOWEL: No bowel obstruction. Appendix is not visualized. No evidence of   inflammation in the pericecal region. Nonspecific mild distention of the   transverse colon.  PERITONEUM: No ascites.  VESSELS: Within normal limits.  RETROPERITONEUM/LYMPH NODES: No lymphadenopathy.  ABDOMINAL WALL: Within normal limits.  BONES: Within normal limits.    IMPRESSION:  Significantly enlarged liver demonstrating severe steatosis. Additional   heterogeneous foci in the pericholecystic region and segment 6are   indeterminate. Consider correlation with MR imaging.    Additional findings as above.        --- End of Report ---            MOJGAN VALDEZ MD; Attending Radiologist  This document has been electronically signed. Mar 21 2024  4:52PM    < end of copied text >  ----------------------------------------------------------------------------------------------------------------------  ACC: [id number] EXAM:  US GALLBLADDER   ORDERED BY: FLOYD BERGMAN     PROCEDURE DATE:  03/21/2024          INTERPRETATION:  Right upper quadrant pain.    Right upper quadrant ultrasound.    Gallbladder sludge noted. No gallstones wall thickening or  pericholecystic fluid. Negative Harrington's sign. No biliary dilatation.   Common duct 6 mm.  Liver is enlarged (26.3 cm) with increased echotexture consistent with   steatosis. Pancreas not adequately visualized.  Right kidney unremarkable.  No ascites.    IMPRESSION:  Gallbladder sludge without secondary signs of acute cholecystitis or   biliary dilatation.    Enlarged fatty liver    --- End of Report ---            KATHARINE BURCIAGA MD; Attending Radiologist  This document has been electronicallysigned. Mar 21 2024  3:59PM    < end of copied text >        ROS  [ x ] UNABLE TO ELICIT

## 2024-03-24 LAB
ALBUMIN SERPL ELPH-MCNC: 2 G/DL — LOW (ref 3.5–5)
ALP SERPL-CCNC: 249 U/L — HIGH (ref 40–120)
ALT FLD-CCNC: 35 U/L DA — SIGNIFICANT CHANGE UP (ref 10–60)
ANION GAP SERPL CALC-SCNC: 8 MMOL/L — SIGNIFICANT CHANGE UP (ref 5–17)
APTT BLD: 38.6 SEC — HIGH (ref 24.5–35.6)
AST SERPL-CCNC: 123 U/L — HIGH (ref 10–40)
BASOPHILS # BLD AUTO: SIGNIFICANT CHANGE UP (ref 0–0.2)
BASOPHILS NFR BLD AUTO: SIGNIFICANT CHANGE UP (ref 0–2)
BILIRUB SERPL-MCNC: 7.5 MG/DL — HIGH (ref 0.2–1.2)
BUN SERPL-MCNC: 19 MG/DL — HIGH (ref 7–18)
CALCIUM SERPL-MCNC: 7.5 MG/DL — LOW (ref 8.4–10.5)
CHLORIDE SERPL-SCNC: 107 MMOL/L — SIGNIFICANT CHANGE UP (ref 96–108)
CO2 SERPL-SCNC: 25 MMOL/L — SIGNIFICANT CHANGE UP (ref 22–31)
CREAT SERPL-MCNC: 0.77 MG/DL — SIGNIFICANT CHANGE UP (ref 0.5–1.3)
EGFR: 119 ML/MIN/1.73M2 — SIGNIFICANT CHANGE UP
EOSINOPHIL # BLD AUTO: SIGNIFICANT CHANGE UP (ref 0–0.5)
EOSINOPHIL NFR BLD AUTO: SIGNIFICANT CHANGE UP (ref 0–6)
GLUCOSE BLDC GLUCOMTR-MCNC: 111 MG/DL — HIGH (ref 70–99)
GLUCOSE BLDC GLUCOMTR-MCNC: 133 MG/DL — HIGH (ref 70–99)
GLUCOSE BLDC GLUCOMTR-MCNC: 135 MG/DL — HIGH (ref 70–99)
GLUCOSE BLDC GLUCOMTR-MCNC: 158 MG/DL — HIGH (ref 70–99)
GLUCOSE BLDC GLUCOMTR-MCNC: 169 MG/DL — HIGH (ref 70–99)
GLUCOSE SERPL-MCNC: 173 MG/DL — HIGH (ref 70–99)
HCT VFR BLD CALC: 31.9 % — LOW (ref 39–50)
HGB BLD-MCNC: 11 G/DL — LOW (ref 13–17)
IMM GRANULOCYTES NFR BLD AUTO: SIGNIFICANT CHANGE UP (ref 0–0.9)
INR BLD: 1.47 RATIO — HIGH (ref 0.85–1.18)
LYMPHOCYTES # BLD AUTO: SIGNIFICANT CHANGE UP (ref 13–44)
LYMPHOCYTES # BLD AUTO: SIGNIFICANT CHANGE UP (ref 1–3.3)
MAGNESIUM SERPL-MCNC: 1.8 MG/DL — SIGNIFICANT CHANGE UP (ref 1.6–2.6)
MCHC RBC-ENTMCNC: 34.5 GM/DL — SIGNIFICANT CHANGE UP (ref 32–36)
MCHC RBC-ENTMCNC: 35 PG — HIGH (ref 27–34)
MCV RBC AUTO: 101.6 FL — HIGH (ref 80–100)
MONOCYTES # BLD AUTO: SIGNIFICANT CHANGE UP (ref 0–0.9)
MONOCYTES NFR BLD AUTO: SIGNIFICANT CHANGE UP (ref 2–14)
NEUTROPHILS # BLD AUTO: SIGNIFICANT CHANGE UP (ref 1.8–7.4)
NEUTROPHILS NFR BLD AUTO: SIGNIFICANT CHANGE UP (ref 43–77)
NRBC # BLD: SIGNIFICANT CHANGE UP /100 WBCS (ref 0–0)
PHOSPHATE SERPL-MCNC: 1.9 MG/DL — LOW (ref 2.5–4.5)
PLATELET # BLD AUTO: 152 K/UL — SIGNIFICANT CHANGE UP (ref 150–400)
POTASSIUM SERPL-MCNC: 3.8 MMOL/L — SIGNIFICANT CHANGE UP (ref 3.5–5.3)
POTASSIUM SERPL-SCNC: 3.8 MMOL/L — SIGNIFICANT CHANGE UP (ref 3.5–5.3)
PROT SERPL-MCNC: 6 G/DL — SIGNIFICANT CHANGE UP (ref 6–8.3)
PROTHROM AB SERPL-ACNC: 16.6 SEC — HIGH (ref 9.5–13)
RBC # BLD: 3.14 M/UL — LOW (ref 4.2–5.8)
RBC # FLD: 17.3 % — HIGH (ref 10.3–14.5)
SODIUM SERPL-SCNC: 140 MMOL/L — SIGNIFICANT CHANGE UP (ref 135–145)
TSH SERPL-MCNC: 0.92 UU/ML — SIGNIFICANT CHANGE UP (ref 0.34–4.82)
WBC # BLD: 23.86 K/UL — HIGH (ref 3.8–10.5)
WBC # FLD AUTO: 23.86 K/UL — HIGH (ref 3.8–10.5)

## 2024-03-24 PROCEDURE — 74018 RADEX ABDOMEN 1 VIEW: CPT | Mod: 26

## 2024-03-24 PROCEDURE — 99291 CRITICAL CARE FIRST HOUR: CPT

## 2024-03-24 PROCEDURE — 99232 SBSQ HOSP IP/OBS MODERATE 35: CPT

## 2024-03-24 RX ORDER — KETOROLAC TROMETHAMINE 30 MG/ML
15 SYRINGE (ML) INJECTION ONCE
Refills: 0 | Status: DISCONTINUED | OUTPATIENT
Start: 2024-03-24 | End: 2024-03-24

## 2024-03-24 RX ORDER — FENTANYL CITRATE 50 UG/ML
50 INJECTION INTRAVENOUS EVERY 6 HOURS
Refills: 0 | Status: DISCONTINUED | OUTPATIENT
Start: 2024-03-24 | End: 2024-03-26

## 2024-03-24 RX ORDER — DEXMEDETOMIDINE HYDROCHLORIDE IN 0.9% SODIUM CHLORIDE 4 UG/ML
0.3 INJECTION INTRAVENOUS
Qty: 400 | Refills: 0 | Status: DISCONTINUED | OUTPATIENT
Start: 2024-03-24 | End: 2024-03-25

## 2024-03-24 RX ORDER — LACTULOSE 10 G/15ML
10 SOLUTION ORAL EVERY 8 HOURS
Refills: 0 | Status: DISCONTINUED | OUTPATIENT
Start: 2024-03-24 | End: 2024-03-28

## 2024-03-24 RX ORDER — LACTULOSE 10 G/15ML
200 SOLUTION ORAL ONCE
Refills: 0 | Status: DISCONTINUED | OUTPATIENT
Start: 2024-03-24 | End: 2024-03-24

## 2024-03-24 RX ORDER — POTASSIUM PHOSPHATE, MONOBASIC POTASSIUM PHOSPHATE, DIBASIC 236; 224 MG/ML; MG/ML
30 INJECTION, SOLUTION INTRAVENOUS ONCE
Refills: 0 | Status: COMPLETED | OUTPATIENT
Start: 2024-03-24 | End: 2024-03-24

## 2024-03-24 RX ADMIN — Medication 105 MILLIGRAM(S): at 13:21

## 2024-03-24 RX ADMIN — Medication 15 MILLIGRAM(S): at 09:27

## 2024-03-24 RX ADMIN — Medication 1: at 00:14

## 2024-03-24 RX ADMIN — PROPOFOL 7.74 MICROGRAM(S)/KG/MIN: 10 INJECTION, EMULSION INTRAVENOUS at 02:17

## 2024-03-24 RX ADMIN — FENTANYL CITRATE 50 MICROGRAM(S): 50 INJECTION INTRAVENOUS at 14:52

## 2024-03-24 RX ADMIN — Medication 105 MILLIGRAM(S): at 05:15

## 2024-03-24 RX ADMIN — Medication 4.03 MICROGRAM(S)/KG/MIN: at 05:15

## 2024-03-24 RX ADMIN — FENTANYL CITRATE 50 MICROGRAM(S): 50 INJECTION INTRAVENOUS at 13:52

## 2024-03-24 RX ADMIN — PANTOPRAZOLE SODIUM 40 MILLIGRAM(S): 20 TABLET, DELAYED RELEASE ORAL at 12:46

## 2024-03-24 RX ADMIN — Medication 15 MILLIGRAM(S): at 22:49

## 2024-03-24 RX ADMIN — PROPOFOL 7.74 MICROGRAM(S)/KG/MIN: 10 INJECTION, EMULSION INTRAVENOUS at 17:47

## 2024-03-24 RX ADMIN — Medication 1: at 06:06

## 2024-03-24 RX ADMIN — Medication 1 MILLIGRAM(S): at 12:45

## 2024-03-24 RX ADMIN — PIPERACILLIN AND TAZOBACTAM 25 GRAM(S): 4; .5 INJECTION, POWDER, LYOPHILIZED, FOR SOLUTION INTRAVENOUS at 21:03

## 2024-03-24 RX ADMIN — CHLORHEXIDINE GLUCONATE 1 APPLICATION(S): 213 SOLUTION TOPICAL at 05:16

## 2024-03-24 RX ADMIN — LACTULOSE 10 GRAM(S): 10 SOLUTION ORAL at 21:03

## 2024-03-24 RX ADMIN — PROPOFOL 7.74 MICROGRAM(S)/KG/MIN: 10 INJECTION, EMULSION INTRAVENOUS at 22:49

## 2024-03-24 RX ADMIN — DEXMEDETOMIDINE HYDROCHLORIDE IN 0.9% SODIUM CHLORIDE 6.45 MICROGRAM(S)/KG/HR: 4 INJECTION INTRAVENOUS at 17:51

## 2024-03-24 RX ADMIN — CHLORHEXIDINE GLUCONATE 15 MILLILITER(S): 213 SOLUTION TOPICAL at 05:15

## 2024-03-24 RX ADMIN — Medication 15 MILLIGRAM(S): at 08:37

## 2024-03-24 RX ADMIN — PROPOFOL 7.74 MICROGRAM(S)/KG/MIN: 10 INJECTION, EMULSION INTRAVENOUS at 05:14

## 2024-03-24 RX ADMIN — PROPOFOL 7.74 MICROGRAM(S)/KG/MIN: 10 INJECTION, EMULSION INTRAVENOUS at 09:29

## 2024-03-24 RX ADMIN — HEPARIN SODIUM 5000 UNIT(S): 5000 INJECTION INTRAVENOUS; SUBCUTANEOUS at 05:15

## 2024-03-24 RX ADMIN — POTASSIUM PHOSPHATE, MONOBASIC POTASSIUM PHOSPHATE, DIBASIC 83.33 MILLIMOLE(S): 236; 224 INJECTION, SOLUTION INTRAVENOUS at 05:16

## 2024-03-24 RX ADMIN — PROPOFOL 7.74 MICROGRAM(S)/KG/MIN: 10 INJECTION, EMULSION INTRAVENOUS at 12:47

## 2024-03-24 RX ADMIN — PIPERACILLIN AND TAZOBACTAM 25 GRAM(S): 4; .5 INJECTION, POWDER, LYOPHILIZED, FOR SOLUTION INTRAVENOUS at 13:21

## 2024-03-24 RX ADMIN — PIPERACILLIN AND TAZOBACTAM 25 GRAM(S): 4; .5 INJECTION, POWDER, LYOPHILIZED, FOR SOLUTION INTRAVENOUS at 05:15

## 2024-03-24 RX ADMIN — Medication 15 MILLIGRAM(S): at 23:23

## 2024-03-24 RX ADMIN — HEPARIN SODIUM 5000 UNIT(S): 5000 INJECTION INTRAVENOUS; SUBCUTANEOUS at 17:52

## 2024-03-24 RX ADMIN — CHLORHEXIDINE GLUCONATE 15 MILLILITER(S): 213 SOLUTION TOPICAL at 17:51

## 2024-03-24 RX ADMIN — PROPOFOL 7.74 MICROGRAM(S)/KG/MIN: 10 INJECTION, EMULSION INTRAVENOUS at 08:36

## 2024-03-24 RX ADMIN — LACTULOSE 10 GRAM(S): 10 SOLUTION ORAL at 13:36

## 2024-03-24 RX ADMIN — DEXMEDETOMIDINE HYDROCHLORIDE IN 0.9% SODIUM CHLORIDE 6.45 MICROGRAM(S)/KG/HR: 4 INJECTION INTRAVENOUS at 13:52

## 2024-03-24 NOTE — PROGRESS NOTE ADULT - SUBJECTIVE AND OBJECTIVE BOX
INTERVAL HPI/OVERNIGHT EVENTS: No acute events overnight.     PRESSORS: [ ] YES [X ] NO  WHICH:    ANTIBIOTICS:                  DATE STARTED:    Antimicrobial:  piperacillin/tazobactam IVPB.. 3.375 Gram(s) IV Intermittent every 8 hours    Cardiovascular:  norepinephrine Infusion 0.05 MICROgram(s)/kG/Min IV Continuous <Continuous>    Pulmonary:    Hematalogic:  heparin   Injectable 5000 Unit(s) SubCutaneous every 12 hours    Other:  chlorhexidine 0.12% Liquid 15 milliLiter(s) Oral Mucosa every 12 hours  chlorhexidine 2% Cloths 1 Application(s) Topical <User Schedule>  fentaNYL    Injectable 50 MICROGram(s) IV Push every 6 hours PRN  folic acid Injectable 1 milliGRAM(s) IV Push daily  influenza   Vaccine 0.5 milliLiter(s) IntraMuscular once  insulin lispro (ADMELOG) corrective regimen sliding scale   SubCutaneous every 6 hours  pantoprazole  Injectable 40 milliGRAM(s) IV Push daily  PHENobarbital Injectable 130 milliGRAM(s) IV Push every 15 minutes PRN  propofol Infusion 15 MICROgram(s)/kG/Min IV Continuous <Continuous>  sodium chloride 0.9% lock flush 10 milliLiter(s) IV Push every 1 hour PRN  thiamine IVPB 500 milliGRAM(s) IV Intermittent every 8 hours    chlorhexidine 0.12% Liquid 15 milliLiter(s) Oral Mucosa every 12 hours  chlorhexidine 2% Cloths 1 Application(s) Topical <User Schedule>  fentaNYL    Injectable 50 MICROGram(s) IV Push every 6 hours PRN  folic acid Injectable 1 milliGRAM(s) IV Push daily  heparin   Injectable 5000 Unit(s) SubCutaneous every 12 hours  influenza   Vaccine 0.5 milliLiter(s) IntraMuscular once  insulin lispro (ADMELOG) corrective regimen sliding scale   SubCutaneous every 6 hours  norepinephrine Infusion 0.05 MICROgram(s)/kG/Min IV Continuous <Continuous>  pantoprazole  Injectable 40 milliGRAM(s) IV Push daily  PHENobarbital Injectable 130 milliGRAM(s) IV Push every 15 minutes PRN  piperacillin/tazobactam IVPB.. 3.375 Gram(s) IV Intermittent every 8 hours  propofol Infusion 15 MICROgram(s)/kG/Min IV Continuous <Continuous>  sodium chloride 0.9% lock flush 10 milliLiter(s) IV Push every 1 hour PRN  thiamine IVPB 500 milliGRAM(s) IV Intermittent every 8 hours    Drug Dosing Weight  Height (cm): 167.6 (21 Mar 2024 12:45)  Weight (kg): 86 (21 Mar 2024 21:30)  BMI (kg/m2): 30.6 (21 Mar 2024 21:30)  BSA (m2): 1.95 (21 Mar 2024 21:30)    CENTRAL LINE: [ ] YES [ X] NO  LOCATION:         BLACK: [X ] YES [ ] NO          A-LINE:  [X ] YES [ ] NO  LOCATION:         ICU Vital Signs Last 24 Hrs  T(C): 37.8 (24 Mar 2024 00:15), Max: 38.4 (23 Mar 2024 09:00)  T(F): 100 (24 Mar 2024 00:15), Max: 101.1 (23 Mar 2024 09:00)  HR: 103 (24 Mar 2024 00:15) (78 - 115)  BP: 108/67 (24 Mar 2024 00:00) (88/64 - 108/67)  BP(mean): 80 (24 Mar 2024 00:00) (73 - 80)  ABP: 99/67 (24 Mar 2024 00:15) (86/51 - 163/107)  ABP(mean): 78 (24 Mar 2024 00:15) (2 - 100)  RR: 12 (24 Mar 2024 00:15) (11 - 19)  SpO2: 100% (24 Mar 2024 00:15) (97% - 100%)    O2 Parameters below as of 23 Mar 2024 06:00  Patient On (Oxygen Delivery Method): ventilator            ABG - ( 23 Mar 2024 03:27 )  pH, Arterial: 7.35  pH, Blood: x     /  pCO2: 42    /  pO2: 128   / HCO3: 23    / Base Excess: -2.4  /  SaO2: 100                   03-22 @ 07:01  -  03-23 @ 07:00  --------------------------------------------------------  IN: 3686 mL / OUT: 1100 mL / NET: 2586 mL        Mode: AC/ CMV (Assist Control/ Continuous Mandatory Ventilation)  RR (machine): 12  TV (machine): 420  FiO2: 30  PEEP: 5  ITime: 0.9  MAP: 8  PIP: 17        PHYSICAL EXAM:  GENERAL: NAD,   HEAD:  Atraumatic, Normocephalic, lying in bed comfortably   EYES: , PERRLA, Icterus  ENMT: Moist mucous membranes, No lesions  NECK: Supple, normal appearance,   NERVOUS SYSTEM:  Alert & Oriented X0,  CHEST/LUNG: No chest deformity; No rales, rhonchi, wheezing   HEART: Regular rate and rhythm; No murmurs,  ABDOMEN: + Distended abdomen, Soft, Nontender Bowel sounds present  EXTREMITIES:  2+ Peripheral Pulses, No clubbing, cyanosis, or edema  SKIN: No rashes or lesions;          LABS:  CBC Full  -  ( 23 Mar 2024 08:25 )  WBC Count : 22.65 K/uL  RBC Count : 3.06 M/uL  Hemoglobin : 10.4 g/dL  Hematocrit : 30.9 %  Platelet Count - Automated : 152 K/uL  Mean Cell Volume : 101.0 fl  Mean Cell Hemoglobin : 34.0 pg  Mean Cell Hemoglobin Concentration : 33.7 gm/dL  Auto Neutrophil # : 15.86 K/uL  Auto Lymphocyte # : 5.66 K/uL  Auto Monocyte # : 1.13 K/uL  Auto Eosinophil # : 0.00 K/uL  Auto Basophil # : 0.00 K/uL  Auto Neutrophil % : 70.0 %  Auto Lymphocyte % : 25.0 %  Auto Monocyte % : 5.0 %  Auto Eosinophil % : 0.0 %  Auto Basophil % : 0.0 %    03-23    142  |  109<H>  |  22<H>  ----------------------------<  184<H>  3.2<L>   |  25  |  0.90    Ca    7.7<L>      23 Mar 2024 03:15  Phos  2.5     03-23  Mg     1.9     03-23    TPro  6.0  /  Alb  2.2<L>  /  TBili  5.5<H>  /  DBili  x   /  AST  115<H>  /  ALT  34  /  AlkPhos  254<H>  03-23    PT/INR - ( 23 Mar 2024 03:15 )   PT: 16.9 sec;   INR: 1.50 ratio         PTT - ( 23 Mar 2024 03:15 )  PTT:36.4 sec  Urinalysis Basic - ( 23 Mar 2024 03:15 )    Color: x / Appearance: x / SG: x / pH: x  Gluc: 184 mg/dL / Ketone: x  / Bili: x / Urobili: x   Blood: x / Protein: x / Nitrite: x   Leuk Esterase: x / RBC: x / WBC x   Sq Epi: x / Non Sq Epi: x / Bacteria: x      Culture Results:   No growth at 48 Hours (03-21 @ 14:22)  Culture Results:   No growth at 48 Hours (03-21 @ 14:12)      RADIOLOGY & ADDITIONAL STUDIES REVIEWED:  ***    [ ]GOALS OF CARE DISCUSSION WITH PATIENT/FAMILY/PROXY:    CRITICAL CARE TIME SPENT: 35 minutes INTERVAL HPI/OVERNIGHT EVENTS: Patient spike a fever ON, T max 38.3C. Patient given IV toradol 15mg.       PRESSORS: [x] YES [] NO  WHICH: Levophed 0.1mcg     ANTIBIOTICS:                  DATE STARTED:    Antimicrobial:  piperacillin/tazobactam IVPB.. 3.375 Gram(s) IV Intermittent every 8 hours    Cardiovascular:  norepinephrine Infusion 0.05 MICROgram(s)/kG/Min IV Continuous <Continuous>    Pulmonary:    Hematalogic:  heparin   Injectable 5000 Unit(s) SubCutaneous every 12 hours    Other:  chlorhexidine 0.12% Liquid 15 milliLiter(s) Oral Mucosa every 12 hours  chlorhexidine 2% Cloths 1 Application(s) Topical <User Schedule>  fentaNYL    Injectable 50 MICROGram(s) IV Push every 6 hours PRN  folic acid Injectable 1 milliGRAM(s) IV Push daily  influenza   Vaccine 0.5 milliLiter(s) IntraMuscular once  insulin lispro (ADMELOG) corrective regimen sliding scale   SubCutaneous every 6 hours  pantoprazole  Injectable 40 milliGRAM(s) IV Push daily  PHENobarbital Injectable 130 milliGRAM(s) IV Push every 15 minutes PRN  propofol Infusion 15 MICROgram(s)/kG/Min IV Continuous <Continuous>  sodium chloride 0.9% lock flush 10 milliLiter(s) IV Push every 1 hour PRN  thiamine IVPB 500 milliGRAM(s) IV Intermittent every 8 hours    chlorhexidine 0.12% Liquid 15 milliLiter(s) Oral Mucosa every 12 hours  chlorhexidine 2% Cloths 1 Application(s) Topical <User Schedule>  fentaNYL    Injectable 50 MICROGram(s) IV Push every 6 hours PRN  folic acid Injectable 1 milliGRAM(s) IV Push daily  heparin   Injectable 5000 Unit(s) SubCutaneous every 12 hours  influenza   Vaccine 0.5 milliLiter(s) IntraMuscular once  insulin lispro (ADMELOG) corrective regimen sliding scale   SubCutaneous every 6 hours  norepinephrine Infusion 0.05 MICROgram(s)/kG/Min IV Continuous <Continuous>  pantoprazole  Injectable 40 milliGRAM(s) IV Push daily  PHENobarbital Injectable 130 milliGRAM(s) IV Push every 15 minutes PRN  piperacillin/tazobactam IVPB.. 3.375 Gram(s) IV Intermittent every 8 hours  propofol Infusion 15 MICROgram(s)/kG/Min IV Continuous <Continuous>  sodium chloride 0.9% lock flush 10 milliLiter(s) IV Push every 1 hour PRN  thiamine IVPB 500 milliGRAM(s) IV Intermittent every 8 hours    Drug Dosing Weight  Height (cm): 167.6 (21 Mar 2024 12:45)  Weight (kg): 86 (21 Mar 2024 21:30)  BMI (kg/m2): 30.6 (21 Mar 2024 21:30)  BSA (m2): 1.95 (21 Mar 2024 21:30)    CENTRAL LINE: [ ] YES [ X] NO  LOCATION:         BLACK: [X ] YES [ ] NO          A-LINE:  [X ] YES [ ] NO  LOCATION:         ICU Vital Signs Last 24 Hrs  T(C): 37.8 (24 Mar 2024 00:15), Max: 38.4 (23 Mar 2024 09:00)  T(F): 100 (24 Mar 2024 00:15), Max: 101.1 (23 Mar 2024 09:00)  HR: 103 (24 Mar 2024 00:15) (78 - 115)  BP: 108/67 (24 Mar 2024 00:00) (88/64 - 108/67)  BP(mean): 80 (24 Mar 2024 00:00) (73 - 80)  ABP: 99/67 (24 Mar 2024 00:15) (86/51 - 163/107)  ABP(mean): 78 (24 Mar 2024 00:15) (2 - 100)  RR: 12 (24 Mar 2024 00:15) (11 - 19)  SpO2: 100% (24 Mar 2024 00:15) (97% - 100%)    O2 Parameters below as of 23 Mar 2024 06:00  Patient On (Oxygen Delivery Method): ventilator            ABG - ( 23 Mar 2024 03:27 )  pH, Arterial: 7.35  pH, Blood: x     /  pCO2: 42    /  pO2: 128   / HCO3: 23    / Base Excess: -2.4  /  SaO2: 100                   03-22 @ 07:01  -  03-23 @ 07:00  --------------------------------------------------------  IN: 3686 mL / OUT: 1100 mL / NET: 2586 mL        Mode: AC/ CMV (Assist Control/ Continuous Mandatory Ventilation)  RR (machine): 12  TV (machine): 420  FiO2: 30  PEEP: 5  ITime: 0.9  MAP: 8  PIP: 17        PHYSICAL EXAM:  GENERAL: Intubated and sedated.   HEAD:  Atraumatic, Normocephalic, lying in bed comfortably   EYES: , PERRLA, Icterus  ENMT: Moist mucous membranes, No lesions  NECK: Supple, normal appearance,   NERVOUS SYSTEM:  Alert & Oriented X0,  CHEST/LUNG: No chest deformity; No rales, rhonchi, wheezing   HEART: Regular rate and rhythm. Tachycardia; No murmurs,  ABDOMEN: + Distended abdomen, Soft, Nontender, Hypoactive bowel sounds   EXTREMITIES:  2+ Peripheral Pulses, No clubbing, cyanosis, or edema  SKIN: No rashes or lesions;          LABS:  CBC Full  -  ( 23 Mar 2024 08:25 )  WBC Count : 22.65 K/uL  RBC Count : 3.06 M/uL  Hemoglobin : 10.4 g/dL  Hematocrit : 30.9 %  Platelet Count - Automated : 152 K/uL  Mean Cell Volume : 101.0 fl  Mean Cell Hemoglobin : 34.0 pg  Mean Cell Hemoglobin Concentration : 33.7 gm/dL  Auto Neutrophil # : 15.86 K/uL  Auto Lymphocyte # : 5.66 K/uL  Auto Monocyte # : 1.13 K/uL  Auto Eosinophil # : 0.00 K/uL  Auto Basophil # : 0.00 K/uL  Auto Neutrophil % : 70.0 %  Auto Lymphocyte % : 25.0 %  Auto Monocyte % : 5.0 %  Auto Eosinophil % : 0.0 %  Auto Basophil % : 0.0 %    03-23    142  |  109<H>  |  22<H>  ----------------------------<  184<H>  3.2<L>   |  25  |  0.90    Ca    7.7<L>      23 Mar 2024 03:15  Phos  2.5     03-23  Mg     1.9     03-23    TPro  6.0  /  Alb  2.2<L>  /  TBili  5.5<H>  /  DBili  x   /  AST  115<H>  /  ALT  34  /  AlkPhos  254<H>  03-23    PT/INR - ( 23 Mar 2024 03:15 )   PT: 16.9 sec;   INR: 1.50 ratio         PTT - ( 23 Mar 2024 03:15 )  PTT:36.4 sec  Urinalysis Basic - ( 23 Mar 2024 03:15 )    Color: x / Appearance: x / SG: x / pH: x  Gluc: 184 mg/dL / Ketone: x  / Bili: x / Urobili: x   Blood: x / Protein: x / Nitrite: x   Leuk Esterase: x / RBC: x / WBC x   Sq Epi: x / Non Sq Epi: x / Bacteria: x      Culture Results:   No growth at 48 Hours (03-21 @ 14:22)  Culture Results:   No growth at 48 Hours (03-21 @ 14:12)      RADIOLOGY & ADDITIONAL STUDIES REVIEWED:  ***    [ ]GOALS OF CARE DISCUSSION WITH PATIENT/FAMILY/PROXY:    CRITICAL CARE TIME SPENT: 35 minutes

## 2024-03-24 NOTE — CHART NOTE - NSCHARTNOTEFT_GEN_A_CORE
Updated the patient's mother on patient's current status, lab findings and medical plan at bedside with  Hair ([id number]). All medical questions were answered in full.

## 2024-03-24 NOTE — PROGRESS NOTE ADULT - SUBJECTIVE AND OBJECTIVE BOX
S: Intubated. Sedated. On levophed.     O:  Vital Signs Last 24 Hrs  T(C): 38.2 (24 Mar 2024 10:00), Max: 38.4 (24 Mar 2024 05:30)  T(F): 100.8 (24 Mar 2024 10:00), Max: 101.1 (24 Mar 2024 05:30)  HR: 103 (24 Mar 2024 10:00) (78 - 115)  BP: 102/69 (24 Mar 2024 08:00) (88/64 - 108/67)  BP(mean): 80 (24 Mar 2024 08:00) (73 - 80)  RR: 13 (24 Mar 2024 10:00) (11 - 19)  SpO2: 100% (24 Mar 2024 10:00) (97% - 100%)    Parameters below as of 24 Mar 2024 07:00  Patient On (Oxygen Delivery Method): ventilator    Exam:  Gen: intubated, sedated  Abdomen: distended and tympanic upper abdomen, unable to illicit tenderness    Complete Blood Count + Automated Diff in AM (03.24.24 @ 03:24)    WBC Count: 23.86 K/uL   RBC Count: 3.14 M/uL   Hemoglobin: 11.0 g/dL   Hematocrit: 31.9 %   Mean Cell Volume: 101.6 fl   Mean Cell Hemoglobin: 35.0 pg   Mean Cell Hemoglobin Conc: 34.5 gm/dL   Red Cell Distrib Width: 17.3 %   Platelet Count - Automated: 152 K/uL   Auto Neutrophil #: See note   Auto Lymphocyte #: See note   Auto Monocyte #: See note   Auto Eosinophil #: See note   Auto Basophil #: See note   Auto Neutrophil %: See note: Manual diff was performed w-in 72 hrs.  Differential percentages must be correlated with absolute numbers for  clinical significance.   Auto Lymphocyte %: See note   Auto Monocyte %: See note   Auto Eosinophil %: See note   Auto Basophil %: See note   Auto Immature Granulocyte %: See note: (Includes meta, myelo and promyelocytes). Mild elevations in immature  granulocytes may be seen with many inflammatory processes and pregnancy;  clinical correlation suggested.   Nucleated RBC: #SN /100 WBCs

## 2024-03-24 NOTE — PROGRESS NOTE ADULT - NUTRITIONAL ASSESSMENT
This patient has been assessed with a concern for Malnutrition and has been determined to have a diagnosis/diagnoses of Mild protein-calorie malnutrition.    This patient is being managed with:   Diet NPO-  Entered: Mar 22 2024  8:39AM

## 2024-03-24 NOTE — PROGRESS NOTE ADULT - ASSESSMENT
38M, PMHx of alcohol withdrawal seizure with alcohol use disorder, who p/w confusion starting this morning. Admitted for high risk alcohol withdrawal with persistent sinus tachycardia.    Assessment:   # Alcohol use disorder  # Alcohol withdrawal  # Acute metabolic encephalopathy  # Atypical pneumonia  # Hx of alcohol seizures  # Possible pancreatitis  # Liver steatosis  # Alcoholic hepatitis  # Sinus tachycardia  # SIRS  # ARABELLA      Plan:   =============NEURO:=============  # Acute metabolic encephalopathy  # Alcohol withdrawal  s/p Ativan however will pursue intubation for protecting the airways     # Alcohol use disorder  # Hx of alcohol seizures  - Pt has been in and out of alcohol rehab  - Most recently came out 6 months ago - as per sister pt was only hospitalized and not in rehab   - SW consult order placed     #intubated (3/22)    =============CARDIO:================  # Sinus tachycardia  - P/w sinus tachycardia  - Likely due to alcohol withdrawal  - EKG = sinus tachycardia  - trop neg       ==========PULM:=============  # Intubated (3/22)    #Atypical pneumonia   # coronavirus positive however not COVID  CT chest : Multifocal bilateral peripheral opacities suggesting atypical pneumonia. Probable compression atelectasis right lung base, related to elevated right hemidiaphragm.  - f/u CXR       ==========GI:==========  # Alcoholic hepatitis   #transaminitis   P/w T bili 6.6, , ALT 41,   Maddrey score = borderline at 31.4 points (32 point is the cutoff for steroid treatment)  MELD-Na score 17 points 6 % 90day mortality   - NPO for now      # Constipation  #?acute abdomen with increased abdominal pressure of 25   surgery consulted - no indication for surgery at this time , recs paralyzing the patient   - c/w suction     # Possible pancreatitis  Lipase 470, possible mid abdominal pain. Negative imaging.  - c/w NPO    # Liver steatosis    # Liver lesion  Likely due to alcohol use disorder  CT = Has hypodense foci with central hyperdensity involving the pericholecystic region and segment 6, indeterminate.   - Rec outpt MRI, hepatology consult once more stable for outpt follow up       =======RENAL:==========  # ARABELLA  P/w SCr 1.77 (baseline 0.8)  S/p 23L IVF in ED  urinary retention 1.5 L s/p straight cath overnight   repeat SCr on 3/22 improved to 0.85   RESOLVED    # Lactic acidosis  P/w Lactate 10  Due to dehydration, infection-  s/p 3L IVF -> Lactate 1.6  RESOLVED    # Hypokalemia  - K 3.3 s/p Kcl 10 mEq x3 ->3.9   - repeat 3.3 will replete IV     INFECTIOUS:  # SIRS  -p/w Lactate 10, tachycardia, tachypnea>20  CXR neg for consolidation or effusion  Positive coronavirus however not COVID   s/p Cefepime in ED  d/c'd Ceftriaoxone (3/23)  3/23 Started Zosyn   legionella neg   U/A positive   - C/w with Zosyn  - F/u cultures      ==============HEMO:===========  # Leukocytosis slightly trended down to 22k  - As above in sepsis      =======ENDO:=======  A1c 7.9  - FS, SSI  - monitor       =========PPX:========  - Hep SC  - PPI     Dispo: ICU      38M, PMHx of alcohol withdrawal seizure with alcohol use disorder, who p/w confusion starting this morning. Admitted for high risk alcohol withdrawal with persistent sinus tachycardia.    Assessment:   # Alcohol use disorder  # Alcohol withdrawal  # Acute metabolic encephalopathy  # Atypical pneumonia  # Hx of alcohol seizures  # Possible pancreatitis  # Liver steatosis  # Alcoholic hepatitis  # Sinus tachycardia  # SIRS  # ARABELLA      Plan:   =============NEURO:=============  # Acute metabolic encephalopathy  # Alcohol withdrawal  s/p Ativan however will pursue intubation for protecting the airways  s/p phenobarb IVP x1 on 3/23     # Alcohol use disorder  # Hx of alcohol seizures  - Pt has been in and out of alcohol rehab  - Most recently came out 6 months ago - as per sister pt was only hospitalized and not in rehab   - SW consult order placed     #intubated (3/22)    =============CARDIO:================  # Sinus tachycardia  - P/w sinus tachycardia  - Likely due to alcohol withdrawal  - EKG = sinus tachycardia  - trop neg       ==========PULM:=============  # Intubated (3/22)    #Atypical pneumonia   # coronavirus positive however not COVID  CT chest : Multifocal bilateral peripheral opacities suggesting atypical pneumonia. Probable compression atelectasis right lung base, related to elevated right hemidiaphragm.  - c/w zosyn       ==========GI:==========  # Alcoholic hepatitis   #transaminitis  #Hyperbilirubinemia   P/w T bili 6.6 (Dir 5.1), , ALT 41,   Maddrey score = borderline at 31.4 points (32 point is the cutoff for steroid treatment)  MELD-Na score 17 points 6 % 90day mortality   - Transaminitis improving   - T. Hector 6.6 > 5.6 > 5.5>7.5   - 3/24 Maddrey score - 28.7   - NPO for now      # Constipation  #?acute abdomen with increased abdominal pressure of 25   surgery consulted - no indication for surgery at this time , recs paralyzing the patient   - c/w salam sump suction   - abdominal pressure improving   - 3/24 Bladder pressure 16   - 3/24 started lactulose 10mg q8h     # Possible pancreatitis  Lipase 470, possible mid abdominal pain. Negative imaging.  - c/w NPO    # Liver steatosis    # Liver lesion  Likely due to alcohol use disorder  CT = Has hypodense foci with central hyperdensity involving the pericholecystic region and segment 6, indeterminate.   - Rec outpt MRI, hepatology consult once more stable for outpt follow up       =======RENAL:==========  # ARABELLA  P/w SCr 1.77 (baseline 0.8)  S/p 23L IVF in ED  urinary retention 1.5 L s/p straight cath overnight   repeat SCr on 3/22 improved to 0.85   Renal US: No hydronephrosis.   RESOLVED    # Lactic acidosis  P/w Lactate 10  Due to dehydration, infection-  s/p 3L IVF -> Lactate 1.6  RESOLVED    # Hypokalemia  - K 3.3 s/p Kcl 10 mEq x3 ->3.9   - repeat 3.3 will replete IV  RESOLVED     INFECTIOUS:  # SIRS  -p/w Lactate 10, tachycardia, tachypnea>20  CXR neg for consolidation or effusion  Positive coronavirus  s/p Cefepime in ED  d/c'd Ceftriaoxone (3/23)  3/23 Started Zosyn   legionella neg   U/A positive. UCx not sent   - BCx NGTD 48h   - C/w with Zosyn        ==============HEMO:===========  # Leukocytosis  - As above in sepsis      =======ENDO:=======  A1c 7.9  - FS, SSI  - monitor       =========PPX:========  - Hep SC  - PPI     Dispo: ICU      38M, PMHx of alcohol withdrawal seizure with alcohol use disorder, who p/w confusion starting this morning. Admitted for high risk alcohol withdrawal with persistent sinus tachycardia.    Assessment:   # Alcohol use disorder  # Alcohol withdrawal  # Acute metabolic encephalopathy  # Atypical pneumonia  # Hx of alcohol seizures  # Possible pancreatitis  # Liver steatosis  # Alcoholic hepatitis  # Sinus tachycardia  # SIRS  # ARABELLA      Plan:   =============NEURO:=============  # Acute metabolic encephalopathy  # Alcohol withdrawal  s/p Ativan however will pursue intubation for protecting the airways  s/p phenobarb IVP x1 on 3/23     # Alcohol use disorder  # Hx of alcohol seizures  - Pt has been in and out of alcohol rehab  - Most recently came out 6 months ago - as per sister pt was only hospitalized and not in rehab   - SW consult order placed     #intubated (3/22)    =============CARDIO:================  # Sinus tachycardia  - P/w sinus tachycardia  - Likely due to alcohol withdrawal  - EKG = sinus tachycardia  - trop neg       ==========PULM:=============  # Intubated (3/22)    #Atypical pneumonia   # coronavirus positive however not COVID  CT chest : Multifocal bilateral peripheral opacities suggesting atypical pneumonia. Probable compression atelectasis right lung base, related to elevated right hemidiaphragm.  - c/w zosyn       ==========GI:==========  # Alcoholic hepatitis   #transaminitis  #Hyperbilirubinemia   P/w T bili 6.6 (Dir 5.1), , ALT 41,   Maddrey score = borderline at 31.4 points (32 point is the cutoff for steroid treatment)  MELD-Na score 17 points 6 % 90day mortality   - Transaminitis improving   - T. Hector 6.6 > 5.6 > 5.5>7.5   - 3/24 Maddrey score - 28.7   - NPO for now      # Constipation  #?acute abdomen with increased abdominal pressure of 25   surgery consulted - no indication for surgery at this time , recs paralyzing the patient   - c/w salam sump suction   - abdominal pressure improving   - 3/24 Bladder pressure 16   - 3/24 started lactulose 10mg q8h     # Possible pancreatitis  Lipase 470, possible mid abdominal pain. Negative imaging.  - c/w NPO    # Liver steatosis    # Liver lesion  Likely due to alcohol use disorder  CT = Has hypodense foci with central hyperdensity involving the pericholecystic region and segment 6, indeterminate.   - Rec outpt MRI, hepatology consult once more stable for outpt follow up       =======RENAL:==========  # ARABELLA  P/w SCr 1.77 (baseline 0.8)  S/p 23L IVF in ED  urinary retention 1.5 L s/p straight cath overnight  - Roman placed on 3/22  3/22 Scr improved to 0.85   Renal US: No hydronephrosis.   RESOLVED    # Lactic acidosis  P/w Lactate 10  Due to dehydration, infection-  s/p 3L IVF -> Lactate 1.6  RESOLVED    # Hypokalemia  - K 3.3 s/p Kcl 10 mEq x3 ->3.9   - repeat 3.3 will replete IV  RESOLVED     INFECTIOUS:  # SIRS  -p/w Lactate 10, tachycardia, tachypnea>20  CXR neg for consolidation or effusion  Positive coronavirus  s/p Cefepime in ED  d/c'd Ceftriaoxone (3/23)  3/23 Started Zosyn   legionella neg   U/A positive. UCx not sent   - BCx NGTD 48h   - C/w with Zosyn        ==============HEMO:===========  # Leukocytosis  - As above in sepsis      =======ENDO:=======  A1c 7.9  - FS, SSI  - monitor       =========PPX:========  - Hep SC  - PPI     Dispo: ICU

## 2024-03-24 NOTE — PROGRESS NOTE ADULT - ASSESSMENT
38M with pmhx ETOH abuse, with abd distention, intra-abd pressure 22 this morning  likely ileus, hypokalemia repleted, now wnl  LFTs continue to uptrend    continue NPO, NGT  GI follow-up for possible decompression  Continue medical management per ICU  No indication for Operative Intervention at this time  Discussed with Dr. Sin

## 2024-03-25 LAB
ALBUMIN SERPL ELPH-MCNC: 1.7 G/DL — LOW (ref 3.5–5)
ALP SERPL-CCNC: 246 U/L — HIGH (ref 40–120)
ALT FLD-CCNC: 28 U/L DA — SIGNIFICANT CHANGE UP (ref 10–60)
ANION GAP SERPL CALC-SCNC: 8 MMOL/L — SIGNIFICANT CHANGE UP (ref 5–17)
ANION GAP SERPL CALC-SCNC: 8 MMOL/L — SIGNIFICANT CHANGE UP (ref 5–17)
APTT BLD: 42.6 SEC — HIGH (ref 24.5–35.6)
AST SERPL-CCNC: 135 U/L — HIGH (ref 10–40)
BASE EXCESS BLDA CALC-SCNC: -14.8 MMOL/L — LOW (ref -2–3)
BASE EXCESS BLDA CALC-SCNC: -3.2 MMOL/L — LOW (ref -2–3)
BASOPHILS # BLD AUTO: 0.09 K/UL — SIGNIFICANT CHANGE UP (ref 0–0.2)
BASOPHILS NFR BLD AUTO: 0.4 % — SIGNIFICANT CHANGE UP (ref 0–2)
BILIRUB SERPL-MCNC: 7.7 MG/DL — HIGH (ref 0.2–1.2)
BLOOD GAS COMMENTS ARTERIAL: SIGNIFICANT CHANGE UP
BUN SERPL-MCNC: 24 MG/DL — HIGH (ref 7–18)
BUN SERPL-MCNC: 24 MG/DL — HIGH (ref 7–18)
CALCIUM SERPL-MCNC: 7.7 MG/DL — LOW (ref 8.4–10.5)
CALCIUM SERPL-MCNC: 8.2 MG/DL — LOW (ref 8.4–10.5)
CHLORIDE SERPL-SCNC: 111 MMOL/L — HIGH (ref 96–108)
CHLORIDE SERPL-SCNC: 113 MMOL/L — HIGH (ref 96–108)
CHLORIDE UR-SCNC: 79 MMOL/L — SIGNIFICANT CHANGE UP
CO2 SERPL-SCNC: 23 MMOL/L — SIGNIFICANT CHANGE UP (ref 22–31)
CO2 SERPL-SCNC: 25 MMOL/L — SIGNIFICANT CHANGE UP (ref 22–31)
CREAT SERPL-MCNC: 0.62 MG/DL — SIGNIFICANT CHANGE UP (ref 0.5–1.3)
CREAT SERPL-MCNC: 0.86 MG/DL — SIGNIFICANT CHANGE UP (ref 0.5–1.3)
EGFR: 115 ML/MIN/1.73M2 — SIGNIFICANT CHANGE UP
EGFR: 127 ML/MIN/1.73M2 — SIGNIFICANT CHANGE UP
EOSINOPHIL # BLD AUTO: 0.76 K/UL — HIGH (ref 0–0.5)
EOSINOPHIL NFR BLD AUTO: 3.5 % — SIGNIFICANT CHANGE UP (ref 0–6)
GAS PNL BLDA: SIGNIFICANT CHANGE UP
GLUCOSE BLDC GLUCOMTR-MCNC: 117 MG/DL — HIGH (ref 70–99)
GLUCOSE BLDC GLUCOMTR-MCNC: 125 MG/DL — HIGH (ref 70–99)
GLUCOSE BLDC GLUCOMTR-MCNC: 130 MG/DL — HIGH (ref 70–99)
GLUCOSE BLDC GLUCOMTR-MCNC: 143 MG/DL — HIGH (ref 70–99)
GLUCOSE SERPL-MCNC: 147 MG/DL — HIGH (ref 70–99)
GLUCOSE SERPL-MCNC: 147 MG/DL — HIGH (ref 70–99)
HCO3 BLDA-SCNC: 10 MMOL/L — CRITICAL LOW (ref 21–28)
HCO3 BLDA-SCNC: 24 MMOL/L — SIGNIFICANT CHANGE UP (ref 21–28)
HCT VFR BLD CALC: 31.6 % — LOW (ref 39–50)
HGB BLD-MCNC: 10.3 G/DL — LOW (ref 13–17)
HOROWITZ INDEX BLDA+IHG-RTO: 30 — SIGNIFICANT CHANGE UP
IMM GRANULOCYTES NFR BLD AUTO: 11.2 % — HIGH (ref 0–0.9)
INR BLD: 1.31 RATIO — HIGH (ref 0.85–1.18)
LYMPHOCYTES # BLD AUTO: 2.04 K/UL — SIGNIFICANT CHANGE UP (ref 1–3.3)
LYMPHOCYTES # BLD AUTO: 9.4 % — LOW (ref 13–44)
MAGNESIUM SERPL-MCNC: 2 MG/DL — SIGNIFICANT CHANGE UP (ref 1.6–2.6)
MCHC RBC-ENTMCNC: 32.6 GM/DL — SIGNIFICANT CHANGE UP (ref 32–36)
MCHC RBC-ENTMCNC: 34.2 PG — HIGH (ref 27–34)
MCV RBC AUTO: 105 FL — HIGH (ref 80–100)
MONOCYTES # BLD AUTO: 1.44 K/UL — HIGH (ref 0–0.9)
MONOCYTES NFR BLD AUTO: 6.7 % — SIGNIFICANT CHANGE UP (ref 2–14)
NEUTROPHILS # BLD AUTO: 14.85 K/UL — HIGH (ref 1.8–7.4)
NEUTROPHILS NFR BLD AUTO: 68.8 % — SIGNIFICANT CHANGE UP (ref 43–77)
NRBC # BLD: 6 /100 WBCS — HIGH (ref 0–0)
PCO2 BLDA: 22 MMHG — LOW (ref 35–48)
PCO2 BLDA: 48 MMHG — SIGNIFICANT CHANGE UP (ref 35–48)
PH BLDA: 7.27 — LOW (ref 7.35–7.45)
PH BLDA: 7.3 — LOW (ref 7.35–7.45)
PHOSPHATE SERPL-MCNC: 2.2 MG/DL — LOW (ref 2.5–4.5)
PLATELET # BLD AUTO: 141 K/UL — LOW (ref 150–400)
PO2 BLDA: 171 MMHG — HIGH (ref 83–108)
PO2 BLDA: 94 MMHG — SIGNIFICANT CHANGE UP (ref 83–108)
POTASSIUM SERPL-MCNC: 4.1 MMOL/L — SIGNIFICANT CHANGE UP (ref 3.5–5.3)
POTASSIUM SERPL-MCNC: 5.2 MMOL/L — SIGNIFICANT CHANGE UP (ref 3.5–5.3)
POTASSIUM SERPL-SCNC: 4.1 MMOL/L — SIGNIFICANT CHANGE UP (ref 3.5–5.3)
POTASSIUM SERPL-SCNC: 5.2 MMOL/L — SIGNIFICANT CHANGE UP (ref 3.5–5.3)
POTASSIUM UR-SCNC: 30 MMOL/L — SIGNIFICANT CHANGE UP
PROT SERPL-MCNC: 5.6 G/DL — LOW (ref 6–8.3)
PROTHROM AB SERPL-ACNC: 14.8 SEC — HIGH (ref 9.5–13)
RBC # BLD: 3.01 M/UL — LOW (ref 4.2–5.8)
RBC # FLD: 17.3 % — HIGH (ref 10.3–14.5)
SAO2 % BLDA: 100 % — SIGNIFICANT CHANGE UP
SAO2 % BLDA: 98 % — SIGNIFICANT CHANGE UP
SODIUM SERPL-SCNC: 144 MMOL/L — SIGNIFICANT CHANGE UP (ref 135–145)
SODIUM SERPL-SCNC: 144 MMOL/L — SIGNIFICANT CHANGE UP (ref 135–145)
SODIUM UR-SCNC: 53 MMOL/L — SIGNIFICANT CHANGE UP
TRIGL SERPL-MCNC: 759 MG/DL — HIGH
WBC # BLD: 21.6 K/UL — HIGH (ref 3.8–10.5)
WBC # FLD AUTO: 21.6 K/UL — HIGH (ref 3.8–10.5)

## 2024-03-25 PROCEDURE — 99223 1ST HOSP IP/OBS HIGH 75: CPT

## 2024-03-25 PROCEDURE — 99232 SBSQ HOSP IP/OBS MODERATE 35: CPT

## 2024-03-25 RX ORDER — NEOSTIGMINE METHYLSULFATE 1 MG/ML
2 VIAL (ML) INJECTION ONCE
Refills: 0 | Status: DISCONTINUED | OUTPATIENT
Start: 2024-03-25 | End: 2024-03-25

## 2024-03-25 RX ORDER — KETOROLAC TROMETHAMINE 30 MG/ML
15 SYRINGE (ML) INJECTION ONCE
Refills: 0 | Status: DISCONTINUED | OUTPATIENT
Start: 2024-03-25 | End: 2024-03-25

## 2024-03-25 RX ORDER — KETAMINE HYDROCHLORIDE 100 MG/ML
0.25 INJECTION INTRAMUSCULAR; INTRAVENOUS
Qty: 1000 | Refills: 0 | Status: DISCONTINUED | OUTPATIENT
Start: 2024-03-25 | End: 2024-03-27

## 2024-03-25 RX ORDER — POTASSIUM PHOSPHATE, MONOBASIC POTASSIUM PHOSPHATE, DIBASIC 236; 224 MG/ML; MG/ML
15 INJECTION, SOLUTION INTRAVENOUS ONCE
Refills: 0 | Status: COMPLETED | OUTPATIENT
Start: 2024-03-25 | End: 2024-03-25

## 2024-03-25 RX ORDER — MIDAZOLAM HYDROCHLORIDE 1 MG/ML
2 INJECTION, SOLUTION INTRAMUSCULAR; INTRAVENOUS EVERY 4 HOURS
Refills: 0 | Status: DISCONTINUED | OUTPATIENT
Start: 2024-03-25 | End: 2024-03-25

## 2024-03-25 RX ORDER — POTASSIUM CHLORIDE 20 MEQ
10 PACKET (EA) ORAL ONCE
Refills: 0 | Status: COMPLETED | OUTPATIENT
Start: 2024-03-25 | End: 2024-03-25

## 2024-03-25 RX ORDER — POTASSIUM CHLORIDE 20 MEQ
10 PACKET (EA) ORAL
Refills: 0 | Status: COMPLETED | OUTPATIENT
Start: 2024-03-25 | End: 2024-03-25

## 2024-03-25 RX ORDER — ATROPINE SULFATE 0.1 MG/ML
1 SYRINGE (ML) INJECTION ONCE
Refills: 0 | Status: DISCONTINUED | OUTPATIENT
Start: 2024-03-25 | End: 2024-03-25

## 2024-03-25 RX ORDER — CHLORHEXIDINE GLUCONATE 213 G/1000ML
15 SOLUTION TOPICAL EVERY 12 HOURS
Refills: 0 | Status: DISCONTINUED | OUTPATIENT
Start: 2024-03-25 | End: 2024-03-27

## 2024-03-25 RX ORDER — DEXMEDETOMIDINE HYDROCHLORIDE IN 0.9% SODIUM CHLORIDE 4 UG/ML
1 INJECTION INTRAVENOUS
Qty: 400 | Refills: 0 | Status: DISCONTINUED | OUTPATIENT
Start: 2024-03-25 | End: 2024-03-25

## 2024-03-25 RX ORDER — ATROPINE SULFATE 0.1 MG/ML
1 SYRINGE (ML) INJECTION ONCE
Refills: 0 | Status: COMPLETED | OUTPATIENT
Start: 2024-03-25 | End: 2024-03-25

## 2024-03-25 RX ADMIN — Medication 1 MILLIGRAM(S): at 13:09

## 2024-03-25 RX ADMIN — Medication 15 MILLIGRAM(S): at 09:21

## 2024-03-25 RX ADMIN — DEXMEDETOMIDINE HYDROCHLORIDE IN 0.9% SODIUM CHLORIDE 21.5 MICROGRAM(S)/KG/HR: 4 INJECTION INTRAVENOUS at 11:57

## 2024-03-25 RX ADMIN — Medication 100 MILLIEQUIVALENT(S): at 15:31

## 2024-03-25 RX ADMIN — LACTULOSE 10 GRAM(S): 10 SOLUTION ORAL at 13:10

## 2024-03-25 RX ADMIN — LACTULOSE 10 GRAM(S): 10 SOLUTION ORAL at 21:30

## 2024-03-25 RX ADMIN — Medication 100 MILLIEQUIVALENT(S): at 17:26

## 2024-03-25 RX ADMIN — POTASSIUM PHOSPHATE, MONOBASIC POTASSIUM PHOSPHATE, DIBASIC 62.5 MILLIMOLE(S): 236; 224 INJECTION, SOLUTION INTRAVENOUS at 11:57

## 2024-03-25 RX ADMIN — MIDAZOLAM HYDROCHLORIDE 2 MILLIGRAM(S): 1 INJECTION, SOLUTION INTRAMUSCULAR; INTRAVENOUS at 06:15

## 2024-03-25 RX ADMIN — PIPERACILLIN AND TAZOBACTAM 25 GRAM(S): 4; .5 INJECTION, POWDER, LYOPHILIZED, FOR SOLUTION INTRAVENOUS at 21:29

## 2024-03-25 RX ADMIN — PROPOFOL 7.74 MICROGRAM(S)/KG/MIN: 10 INJECTION, EMULSION INTRAVENOUS at 01:56

## 2024-03-25 RX ADMIN — KETAMINE HYDROCHLORIDE 2.15 MG/KG/HR: 100 INJECTION INTRAMUSCULAR; INTRAVENOUS at 16:00

## 2024-03-25 RX ADMIN — FENTANYL CITRATE 50 MICROGRAM(S): 50 INJECTION INTRAVENOUS at 11:09

## 2024-03-25 RX ADMIN — Medication 100 MILLIEQUIVALENT(S): at 16:00

## 2024-03-25 RX ADMIN — Medication 130 MILLIGRAM(S): at 07:37

## 2024-03-25 RX ADMIN — DEXMEDETOMIDINE HYDROCHLORIDE IN 0.9% SODIUM CHLORIDE 21.5 MICROGRAM(S)/KG/HR: 4 INJECTION INTRAVENOUS at 09:07

## 2024-03-25 RX ADMIN — CHLORHEXIDINE GLUCONATE 15 MILLILITER(S): 213 SOLUTION TOPICAL at 05:04

## 2024-03-25 RX ADMIN — Medication 1 MILLIGRAM(S): at 15:36

## 2024-03-25 RX ADMIN — Medication 100 MILLIEQUIVALENT(S): at 17:27

## 2024-03-25 RX ADMIN — CHLORHEXIDINE GLUCONATE 15 MILLILITER(S): 213 SOLUTION TOPICAL at 17:30

## 2024-03-25 RX ADMIN — FENTANYL CITRATE 50 MICROGRAM(S): 50 INJECTION INTRAVENOUS at 04:34

## 2024-03-25 RX ADMIN — LACTULOSE 10 GRAM(S): 10 SOLUTION ORAL at 05:04

## 2024-03-25 RX ADMIN — Medication 130 MILLIGRAM(S): at 08:50

## 2024-03-25 RX ADMIN — Medication 130 MILLIGRAM(S): at 11:57

## 2024-03-25 RX ADMIN — DEXMEDETOMIDINE HYDROCHLORIDE IN 0.9% SODIUM CHLORIDE 21.5 MICROGRAM(S)/KG/HR: 4 INJECTION INTRAVENOUS at 06:16

## 2024-03-25 RX ADMIN — HEPARIN SODIUM 5000 UNIT(S): 5000 INJECTION INTRAVENOUS; SUBCUTANEOUS at 17:30

## 2024-03-25 RX ADMIN — PANTOPRAZOLE SODIUM 40 MILLIGRAM(S): 20 TABLET, DELAYED RELEASE ORAL at 11:57

## 2024-03-25 RX ADMIN — FENTANYL CITRATE 50 MICROGRAM(S): 50 INJECTION INTRAVENOUS at 04:56

## 2024-03-25 RX ADMIN — PIPERACILLIN AND TAZOBACTAM 25 GRAM(S): 4; .5 INJECTION, POWDER, LYOPHILIZED, FOR SOLUTION INTRAVENOUS at 05:04

## 2024-03-25 RX ADMIN — MIDAZOLAM HYDROCHLORIDE 2 MILLIGRAM(S): 1 INJECTION, SOLUTION INTRAMUSCULAR; INTRAVENOUS at 10:07

## 2024-03-25 RX ADMIN — FENTANYL CITRATE 50 MICROGRAM(S): 50 INJECTION INTRAVENOUS at 10:09

## 2024-03-25 RX ADMIN — CHLORHEXIDINE GLUCONATE 1 APPLICATION(S): 213 SOLUTION TOPICAL at 05:03

## 2024-03-25 RX ADMIN — FENTANYL CITRATE 50 MICROGRAM(S): 50 INJECTION INTRAVENOUS at 20:45

## 2024-03-25 RX ADMIN — Medication 15 MILLIGRAM(S): at 10:47

## 2024-03-25 RX ADMIN — FENTANYL CITRATE 50 MICROGRAM(S): 50 INJECTION INTRAVENOUS at 20:15

## 2024-03-25 RX ADMIN — PIPERACILLIN AND TAZOBACTAM 25 GRAM(S): 4; .5 INJECTION, POWDER, LYOPHILIZED, FOR SOLUTION INTRAVENOUS at 13:08

## 2024-03-25 RX ADMIN — HEPARIN SODIUM 5000 UNIT(S): 5000 INJECTION INTRAVENOUS; SUBCUTANEOUS at 05:04

## 2024-03-25 NOTE — PROGRESS NOTE ADULT - NUTRITIONAL ASSESSMENT
This patient has been assessed with a concern for Malnutrition and has been determined to have a diagnosis/diagnoses of Mild protein-calorie malnutrition.    This patient is being managed with:   Diet NPO-  Except Medications  Entered: Mar 24 2024  1:32PM

## 2024-03-25 NOTE — PROGRESS NOTE ADULT - ASSESSMENT
38M, PMHx of alcohol withdrawal seizure with alcohol use disorder, who p/w confusion starting this morning. Admitted for high risk alcohol withdrawal with persistent sinus tachycardia.    Assessment:   # Alcohol use disorder  # Alcohol withdrawal  # Acute metabolic encephalopathy  # Atypical pneumonia  # Hx of alcohol seizures  # Possible pancreatitis  # Liver steatosis  # Alcoholic hepatitis  # Sinus tachycardia  # SIRS  # ARABELLA      Plan:   =============NEURO:=============  # Acute metabolic encephalopathy  # Alcohol withdrawal  s/p Ativan however will pursue intubation for protecting the airways  s/p phenobarb IVP x1 on 3/23     # Alcohol use disorder  # Hx of alcohol seizures  - Pt has been in and out of alcohol rehab  - Most recently came out 6 months ago - as per sister pt was only hospitalized and not in rehab   - SW consult order placed     #intubated (3/22)    =============CARDIO:================  # Sinus tachycardia  - P/w sinus tachycardia  - Likely due to alcohol withdrawal  - EKG = sinus tachycardia  - trop neg       ==========PULM:=============  # Intubated (3/22)    #Atypical pneumonia   # coronavirus positive however not COVID  CT chest : Multifocal bilateral peripheral opacities suggesting atypical pneumonia. Probable compression atelectasis right lung base, related to elevated right hemidiaphragm.  - c/w zosyn       ==========GI:==========  # Alcoholic hepatitis   #transaminitis  #Hyperbilirubinemia   P/w T bili 6.6 (Dir 5.1), , ALT 41,   Maddrey score = borderline at 31.4 points (32 point is the cutoff for steroid treatment)  MELD-Na score 17 points 6 % 90day mortality   - Transaminitis improving   - T. Hector 6.6 > 5.6 > 5.5>7.5   - 3/24 Maddrey score - 28.7   - NPO for now      # Constipation  #?acute abdomen with increased abdominal pressure of 25   surgery consulted - no indication for surgery at this time , recs paralyzing the patient   - c/w salam sump suction   - abdominal pressure improving   - 3/24 Bladder pressure 16   - 3/24 started lactulose 10mg q8h     # Possible pancreatitis  Lipase 470, possible mid abdominal pain. Negative imaging.  - c/w NPO    # Liver steatosis    # Liver lesion  Likely due to alcohol use disorder  CT = Has hypodense foci with central hyperdensity involving the pericholecystic region and segment 6, indeterminate.   - Rec outpt MRI, hepatology consult once more stable for outpt follow up       =======RENAL:==========  # ARABELLA  P/w SCr 1.77 (baseline 0.8)  S/p 23L IVF in ED  urinary retention 1.5 L s/p straight cath overnight  - Roman placed on 3/22  3/22 Scr improved to 0.85   Renal US: No hydronephrosis.   RESOLVED    # Lactic acidosis  P/w Lactate 10  Due to dehydration, infection-  s/p 3L IVF -> Lactate 1.6  RESOLVED    # Hypokalemia  - K 3.3 s/p Kcl 10 mEq x3 ->3.9   - repeat 3.3 will replete IV  RESOLVED     INFECTIOUS:  # SIRS  -p/w Lactate 10, tachycardia, tachypnea>20  CXR neg for consolidation or effusion  Positive coronavirus  s/p Cefepime in ED  d/c'd Ceftriaoxone (3/23)  3/23 Started Zosyn   legionella neg   U/A positive. UCx not sent   - BCx NGTD 48h   - C/w with Zosyn        ==============HEMO:===========  # Leukocytosis  - As above in sepsis      =======ENDO:=======  A1c 7.9  - FS, SSI  - monitor       =========PPX:========  - Hep SC  - PPI     Dispo: ICU      38M, PMHx of alcohol withdrawal seizure with alcohol use disorder, who p/w confusion starting this morning. Admitted for high risk alcohol withdrawal with persistent sinus tachycardia.    Assessment:   # Alcohol use disorder  # Alcohol withdrawal  # Acute metabolic encephalopathy  # Atypical pneumonia  # Hx of alcohol seizures  # Possible pancreatitis  # Liver steatosis  # Alcoholic hepatitis  # Sinus tachycardia  # SIRS  # ARABELLA      Plan:   =============NEURO:=============  # Acute metabolic encephalopathy  # Alcohol withdrawal  s/p Ativan however will pursue intubation for protecting the airways  s/p phenobarb IVP x1 on 3/23   -continue phenobarb pushes when pt becomes agitated  - sedated with ketamine drip    # Alcohol use disorder  # Hx of alcohol seizures  - Pt has been in and out of alcohol rehab  - Most recently came out 6 months ago - as per sister pt was only hospitalized and not in rehab   - SW consult order placed     #intubated (3/22)    =============CARDIO:================  # Sinus tachycardia  - P/w sinus tachycardia  - Likely due to alcohol withdrawal  - EKG = sinus tachycardia  - trop neg      ==========PULM:=============  # Intubated (3/22)    #Atypical pneumonia   # coronavirus positive however not COVID  CT chest : Multifocal bilateral peripheral opacities suggesting atypical pneumonia. Probable compression atelectasis right lung base, related to elevated right hemidiaphragm.  - c/w zosyn       ==========GI:==========  # Alcoholic hepatitis   #transaminitis  #Hyperbilirubinemia   P/w T bili 6.6 (Dir 5.1), , ALT 41,   Maddrey score = borderline at 31.4 points (32 point is the cutoff for steroid treatment)  MELD-Na score 17 points 6 % 90day mortality   - Transaminitis improving   - T. Hector 6.6 > 5.6 > 5.5>7.5   - 3/24 Maddrey score - 28.7   - NPO for now      # Constipation  #?acute abdomen with increased abdominal pressure of 25   surgery consulted - no indication for surgery at this time , recs paralyzing the patient   - c/w salam sump suction   - abdominal pressure improving   - 3/24 Bladder pressure 16   - 3/24 started lactulose 10mg q8h     # Possible pancreatitis  Lipase 470, possible mid abdominal pain. Negative imaging.  - c/w NPO    # Liver steatosis    # Liver lesion  Likely due to alcohol use disorder  CT = Has hypodense foci with central hyperdensity involving the pericholecystic region and segment 6, indeterminate.   - Rec outpt MRI, hepatology consult once more stable for outpt follow up       =======RENAL:==========  # ARABELLA  P/w SCr 1.77 (baseline 0.8)  S/p 23L IVF in ED  urinary retention 1.5 L s/p straight cath overnight  - Roman placed on 3/22  3/22 Scr improved to 0.85   Renal US: No hydronephrosis.   RESOLVED    # Lactic acidosis  P/w Lactate 10  Due to dehydration, infection-  s/p 3L IVF -> Lactate 1.6  RESOLVED    # Hypokalemia  - K 3.3 s/p Kcl 10 mEq x3 ->3.9   - repeat 3.3 will replete IV  RESOLVED     INFECTIOUS:  # SIRS  -p/w Lactate 10, tachycardia, tachypnea>20  CXR neg for consolidation or effusion  Positive coronavirus  s/p Cefepime in ED  d/c'd Ceftriaoxone (3/23)  3/23 Started Zosyn   legionella neg   U/A positive. UCx not sent   - BCx NGTD 48h   - C/w with Zosyn        ==============HEMO:===========  # Leukocytosis  - As above in sepsis      =======ENDO:=======  A1c 7.9  - FS, SSI  - monitor       =========PPX:========  - Hep SC  - PPI     Dispo: ICU      38M, PMHx of alcohol withdrawal seizure with alcohol use disorder, who p/w confusion starting this morning. Admitted for high risk alcohol withdrawal with persistent sinus tachycardia.    Assessment:   # Alcohol use disorder  # Alcohol withdrawal  # Acute metabolic encephalopathy  # Atypical pneumonia  # Hx of alcohol seizures  # Possible pancreatitis  # Liver steatosis  # Alcoholic hepatitis  # Sinus tachycardia  # SIRS  # ARABELLA      Plan:   =============NEURO:=============  # Acute metabolic encephalopathy  # Alcohol withdrawal  s/p Ativan however will pursue intubation for protecting the airways  s/p phenobarb IVP x1 on 3/23   -continue phenobarb pushes when pt becomes agitated  - sedated with ketamine drip    # Alcohol use disorder  # Hx of alcohol seizures  - Pt has been in and out of alcohol rehab  - Most recently came out 6 months ago - as per sister pt was only hospitalized and not in rehab   - SW consult order placed     #intubated (3/22)    =============CARDIO:================  # Sinus tachycardia  - P/w sinus tachycardia  - Likely due to alcohol withdrawal  - EKG = sinus tachycardia  - trop neg      ==========PULM:=============  # Intubated (3/22)    #Atypical pneumonia   # coronavirus positive however not COVID  CT chest : Multifocal bilateral peripheral opacities suggesting atypical pneumonia. Probable compression atelectasis right lung base, related to elevated right hemidiaphragm.  - c/w zosyn       ==========GI:==========  # Constipation  #?acute abdomen with increased abdominal pressure of 25   surgery consulted - no indication for surgery at this time , recs paralyzing the patient   - c/w salam sump suction   - abdominal pressure improving   - 3/24 Bladder pressure 16   - 3/24 started lactulose 10mg q8h  - 3/25 Neostigmine x1, led to bradycardia. Atropine given.    # Alcoholic hepatitis   #transaminitis  #Hyperbilirubinemia   P/w T bili 6.6 (Dir 5.1), , ALT 41,   Maddrey score = borderline at 31.4 points (32 point is the cutoff for steroid treatment)  MELD-Na score 17 points 6 % 90day mortality   - Transaminitis improving   - T. Hector 6.6 > 5.6 > 5.5>7.5   - 3/24 Maddrey score - 28.7   - NPO for now    # Possible pancreatitis  Lipase 470, possible mid abdominal pain. Negative imaging.  - c/w NPO  - f/u repeat Lipase    # Liver steatosis    # Liver lesion  Likely due to alcohol use disorder  CT = Has hypodense foci with central hyperdensity involving the pericholecystic region and segment 6, indeterminate.   - Rec outpt MRI, hepatology consult once more stable for outpt follow up       =======RENAL:==========  #Metabolic Acidosis  -ABG 3/25 showing bicarb of 10  -Vent settings adjusted      # ARABELLA  P/w SCr 1.77 (baseline 0.8)  S/p 23L IVF in ED  urinary retention 1.5 L s/p straight cath overnight  - Roman placed on 3/22  3/22 Scr improved to 0.85   Renal US: No hydronephrosis.   RESOLVED    # Lactic acidosis  P/w Lactate 10  Due to dehydration, infection-  s/p 3L IVF -> Lactate 1.6  RESOLVED    # Hypokalemia  - K 3.3 s/p Kcl 10 mEq x3 ->3.9   - repeat 3.3 will replete IV  RESOLVED     =====INFECTIOUS=====  # SIRS  -p/w Lactate 10, tachycardia, tachypnea>20  CXR neg for consolidation or effusion  Positive coronavirus  s/p Cefepime in ED  d/c'd Ceftriaoxone (3/23)  3/23 Started Zosyn   legionella neg   U/A positive. UCx not sent   - BCx NGTD 48h   - C/w with Zosyn        ==============HEME:===========  # Leukocytosis  - As above in sepsis      =======ENDO:=======  A1c 7.9  - FS, SSI  - monitor     =========SKIN/CATHETERS=======  Roman  [facility]  Left A-Line      =========PPX:========  - Hep SC  - PPI     Dispo: ICU      IMP: This is a 38 yr old man with  alcohol withdrawal seizure with alcohol use disorder, who p/w confusion starting this morning. Admitted for high risk alcohol withdrawal with persistent sinus tachycardia.    Assessment:   # Acute hypoxic resp failure   # Alcohol use disorder  # Alcohol withdrawal  # Acute metabolic encephalopathy  # Atypical pneumonia  # Hx of alcohol seizures  # Possible pancreatitis  # Liver steatosis  # Alcoholic hepatitis  # Sinus tachycardia  # SIRS  # ARABELLA  # Ileus       Plan:   =============NEURO:=============  # Acute metabolic encephalopathy  # Alcohol withdrawal  s/p Ativan however will pursue intubation for protecting the airways  s/p phenobarb IVP x1 on 3/23   -continue phenobarb pushes when pt becomes agitated  - sedated with ketamine drip    # Alcohol use disorder  # Hx of alcohol seizures  - Pt has been in and out of alcohol rehab  - Most recently came out 6 months ago - as per sister pt was only hospitalized and not in rehab   - SW consult order placed     #intubated (3/22)    =============CARDIO:================  # Sinus tachycardia  - P/w sinus tachycardia  - Likely due to alcohol withdrawal  - EKG = sinus tachycardia  - trop neg      ==========PULM:=============  # Intubated (3/22)    #Atypical pneumonia   # coronavirus positive however not COVID  CT chest : Multifocal bilateral peripheral opacities suggesting atypical pneumonia. Probable compression atelectasis right lung base, related to elevated right hemidiaphragm.  - c/w zosyn       ==========GI:==========  # Constipation  #?acute abdomen with increased abdominal pressure of 25   surgery consulted - no indication for surgery at this time , recs paralyzing the patient   - c/w salam sump suction   - abdominal pressure improving   - 3/24 Bladder pressure 16   - 3/24 started lactulose 10mg q8h  - 3/25 Neostigmine x1, led to bradycardia. Atropine given.    # Alcoholic hepatitis   #transaminitis  #Hyperbilirubinemia   P/w T bili 6.6 (Dir 5.1), , ALT 41,   Maddrey score = borderline at 31.4 points (32 point is the cutoff for steroid treatment)  MELD-Na score 17 points 6 % 90day mortality   - Transaminitis improving   - T. Hector 6.6 > 5.6 > 5.5>7.5   - 3/24 Maddrey score - 28.7   - NPO for now    # Possible pancreatitis  Lipase 470, possible mid abdominal pain. Negative imaging.  - c/w NPO  - f/u repeat Lipase    # Liver steatosis    # Liver lesion  Likely due to alcohol use disorder  CT = Has hypodense foci with central hyperdensity involving the pericholecystic region and segment 6, indeterminate.   - Rec outpt MRI, hepatology consult once more stable for outpt follow up       =======RENAL:==========  #Metabolic Acidosis  -ABG 3/25 showing bicarb of 10  -Vent settings adjusted      # ARABELLA  P/w SCr 1.77 (baseline 0.8)  S/p 23L IVF in ED  urinary retention 1.5 L s/p straight cath overnight  - Roman placed on 3/22  3/22 Scr improved to 0.85   Renal US: No hydronephrosis.   RESOLVED    # Lactic acidosis  P/w Lactate 10  Due to dehydration, infection-  s/p 3L IVF -> Lactate 1.6  RESOLVED    # Hypokalemia  - K 3.3 s/p Kcl 10 mEq x3 ->3.9   - repeat 3.3 will replete IV  RESOLVED     =====INFECTIOUS=====  # SIRS  -p/w Lactate 10, tachycardia, tachypnea>20  CXR neg for consolidation or effusion  Positive coronavirus  s/p Cefepime in ED  d/c'd Ceftriaoxone (3/23)  3/23 Started Zosyn   legionella neg   U/A positive. UCx not sent   - BCx NGTD 48h   - C/w with Zosyn        ==============HEME:===========  # Leukocytosis  - As above in sepsis      =======ENDO:=======  A1c 7.9  - FS, SSI  - monitor     =========SKIN/CATHETERS=======  Roman  [facility]  Left A-Line      =========PPX:========  - Hep SC  - PPI

## 2024-03-25 NOTE — CONSULT NOTE ADULT - NS ATTEND AMEND GEN_ALL_CORE FT
38 M EtOH abuse a/w encephalopathy, intubated and on levophed, febrile with ?urine source on zosyn, MDS 14 (actually favorable) GI consult for abdominal distention. Dark urine though normal renal function. Last noted bladder pressure 16, normal <20. No AG (8)  Plan  rectal tube  on review of CT, abdomen seems to largely be liver - no ascites, colonic distention moderate not severe  agree with antibiotics and lactulose TID  no role for steroids at this time  f/u urine cx, fever curve  will need inpatient EtOH detox on discharge

## 2024-03-25 NOTE — PROGRESS NOTE ADULT - SUBJECTIVE AND OBJECTIVE BOX
INTERVAL HPI/OVERNIGHT EVENTS:    Pt seen and examined at bedside. Intubated on pressors x2.     Vital Signs Last 24 Hrs  T(C): 37.7 (25 Mar 2024 07:00), Max: 38.4 (24 Mar 2024 08:45)  T(F): 99.9 (25 Mar 2024 07:00), Max: 101.1 (24 Mar 2024 08:45)  HR: 80 (25 Mar 2024 07:00) (74 - 109)  BP: 87/63 (25 Mar 2024 04:00) (85/64 - 91/68)  BP(mean): 72 (25 Mar 2024 04:00) (72 - 77)  RR: 13 (25 Mar 2024 07:00) (10 - 30)  SpO2: 95% (25 Mar 2024 07:00) (91% - 100%)    Parameters below as of 25 Mar 2024 07:00  Patient On (Oxygen Delivery Method): ventilator    O2 Concentration (%): 30  I&O's Detail    24 Mar 2024 07:01  -  25 Mar 2024 07:00  --------------------------------------------------------  IN:    Dexmedetomidine: 43 mL    Dexmedetomidine: 102.4 mL    IV PiggyBack: 100 mL    IV PiggyBack: 275 mL    Norepinephrine: 111 mL    Propofol: 465.3 mL  Total IN: 1096.7 mL    OUT:    Indwelling Catheter - Urethral (mL): 965 mL    Nasogastric/Oral tube (mL): 200 mL  Total OUT: 1165 mL    Total NET: -68.3 mL      25 Mar 2024 07:01  -  25 Mar 2024 08:01  --------------------------------------------------------  IN:    IV PiggyBack: 25 mL  Total IN: 25 mL    OUT:  Total OUT: 0 mL    Total NET: 25 mL        lactulose Syrup 10 Gram(s) Oral every 8 hours  pantoprazole  Injectable 40 milliGRAM(s) IV Push daily  piperacillin/tazobactam IVPB.. 3.375 Gram(s) IV Intermittent every 8 hours      Physical Exam  General: AAOx3, No acute distress  Skin: No jaundice, no icterus  Abdomen: soft,  nondistended, nontender, no rebound tenderness, no guarding, no palpable masses  : Normal external genitalia  Extremities: non edematous, no calf pain bilaterally    Drains/Tubes:     Labs:                        10.3   21.60 )-----------( 141      ( 25 Mar 2024 03:16 )             31.6     03-25    144  |  111<H>  |  24<H>  ----------------------------<  147<H>  4.1   |  25  |  0.86    Ca    7.7<L>      25 Mar 2024 03:16  Phos  2.2     03-25  Mg     2.0     03-25    TPro  5.6<L>  /  Alb  1.7<L>  /  TBili  7.7<H>  /  DBili  x   /  AST  135<H>  /  ALT  28  /  AlkPhos  246<H>  03-25    PT/INR - ( 25 Mar 2024 03:16 )   PT: 14.8 sec;   INR: 1.31 ratio         PTT - ( 25 Mar 2024 03:16 )  PTT:42.6 sec    RADIOLOGY & ADDITIONAL STUDIES:    38yMale INTERVAL HPI/OVERNIGHT EVENTS:    Pt seen and examined at bedside. Intubated on pressors.    Vital Signs Last 24 Hrs  T(C): 37.7 (25 Mar 2024 07:00), Max: 38.4 (24 Mar 2024 08:45)  T(F): 99.9 (25 Mar 2024 07:00), Max: 101.1 (24 Mar 2024 08:45)  HR: 80 (25 Mar 2024 07:00) (74 - 109)  BP: 87/63 (25 Mar 2024 04:00) (85/64 - 91/68)  BP(mean): 72 (25 Mar 2024 04:00) (72 - 77)  RR: 13 (25 Mar 2024 07:00) (10 - 30)  SpO2: 95% (25 Mar 2024 07:00) (91% - 100%)    Parameters below as of 25 Mar 2024 07:00  Patient On (Oxygen Delivery Method): ventilator    O2 Concentration (%): 30  I&O's Detail    24 Mar 2024 07:01  -  25 Mar 2024 07:00  --------------------------------------------------------  IN:    Dexmedetomidine: 43 mL    Dexmedetomidine: 102.4 mL    IV PiggyBack: 100 mL    IV PiggyBack: 275 mL    Norepinephrine: 111 mL    Propofol: 465.3 mL  Total IN: 1096.7 mL    OUT:    Indwelling Catheter - Urethral (mL): 965 mL    Nasogastric/Oral tube (mL): 200 mL  Total OUT: 1165 mL    Total NET: -68.3 mL      25 Mar 2024 07:01  -  25 Mar 2024 08:01  --------------------------------------------------------  IN:    IV PiggyBack: 25 mL  Total IN: 25 mL    OUT:  Total OUT: 0 mL    Total NET: 25 mL        lactulose Syrup 10 Gram(s) Oral every 8 hours  pantoprazole  Injectable 40 milliGRAM(s) IV Push daily  piperacillin/tazobactam IVPB.. 3.375 Gram(s) IV Intermittent every 8 hours      Physical Exam  General: No acute distress, intubated and sedated  Skin: No jaundice, no icterus  Abdomen: softly distended, unable to illicit tenderness +tympanic  : rubin  Extremities: non edematous, no calf pain bilaterally    Drains/Tubes:     Labs:                        10.3   21.60 )-----------( 141      ( 25 Mar 2024 03:16 )             31.6     03-25    144  |  111<H>  |  24<H>  ----------------------------<  147<H>  4.1   |  25  |  0.86    Ca    7.7<L>      25 Mar 2024 03:16  Phos  2.2     03-25  Mg     2.0     03-25    TPro  5.6<L>  /  Alb  1.7<L>  /  TBili  7.7<H>  /  DBili  x   /  AST  135<H>  /  ALT  28  /  AlkPhos  246<H>  03-25    PT/INR - ( 25 Mar 2024 03:16 )   PT: 14.8 sec;   INR: 1.31 ratio         PTT - ( 25 Mar 2024 03:16 )  PTT:42.6 sec    RADIOLOGY & ADDITIONAL STUDIES:    38yMale

## 2024-03-25 NOTE — PROGRESS NOTE ADULT - ASSESSMENT
38M with pmhx ETOH abuse, with abd distention, intra-abd pressure 22 this morning  likely ileus, hypokalemia repleted, now wnl  LFTs continue to stay elevated     continue NPO, NGT  GI follow-up for possible decompression  Continue medical management per ICU  No indication for Operative Intervention at this time  Discussed with Dr. Sin   38M with pmhx ETOH abuse, with abd distention, intra-abd pressure 17 this morning  likely ileus, hypokalemia resolved,   LFTs continue to stay elevated     continue NPO, NGT  GI follow-up for possible decompression  Continue medical management per ICU  No indication for Operative Intervention at this time  Discussed with Dr. Sin

## 2024-03-25 NOTE — PROGRESS NOTE ADULT - SUBJECTIVE AND OBJECTIVE BOX
INTERVAL HPI/OVERNIGHT EVENTS: ***    PRESSORS: [ ] YES [ ] NO  WHICH:    Antimicrobial:  piperacillin/tazobactam IVPB.. 3.375 Gram(s) IV Intermittent every 8 hours    Cardiovascular:  norepinephrine Infusion 0.05 MICROgram(s)/kG/Min IV Continuous <Continuous>    Pulmonary:    Hematalogic:  heparin   Injectable 5000 Unit(s) SubCutaneous every 12 hours    Other:  chlorhexidine 0.12% Liquid 15 milliLiter(s) Oral Mucosa every 12 hours  chlorhexidine 2% Cloths 1 Application(s) Topical <User Schedule>  dexMEDEtomidine Infusion 1 MICROgram(s)/kG/Hr IV Continuous <Continuous>  fentaNYL    Injectable 50 MICROGram(s) IV Push every 6 hours PRN  folic acid Injectable 1 milliGRAM(s) IV Push daily  influenza   Vaccine 0.5 milliLiter(s) IntraMuscular once  insulin lispro (ADMELOG) corrective regimen sliding scale   SubCutaneous every 6 hours  lactulose Syrup 10 Gram(s) Oral every 8 hours  midazolam Injectable 2 milliGRAM(s) IV Push every 4 hours  pantoprazole  Injectable 40 milliGRAM(s) IV Push daily  PHENobarbital Injectable 130 milliGRAM(s) IV Push every 15 minutes PRN  sodium chloride 0.9% lock flush 10 milliLiter(s) IV Push every 1 hour PRN    chlorhexidine 0.12% Liquid 15 milliLiter(s) Oral Mucosa every 12 hours  chlorhexidine 2% Cloths 1 Application(s) Topical <User Schedule>  dexMEDEtomidine Infusion 1 MICROgram(s)/kG/Hr IV Continuous <Continuous>  fentaNYL    Injectable 50 MICROGram(s) IV Push every 6 hours PRN  folic acid Injectable 1 milliGRAM(s) IV Push daily  heparin   Injectable 5000 Unit(s) SubCutaneous every 12 hours  influenza   Vaccine 0.5 milliLiter(s) IntraMuscular once  insulin lispro (ADMELOG) corrective regimen sliding scale   SubCutaneous every 6 hours  lactulose Syrup 10 Gram(s) Oral every 8 hours  midazolam Injectable 2 milliGRAM(s) IV Push every 4 hours  norepinephrine Infusion 0.05 MICROgram(s)/kG/Min IV Continuous <Continuous>  pantoprazole  Injectable 40 milliGRAM(s) IV Push daily  PHENobarbital Injectable 130 milliGRAM(s) IV Push every 15 minutes PRN  piperacillin/tazobactam IVPB.. 3.375 Gram(s) IV Intermittent every 8 hours  sodium chloride 0.9% lock flush 10 milliLiter(s) IV Push every 1 hour PRN    Drug Dosing Weight  Height (cm): 167.6 (21 Mar 2024 12:45)  Weight (kg): 86 (21 Mar 2024 21:30)  BMI (kg/m2): 30.6 (21 Mar 2024 21:30)  BSA (m2): 1.95 (21 Mar 2024 21:30)    CENTRAL LINE: [ ] YES [ ] NO  LOCATION:   DATE INSERTED:      BLACK: [ ] YES [ ] NO    DATE INSERTED:      A-LINE:  [ ] YES [ ] NO  LOCATION:   DATE INSERTED:      PMH -reviewed admission note, no change since admission  PAST MEDICAL & SURGICAL HISTORY:  No pertinent past medical history      History of seizure due to alcohol withdrawal      History of alcohol use disorder      No significant past surgical history      No significant past surgical history          ICU Vital Signs Last 24 Hrs  T(C): 37.7 (25 Mar 2024 07:00), Max: 38.4 (24 Mar 2024 08:45)  T(F): 99.9 (25 Mar 2024 07:00), Max: 101.1 (24 Mar 2024 08:45)  HR: 80 (25 Mar 2024 07:00) (74 - 109)  BP: 87/63 (25 Mar 2024 04:00) (85/64 - 102/69)  BP(mean): 72 (25 Mar 2024 04:00) (72 - 80)  ABP: 89/63 (25 Mar 2024 07:00) (83/56 - 156/99)  ABP(mean): 72 (25 Mar 2024 07:00) (66 - 120)  RR: 13 (25 Mar 2024 07:00) (10 - 30)  SpO2: 95% (25 Mar 2024 07:00) (91% - 100%)    O2 Parameters below as of 25 Mar 2024 07:00  Patient On (Oxygen Delivery Method): ventilator    O2 Concentration (%): 30        ABG - ( 25 Mar 2024 03:26 )  pH, Arterial: 7.30  pH, Blood: x     /  pCO2: 48    /  pO2: 94    / HCO3: 24    / Base Excess: -3.2  /  SaO2: 98                    03-24 @ 07:01  -  03-25 @ 07:00  --------------------------------------------------------  IN: 1096.7 mL / OUT: 1165 mL / NET: -68.3 mL        Mode: AC/ CMV (Assist Control/ Continuous Mandatory Ventilation)  RR (machine): 12  TV (machine): 420  FiO2: 30  PEEP: 5  ITime: 1  MAP: 12  PIP: 25      PHYSICAL EXAM:    GENERAL: NAD, well-developed  HEAD:  Atraumatic, Normocephalic  EYES: EOMI, PERRLA, conjunctiva and sclera clear  ENMT: Moist mucous membranes  NECK: Supple, normal appearance, No JVD; Normal thyroid; Trachea midline  NERVOUS SYSTEM:  Alert & Oriented X3,  Moving all extremities  CHEST/LUNG: No chest deformity; Clear to Auscultation; No rales, rhonchi, wheezing   HEART: Regular rate and rhythm; No murmurs, rubs, or gallops  ABDOMEN: Soft, Nontender, Nondistended; Bowel sounds present  EXTREMITIES:  2+ Peripheral Pulses, No clubbing, cyanosis, or edema  SKIN: No rashes or lesions;  Good capillary refill      LABS:  CBC Full  -  ( 25 Mar 2024 03:16 )  WBC Count : 21.60 K/uL  RBC Count : 3.01 M/uL  Hemoglobin : 10.3 g/dL  Hematocrit : 31.6 %  Platelet Count - Automated : 141 K/uL  Mean Cell Volume : 105.0 fl  Mean Cell Hemoglobin : 34.2 pg  Mean Cell Hemoglobin Concentration : 32.6 gm/dL  Auto Neutrophil # : 14.85 K/uL  Auto Lymphocyte # : 2.04 K/uL  Auto Monocyte # : 1.44 K/uL  Auto Eosinophil # : 0.76 K/uL  Auto Basophil # : 0.09 K/uL  Auto Neutrophil % : 68.8 %  Auto Lymphocyte % : 9.4 %  Auto Monocyte % : 6.7 %  Auto Eosinophil % : 3.5 %  Auto Basophil % : 0.4 %    03-25    144  |  111<H>  |  24<H>  ----------------------------<  147<H>  4.1   |  25  |  0.86    Ca    7.7<L>      25 Mar 2024 03:16  Phos  2.2     03-25  Mg     2.0     03-25    TPro  5.6<L>  /  Alb  1.7<L>  /  TBili  7.7<H>  /  DBili  x   /  AST  135<H>  /  ALT  28  /  AlkPhos  246<H>  03-25    PT/INR - ( 25 Mar 2024 03:16 )   PT: 14.8 sec;   INR: 1.31 ratio         PTT - ( 25 Mar 2024 03:16 )  PTT:42.6 sec  Urinalysis Basic - ( 25 Mar 2024 03:16 )    Color: x / Appearance: x / SG: x / pH: x  Gluc: 147 mg/dL / Ketone: x  / Bili: x / Urobili: x   Blood: x / Protein: x / Nitrite: x   Leuk Esterase: x / RBC: x / WBC x   Sq Epi: x / Non Sq Epi: x / Bacteria: x          RADIOLOGY & ADDITIONAL STUDIES REVIEWED:  ***    [ ]GOALS OF CARE DISCUSSION WITH PATIENT/FAMILY/PROXY:    CRITICAL CARE TIME SPENT: 35 minutes INTERVAL HPI/OVERNIGHT EVENTS:  No acute events overnight.  Patient examined at bedside this AM.  Patient remains intubated, sedated, with distended abdomen    PRESSORS: [X] YES [ ] NO  WHICH: Levophed    Antimicrobial:  piperacillin/tazobactam IVPB.. 3.375 Gram(s) IV Intermittent every 8 hours    Cardiovascular:  norepinephrine Infusion 0.05 MICROgram(s)/kG/Min IV Continuous <Continuous>    Pulmonary:    Hematalogic:  heparin   Injectable 5000 Unit(s) SubCutaneous every 12 hours    Other:  chlorhexidine 0.12% Liquid 15 milliLiter(s) Oral Mucosa every 12 hours  chlorhexidine 2% Cloths 1 Application(s) Topical <User Schedule>  dexMEDEtomidine Infusion 1 MICROgram(s)/kG/Hr IV Continuous <Continuous>  fentaNYL    Injectable 50 MICROGram(s) IV Push every 6 hours PRN  folic acid Injectable 1 milliGRAM(s) IV Push daily  influenza   Vaccine 0.5 milliLiter(s) IntraMuscular once  insulin lispro (ADMELOG) corrective regimen sliding scale   SubCutaneous every 6 hours  lactulose Syrup 10 Gram(s) Oral every 8 hours  midazolam Injectable 2 milliGRAM(s) IV Push every 4 hours  pantoprazole  Injectable 40 milliGRAM(s) IV Push daily  PHENobarbital Injectable 130 milliGRAM(s) IV Push every 15 minutes PRN  sodium chloride 0.9% lock flush 10 milliLiter(s) IV Push every 1 hour PRN    chlorhexidine 0.12% Liquid 15 milliLiter(s) Oral Mucosa every 12 hours  chlorhexidine 2% Cloths 1 Application(s) Topical <User Schedule>  dexMEDEtomidine Infusion 1 MICROgram(s)/kG/Hr IV Continuous <Continuous>  fentaNYL    Injectable 50 MICROGram(s) IV Push every 6 hours PRN  folic acid Injectable 1 milliGRAM(s) IV Push daily  heparin   Injectable 5000 Unit(s) SubCutaneous every 12 hours  influenza   Vaccine 0.5 milliLiter(s) IntraMuscular once  insulin lispro (ADMELOG) corrective regimen sliding scale   SubCutaneous every 6 hours  lactulose Syrup 10 Gram(s) Oral every 8 hours  midazolam Injectable 2 milliGRAM(s) IV Push every 4 hours  norepinephrine Infusion 0.05 MICROgram(s)/kG/Min IV Continuous <Continuous>  pantoprazole  Injectable 40 milliGRAM(s) IV Push daily  PHENobarbital Injectable 130 milliGRAM(s) IV Push every 15 minutes PRN  piperacillin/tazobactam IVPB.. 3.375 Gram(s) IV Intermittent every 8 hours  sodium chloride 0.9% lock flush 10 milliLiter(s) IV Push every 1 hour PRN    Drug Dosing Weight  Height (cm): 167.6 (21 Mar 2024 12:45)  Weight (kg): 86 (21 Mar 2024 21:30)  BMI (kg/m2): 30.6 (21 Mar 2024 21:30)  BSA (m2): 1.95 (21 Mar 2024 21:30)    CENTRAL LINE: [X] YES [ ] NO  LOCATION: [facility]  DATE INSERTED: 3/22      BLACK: [X] YES [ ] NO    DATE INSERTED:      A-LINE:  [ ] YES [ ] NO  LOCATION:   DATE INSERTED:      PMH -reviewed admission note, no change since admission  PAST MEDICAL & SURGICAL HISTORY:  No pertinent past medical history      History of seizure due to alcohol withdrawal      History of alcohol use disorder      No significant past surgical history      No significant past surgical history          ICU Vital Signs Last 24 Hrs  T(C): 37.7 (25 Mar 2024 07:00), Max: 38.4 (24 Mar 2024 08:45)  T(F): 99.9 (25 Mar 2024 07:00), Max: 101.1 (24 Mar 2024 08:45)  HR: 80 (25 Mar 2024 07:00) (74 - 109)  BP: 87/63 (25 Mar 2024 04:00) (85/64 - 102/69)  BP(mean): 72 (25 Mar 2024 04:00) (72 - 80)  ABP: 89/63 (25 Mar 2024 07:00) (83/56 - 156/99)  ABP(mean): 72 (25 Mar 2024 07:00) (66 - 120)  RR: 13 (25 Mar 2024 07:00) (10 - 30)  SpO2: 95% (25 Mar 2024 07:00) (91% - 100%)    O2 Parameters below as of 25 Mar 2024 07:00  Patient On (Oxygen Delivery Method): ventilator    O2 Concentration (%): 30        ABG - ( 25 Mar 2024 03:26 )  pH, Arterial: 7.30  pH, Blood: x     /  pCO2: 48    /  pO2: 94    / HCO3: 24    / Base Excess: -3.2  /  SaO2: 98                    03-24 @ 07:01  -  03-25 @ 07:00  --------------------------------------------------------  IN: 1096.7 mL / OUT: 1165 mL / NET: -68.3 mL        Mode: AC/ CMV (Assist Control/ Continuous Mandatory Ventilation)  RR (machine): 12  TV (machine): 420  FiO2: 30  PEEP: 5  ITime: 1  MAP: 12  PIP: 25      PHYSICAL EXAM:    GENERAL: Intubated, Sedated, Black with concentrated urine  HEAD:  Atraumatic, Normocephalic  EYES: PERRLA, conjunctiva and sclera clear  ENMT: Moist mucous membranes  NECK: Supple, normal appearance, No JVD; Normal thyroid; Trachea midline  NERVOUS SYSTEM:  Sedated  CHEST/LUNG: No chest deformity; Clear to Auscultation; No rales, rhonchi, wheezing   HEART: Regular rate and rhythm; No murmurs, rubs, or gallops  ABDOMEN: +Distended, Tympanic to percussion, Madison Sump on Intermittent suction; No Bowel Sounds Auscultated  EXTREMITIES:  2+ Peripheral Pulses, No clubbing, cyanosis, or edema  SKIN: No rashes or lesions;  Good capillary refill      LABS:  CBC Full  -  ( 25 Mar 2024 03:16 )  WBC Count : 21.60 K/uL  RBC Count : 3.01 M/uL  Hemoglobin : 10.3 g/dL  Hematocrit : 31.6 %  Platelet Count - Automated : 141 K/uL  Mean Cell Volume : 105.0 fl  Mean Cell Hemoglobin : 34.2 pg  Mean Cell Hemoglobin Concentration : 32.6 gm/dL  Auto Neutrophil # : 14.85 K/uL  Auto Lymphocyte # : 2.04 K/uL  Auto Monocyte # : 1.44 K/uL  Auto Eosinophil # : 0.76 K/uL  Auto Basophil # : 0.09 K/uL  Auto Neutrophil % : 68.8 %  Auto Lymphocyte % : 9.4 %  Auto Monocyte % : 6.7 %  Auto Eosinophil % : 3.5 %  Auto Basophil % : 0.4 %    03-25    144  |  111<H>  |  24<H>  ----------------------------<  147<H>  4.1   |  25  |  0.86    Ca    7.7<L>      25 Mar 2024 03:16  Phos  2.2     03-25  Mg     2.0     03-25    TPro  5.6<L>  /  Alb  1.7<L>  /  TBili  7.7<H>  /  DBili  x   /  AST  135<H>  /  ALT  28  /  AlkPhos  246<H>  03-25    PT/INR - ( 25 Mar 2024 03:16 )   PT: 14.8 sec;   INR: 1.31 ratio         PTT - ( 25 Mar 2024 03:16 )  PTT:42.6 sec  Urinalysis Basic - ( 25 Mar 2024 03:16 )    Color: x / Appearance: x / SG: x / pH: x  Gluc: 147 mg/dL / Ketone: x  / Bili: x / Urobili: x   Blood: x / Protein: x / Nitrite: x   Leuk Esterase: x / RBC: x / WBC x   Sq Epi: x / Non Sq Epi: x / Bacteria: x          RADIOLOGY & ADDITIONAL STUDIES REVIEWED:  ***    [ ]GOALS OF CARE DISCUSSION WITH PATIENT/FAMILY/PROXY:    CRITICAL CARE TIME SPENT: 35 minutes INTERVAL HPI/OVERNIGHT EVENTS:  Pt spiked fever overnight.  Patient examined at bedside this AM.  Patient remains intubated, sedated, with distended abdomen    PRESSORS: [X] YES [ ] NO  WHICH: Levophed    Antimicrobial:  piperacillin/tazobactam IVPB.. 3.375 Gram(s) IV Intermittent every 8 hours    Cardiovascular:  norepinephrine Infusion 0.05 MICROgram(s)/kG/Min IV Continuous <Continuous>    Pulmonary:    Hematalogic:  heparin   Injectable 5000 Unit(s) SubCutaneous every 12 hours    Other:  chlorhexidine 0.12% Liquid 15 milliLiter(s) Oral Mucosa every 12 hours  chlorhexidine 2% Cloths 1 Application(s) Topical <User Schedule>  dexMEDEtomidine Infusion 1 MICROgram(s)/kG/Hr IV Continuous <Continuous>  fentaNYL    Injectable 50 MICROGram(s) IV Push every 6 hours PRN  folic acid Injectable 1 milliGRAM(s) IV Push daily  influenza   Vaccine 0.5 milliLiter(s) IntraMuscular once  insulin lispro (ADMELOG) corrective regimen sliding scale   SubCutaneous every 6 hours  lactulose Syrup 10 Gram(s) Oral every 8 hours  midazolam Injectable 2 milliGRAM(s) IV Push every 4 hours  pantoprazole  Injectable 40 milliGRAM(s) IV Push daily  PHENobarbital Injectable 130 milliGRAM(s) IV Push every 15 minutes PRN  sodium chloride 0.9% lock flush 10 milliLiter(s) IV Push every 1 hour PRN    chlorhexidine 0.12% Liquid 15 milliLiter(s) Oral Mucosa every 12 hours  chlorhexidine 2% Cloths 1 Application(s) Topical <User Schedule>  dexMEDEtomidine Infusion 1 MICROgram(s)/kG/Hr IV Continuous <Continuous>  fentaNYL    Injectable 50 MICROGram(s) IV Push every 6 hours PRN  folic acid Injectable 1 milliGRAM(s) IV Push daily  heparin   Injectable 5000 Unit(s) SubCutaneous every 12 hours  influenza   Vaccine 0.5 milliLiter(s) IntraMuscular once  insulin lispro (ADMELOG) corrective regimen sliding scale   SubCutaneous every 6 hours  lactulose Syrup 10 Gram(s) Oral every 8 hours  midazolam Injectable 2 milliGRAM(s) IV Push every 4 hours  norepinephrine Infusion 0.05 MICROgram(s)/kG/Min IV Continuous <Continuous>  pantoprazole  Injectable 40 milliGRAM(s) IV Push daily  PHENobarbital Injectable 130 milliGRAM(s) IV Push every 15 minutes PRN  piperacillin/tazobactam IVPB.. 3.375 Gram(s) IV Intermittent every 8 hours  sodium chloride 0.9% lock flush 10 milliLiter(s) IV Push every 1 hour PRN    Drug Dosing Weight  Height (cm): 167.6 (21 Mar 2024 12:45)  Weight (kg): 86 (21 Mar 2024 21:30)  BMI (kg/m2): 30.6 (21 Mar 2024 21:30)  BSA (m2): 1.95 (21 Mar 2024 21:30)    CENTRAL LINE: [X] YES [ ] NO  LOCATION: [facility]  DATE INSERTED: 3/22      BLACK: [X] YES [ ] NO    DATE INSERTED:      A-LINE:  [ ] YES [ ] NO  LOCATION:   DATE INSERTED:      PMH -reviewed admission note, no change since admission  PAST MEDICAL & SURGICAL HISTORY:  No pertinent past medical history      History of seizure due to alcohol withdrawal      History of alcohol use disorder      No significant past surgical history      No significant past surgical history          ICU Vital Signs Last 24 Hrs  T(C): 37.7 (25 Mar 2024 07:00), Max: 38.4 (24 Mar 2024 08:45)  T(F): 99.9 (25 Mar 2024 07:00), Max: 101.1 (24 Mar 2024 08:45)  HR: 80 (25 Mar 2024 07:00) (74 - 109)  BP: 87/63 (25 Mar 2024 04:00) (85/64 - 102/69)  BP(mean): 72 (25 Mar 2024 04:00) (72 - 80)  ABP: 89/63 (25 Mar 2024 07:00) (83/56 - 156/99)  ABP(mean): 72 (25 Mar 2024 07:00) (66 - 120)  RR: 13 (25 Mar 2024 07:00) (10 - 30)  SpO2: 95% (25 Mar 2024 07:00) (91% - 100%)    O2 Parameters below as of 25 Mar 2024 07:00  Patient On (Oxygen Delivery Method): ventilator    O2 Concentration (%): 30        ABG - ( 25 Mar 2024 03:26 )  pH, Arterial: 7.30  pH, Blood: x     /  pCO2: 48    /  pO2: 94    / HCO3: 24    / Base Excess: -3.2  /  SaO2: 98                    03-24 @ 07:01  -  03-25 @ 07:00  --------------------------------------------------------  IN: 1096.7 mL / OUT: 1165 mL / NET: -68.3 mL        Mode: AC/ CMV (Assist Control/ Continuous Mandatory Ventilation)  RR (machine): 12  TV (machine): 420  FiO2: 30  PEEP: 5  ITime: 1  MAP: 12  PIP: 25      PHYSICAL EXAM:    GENERAL: Intubated, Sedated, Black with concentrated urine  HEAD:  Atraumatic, Normocephalic  EYES: PERRLA, conjunctiva and sclera clear  ENMT: Catarina Sump on Intermittent suction; Moist mucous membranes  NECK: Supple, normal appearance, No JVD; Normal thyroid; Trachea midline  NERVOUS SYSTEM:  Sedated  CHEST/LUNG: No chest deformity; Clear to Auscultation; No rales, rhonchi, wheezing   HEART: Regular rate and rhythm; No murmurs, rubs, or gallops  ABDOMEN: +Distended, Tympanic to percussion; No Bowel Sounds Auscultated  EXTREMITIES:  2+ Peripheral Pulses, No clubbing, cyanosis, or edema  SKIN: No rashes or lesions;  Good capillary refill      LABS:  CBC Full  -  ( 25 Mar 2024 03:16 )  WBC Count : 21.60 K/uL  RBC Count : 3.01 M/uL  Hemoglobin : 10.3 g/dL  Hematocrit : 31.6 %  Platelet Count - Automated : 141 K/uL  Mean Cell Volume : 105.0 fl  Mean Cell Hemoglobin : 34.2 pg  Mean Cell Hemoglobin Concentration : 32.6 gm/dL  Auto Neutrophil # : 14.85 K/uL  Auto Lymphocyte # : 2.04 K/uL  Auto Monocyte # : 1.44 K/uL  Auto Eosinophil # : 0.76 K/uL  Auto Basophil # : 0.09 K/uL  Auto Neutrophil % : 68.8 %  Auto Lymphocyte % : 9.4 %  Auto Monocyte % : 6.7 %  Auto Eosinophil % : 3.5 %  Auto Basophil % : 0.4 %    03-25    144  |  111<H>  |  24<H>  ----------------------------<  147<H>  4.1   |  25  |  0.86    Ca    7.7<L>      25 Mar 2024 03:16  Phos  2.2     03-25  Mg     2.0     03-25    TPro  5.6<L>  /  Alb  1.7<L>  /  TBili  7.7<H>  /  DBili  x   /  AST  135<H>  /  ALT  28  /  AlkPhos  246<H>  03-25    PT/INR - ( 25 Mar 2024 03:16 )   PT: 14.8 sec;   INR: 1.31 ratio         PTT - ( 25 Mar 2024 03:16 )  PTT:42.6 sec  Urinalysis Basic - ( 25 Mar 2024 03:16 )    Color: x / Appearance: x / SG: x / pH: x  Gluc: 147 mg/dL / Ketone: x  / Bili: x / Urobili: x   Blood: x / Protein: x / Nitrite: x   Leuk Esterase: x / RBC: x / WBC x   Sq Epi: x / Non Sq Epi: x / Bacteria: x          RADIOLOGY & ADDITIONAL STUDIES REVIEWED:  ***    [ ]GOALS OF CARE DISCUSSION WITH PATIENT/FAMILY/PROXY:    CRITICAL CARE TIME SPENT: 35 minutes INTERVAL HPI/OVERNIGHT EVENTS:  Pt spiked fever overnight.  Patient examined at bedside this AM.  Patient remains intubated, sedated, with distended abdomen    PRESSORS: [X] YES [ ] NO  WHICH: Levophed    Antimicrobial:  piperacillin/tazobactam IVPB.. 3.375 Gram(s) IV Intermittent every 8 hours    Cardiovascular:  norepinephrine Infusion 0.05 MICROgram(s)/kG/Min IV Continuous <Continuous>    Pulmonary:    Hematalogic:  heparin   Injectable 5000 Unit(s) SubCutaneous every 12 hours    Other:  chlorhexidine 0.12% Liquid 15 milliLiter(s) Oral Mucosa every 12 hours  chlorhexidine 2% Cloths 1 Application(s) Topical <User Schedule>  dexMEDEtomidine Infusion 1 MICROgram(s)/kG/Hr IV Continuous <Continuous>  fentaNYL    Injectable 50 MICROGram(s) IV Push every 6 hours PRN  folic acid Injectable 1 milliGRAM(s) IV Push daily  influenza   Vaccine 0.5 milliLiter(s) IntraMuscular once  insulin lispro (ADMELOG) corrective regimen sliding scale   SubCutaneous every 6 hours  lactulose Syrup 10 Gram(s) Oral every 8 hours  midazolam Injectable 2 milliGRAM(s) IV Push every 4 hours  pantoprazole  Injectable 40 milliGRAM(s) IV Push daily  PHENobarbital Injectable 130 milliGRAM(s) IV Push every 15 minutes PRN  sodium chloride 0.9% lock flush 10 milliLiter(s) IV Push every 1 hour PRN    chlorhexidine 0.12% Liquid 15 milliLiter(s) Oral Mucosa every 12 hours  chlorhexidine 2% Cloths 1 Application(s) Topical <User Schedule>  dexMEDEtomidine Infusion 1 MICROgram(s)/kG/Hr IV Continuous <Continuous>  fentaNYL    Injectable 50 MICROGram(s) IV Push every 6 hours PRN  folic acid Injectable 1 milliGRAM(s) IV Push daily  heparin   Injectable 5000 Unit(s) SubCutaneous every 12 hours  influenza   Vaccine 0.5 milliLiter(s) IntraMuscular once  insulin lispro (ADMELOG) corrective regimen sliding scale   SubCutaneous every 6 hours  lactulose Syrup 10 Gram(s) Oral every 8 hours  midazolam Injectable 2 milliGRAM(s) IV Push every 4 hours  norepinephrine Infusion 0.05 MICROgram(s)/kG/Min IV Continuous <Continuous>  pantoprazole  Injectable 40 milliGRAM(s) IV Push daily  PHENobarbital Injectable 130 milliGRAM(s) IV Push every 15 minutes PRN  piperacillin/tazobactam IVPB.. 3.375 Gram(s) IV Intermittent every 8 hours  sodium chloride 0.9% lock flush 10 milliLiter(s) IV Push every 1 hour PRN    Drug Dosing Weight  Height (cm): 167.6 (21 Mar 2024 12:45)  Weight (kg): 86 (21 Mar 2024 21:30)  BMI (kg/m2): 30.6 (21 Mar 2024 21:30)  BSA (m2): 1.95 (21 Mar 2024 21:30)    CENTRAL LINE: [X] YES [ ] NO  LOCATION: [facility]  DATE INSERTED: 3/22      BLACK: [X] YES [ ] NO    DATE INSERTED:      A-LINE:  [ ] YES [ ] NO  LOCATION:   DATE INSERTED:      PMH -reviewed admission note, no change since admission  PAST MEDICAL & SURGICAL HISTORY:  No pertinent past medical history      History of seizure due to alcohol withdrawal      History of alcohol use disorder      No significant past surgical history      No significant past surgical history          ICU Vital Signs Last 24 Hrs  T(C): 37.7 (25 Mar 2024 07:00), Max: 38.4 (24 Mar 2024 08:45)  T(F): 99.9 (25 Mar 2024 07:00), Max: 101.1 (24 Mar 2024 08:45)  HR: 80 (25 Mar 2024 07:00) (74 - 109)  BP: 87/63 (25 Mar 2024 04:00) (85/64 - 102/69)  BP(mean): 72 (25 Mar 2024 04:00) (72 - 80)  ABP: 89/63 (25 Mar 2024 07:00) (83/56 - 156/99)  ABP(mean): 72 (25 Mar 2024 07:00) (66 - 120)  RR: 13 (25 Mar 2024 07:00) (10 - 30)  SpO2: 95% (25 Mar 2024 07:00) (91% - 100%)    O2 Parameters below as of 25 Mar 2024 07:00  Patient On (Oxygen Delivery Method): ventilator    O2 Concentration (%): 30        ABG - ( 25 Mar 2024 03:26 )  pH, Arterial: 7.30  pH, Blood: x     /  pCO2: 48    /  pO2: 94    / HCO3: 24    / Base Excess: -3.2  /  SaO2: 98                    03-24 @ 07:01  -  03-25 @ 07:00  --------------------------------------------------------  IN: 1096.7 mL / OUT: 1165 mL / NET: -68.3 mL        Mode: AC/ CMV (Assist Control/ Continuous Mandatory Ventilation)  RR (machine): 12  TV (machine): 420  FiO2: 30  PEEP: 5  ITime: 1  MAP: 12  PIP: 25      PHYSICAL EXAM:    GENERAL: Intubated, Sedated, Black with concentrated urine + ETT  HEAD:  Atraumatic, Normocephalic  EYES: PERRLA, conjunctiva and sclera clear  ENMT: Arthur Felicianop on Intermittent suction; Moist mucous membranes  NECK: Supple, normal appearance, No JVD; Normal thyroid; Trachea midline  NERVOUS SYSTEM:  Sedated  CHEST/LUNG: No chest deformity; Clear to Auscultation; No rales, rhonchi, wheezing   HEART: Regular rate and rhythm; No murmurs, rubs, or gallops  ABDOMEN: +Distended, Tympanic to percussion; No Bowel Sounds Auscultated  EXTREMITIES:  2+ Peripheral Pulses, No clubbing, cyanosis, or edema  SKIN: No rashes or lesions;  Good capillary refill      LABS:  CBC Full  -  ( 25 Mar 2024 03:16 )  WBC Count : 21.60 K/uL  RBC Count : 3.01 M/uL  Hemoglobin : 10.3 g/dL  Hematocrit : 31.6 %  Platelet Count - Automated : 141 K/uL  Mean Cell Volume : 105.0 fl  Mean Cell Hemoglobin : 34.2 pg  Mean Cell Hemoglobin Concentration : 32.6 gm/dL  Auto Neutrophil # : 14.85 K/uL  Auto Lymphocyte # : 2.04 K/uL  Auto Monocyte # : 1.44 K/uL  Auto Eosinophil # : 0.76 K/uL  Auto Basophil # : 0.09 K/uL  Auto Neutrophil % : 68.8 %  Auto Lymphocyte % : 9.4 %  Auto Monocyte % : 6.7 %  Auto Eosinophil % : 3.5 %  Auto Basophil % : 0.4 %    03-25    144  |  111<H>  |  24<H>  ----------------------------<  147<H>  4.1   |  25  |  0.86    Ca    7.7<L>      25 Mar 2024 03:16  Phos  2.2     03-25  Mg     2.0     03-25    TPro  5.6<L>  /  Alb  1.7<L>  /  TBili  7.7<H>  /  DBili  x   /  AST  135<H>  /  ALT  28  /  AlkPhos  246<H>  03-25    PT/INR - ( 25 Mar 2024 03:16 )   PT: 14.8 sec;   INR: 1.31 ratio         PTT - ( 25 Mar 2024 03:16 )  PTT:42.6 sec  Urinalysis Basic - ( 25 Mar 2024 03:16 )    Color: x / Appearance: x / SG: x / pH: x  Gluc: 147 mg/dL / Ketone: x  / Bili: x / Urobili: x   Blood: x / Protein: x / Nitrite: x   Leuk Esterase: x / RBC: x / WBC x   Sq Epi: x / Non Sq Epi: x / Bacteria: x        RADIOLOGY & ADDITIONAL STUDIES REVIEWED:  ***    [ ]GOALS OF CARE DISCUSSION WITH PATIENT/FAMILY/PROXY:    CRITICAL CARE TIME SPENT: 35 minutes

## 2024-03-25 NOTE — CONSULT NOTE ADULT - ASSESSMENT
This note and its recommendations herein are preliminary until such time as cosigned by an attending.    GI will continue to follow.  Thank you for this consult! Patient is a 38M with a PMHx of alcohol use disorder with hx of withdrawal seizures who presented to the ED on 3/21 with AMS. GI was consulted for abdominal distention.     #Abdominal distention  #Colonic distention  #Hepatomegaly  #Hepatic steatosis  #Alcohol-associated hepatitis  #Elevated INR  #Elevated lipase  #Acute metabolic encephalopathy  #Elevated ammonia  #Macrocytic anemia  #Hypoalbuminemia  #Alcohol use disorder  #ETOH withdrawal  Patient presented with AMS, extensive etoh use hx and prior withdrawals and seizures, c/f encephalopathy given presentation, last drink 3/21 AM day of admission. In the ED, ammonia 107 -> 172, WBC 24.09, INR 1.55, TBili 6.6, alk phos 377, , ALT 41.  , lipase 479, lactate 10 -> 5.6 -> 1.9. Admitted to the ICU for high risk alcohol withdrawal and persistent sinus tach. LFTs remain elevated however stable, intubated 3/22. AFP 2.7.  ICU course c/b worsening abdominal distention, serial abd xrays showing colonic distention, surgery consulted for increased intra-abdominal pressure 26mmHg, suspect likely ileus, rec NPO, NGT, and GI eval, no surgical intervention at this time. On exam, patient's mother reports abd distention much improved compared to prior, abd xray from yesterday also showing decreased distention of cecum.   AST:ALT >2:1, indicating etoh induced hepatocellular injury with evidence of coagulopathy, macrocytic anemia indicating chronicity which can be seen in AUD (reassuringly no overt GIB), and hepatomegaly with hepatic steatosis likely worsened with increased etoh intake. Considered r/o SBP with diagnostic paracentesis however no ascites on imaging. R factor indicating cholestatic injury, prev imaging c/f GB sludge however bile ducts wnl, low suspicion of biliary obstruction. Of note, elevated lipase on admission however no reports of abd pain nor radiographic evidence of pancreatitis, low suspicion of pancreatitis at this time, suspect rise in lipase 2/2 hepatobiliary etiology. Differentials include ileus vs malignant obstruction (less likely) vs external compression along cecum (severe hepatomegaly) vs cholangitis vs mesenteric ischemia vs other.   3/24: TBili 7.5, alk phos 249, , ALT 35, INR 1.47  3/25: TBili 7.7, alk phos 246, , ALT 28, WBC 21.6, continues on Zosyn. Tmax 38.3, on norepinephrine & dexmedetomidine. MELD 3.0: 21. MDF 16, may benefit from steroids however given active infection (pna, UTI), risks > benefits, defer at this time.     	- Agree with TX lactulose  	- Agree with IV abx  	- Consider rectal tube placement for further decompression  	- Serial abd exams  	- Trend PT/INR and LFTs/MELD labs daily  	- Maintain active T&S, 2 large bore peripheral IVs, transfuse for goal Hgb >7 or if symptomatic  	- Trend H/H  	- Monitor for s/sx of GI bleed  	- IV PPI BID  	- Consider hepatology consult  	- No endoscopic evaluation at this time    This note and its recommendations herein are preliminary until such time as cosigned by an attending.    GI will continue to follow.  Thank you for this consult!

## 2024-03-25 NOTE — CONSULT NOTE ADULT - SUBJECTIVE AND OBJECTIVE BOX
INITIAL GI CONSULTATION    Patient is a 38y old  Male who presents with a chief complaint of Alcohol withdrawal (25 Mar 2024 07:56)    GI HPI:  Patient is a 38M with a PMHx of alcohol use disorder with hx of withdrawal seizures who presented to the ED on 3/21 with AMS. GI was consulted for abdominal distention.     Patient is intubated & sedated at time of eval, collateral obtained from patient's mother at bedside and chart review.   Patient has a hx of alcohol use disorder, completed rehab program 6 months ago however relapsed, alcohol use has been worsening over the past 2 weeks. Prior to ED presentation, endorsed "flu-like symptoms" including malaise and heartburn    38M, PMHx of alcohol withdrawl seizure with alcohol use disorder, who p/w confusion starting this morning. According to the patient, he came out of the alcohol rehab few days ago. Last drink was last night, and he said that his friend gave him a pill to take, and he took it. He currently endorses constipation and denies other Patient denies headache, fever, chills, nausea, vomit, chest pain, shortness of breath, cough, lightheadedness, abdominal pain, diarrhea, constipation, dark/bloody stool, dysuria, hematuria, loss of strength, or loss of sensation.      According to the father at bedside, the patient last came out of alcohol rehab 6 months ago. This morning, the patient's eyes were yellow and that he was more confused, saying things that did not make sense. Also, since last week, he noticed that the patient was developing increased abdominal swelling. Since yesterday, patient has been only at home drinking alcohol. There was no friend who came over. Patient also drank till 9am this morning.   (21 Mar 2024 17:34)        PMH/PSH:  PAST MEDICAL & SURGICAL HISTORY:  No pertinent past medical history      History of seizure due to alcohol withdrawal      History of alcohol use disorder      No significant past surgical history      No significant past surgical history            FH:  FAMILY HISTORY:        MEDS:  MEDICATIONS  (STANDING):  chlorhexidine 2% Cloths 1 Application(s) Topical <User Schedule>  dexMEDEtomidine Infusion 1 MICROgram(s)/kG/Hr (21.5 mL/Hr) IV Continuous <Continuous>  folic acid Injectable 1 milliGRAM(s) IV Push daily  heparin   Injectable 5000 Unit(s) SubCutaneous every 12 hours  influenza   Vaccine 0.5 milliLiter(s) IntraMuscular once  insulin lispro (ADMELOG) corrective regimen sliding scale   SubCutaneous every 6 hours  lactulose Syrup 10 Gram(s) Oral every 8 hours  norepinephrine Infusion 0.05 MICROgram(s)/kG/Min (4.03 mL/Hr) IV Continuous <Continuous>  pantoprazole  Injectable 40 milliGRAM(s) IV Push daily  piperacillin/tazobactam IVPB.. 3.375 Gram(s) IV Intermittent every 8 hours  potassium phosphate IVPB 15 milliMole(s) IV Intermittent once    MEDICATIONS  (PRN):  fentaNYL    Injectable 50 MICROGram(s) IV Push every 6 hours PRN vent asynchrony  PHENobarbital Injectable 130 milliGRAM(s) IV Push every 15 minutes PRN CIWA >8  sodium chloride 0.9% lock flush 10 milliLiter(s) IV Push every 1 hour PRN Pre/post blood products, medications, blood draw, and to maintain line patency    Allergies    No Known Allergies    Intolerances          ROS: Unable to obtain ROS iso intubated/sedated.  ______________________________________________________________________  PHYSICAL EXAM:  T(C): 38.4 (03-25-24 @ 10:30), Max: 38.4 (03-25-24 @ 10:30)  HR: 79 (03-25-24 @ 10:30)  BP: 89/66 (03-25-24 @ 08:00)  RR: 17 (03-25-24 @ 10:30)  SpO2: 98% (03-25-24 @ 10:30)  Wt(kg): --    03-24 - 03-25  --------------------------------------------------------  IN:    Dexmedetomidine: 43 mL    Dexmedetomidine: 102.4 mL    IV PiggyBack: 100 mL    IV PiggyBack: 275 mL    Norepinephrine: 111 mL    Propofol: 465.3 mL  Total IN: 1096.7 mL    OUT:    Indwelling Catheter - Urethral (mL): 1045 mL    Nasogastric/Oral tube (mL): 200 mL  Total OUT: 1245 mL    Total NET: -148.3 mL      03-25 - 03-25  --------------------------------------------------------  IN:    IV PiggyBack: 25 mL  Total IN: 25 mL    OUT:    Indwelling Catheter - Urethral (mL): 80 mL  Total OUT: 80 mL    Total NET: -55 mL          GEN: NAD, intubated & sedated  HEENT: icteric sclerae, moist mucous membranes  PULM: LSCTAB, no wheezing, rales, or rhonchi  CV: RRR, no m/r/b  GI: Taut, distended, +BS, no fluid shift  MSK: No clubbing or cyanosis  NEURO: Unable to assess orientation  ______________________________________________________________________  LABS:                        10.3   21.60 )-----------( 141      ( 25 Mar 2024 03:16 )             31.6     03-25    144  |  111<H>  |  24<H>  ----------------------------<  147<H>  4.1   |  25  |  0.86    Ca    7.7<L>      25 Mar 2024 03:16  Phos  2.2     03-25  Mg     2.0     03-25    TPro  5.6<L>  /  Alb  1.7<L>  /  TBili  7.7<H>  /  DBili  x   /  AST  135<H>  /  ALT  28  /  AlkPhos  246<H>  03-25    LIVER FUNCTIONS - ( 25 Mar 2024 03:16 )  Alb: 1.7 g/dL / Pro: 5.6 g/dL / ALK PHOS: 246 U/L / ALT: 28 U/L DA / AST: 135 U/L / GGT: x           PT/INR - ( 25 Mar 2024 03:16 )   PT: 14.8 sec;   INR: 1.31 ratio         PTT - ( 25 Mar 2024 03:16 )  PTT:42.6 sec  ____________________________________________    IMAGING:    < from: Xray Abdomen 1 View PORTABLE -Urgent (Xray Abdomen 1 View PORTABLE -Urgent .) (03.24.24 @ 10:30) >  IMPRESSION:  Decreased colonic distention as noted.    < end of copied text >   INITIAL GI CONSULTATION    Patient is a 38y old  Male who presents with a chief complaint of Alcohol withdrawal (25 Mar 2024 07:56)    GI HPI:  Patient is a 38M with a PMHx of alcohol use disorder with hx of withdrawal seizures who presented to the ED on 3/21 with AMS. GI was consulted for abdominal distention.     Patient is intubated & sedated at time of eval, collateral obtained from patient's mother at bedside and chart review.   Patient has a hx of alcohol use disorder, completed rehab program 6 months ago however relapsed, alcohol use has been worsening over the past 2 weeks. Prior to ED presentation, endorsed "flu-like symptoms" including malaise and heartburn. He has been drinking alcohol since 22 y/o, last drink 3/20 PM. No overt signs of bleeding prior to admission.  Patient endorsed constipation and reported leaving rehab a few days ago, a friend gave him a pill so he took it, however the patient's father reported patient came out of alcohol rehab 6 months ago, eyes were more yellow, appeared more confused and saying things that did not make sense, and increased abdominal swelling for the past week, no friend came over, and drank till 3/21 at 9AM.     In the ED, labs notable for K 3.4, BUN 18, Cr 1.77, alb 3.0, TBili 6.6, DBili 5.1, alk phos 377, , ALT 41, lipase 479, lactate 10 -> 5.6, INR 1.55. BAL 9. acetaminophen <2, salicylate <1.7. UTox + bzd. +coronavirpus. Acute hepatitis panel neg. BCx neg. UA grossly positive.   CTAP noncon 3/21 - liver is enlarged 29cm, severe hepatic steatosis, bile ducts wnl, GB with possible sludge or vicarious excretion of contrast, pancreas wnl, nonspecific mild distention of transverse colon, no ascites.   CTH noncon 3/21 - neg for acute intracranial hemorrhage  Admitted to the ICU for further management. ICU course c/b worsening abdominal distention, salem pump placed with <100 cc output, bladder pressure 26mmHg, hepatomegaly with hepatic steatosis, persistent leukocytosis, febrile this morning. Surgery consulted and following.     3/22: family indicated no blood transfusions, patient was intubated. Abd xray - colonic distention similar to 3/21, no dilated small bowel.   3/24: Abd xray show decreased colonic distention. Bladder pressure 16, started lactulose. INR 1.47, TBili 7.5, alk phos 249, , ALT 35, WBC 23.86.   3/25: , INR 1.31, BUN 24, Cr 0.86, TBili 7.7, alk phos 246, , ALT 28, WBC 21.6, Hgb 10.3, plt 141.     This morning, patient's mother reports his abdominal appears much improved. Abd xray from yesterday showed less distention of cecum.       PMH/PSH:  PAST MEDICAL & SURGICAL HISTORY:  No pertinent past medical history      History of seizure due to alcohol withdrawal      History of alcohol use disorder      No significant past surgical history      No significant past surgical history            FH:  FAMILY HISTORY:        MEDS:  MEDICATIONS  (STANDING):  chlorhexidine 2% Cloths 1 Application(s) Topical <User Schedule>  dexMEDEtomidine Infusion 1 MICROgram(s)/kG/Hr (21.5 mL/Hr) IV Continuous <Continuous>  folic acid Injectable 1 milliGRAM(s) IV Push daily  heparin   Injectable 5000 Unit(s) SubCutaneous every 12 hours  influenza   Vaccine 0.5 milliLiter(s) IntraMuscular once  insulin lispro (ADMELOG) corrective regimen sliding scale   SubCutaneous every 6 hours  lactulose Syrup 10 Gram(s) Oral every 8 hours  norepinephrine Infusion 0.05 MICROgram(s)/kG/Min (4.03 mL/Hr) IV Continuous <Continuous>  pantoprazole  Injectable 40 milliGRAM(s) IV Push daily  piperacillin/tazobactam IVPB.. 3.375 Gram(s) IV Intermittent every 8 hours  potassium phosphate IVPB 15 milliMole(s) IV Intermittent once    MEDICATIONS  (PRN):  fentaNYL    Injectable 50 MICROGram(s) IV Push every 6 hours PRN vent asynchrony  PHENobarbital Injectable 130 milliGRAM(s) IV Push every 15 minutes PRN CIWA >8  sodium chloride 0.9% lock flush 10 milliLiter(s) IV Push every 1 hour PRN Pre/post blood products, medications, blood draw, and to maintain line patency    Allergies    No Known Allergies    Intolerances          ROS: Unable to obtain ROS iso intubated/sedated.  ______________________________________________________________________  PHYSICAL EXAM:  T(C): 38.4 (03-25-24 @ 10:30), Max: 38.4 (03-25-24 @ 10:30)  HR: 79 (03-25-24 @ 10:30)  BP: 89/66 (03-25-24 @ 08:00)  RR: 17 (03-25-24 @ 10:30)  SpO2: 98% (03-25-24 @ 10:30)  Wt(kg): --    03-24 - 03-25  --------------------------------------------------------  IN:    Dexmedetomidine: 43 mL    Dexmedetomidine: 102.4 mL    IV PiggyBack: 100 mL    IV PiggyBack: 275 mL    Norepinephrine: 111 mL    Propofol: 465.3 mL  Total IN: 1096.7 mL    OUT:    Indwelling Catheter - Urethral (mL): 1045 mL    Nasogastric/Oral tube (mL): 200 mL  Total OUT: 1245 mL    Total NET: -148.3 mL      03-25 - 03-25  --------------------------------------------------------  IN:    IV PiggyBack: 25 mL  Total IN: 25 mL    OUT:    Indwelling Catheter - Urethral (mL): 80 mL  Total OUT: 80 mL    Total NET: -55 mL          GEN: NAD, intubated & sedated  HEENT: icteric sclerae, moist mucous membranes  PULM: LSCTAB, no wheezing, rales, or rhonchi  CV: RRR, no m/r/b  GI: Taut, distended, +BS, no fluid shift  MSK: No clubbing or cyanosis  NEURO: Unable to assess orientation  ______________________________________________________________________  LABS:                        10.3   21.60 )-----------( 141      ( 25 Mar 2024 03:16 )             31.6     03-25    144  |  111<H>  |  24<H>  ----------------------------<  147<H>  4.1   |  25  |  0.86    Ca    7.7<L>      25 Mar 2024 03:16  Phos  2.2     03-25  Mg     2.0     03-25    TPro  5.6<L>  /  Alb  1.7<L>  /  TBili  7.7<H>  /  DBili  x   /  AST  135<H>  /  ALT  28  /  AlkPhos  246<H>  03-25    LIVER FUNCTIONS - ( 25 Mar 2024 03:16 )  Alb: 1.7 g/dL / Pro: 5.6 g/dL / ALK PHOS: 246 U/L / ALT: 28 U/L DA / AST: 135 U/L / GGT: x           PT/INR - ( 25 Mar 2024 03:16 )   PT: 14.8 sec;   INR: 1.31 ratio         PTT - ( 25 Mar 2024 03:16 )  PTT:42.6 sec  ____________________________________________    IMAGING:    < from: Xray Abdomen 1 View PORTABLE -Urgent (Xray Abdomen 1 View PORTABLE -Urgent .) (03.24.24 @ 10:30) >  IMPRESSION:  Decreased colonic distention as noted.    < end of copied text >   INITIAL GI CONSULTATION    Patient is a 38y old  Male who presents with a chief complaint of Alcohol withdrawal (25 Mar 2024 07:56)    GI HPI:  Patient is a 38M with a PMHx of alcohol use disorder with hx of withdrawal seizures who presented to the ED on 3/21 with AMS. GI was consulted for abdominal distention.     Patient is intubated & sedated at time of eval, collateral obtained from patient's mother at bedside and chart review.   Patient has a hx of alcohol use disorder, completed rehab program 6 months ago however relapsed, alcohol use has been worsening over the past 2 weeks. Prior to ED presentation, endorsed "flu-like symptoms" including malaise and heartburn. He has been drinking alcohol since 20 y/o, last drink 3/20 PM. No overt signs of bleeding prior to admission.  Patient endorsed constipation and reported leaving rehab a few days ago, a friend gave him a pill so he took it, however the patient's father reported patient came out of alcohol rehab 6 months ago, eyes were more yellow, appeared more confused and saying things that did not make sense, and increased abdominal swelling for the past week, no friend came over, and drank till 3/21 at 9AM.     In the ED, labs notable for K 3.4, BUN 18, Cr 1.77, alb 3.0, TBili 6.6, DBili 5.1, alk phos 377, , ALT 41, lipase 479, lactate 10 -> 5.6, INR 1.55. BAL 9. acetaminophen <2, salicylate <1.7. UTox + bzd. +coronavirus not SARS CoV 2. Acute hepatitis panel neg. BCx neg. UA grossly positive.   CTAP noncon 3/21 - liver is enlarged 29cm, severe hepatic steatosis, bile ducts wnl, GB with possible sludge or vicarious excretion of contrast, pancreas wnl, nonspecific mild distention of transverse colon, no ascites.   CTH noncon 3/21 - neg for acute intracranial hemorrhage  Admitted to the ICU for further management. ICU course c/b worsening abdominal distention, salem pump placed with <100 cc output, bladder pressure 26mmHg, hepatomegaly with hepatic steatosis, persistent leukocytosis, febrile this morning. Surgery consulted and following.     3/22: family indicated no blood transfusions, patient was intubated. Abd xray - colonic distention similar to 3/21, no dilated small bowel.   3/24: Abd xray show decreased colonic distention. Bladder pressure 16, started lactulose. INR 1.47, TBili 7.5, alk phos 249, , ALT 35, WBC 23.86.   3/25: , INR 1.31, BUN 24, Cr 0.86, TBili 7.7, alk phos 246, , ALT 28, WBC 21.6, Hgb 10.3, plt 141.     This morning, patient's mother reports his abdominal appears much improved. Abd xray from yesterday showed less distention of cecum.       PMH/PSH:  PAST MEDICAL & SURGICAL HISTORY:  No pertinent past medical history      History of seizure due to alcohol withdrawal      History of alcohol use disorder      No significant past surgical history      No significant past surgical history            FH:  FAMILY HISTORY:        MEDS:  MEDICATIONS  (STANDING):  chlorhexidine 2% Cloths 1 Application(s) Topical <User Schedule>  dexMEDEtomidine Infusion 1 MICROgram(s)/kG/Hr (21.5 mL/Hr) IV Continuous <Continuous>  folic acid Injectable 1 milliGRAM(s) IV Push daily  heparin   Injectable 5000 Unit(s) SubCutaneous every 12 hours  influenza   Vaccine 0.5 milliLiter(s) IntraMuscular once  insulin lispro (ADMELOG) corrective regimen sliding scale   SubCutaneous every 6 hours  lactulose Syrup 10 Gram(s) Oral every 8 hours  norepinephrine Infusion 0.05 MICROgram(s)/kG/Min (4.03 mL/Hr) IV Continuous <Continuous>  pantoprazole  Injectable 40 milliGRAM(s) IV Push daily  piperacillin/tazobactam IVPB.. 3.375 Gram(s) IV Intermittent every 8 hours  potassium phosphate IVPB 15 milliMole(s) IV Intermittent once    MEDICATIONS  (PRN):  fentaNYL    Injectable 50 MICROGram(s) IV Push every 6 hours PRN vent asynchrony  PHENobarbital Injectable 130 milliGRAM(s) IV Push every 15 minutes PRN CIWA >8  sodium chloride 0.9% lock flush 10 milliLiter(s) IV Push every 1 hour PRN Pre/post blood products, medications, blood draw, and to maintain line patency    Allergies    No Known Allergies    Intolerances          ROS: Unable to obtain ROS iso intubated/sedated.  ______________________________________________________________________  PHYSICAL EXAM:  T(C): 38.4 (03-25-24 @ 10:30), Max: 38.4 (03-25-24 @ 10:30)  HR: 79 (03-25-24 @ 10:30)  BP: 89/66 (03-25-24 @ 08:00)  RR: 17 (03-25-24 @ 10:30)  SpO2: 98% (03-25-24 @ 10:30)  Wt(kg): --    03-24 - 03-25  --------------------------------------------------------  IN:    Dexmedetomidine: 43 mL    Dexmedetomidine: 102.4 mL    IV PiggyBack: 100 mL    IV PiggyBack: 275 mL    Norepinephrine: 111 mL    Propofol: 465.3 mL  Total IN: 1096.7 mL    OUT:    Indwelling Catheter - Urethral (mL): 1045 mL    Nasogastric/Oral tube (mL): 200 mL  Total OUT: 1245 mL    Total NET: -148.3 mL      03-25 - 03-25  --------------------------------------------------------  IN:    IV PiggyBack: 25 mL  Total IN: 25 mL    OUT:    Indwelling Catheter - Urethral (mL): 80 mL  Total OUT: 80 mL    Total NET: -55 mL          GEN: NAD, intubated & sedated  HEENT: icteric sclerae, moist mucous membranes  PULM: LSCTAB, no wheezing, rales, or rhonchi  CV: RRR, no m/r/b  GI: Taut, distended, +BS, no fluid shift  MSK: No clubbing or cyanosis  NEURO: Unable to assess orientation  ______________________________________________________________________  LABS:                        10.3   21.60 )-----------( 141      ( 25 Mar 2024 03:16 )             31.6     03-25    144  |  111<H>  |  24<H>  ----------------------------<  147<H>  4.1   |  25  |  0.86    Ca    7.7<L>      25 Mar 2024 03:16  Phos  2.2     03-25  Mg     2.0     03-25    TPro  5.6<L>  /  Alb  1.7<L>  /  TBili  7.7<H>  /  DBili  x   /  AST  135<H>  /  ALT  28  /  AlkPhos  246<H>  03-25    LIVER FUNCTIONS - ( 25 Mar 2024 03:16 )  Alb: 1.7 g/dL / Pro: 5.6 g/dL / ALK PHOS: 246 U/L / ALT: 28 U/L DA / AST: 135 U/L / GGT: x           PT/INR - ( 25 Mar 2024 03:16 )   PT: 14.8 sec;   INR: 1.31 ratio         PTT - ( 25 Mar 2024 03:16 )  PTT:42.6 sec  ____________________________________________    IMAGING:    < from: Xray Abdomen 1 View PORTABLE -Urgent (Xray Abdomen 1 View PORTABLE -Urgent .) (03.24.24 @ 10:30) >  IMPRESSION:  Decreased colonic distention as noted.    < end of copied text >

## 2024-03-26 LAB
ALBUMIN SERPL ELPH-MCNC: 1.7 G/DL — LOW (ref 3.5–5)
ALP SERPL-CCNC: 236 U/L — HIGH (ref 40–120)
ALT FLD-CCNC: 32 U/L DA — SIGNIFICANT CHANGE UP (ref 10–60)
ANION GAP SERPL CALC-SCNC: 5 MMOL/L — SIGNIFICANT CHANGE UP (ref 5–17)
ANISOCYTOSIS BLD QL: SLIGHT — SIGNIFICANT CHANGE UP
APTT BLD: 36.9 SEC — HIGH (ref 24.5–35.6)
AST SERPL-CCNC: 169 U/L — HIGH (ref 10–40)
BASE EXCESS BLDA CALC-SCNC: -2.2 MMOL/L — LOW (ref -2–3)
BASOPHILS # BLD AUTO: 0 K/UL — SIGNIFICANT CHANGE UP (ref 0–0.2)
BASOPHILS NFR BLD AUTO: 0 % — SIGNIFICANT CHANGE UP (ref 0–2)
BILIRUB SERPL-MCNC: 9.2 MG/DL — HIGH (ref 0.2–1.2)
BLOOD GAS COMMENTS ARTERIAL: SIGNIFICANT CHANGE UP
BUN SERPL-MCNC: 23 MG/DL — HIGH (ref 7–18)
CALCIUM SERPL-MCNC: 8 MG/DL — LOW (ref 8.4–10.5)
CHLORIDE SERPL-SCNC: 114 MMOL/L — HIGH (ref 96–108)
CO2 SERPL-SCNC: 25 MMOL/L — SIGNIFICANT CHANGE UP (ref 22–31)
CREAT SERPL-MCNC: 0.71 MG/DL — SIGNIFICANT CHANGE UP (ref 0.5–1.3)
CULTURE RESULTS: SIGNIFICANT CHANGE UP
CULTURE RESULTS: SIGNIFICANT CHANGE UP
EGFR: 122 ML/MIN/1.73M2 — SIGNIFICANT CHANGE UP
EOSINOPHIL # BLD AUTO: 1.1 K/UL — HIGH (ref 0–0.5)
EOSINOPHIL NFR BLD AUTO: 5 % — SIGNIFICANT CHANGE UP (ref 0–6)
GLUCOSE BLDC GLUCOMTR-MCNC: 108 MG/DL — HIGH (ref 70–99)
GLUCOSE BLDC GLUCOMTR-MCNC: 111 MG/DL — HIGH (ref 70–99)
GLUCOSE BLDC GLUCOMTR-MCNC: 118 MG/DL — HIGH (ref 70–99)
GLUCOSE BLDC GLUCOMTR-MCNC: 136 MG/DL — HIGH (ref 70–99)
GLUCOSE SERPL-MCNC: 132 MG/DL — HIGH (ref 70–99)
HCO3 BLDA-SCNC: 24 MMOL/L — SIGNIFICANT CHANGE UP (ref 21–28)
HCT VFR BLD CALC: 31.2 % — LOW (ref 39–50)
HGB BLD-MCNC: 10.1 G/DL — LOW (ref 13–17)
HOROWITZ INDEX BLDA+IHG-RTO: 40 — SIGNIFICANT CHANGE UP
HYPOCHROMIA BLD QL: SIGNIFICANT CHANGE UP
INR BLD: 1.22 RATIO — HIGH (ref 0.85–1.18)
LIDOCAIN IGE QN: 86 U/L — HIGH (ref 13–75)
LYMPHOCYTES # BLD AUTO: 17 % — SIGNIFICANT CHANGE UP (ref 13–44)
LYMPHOCYTES # BLD AUTO: 3.72 K/UL — HIGH (ref 1–3.3)
MAGNESIUM SERPL-MCNC: 2.3 MG/DL — SIGNIFICANT CHANGE UP (ref 1.6–2.6)
MANUAL SMEAR VERIFICATION: SIGNIFICANT CHANGE UP
MCHC RBC-ENTMCNC: 32.4 GM/DL — SIGNIFICANT CHANGE UP (ref 32–36)
MCHC RBC-ENTMCNC: 34.6 PG — HIGH (ref 27–34)
MCV RBC AUTO: 106.8 FL — HIGH (ref 80–100)
MONOCYTES # BLD AUTO: 0.22 K/UL — SIGNIFICANT CHANGE UP (ref 0–0.9)
MONOCYTES NFR BLD AUTO: 1 % — LOW (ref 2–14)
NEUTROPHILS # BLD AUTO: 16.86 K/UL — HIGH (ref 1.8–7.4)
NEUTROPHILS NFR BLD AUTO: 77 % — SIGNIFICANT CHANGE UP (ref 43–77)
NRBC # BLD: 0 /100 WBCS — SIGNIFICANT CHANGE UP (ref 0–0)
OVALOCYTES BLD QL SMEAR: SLIGHT — SIGNIFICANT CHANGE UP
PCO2 BLDA: 44 MMHG — SIGNIFICANT CHANGE UP (ref 35–48)
PH BLDA: 7.34 — LOW (ref 7.35–7.45)
PHOSPHATE SERPL-MCNC: 2.5 MG/DL — SIGNIFICANT CHANGE UP (ref 2.5–4.5)
PLAT MORPH BLD: NORMAL — SIGNIFICANT CHANGE UP
PLATELET # BLD AUTO: 173 K/UL — SIGNIFICANT CHANGE UP (ref 150–400)
PLATELET COUNT - ESTIMATE: NORMAL — SIGNIFICANT CHANGE UP
PO2 BLDA: 138 MMHG — HIGH (ref 83–108)
POLYCHROMASIA BLD QL SMEAR: SLIGHT — SIGNIFICANT CHANGE UP
POTASSIUM SERPL-MCNC: 4.9 MMOL/L — SIGNIFICANT CHANGE UP (ref 3.5–5.3)
POTASSIUM SERPL-SCNC: 4.9 MMOL/L — SIGNIFICANT CHANGE UP (ref 3.5–5.3)
PROT SERPL-MCNC: 5.5 G/DL — LOW (ref 6–8.3)
PROTHROM AB SERPL-ACNC: 13.8 SEC — HIGH (ref 9.5–13)
RBC # BLD: 2.92 M/UL — LOW (ref 4.2–5.8)
RBC # FLD: 17.7 % — HIGH (ref 10.3–14.5)
RBC BLD AUTO: ABNORMAL
SAO2 % BLDA: 100 % — SIGNIFICANT CHANGE UP
SODIUM SERPL-SCNC: 144 MMOL/L — SIGNIFICANT CHANGE UP (ref 135–145)
SPECIMEN SOURCE: SIGNIFICANT CHANGE UP
SPECIMEN SOURCE: SIGNIFICANT CHANGE UP
SPHEROCYTES BLD QL SMEAR: SLIGHT — SIGNIFICANT CHANGE UP
STOMATOCYTES BLD QL SMEAR: SLIGHT — SIGNIFICANT CHANGE UP
WBC # BLD: 21.9 K/UL — HIGH (ref 3.8–10.5)
WBC # FLD AUTO: 21.9 K/UL — HIGH (ref 3.8–10.5)

## 2024-03-26 PROCEDURE — 74018 RADEX ABDOMEN 1 VIEW: CPT | Mod: 26

## 2024-03-26 PROCEDURE — 99232 SBSQ HOSP IP/OBS MODERATE 35: CPT

## 2024-03-26 RX ORDER — THIAMINE MONONITRATE (VIT B1) 100 MG
100 TABLET ORAL DAILY
Refills: 0 | Status: DISCONTINUED | OUTPATIENT
Start: 2024-03-27 | End: 2024-04-11

## 2024-03-26 RX ORDER — PHENOBARBITAL 60 MG
130 TABLET ORAL
Refills: 0 | Status: DISCONTINUED | OUTPATIENT
Start: 2024-03-26 | End: 2024-03-29

## 2024-03-26 RX ORDER — DEXMEDETOMIDINE HYDROCHLORIDE IN 0.9% SODIUM CHLORIDE 4 UG/ML
0.5 INJECTION INTRAVENOUS
Qty: 400 | Refills: 0 | Status: DISCONTINUED | OUTPATIENT
Start: 2024-03-26 | End: 2024-03-27

## 2024-03-26 RX ORDER — ACETAMINOPHEN 500 MG
1000 TABLET ORAL ONCE
Refills: 0 | Status: COMPLETED | OUTPATIENT
Start: 2024-03-26 | End: 2024-03-26

## 2024-03-26 RX ADMIN — Medication 400 MILLIGRAM(S): at 13:46

## 2024-03-26 RX ADMIN — CHLORHEXIDINE GLUCONATE 15 MILLILITER(S): 213 SOLUTION TOPICAL at 05:10

## 2024-03-26 RX ADMIN — PIPERACILLIN AND TAZOBACTAM 25 GRAM(S): 4; .5 INJECTION, POWDER, LYOPHILIZED, FOR SOLUTION INTRAVENOUS at 05:10

## 2024-03-26 RX ADMIN — Medication 4.03 MICROGRAM(S)/KG/MIN: at 18:38

## 2024-03-26 RX ADMIN — Medication 1 MILLIGRAM(S): at 18:35

## 2024-03-26 RX ADMIN — HEPARIN SODIUM 5000 UNIT(S): 5000 INJECTION INTRAVENOUS; SUBCUTANEOUS at 05:10

## 2024-03-26 RX ADMIN — FENTANYL CITRATE 50 MICROGRAM(S): 50 INJECTION INTRAVENOUS at 03:04

## 2024-03-26 RX ADMIN — Medication 130 MILLIGRAM(S): at 09:29

## 2024-03-26 RX ADMIN — Medication 400 MILLIGRAM(S): at 03:20

## 2024-03-26 RX ADMIN — PANTOPRAZOLE SODIUM 40 MILLIGRAM(S): 20 TABLET, DELAYED RELEASE ORAL at 12:20

## 2024-03-26 RX ADMIN — HEPARIN SODIUM 5000 UNIT(S): 5000 INJECTION INTRAVENOUS; SUBCUTANEOUS at 18:36

## 2024-03-26 RX ADMIN — CHLORHEXIDINE GLUCONATE 15 MILLILITER(S): 213 SOLUTION TOPICAL at 18:36

## 2024-03-26 RX ADMIN — Medication 1000 MILLIGRAM(S): at 03:50

## 2024-03-26 RX ADMIN — KETAMINE HYDROCHLORIDE 2.15 MG/KG/HR: 100 INJECTION INTRAMUSCULAR; INTRAVENOUS at 05:11

## 2024-03-26 RX ADMIN — CHLORHEXIDINE GLUCONATE 1 APPLICATION(S): 213 SOLUTION TOPICAL at 05:11

## 2024-03-26 RX ADMIN — Medication 1000 MILLIGRAM(S): at 15:20

## 2024-03-26 RX ADMIN — PIPERACILLIN AND TAZOBACTAM 25 GRAM(S): 4; .5 INJECTION, POWDER, LYOPHILIZED, FOR SOLUTION INTRAVENOUS at 13:17

## 2024-03-26 RX ADMIN — Medication 130 MILLIGRAM(S): at 01:09

## 2024-03-26 RX ADMIN — PIPERACILLIN AND TAZOBACTAM 25 GRAM(S): 4; .5 INJECTION, POWDER, LYOPHILIZED, FOR SOLUTION INTRAVENOUS at 21:41

## 2024-03-26 RX ADMIN — KETAMINE HYDROCHLORIDE 2.15 MG/KG/HR: 100 INJECTION INTRAMUSCULAR; INTRAVENOUS at 09:47

## 2024-03-26 RX ADMIN — DEXMEDETOMIDINE HYDROCHLORIDE IN 0.9% SODIUM CHLORIDE 10.8 MICROGRAM(S)/KG/HR: 4 INJECTION INTRAVENOUS at 09:52

## 2024-03-26 RX ADMIN — LACTULOSE 10 GRAM(S): 10 SOLUTION ORAL at 21:41

## 2024-03-26 RX ADMIN — FENTANYL CITRATE 50 MICROGRAM(S): 50 INJECTION INTRAVENOUS at 03:11

## 2024-03-26 RX ADMIN — LACTULOSE 10 GRAM(S): 10 SOLUTION ORAL at 05:10

## 2024-03-26 NOTE — PROGRESS NOTE ADULT - ASSESSMENT
38M with pmhx ETOH abuse, with abd distention, intra-abd pressure <20 as per ICU    +BM- nonbloody, abd pressure downtrending as per ICU    Plan  - continue NPO, NGT  - follow GI recommendations, possible decompression  - repeat abd XR   - remainder of care as per ICU team     38M with pmhx ETOH abuse, with abd distention, intra-abd pressure <20 as per ICU    +BM- nonbloody, abd pressure downtrending as per ICU    Plan  - continue NPO, NGT prn  - follow GI recommendations, possible decompression  - no acute surgical intervention   - remainder of care as per ICU team

## 2024-03-26 NOTE — PROGRESS NOTE ADULT - ASSESSMENT
IMP: This is a 38 yr old man with  alcohol withdrawal seizure with alcohol use disorder, who p/w confusion starting this morning. Admitted for high risk alcohol withdrawal with persistent sinus tachycardia.    Assessment:   # Acute hypoxic resp failure   # Alcohol use disorder  # Alcohol withdrawal  # Acute metabolic encephalopathy  # Atypical pneumonia  # Hx of alcohol seizures  # Possible pancreatitis  # Liver steatosis  # Alcoholic hepatitis  # Sinus tachycardia  # SIRS  # ARABELLA  # Ileus       Plan:   =============NEURO:=============  # Acute metabolic encephalopathy  # Alcohol withdrawal  s/p Ativan however will pursue intubation for protecting the airways  s/p phenobarb IVP x1 on 3/23   -continue phenobarb pushes when pt becomes agitated  - sedated with ketamine drip    # Alcohol use disorder  # Hx of alcohol seizures  - Pt has been in and out of alcohol rehab  - Most recently came out 6 months ago - as per sister pt was only hospitalized and not in rehab   - SW consult order placed     #intubated (3/22)    =============CARDIO:================  # Sinus tachycardia  - P/w sinus tachycardia  - Likely due to alcohol withdrawal  - EKG = sinus tachycardia  - trop neg      ==========PULM:=============  # Intubated (3/22)    #Atypical pneumonia   # coronavirus positive however not COVID  CT chest : Multifocal bilateral peripheral opacities suggesting atypical pneumonia. Probable compression atelectasis right lung base, related to elevated right hemidiaphragm.  - c/w zosyn       ==========GI:==========  # Constipation  #?acute abdomen with increased abdominal pressure of 25   surgery consulted - no indication for surgery at this time , recs paralyzing the patient   - c/w salam sump suction   - abdominal pressure improving   - 3/24 Bladder pressure 16   - 3/24 started lactulose 10mg q8h  - 3/25 Neostigmine x1, led to bradycardia. Atropine given.    # Alcoholic hepatitis   #transaminitis  #Hyperbilirubinemia   P/w T bili 6.6 (Dir 5.1), , ALT 41,   Maddrey score = borderline at 31.4 points (32 point is the cutoff for steroid treatment)  MELD-Na score 17 points 6 % 90day mortality   - Transaminitis improving   - T. Hector 6.6 > 5.6 > 5.5>7.5   - 3/24 Maddrey score - 28.7   - NPO for now    # Possible pancreatitis  Lipase 470, possible mid abdominal pain. Negative imaging.  - c/w NPO  - f/u repeat Lipase    # Liver steatosis    # Liver lesion  Likely due to alcohol use disorder  CT = Has hypodense foci with central hyperdensity involving the pericholecystic region and segment 6, indeterminate.   - Rec outpt MRI, hepatology consult once more stable for outpt follow up       =======RENAL:==========  #Metabolic Acidosis  -ABG 3/25 showing bicarb of 10  -Vent settings adjusted      # ARABELLA  P/w SCr 1.77 (baseline 0.8)  S/p 23L IVF in ED  urinary retention 1.5 L s/p straight cath overnight  - Roman placed on 3/22  3/22 Scr improved to 0.85   Renal US: No hydronephrosis.   RESOLVED    # Lactic acidosis  P/w Lactate 10  Due to dehydration, infection-  s/p 3L IVF -> Lactate 1.6  RESOLVED    # Hypokalemia  - K 3.3 s/p Kcl 10 mEq x3 ->3.9   - repeat 3.3 will replete IV  RESOLVED     =====INFECTIOUS=====  # SIRS  -p/w Lactate 10, tachycardia, tachypnea>20  CXR neg for consolidation or effusion  Positive coronavirus  s/p Cefepime in ED  d/c'd Ceftriaoxone (3/23)  3/23 Started Zosyn   legionella neg   U/A positive. UCx not sent   - BCx NGTD 48h   - C/w with Zosyn        ==============HEME:===========  # Leukocytosis  - As above in sepsis      =======ENDO:=======  A1c 7.9  - FS, SSI  - monitor     =========SKIN/CATHETERS=======  Roman  [facility]  Left A-Line      =========PPX:========  - Hep SC  - PPI      IMP: This is a 38 yr old man with  alcohol withdrawal seizure with alcohol use disorder, who p/w confusion starting this morning. Admitted for high risk alcohol withdrawal with persistent sinus tachycardia.    Assessment:   # Acute hypoxic resp failure   # Alcohol use disorder  # Alcohol withdrawal  # Acute metabolic encephalopathy  # Atypical pneumonia  # Hx of alcohol seizures  # Possible pancreatitis  # Liver steatosis  # Alcoholic hepatitis  # Sinus tachycardia  # SIRS  # ARABELLA  # Ileus       Plan:   =============NEURO:=============  # Acute metabolic encephalopathy  # Alcohol withdrawal  s/p Ativan however will pursue intubation for protecting the airways  s/p phenobarb IVP x1 on 3/23   -continue phenobarb pushes when pt becomes agitated  - sedated with ketamine drip and precedex    # Alcohol use disorder  # Hx of alcohol seizures  - Pt has been in and out of alcohol rehab  - Most recently came out 6 months ago - as per sister pt was only hospitalized and not in rehab   - SW consult order placed     #intubated (3/22)  -Attempt to do SAT, SBT in afternoon 3/26    =============CARDIO:================  # Sinus tachycardia  - P/w sinus tachycardia  - Likely due to alcohol withdrawal  - EKG = sinus tachycardia  - trop neg      ==========PULM:=============  # Intubated (3/22)    #Atypical pneumonia   # coronavirus positive however not COVID  CT chest : Multifocal bilateral peripheral opacities suggesting atypical pneumonia. Probable compression atelectasis right lung base, related to elevated right hemidiaphragm.  - c/w zosyn       ==========GI:==========  # Constipation  #?acute abdomen with increased abdominal pressure of 25   surgery consulted - no indication for surgery at this time , recs paralyzing the patient   - c/w salam sump suction   - abdominal pressure improving   - 3/24 Bladder pressure 16   - 3/24 started lactulose 10mg q8h  - 3/25 Neostigmine x1, led to bradycardia. Atropine given.  - 3/26 Pt had large BM, will continue lactulose    # Alcoholic hepatitis   #transaminitis  #Hyperbilirubinemia   P/w T bili 6.6 (Dir 5.1), , ALT 41,   Maddrey score = borderline at 31.4 points (32 point is the cutoff for steroid treatment)  MELD-Na score 17 points 6 % 90day mortality   - Transaminitis improving   - T. Hector 6.6 > 5.6 > 5.5>7.5   - 3/24 Maddrey score - 28.7   - NPO for now    # Possible pancreatitis  Lipase 470, possible mid abdominal pain. Negative imaging.  - c/w NPO  - repeat Lipase wnl    # Liver steatosis    # Liver lesion  Likely due to alcohol use disorder  CT = Has hypodense foci with central hyperdensity involving the pericholecystic region and segment 6, indeterminate.   - Rec outpt MRI, hepatology consult once more stable for outpt follow up       =======RENAL:==========  #Metabolic Acidosis  -ABG 3/25 showing bicarb of 10  -Vent settings adjusted    # ARABELLA  P/w SCr 1.77 (baseline 0.8)  S/p 23L IVF in ED  urinary retention 1.5 L s/p straight cath overnight  - Roman placed on 3/22  3/22 Scr improved to 0.85   Renal US: No hydronephrosis.   RESOLVED    # Lactic acidosis  P/w Lactate 10  Due to dehydration, infection-  s/p 3L IVF -> Lactate 1.6  RESOLVED    # Hypokalemia  - K 3.3 s/p Kcl 10 mEq x3 ->3.9   - repeat 3.3 will replete IV  RESOLVED     =====INFECTIOUS=====  # SIRS  -p/w Lactate 10, tachycardia, tachypnea>20  CXR neg for consolidation or effusion  Positive coronavirus  s/p Cefepime in ED  d/c'd Ceftriaoxone (3/23)  3/23 Started Zosyn   legionella neg   U/A positive. UCx not sent   - BCx NGTD 48h   - C/w with Zosyn    -Will obtain another set of cultures if continues to spike fever    ==============HEME:===========  # Leukocytosis  - As above in sepsis    =======ENDO:=======  A1c 7.9  - FS, SSI  - monitor     =========SKIN/CATHETERS=======  [facility]  Left A-Line    =========PPX:========  - Hep SC  - PPI

## 2024-03-26 NOTE — PROGRESS NOTE ADULT - SUBJECTIVE AND OBJECTIVE BOX
GI Progress Note    Patient is a 38y old  Male who presents with a chief complaint of Alcohol withdrawal (26 Mar 2024 09:13)    GI was consulted for abdominal distention.    24-HOUR INTERVAL EVENTS: Patient in bed, remains intubated and on norepinephrine, minimally sedated. s/p neostigmine x 1 yesterday. Large BM this morning, no hematochezia or melena.     MEDICATIONS  (STANDING):  chlorhexidine 0.12% Liquid 15 milliLiter(s) Oral Mucosa every 12 hours  chlorhexidine 2% Cloths 1 Application(s) Topical <User Schedule>  dexMEDEtomidine Infusion 0.5 MICROgram(s)/kG/Hr (10.8 mL/Hr) IV Continuous <Continuous>  folic acid Injectable 1 milliGRAM(s) IV Push daily  heparin   Injectable 5000 Unit(s) SubCutaneous every 12 hours  influenza   Vaccine 0.5 milliLiter(s) IntraMuscular once  insulin lispro (ADMELOG) corrective regimen sliding scale   SubCutaneous every 6 hours  ketamine Infusion. 0.25 mG/kG/Hr (2.15 mL/Hr) IV Continuous <Continuous>  lactulose Syrup 10 Gram(s) Oral every 8 hours  norepinephrine Infusion 0.05 MICROgram(s)/kG/Min (4.03 mL/Hr) IV Continuous <Continuous>  pantoprazole  Injectable 40 milliGRAM(s) IV Push daily  piperacillin/tazobactam IVPB.. 3.375 Gram(s) IV Intermittent every 8 hours    MEDICATIONS  (PRN):  PHENobarbital Injectable 130 milliGRAM(s) IV Push every 30 minutes PRN Agitation  sodium chloride 0.9% lock flush 10 milliLiter(s) IV Push every 1 hour PRN Pre/post blood products, medications, blood draw, and to maintain line patency    __________________________________________________  REVIEW OF SYSTEMS:  Unable to obtain ROS iso intubated/sedated.  ________________________________________________  PHYSICAL EXAM    Vital Signs Last 24 Hrs  T(C): 37.7 (26 Mar 2024 09:45), Max: 38.7 (26 Mar 2024 02:45)  T(F): 99.9 (26 Mar 2024 09:45), Max: 101.7 (26 Mar 2024 02:45)  HR: 94 (26 Mar 2024 09:45) (55 - 102)  BP: 100/69 (26 Mar 2024 08:00) (89/68 - 100/69)  BP(mean): 79 (26 Mar 2024 08:00) (73 - 79)  RR: 25 (26 Mar 2024 09:45) (6 - 28)  SpO2: 97% (26 Mar 2024 09:45) (92% - 100%)    Parameters below as of 26 Mar 2024 07:00  Patient On (Oxygen Delivery Method): ventilator    O2 Concentration (%): 40    GEN: NAD, intubated  HEENT: icteric sclerae neck supple, moist mucous membranes  PULM: LCTAB, no wheezing, rales, or rhonchi  CV: RRR, no m/r/g  GI: taut, distended, +BS, no fluid shift  MSK: No clubbing or cyanosis  NEURO: Unable to assess orientation  _________________________________________________  LABS:                        10.1   21.90 )-----------( 173      ( 26 Mar 2024 03:30 )             31.2     03-26    144  |  114<H>  |  23<H>  ----------------------------<  132<H>  4.9   |  25  |  0.71    Ca    8.0<L>      26 Mar 2024 03:30  Phos  2.5     03-26  Mg     2.3     03-26    TPro  5.5<L>  /  Alb  1.7<L>  /  TBili  9.2<H>  /  DBili  x   /  AST  169<H>  /  ALT  32  /  AlkPhos  236<H>  03-26    PT/INR - ( 26 Mar 2024 03:30 )   PT: 13.8 sec;   INR: 1.22 ratio         PTT - ( 26 Mar 2024 03:30 )  PTT:36.9 sec  Urinalysis Basic - ( 26 Mar 2024 03:30 )    Color: x / Appearance: x / SG: x / pH: x  Gluc: 132 mg/dL / Ketone: x  / Bili: x / Urobili: x   Blood: x / Protein: x / Nitrite: x   Leuk Esterase: x / RBC: x / WBC x   Sq Epi: x / Non Sq Epi: x / Bacteria: x      CAPILLARY BLOOD GLUCOSE      POCT Blood Glucose.: 136 mg/dL (26 Mar 2024 06:41)  POCT Blood Glucose.: 125 mg/dL (25 Mar 2024 23:35)  POCT Blood Glucose.: 143 mg/dL (25 Mar 2024 17:19)  POCT Blood Glucose.: 130 mg/dL (25 Mar 2024 12:55)    SARS-CoV-2: NotDetec (21 Mar 2024 18:45)      RADIOLOGY & ADDITIONAL TESTS:  No new imaging.

## 2024-03-26 NOTE — CHART NOTE - NSCHARTNOTEFT_GEN_A_CORE
Assessment:   Patient is a 38y old  Male who presents with a chief complaint of Alcohol withdrawal (26 Mar 2024 10:11). Pt continues NPO, now day #6. Discussed on ICU IDR, reported that pt had 2 BMs, improved abdominal distension, may extubate. Pt now noted as extubated. Seen by GI.       Factors impacting intake: [ ] none [ ] nausea  [ ] vomiting [ ] diarrhea [ ] constipation  [ ]chewing problems [ ] swallowing issues  [x ] other: altered GI fx/ structure    Diet Prescription: Diet, NPO:   Except Medications (24 @ 13:31)        Daily     Daily Weight in k.1 (26 Mar 2024 08:00)  Weight in k.5 (25 Mar 2024 07:00)  Weight in k.1 (24 Mar 2024 07:00)  Weight in k.1 (23 Mar 2024 08:00)  Weight in k.5 (22 Mar 2024 08:00)    % Weight Change: I>O, 1+ generalized edema noted.    Pertinent Medications: MEDICATIONS  (STANDING):  chlorhexidine 0.12% Liquid 15 milliLiter(s) Oral Mucosa every 12 hours  chlorhexidine 2% Cloths 1 Application(s) Topical <User Schedule>  dexMEDEtomidine Infusion 0.5 MICROgram(s)/kG/Hr (10.8 mL/Hr) IV Continuous <Continuous>  folic acid Injectable 1 milliGRAM(s) IV Push daily  heparin   Injectable 5000 Unit(s) SubCutaneous every 12 hours  influenza   Vaccine 0.5 milliLiter(s) IntraMuscular once  insulin lispro (ADMELOG) corrective regimen sliding scale   SubCutaneous every 6 hours  ketamine Infusion. 0.25 mG/kG/Hr (2.15 mL/Hr) IV Continuous <Continuous>  lactulose Syrup 10 Gram(s) Oral every 8 hours  norepinephrine Infusion 0.05 MICROgram(s)/kG/Min (4.03 mL/Hr) IV Continuous <Continuous>  pantoprazole  Injectable 40 milliGRAM(s) IV Push daily  piperacillin/tazobactam IVPB.. 3.375 Gram(s) IV Intermittent every 8 hours    MEDICATIONS  (PRN):  PHENobarbital Injectable 130 milliGRAM(s) IV Push every 30 minutes PRN Agitation  sodium chloride 0.9% lock flush 10 milliLiter(s) IV Push every 1 hour PRN Pre/post blood products, medications, blood draw, and to maintain line patency    Pertinent Labs:  Na144 mmol/L Glu 132 mg/dL<H> K+ 4.9 mmol/L Cr  0.71 mg/dL BUN 23 mg/dL<H>  Phos 2.5 mg/dL  Alb 1.7 g/dL<L>  Chol --    LDL --    HDL --    Trig 759 mg/dL<H>     CAPILLARY BLOOD GLUCOSE      POCT Blood Glucose.: 118 mg/dL (26 Mar 2024 12:16)  POCT Blood Glucose.: 136 mg/dL (26 Mar 2024 06:41)  POCT Blood Glucose.: 125 mg/dL (25 Mar 2024 23:35)  POCT Blood Glucose.: 143 mg/dL (25 Mar 2024 17:19)        Estimated Needs:   [x ] no change since previous assessment  [ ] recalculated:       Previous Nutrition Diagnosis:   [ ] Altered GI function  [x ]Inadequate Oral Intake [ ] Swallowing Difficulty   [ ] Altered nutrition related labs [ ] Increased Nutrient Needs [ ] Overweight/Obesity   [ ] Unintended Weight Loss [ ] Food & Nutrition Related Knowledge Deficit [x ] Malnutrition (PCM at least mild)  [ ] Other:     Nutrition Diagnosis is [x ] PCM (moderate)    Interventions:   Recommend  [ ] Change Diet To:  [ ] Nutrition Supplement  [ ] Nutrition Support  [x ] Other: Diet advancement per MD.  MD to monitor. RD available.     Monitoring and Evaluation: [ x ] follow up per protocol

## 2024-03-26 NOTE — DIETITIAN NUTRITION RISK NOTIFICATION - TREATMENT: THE FOLLOWING DIET HAS BEEN RECOMMENDED
Diet, MICHELLE (03-22-24 @ 08:40) [Active]      
Diet, NPO:   Except Medications (03-24-24 @ 13:31) [Active]

## 2024-03-26 NOTE — PROGRESS NOTE ADULT - SUBJECTIVE AND OBJECTIVE BOX
INTERVAL HPI/OVERNIGHT EVENTS:  Pt resting comfortably. No acute complaints.     MEDICATIONS  (STANDING):  chlorhexidine 0.12% Liquid 15 milliLiter(s) Oral Mucosa every 12 hours  chlorhexidine 2% Cloths 1 Application(s) Topical <User Schedule>  folic acid Injectable 1 milliGRAM(s) IV Push daily  heparin   Injectable 5000 Unit(s) SubCutaneous every 12 hours  influenza   Vaccine 0.5 milliLiter(s) IntraMuscular once  insulin lispro (ADMELOG) corrective regimen sliding scale   SubCutaneous every 6 hours  ketamine Infusion. 0.25 mG/kG/Hr (2.15 mL/Hr) IV Continuous <Continuous>  lactulose Syrup 10 Gram(s) Oral every 8 hours  norepinephrine Infusion 0.05 MICROgram(s)/kG/Min (4.03 mL/Hr) IV Continuous <Continuous>  pantoprazole  Injectable 40 milliGRAM(s) IV Push daily  piperacillin/tazobactam IVPB.. 3.375 Gram(s) IV Intermittent every 8 hours    MEDICATIONS  (PRN):  fentaNYL    Injectable 50 MICROGram(s) IV Push every 6 hours PRN vent asynchrony  PHENobarbital Injectable 130 milliGRAM(s) IV Push every 30 minutes PRN Agitation  sodium chloride 0.9% lock flush 10 milliLiter(s) IV Push every 1 hour PRN Pre/post blood products, medications, blood draw, and to maintain line patency      Vital Signs Last 24 Hrs  T(C): 38 (26 Mar 2024 08:00), Max: 38.7 (26 Mar 2024 02:45)  T(F): 100.4 (26 Mar 2024 08:00), Max: 101.7 (26 Mar 2024 02:45)  HR: 95 (26 Mar 2024 08:00) (55 - 102)  BP: 100/69 (26 Mar 2024 08:00) (89/68 - 100/69)  BP(mean): 79 (26 Mar 2024 08:00) (73 - 79)  RR: 16 (26 Mar 2024 08:00) (6 - 28)  SpO2: 96% (26 Mar 2024 08:00) (91% - 100%)    Parameters below as of 26 Mar 2024 07:00  Patient On (Oxygen Delivery Method): ventilator    O2 Concentration (%): 40    Physical:  General: A&Ox3. NAD.  Abdomen: Soft nondistended, nontender.    I&O's Detail    25 Mar 2024 07:01  -  26 Mar 2024 07:00  --------------------------------------------------------  IN:    Dexmedetomidine: 236 mL    Enteral Tube Flush: 30 mL    IV PiggyBack: 250 mL    IV PiggyBack: 300 mL    IV PiggyBack: 100 mL    IV PiggyBack: 325 mL    Ketamine: 103.3 mL    Norepinephrine: 83.2 mL  Total IN: 1427.5 mL    OUT:    Indwelling Catheter - Urethral (mL): 1140 mL    Nasogastric/Oral tube (mL): 100 mL  Total OUT: 1240 mL    Total NET: 187.5 mL          LABS:                        10.1   21.90 )-----------( 173      ( 26 Mar 2024 03:30 )             31.2             03-26    144  |  114<H>  |  23<H>  ----------------------------<  132<H>  4.9   |  25  |  0.71    Ca    8.0<L>      26 Mar 2024 03:30  Phos  2.5     03-26  Mg     2.3     03-26    TPro  5.5<L>  /  Alb  1.7<L>  /  TBili  9.2<H>  /  DBili  x   /  AST  169<H>  /  ALT  32  /  AlkPhos  236<H>  03-26      38y.o. Male

## 2024-03-26 NOTE — PROGRESS NOTE ADULT - ASSESSMENT
Patient is a 38M with a PMHx of alcohol use disorder with hx of withdrawal seizures who presented to the ED on 3/21 with AMS. GI was consulted for abdominal distention.     #Abdominal distention  #Colonic distention  #Hepatomegaly  #Hepatic steatosis  #Alcohol-associated hepatitis  #Elevated INR  #Elevated lipase  #Acute metabolic encephalopathy  #Elevated ammonia  #Macrocytic anemia  #Hypoalbuminemia  #Alcohol use disorder  #ETOH withdrawal  #Liver lesion  Patient presented with AMS, extensive etoh use hx and prior withdrawals and seizures, c/f encephalopathy given presentation, last drink 3/21 AM day of admission. In the ED, ammonia 107 -> 172, WBC 24.09, INR 1.55, TBili 6.6, alk phos 377, , ALT 41.  , lipase 479, lactate 10 -> 5.6 -> 1.9. Admitted to the ICU for high risk alcohol withdrawal and persistent sinus tach. LFTs remain elevated however stable, intubated 3/22. AFP 2.7.  ICU course c/b worsening abdominal distention, serial abd xrays showing colonic distention, surgery consulted for increased intra-abdominal pressure 26mmHg, suspect likely ileus, rec NPO, NGT, and GI eval, no surgical intervention at this time.   3/24: TBili 7.5, alk phos 249, , ALT 35, INR 1.47  3/25: TBili 7.7, alk phos 246, , ALT 28, WBC 21.6, continues on Zosyn. Tmax 38.3, on norepinephrine & dexmedetomidine. MELD 3.0: 21. MDF 16, may benefit from steroids however given active infection (pna, UTI), risks > benefits, defer at this time. s/p neostigmine x 1.  3/26: Large BM this morning. WBC 21.9, Hgb 10.1, .8, INR 1.22, TBili 9.2, alk phos 236, , ALT 32, lipase 86.   Meld 3.0: 21. MDF: 12.9, continue to defer steroids 2/2 active infection (pna, UTI, febrile), remains on Zosyn. Intermittent use of restraints, minimal sedation this morning. Of note, on repeat review of CTAP noncon 3/21 - there are hypodense foci with central hyperdensity involving the pericholecystic region and segment 6, indeterminate.     	- Consider rectal tube placement for further decompression  	- Large BM 3/26 AM, consider repeat abd xray (last imaging 3/24)  	- Follow up UCx, fever curve  	- Agree with antibiotics  	- Agree with lactulose  	- s/p neostigmine x 1 on 3/25 with good effect  	- Trend PT/INR and LFTs/MELD labs daily  	- Serial abd exams  	- IV PPI BID  	- Maintain active T&S, 2 large bore peripheral IVs, transrfuse for goal HGb >7 or if symptomatic  	- Trend H/H  	- No acute GI intervention warranted given overall improving bowel function    This note and its recommendations herein are preliminary until such time as cosigned by an attending.

## 2024-03-26 NOTE — PROGRESS NOTE ADULT - SUBJECTIVE AND OBJECTIVE BOX
INTERVAL HPI/OVERNIGHT EVENTS: ***    PRESSORS: [ ] YES [ ] NO  WHICH:    Antimicrobial:  piperacillin/tazobactam IVPB.. 3.375 Gram(s) IV Intermittent every 8 hours    Cardiovascular:  norepinephrine Infusion 0.05 MICROgram(s)/kG/Min IV Continuous <Continuous>    Pulmonary:    Hematalogic:  heparin   Injectable 5000 Unit(s) SubCutaneous every 12 hours    Other:  chlorhexidine 0.12% Liquid 15 milliLiter(s) Oral Mucosa every 12 hours  chlorhexidine 2% Cloths 1 Application(s) Topical <User Schedule>  fentaNYL    Injectable 50 MICROGram(s) IV Push every 6 hours PRN  folic acid Injectable 1 milliGRAM(s) IV Push daily  influenza   Vaccine 0.5 milliLiter(s) IntraMuscular once  insulin lispro (ADMELOG) corrective regimen sliding scale   SubCutaneous every 6 hours  ketamine Infusion. 0.25 mG/kG/Hr IV Continuous <Continuous>  lactulose Syrup 10 Gram(s) Oral every 8 hours  pantoprazole  Injectable 40 milliGRAM(s) IV Push daily  sodium chloride 0.9% lock flush 10 milliLiter(s) IV Push every 1 hour PRN    chlorhexidine 0.12% Liquid 15 milliLiter(s) Oral Mucosa every 12 hours  chlorhexidine 2% Cloths 1 Application(s) Topical <User Schedule>  fentaNYL    Injectable 50 MICROGram(s) IV Push every 6 hours PRN  folic acid Injectable 1 milliGRAM(s) IV Push daily  heparin   Injectable 5000 Unit(s) SubCutaneous every 12 hours  influenza   Vaccine 0.5 milliLiter(s) IntraMuscular once  insulin lispro (ADMELOG) corrective regimen sliding scale   SubCutaneous every 6 hours  ketamine Infusion. 0.25 mG/kG/Hr IV Continuous <Continuous>  lactulose Syrup 10 Gram(s) Oral every 8 hours  norepinephrine Infusion 0.05 MICROgram(s)/kG/Min IV Continuous <Continuous>  pantoprazole  Injectable 40 milliGRAM(s) IV Push daily  piperacillin/tazobactam IVPB.. 3.375 Gram(s) IV Intermittent every 8 hours  sodium chloride 0.9% lock flush 10 milliLiter(s) IV Push every 1 hour PRN    Drug Dosing Weight  Height (cm): 167.6 (21 Mar 2024 12:45)  Weight (kg): 86 (21 Mar 2024 21:30)  BMI (kg/m2): 30.6 (21 Mar 2024 21:30)  BSA (m2): 1.95 (21 Mar 2024 21:30)    CENTRAL LINE: [ ] YES [ ] NO  LOCATION:   DATE INSERTED:      BLACK: [ ] YES [ ] NO    DATE INSERTED:      A-LINE:  [ ] YES [ ] NO  LOCATION:   DATE INSERTED:      PMH -reviewed admission note, no change since admission  PAST MEDICAL & SURGICAL HISTORY:  No pertinent past medical history      History of seizure due to alcohol withdrawal      History of alcohol use disorder      No significant past surgical history      No significant past surgical history          ICU Vital Signs Last 24 Hrs  T(C): 38.1 (26 Mar 2024 07:15), Max: 38.7 (26 Mar 2024 02:45)  T(F): 100.6 (26 Mar 2024 07:15), Max: 101.7 (26 Mar 2024 02:45)  HR: 95 (26 Mar 2024 07:15) (55 - 102)  BP: 91/66 (26 Mar 2024 04:00) (89/66 - 99/62)  BP(mean): 75 (26 Mar 2024 04:00) (73 - 76)  ABP: 106/71 (26 Mar 2024 07:15) (84/55 - 134/88)  ABP(mean): 84 (26 Mar 2024 07:15) (10 - 107)  RR: 18 (26 Mar 2024 07:15) (6 - 28)  SpO2: 97% (26 Mar 2024 07:15) (91% - 100%)    O2 Parameters below as of 26 Mar 2024 07:00  Patient On (Oxygen Delivery Method): ventilator    O2 Concentration (%): 40        ABG - ( 26 Mar 2024 03:16 )  pH, Arterial: 7.34  pH, Blood: x     /  pCO2: 44    /  pO2: 138   / HCO3: 24    / Base Excess: -2.2  /  SaO2: 100                   03-25 @ 07:01  -  03-26 @ 07:00  --------------------------------------------------------  IN: 1427.5 mL / OUT: 1240 mL / NET: 187.5 mL        Mode: AC/ CMV (Assist Control/ Continuous Mandatory Ventilation)  RR (machine): 16  TV (machine): 450  FiO2: 40  PEEP: 5  ITime: 1.2  MAP: 15  PIP: 26      PHYSICAL EXAM:    GENERAL: NAD, well-developed  HEAD:  Atraumatic, Normocephalic  EYES: EOMI, PERRLA, conjunctiva and sclera clear  ENMT: Moist mucous membranes  NECK: Supple, normal appearance, No JVD; Normal thyroid; Trachea midline  NERVOUS SYSTEM:  Alert & Oriented X3,  Moving all extremities  CHEST/LUNG: No chest deformity; Clear to Auscultation; No rales, rhonchi, wheezing   HEART: Regular rate and rhythm; No murmurs, rubs, or gallops  ABDOMEN: Soft, Nontender, Nondistended; Bowel sounds present  EXTREMITIES:  2+ Peripheral Pulses, No clubbing, cyanosis, or edema  SKIN: No rashes or lesions;  Good capillary refill      LABS:  CBC Full  -  ( 26 Mar 2024 03:30 )  WBC Count : 21.90 K/uL  RBC Count : 2.92 M/uL  Hemoglobin : 10.1 g/dL  Hematocrit : 31.2 %  Platelet Count - Automated : 173 K/uL  Mean Cell Volume : 106.8 fl  Mean Cell Hemoglobin : 34.6 pg  Mean Cell Hemoglobin Concentration : 32.4 gm/dL  Auto Neutrophil # : 16.86 K/uL  Auto Lymphocyte # : 3.72 K/uL  Auto Monocyte # : 0.22 K/uL  Auto Eosinophil # : 1.10 K/uL  Auto Basophil # : 0.00 K/uL  Auto Neutrophil % : 77.0 %  Auto Lymphocyte % : 17.0 %  Auto Monocyte % : 1.0 %  Auto Eosinophil % : 5.0 %  Auto Basophil % : 0.0 %    03-26    144  |  114<H>  |  23<H>  ----------------------------<  132<H>  4.9   |  25  |  0.71    Ca    8.0<L>      26 Mar 2024 03:30  Phos  2.5     03-26  Mg     2.3     03-26    TPro  5.5<L>  /  Alb  1.7<L>  /  TBili  9.2<H>  /  DBili  x   /  AST  169<H>  /  ALT  32  /  AlkPhos  236<H>  03-26    PT/INR - ( 26 Mar 2024 03:30 )   PT: 13.8 sec;   INR: 1.22 ratio         PTT - ( 26 Mar 2024 03:30 )  PTT:36.9 sec  Urinalysis Basic - ( 26 Mar 2024 03:30 )    Color: x / Appearance: x / SG: x / pH: x  Gluc: 132 mg/dL / Ketone: x  / Bili: x / Urobili: x   Blood: x / Protein: x / Nitrite: x   Leuk Esterase: x / RBC: x / WBC x   Sq Epi: x / Non Sq Epi: x / Bacteria: x          RADIOLOGY & ADDITIONAL STUDIES REVIEWED:  ***    [ ]GOALS OF CARE DISCUSSION WITH PATIENT/FAMILY/PROXY:    CRITICAL CARE TIME SPENT: 35 minutes INTERVAL HPI/OVERNIGHT EVENTS:  Pt spiked fever overnight. Pt had large Bowel Movement this morning. Pt examined at bedside this AM. Pt remains intubated and sedated, although more awake today.    PRESSORS: [X] YES [ ] NO  WHICH: Levophed    Antimicrobial:  piperacillin/tazobactam IVPB.. 3.375 Gram(s) IV Intermittent every 8 hours    Cardiovascular:  norepinephrine Infusion 0.05 MICROgram(s)/kG/Min IV Continuous <Continuous>    Pulmonary:    Hematalogic:  heparin   Injectable 5000 Unit(s) SubCutaneous every 12 hours    Other:  chlorhexidine 0.12% Liquid 15 milliLiter(s) Oral Mucosa every 12 hours  chlorhexidine 2% Cloths 1 Application(s) Topical <User Schedule>  fentaNYL    Injectable 50 MICROGram(s) IV Push every 6 hours PRN  folic acid Injectable 1 milliGRAM(s) IV Push daily  influenza   Vaccine 0.5 milliLiter(s) IntraMuscular once  insulin lispro (ADMELOG) corrective regimen sliding scale   SubCutaneous every 6 hours  ketamine Infusion. 0.25 mG/kG/Hr IV Continuous <Continuous>  lactulose Syrup 10 Gram(s) Oral every 8 hours  pantoprazole  Injectable 40 milliGRAM(s) IV Push daily  sodium chloride 0.9% lock flush 10 milliLiter(s) IV Push every 1 hour PRN    chlorhexidine 0.12% Liquid 15 milliLiter(s) Oral Mucosa every 12 hours  chlorhexidine 2% Cloths 1 Application(s) Topical <User Schedule>  fentaNYL    Injectable 50 MICROGram(s) IV Push every 6 hours PRN  folic acid Injectable 1 milliGRAM(s) IV Push daily  heparin   Injectable 5000 Unit(s) SubCutaneous every 12 hours  influenza   Vaccine 0.5 milliLiter(s) IntraMuscular once  insulin lispro (ADMELOG) corrective regimen sliding scale   SubCutaneous every 6 hours  ketamine Infusion. 0.25 mG/kG/Hr IV Continuous <Continuous>  lactulose Syrup 10 Gram(s) Oral every 8 hours  norepinephrine Infusion 0.05 MICROgram(s)/kG/Min IV Continuous <Continuous>  pantoprazole  Injectable 40 milliGRAM(s) IV Push daily  piperacillin/tazobactam IVPB.. 3.375 Gram(s) IV Intermittent every 8 hours  sodium chloride 0.9% lock flush 10 milliLiter(s) IV Push every 1 hour PRN    Drug Dosing Weight  Height (cm): 167.6 (21 Mar 2024 12:45)  Weight (kg): 86 (21 Mar 2024 21:30)  BMI (kg/m2): 30.6 (21 Mar 2024 21:30)  BSA (m2): 1.95 (21 Mar 2024 21:30)    CENTRAL LINE: [X] YES [ ] NO  LOCATION:   DATE INSERTED:      BLACK: [X] YES [ ] NO    DATE INSERTED:      A-LINE:  [X] YES [ ] NO  LOCATION: Left Wrist  DATE INSERTED:      PMH -reviewed admission note, no change since admission  PAST MEDICAL & SURGICAL HISTORY:  No pertinent past medical history      History of seizure due to alcohol withdrawal      History of alcohol use disorder      No significant past surgical history      No significant past surgical history          ICU Vital Signs Last 24 Hrs  T(C): 38.1 (26 Mar 2024 07:15), Max: 38.7 (26 Mar 2024 02:45)  T(F): 100.6 (26 Mar 2024 07:15), Max: 101.7 (26 Mar 2024 02:45)  HR: 95 (26 Mar 2024 07:15) (55 - 102)  BP: 91/66 (26 Mar 2024 04:00) (89/66 - 99/62)  BP(mean): 75 (26 Mar 2024 04:00) (73 - 76)  ABP: 106/71 (26 Mar 2024 07:15) (84/55 - 134/88)  ABP(mean): 84 (26 Mar 2024 07:15) (10 - 107)  RR: 18 (26 Mar 2024 07:15) (6 - 28)  SpO2: 97% (26 Mar 2024 07:15) (91% - 100%)    O2 Parameters below as of 26 Mar 2024 07:00  Patient On (Oxygen Delivery Method): ventilator    O2 Concentration (%): 40        ABG - ( 26 Mar 2024 03:16 )  pH, Arterial: 7.34  pH, Blood: x     /  pCO2: 44    /  pO2: 138   / HCO3: 24    / Base Excess: -2.2  /  SaO2: 100                   03-25 @ 07:01  -  03-26 @ 07:00  --------------------------------------------------------  IN: 1427.5 mL / OUT: 1240 mL / NET: 187.5 mL        Mode: AC/ CMV (Assist Control/ Continuous Mandatory Ventilation)  RR (machine): 16  TV (machine): 450  FiO2: 40  PEEP: 5  ITime: 1.2  MAP: 15  PIP: 26      PHYSICAL EXAM:    GENERAL: Intubated, Sedated, Black with concentrated urine, + ETT  HEAD:  Atraumatic, Normocephalic  EYES: PERRLA, conjunctiva and sclera clear  ENMT: Reeves Sump on intermittent suction; Moist mucous membranes  NECK: Supple, normal appearance, No JVD; Normal thyroid; Trachea midline  NERVOUS SYSTEM:  Sedated, more awake today  CHEST/LUNG: Coarse lung sounds b/l  HEART: Regular rate and rhythm; No murmurs, rubs, or gallops  ABDOMEN: +Distended, softer today, Tympanic to percussion; Hypoactive Bowel Sounds Auscultated  EXTREMITIES:  2+ Peripheral Pulses, No clubbing, cyanosis, or edema  SKIN: No rashes or lesions;  Good capillary refill      LABS:  CBC Full  -  ( 26 Mar 2024 03:30 )  WBC Count : 21.90 K/uL  RBC Count : 2.92 M/uL  Hemoglobin : 10.1 g/dL  Hematocrit : 31.2 %  Platelet Count - Automated : 173 K/uL  Mean Cell Volume : 106.8 fl  Mean Cell Hemoglobin : 34.6 pg  Mean Cell Hemoglobin Concentration : 32.4 gm/dL  Auto Neutrophil # : 16.86 K/uL  Auto Lymphocyte # : 3.72 K/uL  Auto Monocyte # : 0.22 K/uL  Auto Eosinophil # : 1.10 K/uL  Auto Basophil # : 0.00 K/uL  Auto Neutrophil % : 77.0 %  Auto Lymphocyte % : 17.0 %  Auto Monocyte % : 1.0 %  Auto Eosinophil % : 5.0 %  Auto Basophil % : 0.0 %    03-26    144  |  114<H>  |  23<H>  ----------------------------<  132<H>  4.9   |  25  |  0.71    Ca    8.0<L>      26 Mar 2024 03:30  Phos  2.5     03-26  Mg     2.3     03-26    TPro  5.5<L>  /  Alb  1.7<L>  /  TBili  9.2<H>  /  DBili  x   /  AST  169<H>  /  ALT  32  /  AlkPhos  236<H>  03-26    PT/INR - ( 26 Mar 2024 03:30 )   PT: 13.8 sec;   INR: 1.22 ratio         PTT - ( 26 Mar 2024 03:30 )  PTT:36.9 sec  Urinalysis Basic - ( 26 Mar 2024 03:30 )    Color: x / Appearance: x / SG: x / pH: x  Gluc: 132 mg/dL / Ketone: x  / Bili: x / Urobili: x   Blood: x / Protein: x / Nitrite: x   Leuk Esterase: x / RBC: x / WBC x   Sq Epi: x / Non Sq Epi: x / Bacteria: x          RADIOLOGY & ADDITIONAL STUDIES REVIEWED:  ***    [ ]GOALS OF CARE DISCUSSION WITH PATIENT/FAMILY/PROXY:    CRITICAL CARE TIME SPENT: 35 minutes

## 2024-03-27 LAB
ALBUMIN SERPL ELPH-MCNC: 1.6 G/DL — LOW (ref 3.5–5)
ALP SERPL-CCNC: 245 U/L — HIGH (ref 40–120)
ALT FLD-CCNC: 34 U/L DA — SIGNIFICANT CHANGE UP (ref 10–60)
ANION GAP SERPL CALC-SCNC: 7 MMOL/L — SIGNIFICANT CHANGE UP (ref 5–17)
ANISOCYTOSIS BLD QL: SLIGHT — SIGNIFICANT CHANGE UP
APTT BLD: 35 SEC — SIGNIFICANT CHANGE UP (ref 24.5–35.6)
AST SERPL-CCNC: 172 U/L — HIGH (ref 10–40)
BASOPHILS # BLD AUTO: 0 K/UL — SIGNIFICANT CHANGE UP (ref 0–0.2)
BASOPHILS NFR BLD AUTO: 0 % — SIGNIFICANT CHANGE UP (ref 0–2)
BILIRUB SERPL-MCNC: 9.2 MG/DL — HIGH (ref 0.2–1.2)
BUN SERPL-MCNC: 18 MG/DL — SIGNIFICANT CHANGE UP (ref 7–18)
CALCIUM SERPL-MCNC: 8.2 MG/DL — LOW (ref 8.4–10.5)
CHLORIDE SERPL-SCNC: 115 MMOL/L — HIGH (ref 96–108)
CO2 SERPL-SCNC: 26 MMOL/L — SIGNIFICANT CHANGE UP (ref 22–31)
CREAT SERPL-MCNC: 0.62 MG/DL — SIGNIFICANT CHANGE UP (ref 0.5–1.3)
EGFR: 127 ML/MIN/1.73M2 — SIGNIFICANT CHANGE UP
EOSINOPHIL # BLD AUTO: 0.76 K/UL — HIGH (ref 0–0.5)
EOSINOPHIL NFR BLD AUTO: 4 % — SIGNIFICANT CHANGE UP (ref 0–6)
GLUCOSE BLDC GLUCOMTR-MCNC: 110 MG/DL — HIGH (ref 70–99)
GLUCOSE BLDC GLUCOMTR-MCNC: 114 MG/DL — HIGH (ref 70–99)
GLUCOSE BLDC GLUCOMTR-MCNC: 115 MG/DL — HIGH (ref 70–99)
GLUCOSE BLDC GLUCOMTR-MCNC: 118 MG/DL — HIGH (ref 70–99)
GLUCOSE SERPL-MCNC: 118 MG/DL — HIGH (ref 70–99)
HCT VFR BLD CALC: 29.5 % — LOW (ref 39–50)
HGB BLD-MCNC: 9.5 G/DL — LOW (ref 13–17)
HYPOCHROMIA BLD QL: SIGNIFICANT CHANGE UP
INR BLD: 1.3 RATIO — HIGH (ref 0.85–1.18)
LYMPHOCYTES # BLD AUTO: 1.14 K/UL — SIGNIFICANT CHANGE UP (ref 1–3.3)
LYMPHOCYTES # BLD AUTO: 6 % — LOW (ref 13–44)
MACROCYTES BLD QL: SLIGHT — SIGNIFICANT CHANGE UP
MAGNESIUM SERPL-MCNC: 2.5 MG/DL — SIGNIFICANT CHANGE UP (ref 1.6–2.6)
MANUAL SMEAR VERIFICATION: SIGNIFICANT CHANGE UP
MCHC RBC-ENTMCNC: 32.2 GM/DL — SIGNIFICANT CHANGE UP (ref 32–36)
MCHC RBC-ENTMCNC: 33.9 PG — SIGNIFICANT CHANGE UP (ref 27–34)
MCV RBC AUTO: 105.4 FL — HIGH (ref 80–100)
MONOCYTES # BLD AUTO: 0.57 K/UL — SIGNIFICANT CHANGE UP (ref 0–0.9)
MONOCYTES NFR BLD AUTO: 3 % — SIGNIFICANT CHANGE UP (ref 2–14)
NEUTROPHILS # BLD AUTO: 16.57 K/UL — HIGH (ref 1.8–7.4)
NEUTROPHILS NFR BLD AUTO: 84 % — HIGH (ref 43–77)
NEUTS BAND # BLD: 3 % — SIGNIFICANT CHANGE UP (ref 0–8)
NRBC # BLD: 1 /100 WBCS — HIGH (ref 0–0)
OVALOCYTES BLD QL SMEAR: SLIGHT — SIGNIFICANT CHANGE UP
PH UR: 6.5 — SIGNIFICANT CHANGE UP (ref 5–8)
PHOSPHATE SERPL-MCNC: 1.6 MG/DL — LOW (ref 2.5–4.5)
PLAT MORPH BLD: NORMAL — SIGNIFICANT CHANGE UP
PLATELET # BLD AUTO: 226 K/UL — SIGNIFICANT CHANGE UP (ref 150–400)
PLATELET COUNT - ESTIMATE: NORMAL — SIGNIFICANT CHANGE UP
POLYCHROMASIA BLD QL SMEAR: SLIGHT — SIGNIFICANT CHANGE UP
POTASSIUM SERPL-MCNC: 3.5 MMOL/L — SIGNIFICANT CHANGE UP (ref 3.5–5.3)
POTASSIUM SERPL-SCNC: 3.5 MMOL/L — SIGNIFICANT CHANGE UP (ref 3.5–5.3)
PROT SERPL-MCNC: 5.3 G/DL — LOW (ref 6–8.3)
PROTHROM AB SERPL-ACNC: 14.7 SEC — HIGH (ref 9.5–13)
RBC # BLD: 2.8 M/UL — LOW (ref 4.2–5.8)
RBC # FLD: 18.7 % — HIGH (ref 10.3–14.5)
RBC BLD AUTO: ABNORMAL
SODIUM SERPL-SCNC: 148 MMOL/L — HIGH (ref 135–145)
SPHEROCYTES BLD QL SMEAR: SLIGHT — SIGNIFICANT CHANGE UP
STOMATOCYTES BLD QL SMEAR: SIGNIFICANT CHANGE UP
WBC # BLD: 19.05 K/UL — HIGH (ref 3.8–10.5)
WBC # FLD AUTO: 19.05 K/UL — HIGH (ref 3.8–10.5)

## 2024-03-27 PROCEDURE — 99233 SBSQ HOSP IP/OBS HIGH 50: CPT

## 2024-03-27 RX ORDER — POTASSIUM PHOSPHATE, MONOBASIC POTASSIUM PHOSPHATE, DIBASIC 236; 224 MG/ML; MG/ML
30 INJECTION, SOLUTION INTRAVENOUS ONCE
Refills: 0 | Status: COMPLETED | OUTPATIENT
Start: 2024-03-27 | End: 2024-03-27

## 2024-03-27 RX ORDER — ACETAMINOPHEN 500 MG
1000 TABLET ORAL ONCE
Refills: 0 | Status: COMPLETED | OUTPATIENT
Start: 2024-03-27 | End: 2024-03-27

## 2024-03-27 RX ORDER — PHENOBARBITAL 60 MG
260 TABLET ORAL ONCE
Refills: 0 | Status: DISCONTINUED | OUTPATIENT
Start: 2024-03-27 | End: 2024-03-27

## 2024-03-27 RX ORDER — SODIUM CHLORIDE 9 MG/ML
500 INJECTION, SOLUTION INTRAVENOUS ONCE
Refills: 0 | Status: COMPLETED | OUTPATIENT
Start: 2024-03-27 | End: 2024-03-27

## 2024-03-27 RX ADMIN — Medication 408 MILLIGRAM(S): at 17:00

## 2024-03-27 RX ADMIN — Medication 130 MILLIGRAM(S): at 08:20

## 2024-03-27 RX ADMIN — Medication 130 MILLIGRAM(S): at 12:32

## 2024-03-27 RX ADMIN — Medication 100 MILLIGRAM(S): at 11:26

## 2024-03-27 RX ADMIN — SODIUM CHLORIDE 500 MILLILITER(S): 9 INJECTION, SOLUTION INTRAVENOUS at 11:30

## 2024-03-27 RX ADMIN — CHLORHEXIDINE GLUCONATE 1 APPLICATION(S): 213 SOLUTION TOPICAL at 05:19

## 2024-03-27 RX ADMIN — HEPARIN SODIUM 5000 UNIT(S): 5000 INJECTION INTRAVENOUS; SUBCUTANEOUS at 05:19

## 2024-03-27 RX ADMIN — LACTULOSE 10 GRAM(S): 10 SOLUTION ORAL at 05:19

## 2024-03-27 RX ADMIN — Medication 400 MILLIGRAM(S): at 02:55

## 2024-03-27 RX ADMIN — HEPARIN SODIUM 5000 UNIT(S): 5000 INJECTION INTRAVENOUS; SUBCUTANEOUS at 17:17

## 2024-03-27 RX ADMIN — POTASSIUM PHOSPHATE, MONOBASIC POTASSIUM PHOSPHATE, DIBASIC 83.33 MILLIMOLE(S): 236; 224 INJECTION, SOLUTION INTRAVENOUS at 06:34

## 2024-03-27 RX ADMIN — LACTULOSE 10 GRAM(S): 10 SOLUTION ORAL at 16:39

## 2024-03-27 RX ADMIN — Medication 1 MILLIGRAM(S): at 16:39

## 2024-03-27 RX ADMIN — PIPERACILLIN AND TAZOBACTAM 25 GRAM(S): 4; .5 INJECTION, POWDER, LYOPHILIZED, FOR SOLUTION INTRAVENOUS at 05:19

## 2024-03-27 RX ADMIN — PIPERACILLIN AND TAZOBACTAM 25 GRAM(S): 4; .5 INJECTION, POWDER, LYOPHILIZED, FOR SOLUTION INTRAVENOUS at 16:39

## 2024-03-27 RX ADMIN — PANTOPRAZOLE SODIUM 40 MILLIGRAM(S): 20 TABLET, DELAYED RELEASE ORAL at 11:26

## 2024-03-27 RX ADMIN — PIPERACILLIN AND TAZOBACTAM 25 GRAM(S): 4; .5 INJECTION, POWDER, LYOPHILIZED, FOR SOLUTION INTRAVENOUS at 22:13

## 2024-03-27 RX ADMIN — LACTULOSE 10 GRAM(S): 10 SOLUTION ORAL at 22:14

## 2024-03-27 RX ADMIN — Medication 1000 MILLIGRAM(S): at 03:40

## 2024-03-27 NOTE — PROGRESS NOTE ADULT - ASSESSMENT
IMP: This is a 38 yr old man with  alcohol withdrawal seizure with alcohol use disorder, who p/w confusion starting this morning. Admitted for high risk alcohol withdrawal with persistent sinus tachycardia.    Assessment:   # Acute hypoxic resp failure   # Alcohol use disorder  # Alcohol withdrawal  # Acute metabolic encephalopathy  # Atypical pneumonia  # Hx of alcohol seizures  # Possible pancreatitis  # Liver steatosis  # Alcoholic hepatitis  # Sinus tachycardia  # SIRS  # ARABELLA  # Ileus       Plan:   =============NEURO:=============  # Acute metabolic encephalopathy  # Alcohol withdrawal  s/p Ativan however will pursue intubation for protecting the airways  s/p phenobarb IVP x1 on 3/23   -continue phenobarb pushes when pt becomes agitated  - sedated with ketamine drip and precedex    # Alcohol use disorder  # Hx of alcohol seizures  - Pt has been in and out of alcohol rehab  - Most recently came out 6 months ago - as per sister pt was only hospitalized and not in rehab   - SW consult order placed     #intubated (3/22)  -Attempt to do SAT, SBT in afternoon 3/26    =============CARDIO:================  # Sinus tachycardia  - P/w sinus tachycardia  - Likely due to alcohol withdrawal  - EKG = sinus tachycardia  - trop neg      ==========PULM:=============  # Intubated (3/22)    #Atypical pneumonia   # coronavirus positive however not COVID  CT chest : Multifocal bilateral peripheral opacities suggesting atypical pneumonia. Probable compression atelectasis right lung base, related to elevated right hemidiaphragm.  - c/w zosyn       ==========GI:==========  # Constipation  #?acute abdomen with increased abdominal pressure of 25   surgery consulted - no indication for surgery at this time , recs paralyzing the patient   - c/w salam sump suction   - abdominal pressure improving   - 3/24 Bladder pressure 16   - 3/24 started lactulose 10mg q8h  - 3/25 Neostigmine x1, led to bradycardia. Atropine given.  - 3/26 Pt had large BM, will continue lactulose    # Alcoholic hepatitis   #transaminitis  #Hyperbilirubinemia   P/w T bili 6.6 (Dir 5.1), , ALT 41,   Maddrey score = borderline at 31.4 points (32 point is the cutoff for steroid treatment)  MELD-Na score 17 points 6 % 90day mortality   - Transaminitis improving   - T. Hector 6.6 > 5.6 > 5.5>7.5   - 3/24 Maddrey score - 28.7   - NPO for now    # Possible pancreatitis  Lipase 470, possible mid abdominal pain. Negative imaging.  - c/w NPO  - repeat Lipase wnl    # Liver steatosis    # Liver lesion  Likely due to alcohol use disorder  CT = Has hypodense foci with central hyperdensity involving the pericholecystic region and segment 6, indeterminate.   - Rec outpt MRI, hepatology consult once more stable for outpt follow up       =======RENAL:==========  #Metabolic Acidosis  -ABG 3/25 showing bicarb of 10  -Vent settings adjusted    # ARABELLA  P/w SCr 1.77 (baseline 0.8)  S/p 23L IVF in ED  urinary retention 1.5 L s/p straight cath overnight  - Roman placed on 3/22  3/22 Scr improved to 0.85   Renal US: No hydronephrosis.   RESOLVED    # Lactic acidosis  P/w Lactate 10  Due to dehydration, infection-  s/p 3L IVF -> Lactate 1.6  RESOLVED    # Hypokalemia  - K 3.3 s/p Kcl 10 mEq x3 ->3.9   - repeat 3.3 will replete IV  RESOLVED     =====INFECTIOUS=====  # SIRS  -p/w Lactate 10, tachycardia, tachypnea>20  CXR neg for consolidation or effusion  Positive coronavirus  s/p Cefepime in ED  d/c'd Ceftriaoxone (3/23)  3/23 Started Zosyn   legionella neg   U/A positive. UCx not sent   - BCx NGTD 48h   - C/w with Zosyn    -Will obtain another set of cultures if continues to spike fever    ==============HEME:===========  # Leukocytosis  - As above in sepsis    =======ENDO:=======  A1c 7.9  - FS, SSI  - monitor     =========SKIN/CATHETERS=======  Jessie [place] Sum  Left A-Line    =========PPX:========  - Hep SC  - PPI      IMP: This is a 38 yr old man with  alcohol withdrawal seizure with alcohol use disorder, who p/w confusion starting this morning. Admitted for high risk alcohol withdrawal with persistent sinus tachycardia.    Assessment:   # Acute hypoxic resp failure   # Alcohol use disorder  # Alcohol withdrawal  # Acute metabolic encephalopathy  # Atypical pneumonia  # Hx of alcohol seizures  # Possible pancreatitis  # Liver steatosis  # Alcoholic hepatitis  # Sinus tachycardia  # SIRS  # ARABELLA  # Ileus       Plan:   =============NEURO:=============  # Acute metabolic encephalopathy  # Alcohol withdrawal  s/p Ativan however will pursue intubation for protecting the airways  s/p phenobarb IVP x1 on 3/23   - s/p ketamine drip and precedex  - continue phenobarb pushes when pt becomes agitated    # Alcohol use disorder  # Hx of alcohol seizures  - Pt has been in and out of alcohol rehab  - Most recently came out 6 months ago - as per sister pt was only hospitalized and not in rehab   - SW consult order placed     #intubated (3/22)  -Attempt to do SAT, SBT in afternoon 3/26  -EXTUBATED 3/26    =============CARDIO:================  # Sinus tachycardia  - P/w sinus tachycardia  - Likely due to alcohol withdrawal  - EKG = sinus tachycardia  - trop neg      ==========PULM:=============  #Atypical pneumonia   # coronavirus positive however not COVID  CT chest : Multifocal bilateral peripheral opacities suggesting atypical pneumonia. Probable compression atelectasis right lung base, related to elevated right hemidiaphragm.  - c/w zosyn       ==========GI:==========  # Constipation  #?acute abdomen with increased abdominal pressure of 25   surgery consulted - no indication for surgery at this time , recs paralyzing the patient   - c/w salam sump suction   - abdominal pressure improving   - 3/24 Bladder pressure 16   - 3/24 started lactulose 10mg q8h  - 3/25 Neostigmine x1, led to bradycardia. Atropine given.  - 3/26 Pt had large BM, will continue lactulose    #Diet  -Clear Liquid    # Alcoholic hepatitis   #transaminitis  #Hyperbilirubinemia   P/w T bili 6.6 (Dir 5.1), , ALT 41,   Maddrey score = borderline at 31.4 points (32 point is the cutoff for steroid treatment)  MELD-Na score 17 points 6 % 90day mortality   - Transaminitis improving   - T. Hector 6.6 > 5.6 > 5.5>7.5   - 3/24 Maddrey score - 28.7   - NPO for now    # Possible pancreatitis  Lipase 470, possible mid abdominal pain. Negative imaging.  - c/w NPO  - repeat Lipase wnl    # Liver steatosis    # Liver lesion  Likely due to alcohol use disorder  CT = Has hypodense foci with central hyperdensity involving the pericholecystic region and segment 6, indeterminate.   - Rec outpt MRI, hepatology consult once more stable for outpt follow up       =======RENAL:==========  #Metabolic Acidosis  -ABG 3/25 showing bicarb of 10  -Vent settings adjusted    # ARABELLA  P/w SCr 1.77 (baseline 0.8)  S/p 23L IVF in ED  urinary retention 1.5 L s/p straight cath overnight  - Roman placed on 3/22  3/22 Scr improved to 0.85   Renal US: No hydronephrosis.   RESOLVED    # Lactic acidosis  P/w Lactate 10  Due to dehydration, infection-  s/p 3L IVF -> Lactate 1.6  RESOLVED    # Hypokalemia  - K 3.3 s/p Kcl 10 mEq x3 ->3.9   - repeat 3.3 will replete IV  RESOLVED     =====INFECTIOUS=====  # SIRS  -p/w Lactate 10, tachycardia, tachypnea>20  CXR neg for consolidation or effusion  Positive coronavirus  s/p Cefepime in ED  d/c'd Ceftriaoxone (3/23)  3/23 Started Zosyn   legionella neg   U/A positive. UCx not sent   - BCx NGTD 48h   - C/w with Zosyn    -3/27 pt continues to spike fevers  - Removing Central Line and A line  - Roman cath has been removed    ==============HEME:===========  # Leukocytosis  - As above in SIRS    =======ENDO:=======  A1c 7.9  - FS, SSI  - monitor     =========SKIN/CATHETERS=======  Romanluis iWggins  Left A-Line    =========PPX:========  - Hep SC  - PPI      IMP: This is a 38 yr old man with  alcohol withdrawal seizure with alcohol use disorder, who p/w confusion starting this morning. Admitted for high risk alcohol withdrawal with persistent sinus tachycardia.    Assessment:   # Acute hypoxic resp failure   # Alcohol use disorder  # Alcohol withdrawal  # Acute metabolic encephalopathy  # Atypical pneumonia  # Hx of alcohol seizures  # Possible pancreatitis  # Liver steatosis  # Alcoholic hepatitis  # Sinus tachycardia  # SIRS  # ARABELLA  # Ileus       Plan:   =============NEURO:=============  # Acute metabolic encephalopathy  # Alcohol withdrawal  s/p Ativan however will pursue intubation for protecting the airways  s/p phenobarb IVP x1 on 3/23   - s/p ketamine drip and precedex  - continue phenobarb pushes when pt becomes agitated    # Alcohol use disorder  # Hx of alcohol seizures  - Pt has been in and out of alcohol rehab  - Most recently came out 6 months ago - as per sister pt was only hospitalized and not in rehab   - SW consult order placed     #intubated (3/22)  -Attempt to do SAT, SBT in afternoon 3/26  -EXTUBATED 3/26    =============CARDIO:================  # Sinus tachycardia  - P/w sinus tachycardia  - Likely due to alcohol withdrawal  - EKG = sinus tachycardia  - trop neg      ==========PULM:=============  #Atypical pneumonia   # coronavirus positive however not COVID  CT chest : Multifocal bilateral peripheral opacities suggesting atypical pneumonia. Probable compression atelectasis right lung base, related to elevated right hemidiaphragm.  - c/w zosyn       ==========GI:==========  # Constipation  #?acute abdomen with increased abdominal pressure of 25   surgery consulted - no indication for surgery at this time , recs paralyzing the patient   - abdominal pressure improving. No longer need to measure  - 3/24 Bladder pressure 16   - 3/24 started lactulose 10mg q8h  - 3/25 Neostigmine x1, led to bradycardia. Atropine given.  - 3/26 Pt had large BM, will continue lactulose  - Remove [place] Sump    #Diet  -Clear Liquid    # Alcoholic hepatitis   #transaminitis  #Hyperbilirubinemia   P/w T bili 6.6 (Dir 5.1), , ALT 41,   Maddrey score = borderline at 31.4 points (32 point is the cutoff for steroid treatment)  MELD-Na score 17 points 6 % 90day mortality   - Transaminitis improving   - T. Hector 6.6 > 5.6 > 5.5>7.5   - 3/24 Maddrey score - 28.7     # Possible pancreatitis  Lipase 470, possible mid abdominal pain. Negative imaging.  - repeat Lipase 86, not concerning for pancreatitis    # Liver steatosis    # Liver lesion  Likely due to alcohol use disorder  CT = Has hypodense foci with central hyperdensity involving the pericholecystic region and segment 6, indeterminate.   - Rec outpt MRI, hepatology consult once more stable for outpt follow up       =======RENAL:==========  #Metabolic Acidosis  -ABG 3/25 showing bicarb of 10  -Vent settings adjusted  -RESOLVED    # ARABELLA  P/w SCr 1.77 (baseline 0.8)  S/p 23L IVF in ED  urinary retention 1.5 L s/p straight cath overnight  - Roman placed on 3/22  3/22 Scr improved to 0.85   Renal US: No hydronephrosis.   RESOLVED    # Lactic acidosis  P/w Lactate 10  Due to dehydration, infection-  s/p 3L IVF -> Lactate 1.6  RESOLVED    # Hypokalemia  - K 3.3 s/p Kcl 10 mEq x3 ->3.9   - repeat 3.3 will replete IV  RESOLVED     =====INFECTIOUS=====  # SIRS  -p/w Lactate 10, tachycardia, tachypnea>20  CXR neg for consolidation or effusion  Positive coronavirus  s/p Cefepime in ED  d/c'd Ceftriaoxone (3/23)  3/23 Started Zosyn   legionella neg   U/A positive. UCx not sent   - BCx NGTD 48h   - C/w with Zosyn    -3/27 pt continues to spike fevers  - Removing Central Line and A line  - Roman cath has been removed    ==============HEME:===========  # Leukocytosis  - As above in SIRS    =======ENDO:=======  A1c 7.9  - FS, SSI  - monitor     =========SKIN/CATHETERS=======  Roman  RIJ  [place] Sum  Left A-Line    =========PPX:========  - Hep SC  - PPI      IMP: This is a 38 yr old man with  alcohol withdrawal seizure with alcohol use disorder, who p/w confusion starting this morning. Admitted for high risk alcohol withdrawal with persistent sinus tachycardia.    Assessment:   # Acute hypoxic resp failure   # Alcohol use disorder  # Alcohol withdrawal  # Acute metabolic encephalopathy  # Atypical pneumonia  # Hx of alcohol seizures  # Possible pancreatitis  # Liver steatosis  # Alcoholic hepatitis  # Sinus tachycardia  # SIRS  # ARABELLA  # Ileus       Plan:   =============NEURO:=============  # Acute metabolic encephalopathy  # Alcohol withdrawal  s/p Ativan however will pursue intubation for protecting the airways  s/p phenobarb IVP x1 on 3/23   - s/p ketamine drip and precedex  - continue phenobarb pushes when pt becomes agitated    # Alcohol use disorder  # Hx of alcohol seizures  - Pt has been in and out of alcohol rehab  - Most recently came out 6 months ago - as per sister pt was only hospitalized and not in rehab   - SW consult order placed     #intubated (3/22)  -Attempt to do SAT, SBT in afternoon 3/26  -EXTUBATED 3/26    =============CARDIO:================  # Sinus tachycardia  - P/w sinus tachycardia  - Likely due to alcohol withdrawal  - EKG = sinus tachycardia  - trop neg      ==========PULM:=============  #Atypical pneumonia   # coronavirus positive however not COVID  CT chest : Multifocal bilateral peripheral opacities suggesting atypical pneumonia. Probable compression atelectasis right lung base, related to elevated right hemidiaphragm.  - c/w zosyn       ==========GI:==========  # Constipation  #?acute abdomen with increased abdominal pressure of 25   surgery consulted - no indication for surgery at this time , recs paralyzing the patient   - abdominal pressure improving. No longer need to measure  - 3/24 Bladder pressure 16   - 3/24 started lactulose 10mg q8h  - 3/25 Neostigmine x1, led to bradycardia. Atropine given.  - 3/26 Pt had large BM, will continue lactulose  - Remove [place] Sump    #Diet  -Clear Liquid    # Alcoholic hepatitis   #transaminitis  #Hyperbilirubinemia   P/w T bili 6.6 (Dir 5.1), , ALT 41,   Maddrey score = borderline at 31.4 points (32 point is the cutoff for steroid treatment)  MELD-Na score 17 points 6 % 90day mortality   - Transaminitis improving   - T. Hector 6.6 > 5.6 > 5.5>7.5   - 3/24 Maddrey score - 28.7     # Possible pancreatitis  Lipase 470, possible mid abdominal pain. Negative imaging.  - repeat Lipase 86, not concerning for pancreatitis    # Liver steatosis    # Liver lesion  Likely due to alcohol use disorder  CT = Has hypodense foci with central hyperdensity involving the pericholecystic region and segment 6, indeterminate.   - Rec outpt MRI, hepatology consult once more stable for outpt follow up       =======RENAL:==========  #Metabolic Acidosis  -ABG 3/25 showing bicarb of 10  -Vent settings adjusted  -RESOLVED    # ARABELLA  P/w SCr 1.77 (baseline 0.8)  S/p 23L IVF in ED  urinary retention 1.5 L s/p straight cath overnight  - Roman placed on 3/22  3/22 Scr improved to 0.85   Renal US: No hydronephrosis.   RESOLVED    # Lactic acidosis  P/w Lactate 10  Due to dehydration, infection-  s/p 3L IVF -> Lactate 1.6  RESOLVED    # Hypokalemia  - K 3.3 s/p Kcl 10 mEq x3 ->3.9   - repeat 3.3 will replete IV  RESOLVED     =====INFECTIOUS=====  # SIRS  -p/w Lactate 10, tachycardia, tachypnea>20  CXR neg for consolidation or effusion  Positive coronavirus  s/p Cefepime in ED  d/c'd Ceftriaoxone (3/23)  3/23 Started Zosyn   legionella neg   U/A positive. UCx not sent   - BCx NGTD 48h   - C/w with Zosyn    -3/27 pt continues to spike fevers  - Removing Central Line and A line  - Roman cath has been removed    ==============HEME:===========  # Leukocytosis  - As above in SIRS    =======ENDO:=======  A1c 7.9  - FS, SSI  - monitor     =========SKIN/CATHETERS=======  Roman  RIJ  [place] Sum  Left A-Line    =========PPX:========  - Hep SC  - PPI

## 2024-03-27 NOTE — PROGRESS NOTE ADULT - ASSESSMENT
Patient is a 38M with a PMHx of alcohol use disorder with hx of withdrawal seizures who presented to the ED on 3/21 with AMS. GI was consulted for abdominal distention.     #Abdominal distention  #Colonic distention  #Hepatomegaly  #Hepatic steatosis  #Alcohol-associated hepatitis  #Elevated INR  #Elevated lipase  #Acute metabolic encephalopathy  #Elevated ammonia  #Macrocytic anemia  #Hypoalbuminemia  #Alcohol use disorder  #ETOH withdrawal  #Liver lesion  Patient presented with AMS, extensive etoh use hx and prior withdrawals and seizures, c/f encephalopathy given presentation, last drink 3/21 AM day of admission. In the ED, ammonia 107 -> 172, WBC 24.09, INR 1.55, TBili 6.6, alk phos 377, , ALT 41.  , lipase 479, lactate 10 -> 5.6 -> 1.9. Admitted to the ICU for high risk alcohol withdrawal and persistent sinus tach. LFTs remain elevated however stable, intubated 3/22. AFP 2.7.  ICU course c/b worsening abdominal distention, serial abd xrays showing colonic distention, surgery consulted for increased intra-abdominal pressure 26mmHg, suspect likely ileus, rec NPO, NGT, and GI eval, no surgical intervention at this time.   3/24: TBili 7.5, alk phos 249, , ALT 35, INR 1.47  3/25: TBili 7.7, alk phos 246, , ALT 28, WBC 21.6, continues on Zosyn. Tmax 38.3, on norepinephrine & dexmedetomidine. MELD 3.0: 21. MDF 16, may benefit from steroids however given active infection (pna, UTI), risks > benefits, defer at this time. s/p neostigmine x 1.  3/26: Large BM this morning. WBC 21.9, Hgb 10.1, .8, INR 1.22, TBili 9.2, alk phos 236, , ALT 32, lipase 86.   Meld 3.0: 21. MDF: 12.9, continue to defer steroids 2/2 active infection (pna, UTI, febrile), remains on Zosyn. Intermittent use of restraints, minimal sedation this morning. Of note, on repeat review of CTAP noncon 3/21 - there are hypodense foci with central hyperdensity involving the pericholecystic region and segment 6, indeterminate.   3/27: abd xray done yesterday, on wet read appears much improved.       This note and its recommendations herein are preliminary until such time as cosigned by an attending. Patient is a 38M with a PMHx of alcohol use disorder with hx of withdrawal seizures who presented to the ED on 3/21 with AMS. GI was consulted for abdominal distention.     #Abdominal distention  #Colonic distention  #Hepatomegaly  #Hepatic steatosis  #Alcohol-associated hepatitis  #Elevated INR  #Elevated lipase  #Acute metabolic encephalopathy  #Elevated ammonia  #Macrocytic anemia  #Hypoalbuminemia  #Alcohol use disorder  #ETOH withdrawal  #Liver lesion  Patient presented with AMS, extensive etoh use hx and prior withdrawals and seizures, c/f encephalopathy given presentation, last drink 3/21 AM day of admission. In the ED, ammonia 107 -> 172, WBC 24.09, INR 1.55, TBili 6.6, alk phos 377, , ALT 41.  , lipase 479, lactate 10 -> 5.6 -> 1.9. Admitted to the ICU for high risk alcohol withdrawal and persistent sinus tach. LFTs remain elevated however stable, intubated 3/22. AFP 2.7.  ICU course c/b worsening abdominal distention, serial abd xrays showing colonic distention, surgery consulted for increased intra-abdominal pressure 26mmHg, suspect likely ileus, rec NPO, NGT, and GI eval, no surgical intervention at this time.   3/24: TBili 7.5, alk phos 249, , ALT 35, INR 1.47  3/25: TBili 7.7, alk phos 246, , ALT 28, WBC 21.6, continues on Zosyn. Tmax 38.3, on norepinephrine & dexmedetomidine. MELD 3.0: 21. MDF 16, may benefit from steroids however given active infection (pna, UTI), risks > benefits, defer at this time. s/p neostigmine x 1.  3/26: Large BM this morning. WBC 21.9, Hgb 10.1, .8, INR 1.22, TBili 9.2, alk phos 236, , ALT 32, lipase 86.   Meld 3.0: 21. MDF: 12.9, continue to defer steroids 2/2 active infection (pna, UTI, febrile), remains on Zosyn. Intermittent use of restraints, minimal sedation this morning. Of note, on repeat review of CTAP noncon 3/21 - there are hypodense foci with central hyperdensity involving the pericholecystic region and segment 6, indeterminate. Extubated.   3/27: abd xray done yesterday, on wet read appears much improved. TBili 9.2, alk phos 245, , ALT 34, Na 148, WBC 19.05, Hgb 9.5, .4, plt 226. Leukocytosis downtrending.     	- Trial CLD, would not advance further at this time, monitor bowel function  	- Continue lactulose  	- Continue antibiotics for +UA, no UCx sent per primary team  	- s/p neostigmine x 1 on 3/25 with good effect  	- Serial abd exams  	- Consider serial abd xrays   	- IV PPI BID  	- Trend PT/INR and LFTs/MELD labs daily  	- Maintain active T&S, 2 large bore peripheral IVs, transfuse for goal Hgb >7 or if symptomatic  	- Trend H/H  	- No acute GI intervention warranted given overall improving bowel function    This note and its recommendations herein are preliminary until such time as cosigned by an attending.

## 2024-03-27 NOTE — PROGRESS NOTE ADULT - NUTRITIONAL ASSESSMENT
This patient has been assessed with a concern for Malnutrition and has been determined to have a diagnosis/diagnoses of Moderate protein-calorie malnutrition.    This patient is being managed with:   Diet NPO-  Except Medications  Entered: Mar 24 2024  1:32PM

## 2024-03-27 NOTE — PROGRESS NOTE ADULT - SUBJECTIVE AND OBJECTIVE BOX
INTERVAL HPI/OVERNIGHT EVENTS: ***    PRESSORS: [ ] YES [ ] NO  WHICH:    Antimicrobial:  piperacillin/tazobactam IVPB.. 3.375 Gram(s) IV Intermittent every 8 hours    Cardiovascular:  norepinephrine Infusion 0.05 MICROgram(s)/kG/Min IV Continuous <Continuous>    Pulmonary:    Hematalogic:  heparin   Injectable 5000 Unit(s) SubCutaneous every 12 hours    Other:  chlorhexidine 2% Cloths 1 Application(s) Topical <User Schedule>  folic acid Injectable 1 milliGRAM(s) IV Push daily  influenza   Vaccine 0.5 milliLiter(s) IntraMuscular once  insulin lispro (ADMELOG) corrective regimen sliding scale   SubCutaneous every 6 hours  lactulose Syrup 10 Gram(s) Oral every 8 hours  pantoprazole  Injectable 40 milliGRAM(s) IV Push daily  PHENobarbital Injectable 130 milliGRAM(s) IV Push every 30 minutes PRN  sodium chloride 0.9% lock flush 10 milliLiter(s) IV Push every 1 hour PRN  thiamine 100 milliGRAM(s) Oral daily    chlorhexidine 2% Cloths 1 Application(s) Topical <User Schedule>  folic acid Injectable 1 milliGRAM(s) IV Push daily  heparin   Injectable 5000 Unit(s) SubCutaneous every 12 hours  influenza   Vaccine 0.5 milliLiter(s) IntraMuscular once  insulin lispro (ADMELOG) corrective regimen sliding scale   SubCutaneous every 6 hours  lactulose Syrup 10 Gram(s) Oral every 8 hours  norepinephrine Infusion 0.05 MICROgram(s)/kG/Min IV Continuous <Continuous>  pantoprazole  Injectable 40 milliGRAM(s) IV Push daily  PHENobarbital Injectable 130 milliGRAM(s) IV Push every 30 minutes PRN  piperacillin/tazobactam IVPB.. 3.375 Gram(s) IV Intermittent every 8 hours  sodium chloride 0.9% lock flush 10 milliLiter(s) IV Push every 1 hour PRN  thiamine 100 milliGRAM(s) Oral daily    Drug Dosing Weight  Height (cm): 167.6 (21 Mar 2024 12:45)  Weight (kg): 86 (21 Mar 2024 21:30)  BMI (kg/m2): 30.6 (21 Mar 2024 21:30)  BSA (m2): 1.95 (21 Mar 2024 21:30)    CENTRAL LINE: [ ] YES [ ] NO  LOCATION:   DATE INSERTED:      BLACK: [ ] YES [ ] NO    DATE INSERTED:      A-LINE:  [ ] YES [ ] NO  LOCATION:   DATE INSERTED:      PMH -reviewed admission note, no change since admission  PAST MEDICAL & SURGICAL HISTORY:  No pertinent past medical history      History of seizure due to alcohol withdrawal      History of alcohol use disorder      No significant past surgical history      No significant past surgical history          ICU Vital Signs Last 24 Hrs  T(C): 37.9 (27 Mar 2024 06:00), Max: 38.3 (26 Mar 2024 12:30)  T(F): 100.2 (27 Mar 2024 06:00), Max: 100.9 (26 Mar 2024 12:30)  HR: 112 (27 Mar 2024 07:00) (83 - 115)  BP: 90/58 (27 Mar 2024 04:00) (76/49 - 100/69)  BP(mean): 70 (27 Mar 2024 04:00) (58 - 79)  ABP: 105/63 (27 Mar 2024 07:00) (86/58 - 134/101)  ABP(mean): 78 (27 Mar 2024 07:00) (10 - 345)  RR: 19 (27 Mar 2024 07:00) (10 - 26)  SpO2: 97% (27 Mar 2024 07:00) (92% - 100%)    O2 Parameters below as of 27 Mar 2024 07:00  Patient On (Oxygen Delivery Method): nasal cannula  O2 Flow (L/min): 3          ABG - ( 26 Mar 2024 03:16 )  pH, Arterial: 7.34  pH, Blood: x     /  pCO2: 44    /  pO2: 138   / HCO3: 24    / Base Excess: -2.2  /  SaO2: 100                   03-26 @ 07:01  -  03-27 @ 07:00  --------------------------------------------------------  IN: 551.1 mL / OUT: 2180 mL / NET: -1628.9 mL        Mode: CPAP with PS  FiO2: 40  PEEP: 5  PS: 7  ITime: 1  MAP: 6  PIP: 15      PHYSICAL EXAM:    GENERAL: NAD, well-developed  HEAD:  Atraumatic, Normocephalic  EYES: EOMI, PERRLA, conjunctiva and sclera clear  ENMT: Moist mucous membranes  NECK: Supple, normal appearance, No JVD; Normal thyroid; Trachea midline  NERVOUS SYSTEM:  Alert & Oriented X3,  Moving all extremities  CHEST/LUNG: No chest deformity; Clear to Auscultation; No rales, rhonchi, wheezing   HEART: Regular rate and rhythm; No murmurs, rubs, or gallops  ABDOMEN: Soft, Nontender, Nondistended; Bowel sounds present  EXTREMITIES:  2+ Peripheral Pulses, No clubbing, cyanosis, or edema  SKIN: No rashes or lesions;  Good capillary refill      LABS:  CBC Full  -  ( 27 Mar 2024 03:51 )  WBC Count : 19.05 K/uL  RBC Count : 2.80 M/uL  Hemoglobin : 9.5 g/dL  Hematocrit : 29.5 %  Platelet Count - Automated : 226 K/uL  Mean Cell Volume : 105.4 fl  Mean Cell Hemoglobin : 33.9 pg  Mean Cell Hemoglobin Concentration : 32.2 gm/dL  Auto Neutrophil # : 16.57 K/uL  Auto Lymphocyte # : 1.14 K/uL  Auto Monocyte # : 0.57 K/uL  Auto Eosinophil # : 0.76 K/uL  Auto Basophil # : 0.00 K/uL  Auto Neutrophil % : 84.0 %  Auto Lymphocyte % : 6.0 %  Auto Monocyte % : 3.0 %  Auto Eosinophil % : 4.0 %  Auto Basophil % : 0.0 %    03-27    148<H>  |  115<H>  |  18  ----------------------------<  118<H>  3.5   |  26  |  0.62    Ca    8.2<L>      27 Mar 2024 03:51  Phos  1.6     03-27  Mg     2.5     03-27    TPro  5.3<L>  /  Alb  1.6<L>  /  TBili  9.2<H>  /  DBili  x   /  AST  172<H>  /  ALT  34  /  AlkPhos  245<H>  03-27    PT/INR - ( 26 Mar 2024 03:30 )   PT: 13.8 sec;   INR: 1.22 ratio         PTT - ( 26 Mar 2024 03:30 )  PTT:36.9 sec  Urinalysis Basic - ( 27 Mar 2024 03:51 )    Color: x / Appearance: x / SG: x / pH: x  Gluc: 118 mg/dL / Ketone: x  / Bili: x / Urobili: x   Blood: x / Protein: x / Nitrite: x   Leuk Esterase: x / RBC: x / WBC x   Sq Epi: x / Non Sq Epi: x / Bacteria: x          RADIOLOGY & ADDITIONAL STUDIES REVIEWED:  ***    [ ]GOALS OF CARE DISCUSSION WITH PATIENT/FAMILY/PROXY:    CRITICAL CARE TIME SPENT: 35 minutes INTERVAL HPI/OVERNIGHT EVENTS: ***    PRESSORS: [X] YES [ ] NO  WHICH: Levophed    Antimicrobial:  piperacillin/tazobactam IVPB.. 3.375 Gram(s) IV Intermittent every 8 hours    Cardiovascular:  norepinephrine Infusion 0.05 MICROgram(s)/kG/Min IV Continuous <Continuous>    Pulmonary:    Hematalogic:  heparin   Injectable 5000 Unit(s) SubCutaneous every 12 hours    Other:  chlorhexidine 2% Cloths 1 Application(s) Topical <User Schedule>  folic acid Injectable 1 milliGRAM(s) IV Push daily  influenza   Vaccine 0.5 milliLiter(s) IntraMuscular once  insulin lispro (ADMELOG) corrective regimen sliding scale   SubCutaneous every 6 hours  lactulose Syrup 10 Gram(s) Oral every 8 hours  pantoprazole  Injectable 40 milliGRAM(s) IV Push daily  PHENobarbital Injectable 130 milliGRAM(s) IV Push every 30 minutes PRN  sodium chloride 0.9% lock flush 10 milliLiter(s) IV Push every 1 hour PRN  thiamine 100 milliGRAM(s) Oral daily    chlorhexidine 2% Cloths 1 Application(s) Topical <User Schedule>  folic acid Injectable 1 milliGRAM(s) IV Push daily  heparin   Injectable 5000 Unit(s) SubCutaneous every 12 hours  influenza   Vaccine 0.5 milliLiter(s) IntraMuscular once  insulin lispro (ADMELOG) corrective regimen sliding scale   SubCutaneous every 6 hours  lactulose Syrup 10 Gram(s) Oral every 8 hours  norepinephrine Infusion 0.05 MICROgram(s)/kG/Min IV Continuous <Continuous>  pantoprazole  Injectable 40 milliGRAM(s) IV Push daily  PHENobarbital Injectable 130 milliGRAM(s) IV Push every 30 minutes PRN  piperacillin/tazobactam IVPB.. 3.375 Gram(s) IV Intermittent every 8 hours  sodium chloride 0.9% lock flush 10 milliLiter(s) IV Push every 1 hour PRN  thiamine 100 milliGRAM(s) Oral daily    Drug Dosing Weight  Height (cm): 167.6 (21 Mar 2024 12:45)  Weight (kg): 86 (21 Mar 2024 21:30)  BMI (kg/m2): 30.6 (21 Mar 2024 21:30)  BSA (m2): 1.95 (21 Mar 2024 21:30)    CENTRAL LINE: [X] YES [ ] NO  LOCATION:   DATE INSERTED:      BLACK: [ ] YES [X] NO    DATE INSERTED:      A-LINE:  [X] YES [ ] NO  LOCATION:   DATE INSERTED:      PMH -reviewed admission note, no change since admission  PAST MEDICAL & SURGICAL HISTORY:  No pertinent past medical history      History of seizure due to alcohol withdrawal      History of alcohol use disorder      No significant past surgical history      No significant past surgical history          ICU Vital Signs Last 24 Hrs  T(C): 37.9 (27 Mar 2024 06:00), Max: 38.3 (26 Mar 2024 12:30)  T(F): 100.2 (27 Mar 2024 06:00), Max: 100.9 (26 Mar 2024 12:30)  HR: 112 (27 Mar 2024 07:00) (83 - 115)  BP: 90/58 (27 Mar 2024 04:00) (76/49 - 100/69)  BP(mean): 70 (27 Mar 2024 04:00) (58 - 79)  ABP: 105/63 (27 Mar 2024 07:00) (86/58 - 134/101)  ABP(mean): 78 (27 Mar 2024 07:00) (10 - 345)  RR: 19 (27 Mar 2024 07:00) (10 - 26)  SpO2: 97% (27 Mar 2024 07:00) (92% - 100%)    O2 Parameters below as of 27 Mar 2024 07:00  Patient On (Oxygen Delivery Method): nasal cannula  O2 Flow (L/min): 3          ABG - ( 26 Mar 2024 03:16 )  pH, Arterial: 7.34  pH, Blood: x     /  pCO2: 44    /  pO2: 138   / HCO3: 24    / Base Excess: -2.2  /  SaO2: 100                   03-26 @ 07:01  -  03-27 @ 07:00  --------------------------------------------------------  IN: 551.1 mL / OUT: 2180 mL / NET: -1628.9 mL        Mode: CPAP with PS  FiO2: 40  PEEP: 5  PS: 7  ITime: 1  MAP: 6  PIP: 15      PHYSICAL EXAM:    GENERAL: NAD, well-developed, Dark niall urine w/ condom cath  HEAD:  Atraumatic, Normocephalic  EYES: EOMI, PERRLA, conjunctiva and sclera clear  ENMT: +[place] Sump. Moist mucous membranes  NECK: Supple, normal appearance, No JVD; Normal thyroid; Trachea midline  NERVOUS SYSTEM:  Awake and Alert, Moving all extremities, Following commands  CHEST/LUNG: No chest deformity; Clear to Auscultation; No rales, rhonchi, wheezing   HEART: Regular rate and rhythm; No murmurs, rubs, or gallops  ABDOMEN: +Distended, soft, Hypoactive bowel sounds. Rectal tube with loose stool  EXTREMITIES:  2+ Peripheral Pulses, No clubbing, cyanosis, or edema  SKIN: No rashes or lesions;  Good capillary refill      LABS:  CBC Full  -  ( 27 Mar 2024 03:51 )  WBC Count : 19.05 K/uL  RBC Count : 2.80 M/uL  Hemoglobin : 9.5 g/dL  Hematocrit : 29.5 %  Platelet Count - Automated : 226 K/uL  Mean Cell Volume : 105.4 fl  Mean Cell Hemoglobin : 33.9 pg  Mean Cell Hemoglobin Concentration : 32.2 gm/dL  Auto Neutrophil # : 16.57 K/uL  Auto Lymphocyte # : 1.14 K/uL  Auto Monocyte # : 0.57 K/uL  Auto Eosinophil # : 0.76 K/uL  Auto Basophil # : 0.00 K/uL  Auto Neutrophil % : 84.0 %  Auto Lymphocyte % : 6.0 %  Auto Monocyte % : 3.0 %  Auto Eosinophil % : 4.0 %  Auto Basophil % : 0.0 %    03-27    148<H>  |  115<H>  |  18  ----------------------------<  118<H>  3.5   |  26  |  0.62    Ca    8.2<L>      27 Mar 2024 03:51  Phos  1.6     03-27  Mg     2.5     03-27    TPro  5.3<L>  /  Alb  1.6<L>  /  TBili  9.2<H>  /  DBili  x   /  AST  172<H>  /  ALT  34  /  AlkPhos  245<H>  03-27    PT/INR - ( 26 Mar 2024 03:30 )   PT: 13.8 sec;   INR: 1.22 ratio         PTT - ( 26 Mar 2024 03:30 )  PTT:36.9 sec  Urinalysis Basic - ( 27 Mar 2024 03:51 )    Color: x / Appearance: x / SG: x / pH: x  Gluc: 118 mg/dL / Ketone: x  / Bili: x / Urobili: x   Blood: x / Protein: x / Nitrite: x   Leuk Esterase: x / RBC: x / WBC x   Sq Epi: x / Non Sq Epi: x / Bacteria: x          RADIOLOGY & ADDITIONAL STUDIES REVIEWED:  ***    [ ]GOALS OF CARE DISCUSSION WITH PATIENT/FAMILY/PROXY:    CRITICAL CARE TIME SPENT: 35 minutes INTERVAL HPI/OVERNIGHT EVENTS:  pt spiked fever this morning. abdominal distension has softened. Will take out lines today.    PRESSORS: [X] YES [ ] NO  WHICH: Levophed    Antimicrobial:  piperacillin/tazobactam IVPB.. 3.375 Gram(s) IV Intermittent every 8 hours    Cardiovascular:  norepinephrine Infusion 0.05 MICROgram(s)/kG/Min IV Continuous <Continuous>    Pulmonary:    Hematalogic:  heparin   Injectable 5000 Unit(s) SubCutaneous every 12 hours    Other:  chlorhexidine 2% Cloths 1 Application(s) Topical <User Schedule>  folic acid Injectable 1 milliGRAM(s) IV Push daily  influenza   Vaccine 0.5 milliLiter(s) IntraMuscular once  insulin lispro (ADMELOG) corrective regimen sliding scale   SubCutaneous every 6 hours  lactulose Syrup 10 Gram(s) Oral every 8 hours  pantoprazole  Injectable 40 milliGRAM(s) IV Push daily  PHENobarbital Injectable 130 milliGRAM(s) IV Push every 30 minutes PRN  sodium chloride 0.9% lock flush 10 milliLiter(s) IV Push every 1 hour PRN  thiamine 100 milliGRAM(s) Oral daily    chlorhexidine 2% Cloths 1 Application(s) Topical <User Schedule>  folic acid Injectable 1 milliGRAM(s) IV Push daily  heparin   Injectable 5000 Unit(s) SubCutaneous every 12 hours  influenza   Vaccine 0.5 milliLiter(s) IntraMuscular once  insulin lispro (ADMELOG) corrective regimen sliding scale   SubCutaneous every 6 hours  lactulose Syrup 10 Gram(s) Oral every 8 hours  norepinephrine Infusion 0.05 MICROgram(s)/kG/Min IV Continuous <Continuous>  pantoprazole  Injectable 40 milliGRAM(s) IV Push daily  PHENobarbital Injectable 130 milliGRAM(s) IV Push every 30 minutes PRN  piperacillin/tazobactam IVPB.. 3.375 Gram(s) IV Intermittent every 8 hours  sodium chloride 0.9% lock flush 10 milliLiter(s) IV Push every 1 hour PRN  thiamine 100 milliGRAM(s) Oral daily    Drug Dosing Weight  Height (cm): 167.6 (21 Mar 2024 12:45)  Weight (kg): 86 (21 Mar 2024 21:30)  BMI (kg/m2): 30.6 (21 Mar 2024 21:30)  BSA (m2): 1.95 (21 Mar 2024 21:30)    CENTRAL LINE: [X] YES [ ] NO  LOCATION:   DATE INSERTED:      BLACK: [ ] YES [X] NO    DATE INSERTED:      A-LINE:  [X] YES [ ] NO  LOCATION:   DATE INSERTED:      PMH -reviewed admission note, no change since admission  PAST MEDICAL & SURGICAL HISTORY:  No pertinent past medical history      History of seizure due to alcohol withdrawal      History of alcohol use disorder      No significant past surgical history      No significant past surgical history          ICU Vital Signs Last 24 Hrs  T(C): 37.9 (27 Mar 2024 06:00), Max: 38.3 (26 Mar 2024 12:30)  T(F): 100.2 (27 Mar 2024 06:00), Max: 100.9 (26 Mar 2024 12:30)  HR: 112 (27 Mar 2024 07:00) (83 - 115)  BP: 90/58 (27 Mar 2024 04:00) (76/49 - 100/69)  BP(mean): 70 (27 Mar 2024 04:00) (58 - 79)  ABP: 105/63 (27 Mar 2024 07:00) (86/58 - 134/101)  ABP(mean): 78 (27 Mar 2024 07:00) (10 - 345)  RR: 19 (27 Mar 2024 07:00) (10 - 26)  SpO2: 97% (27 Mar 2024 07:00) (92% - 100%)    O2 Parameters below as of 27 Mar 2024 07:00  Patient On (Oxygen Delivery Method): nasal cannula  O2 Flow (L/min): 3          ABG - ( 26 Mar 2024 03:16 )  pH, Arterial: 7.34  pH, Blood: x     /  pCO2: 44    /  pO2: 138   / HCO3: 24    / Base Excess: -2.2  /  SaO2: 100                   03-26 @ 07:01  -  03-27 @ 07:00  --------------------------------------------------------  IN: 551.1 mL / OUT: 2180 mL / NET: -1628.9 mL        Mode: CPAP with PS  FiO2: 40  PEEP: 5  PS: 7  ITime: 1  MAP: 6  PIP: 15      PHYSICAL EXAM:    GENERAL: NAD, well-developed, Dark niall urine w/ condom cath  HEAD:  Atraumatic, Normocephalic  EYES: EOMI, PERRLA, conjunctiva and sclera clear  ENMT: +[place] Sump. Moist mucous membranes  NECK: Supple, normal appearance, No JVD; Normal thyroid; Trachea midline  NERVOUS SYSTEM:  Awake and Alert, Moving all extremities, Following commands  CHEST/LUNG: No chest deformity; Clear to Auscultation; No rales, rhonchi, wheezing   HEART: Regular rate and rhythm; No murmurs, rubs, or gallops  ABDOMEN: +Distended, soft, Hypoactive bowel sounds. Rectal tube with loose stool  EXTREMITIES:  2+ Peripheral Pulses, No clubbing, cyanosis, or edema  SKIN: No rashes or lesions;  Good capillary refill      LABS:  CBC Full  -  ( 27 Mar 2024 03:51 )  WBC Count : 19.05 K/uL  RBC Count : 2.80 M/uL  Hemoglobin : 9.5 g/dL  Hematocrit : 29.5 %  Platelet Count - Automated : 226 K/uL  Mean Cell Volume : 105.4 fl  Mean Cell Hemoglobin : 33.9 pg  Mean Cell Hemoglobin Concentration : 32.2 gm/dL  Auto Neutrophil # : 16.57 K/uL  Auto Lymphocyte # : 1.14 K/uL  Auto Monocyte # : 0.57 K/uL  Auto Eosinophil # : 0.76 K/uL  Auto Basophil # : 0.00 K/uL  Auto Neutrophil % : 84.0 %  Auto Lymphocyte % : 6.0 %  Auto Monocyte % : 3.0 %  Auto Eosinophil % : 4.0 %  Auto Basophil % : 0.0 %    03-27    148<H>  |  115<H>  |  18  ----------------------------<  118<H>  3.5   |  26  |  0.62    Ca    8.2<L>      27 Mar 2024 03:51  Phos  1.6     03-27  Mg     2.5     03-27    TPro  5.3<L>  /  Alb  1.6<L>  /  TBili  9.2<H>  /  DBili  x   /  AST  172<H>  /  ALT  34  /  AlkPhos  245<H>  03-27    PT/INR - ( 26 Mar 2024 03:30 )   PT: 13.8 sec;   INR: 1.22 ratio         PTT - ( 26 Mar 2024 03:30 )  PTT:36.9 sec  Urinalysis Basic - ( 27 Mar 2024 03:51 )    Color: x / Appearance: x / SG: x / pH: x  Gluc: 118 mg/dL / Ketone: x  / Bili: x / Urobili: x   Blood: x / Protein: x / Nitrite: x   Leuk Esterase: x / RBC: x / WBC x   Sq Epi: x / Non Sq Epi: x / Bacteria: x          RADIOLOGY & ADDITIONAL STUDIES REVIEWED:  ***    [ ]GOALS OF CARE DISCUSSION WITH PATIENT/FAMILY/PROXY:    CRITICAL CARE TIME SPENT: 35 minutes

## 2024-03-27 NOTE — CHART NOTE - NSCHARTNOTEFT_GEN_A_CORE
RIJ Central line and Left Radial A Line removed at bedside. Minimal blood loss noted. No hematoma appreciated.

## 2024-03-27 NOTE — PROGRESS NOTE ADULT - SUBJECTIVE AND OBJECTIVE BOX
GI Progress Note    Patient is a 38y old  Male who presents with a chief complaint of Alcohol withdrawal (27 Mar 2024 07:34)    GI was consulted for abdominal distention.    24-HOUR INTERVAL EVENTS: Patient resting in bed, extubated, remains on levophed, mentation still altered. Rectal tube in place, brown liquid stool output, abd xray performed 3/26 overall appears improved, cecum dilated, official read pending.     MEDICATIONS  (STANDING):  chlorhexidine 2% Cloths 1 Application(s) Topical <User Schedule>  folic acid Injectable 1 milliGRAM(s) IV Push daily  heparin   Injectable 5000 Unit(s) SubCutaneous every 12 hours  influenza   Vaccine 0.5 milliLiter(s) IntraMuscular once  insulin lispro (ADMELOG) corrective regimen sliding scale   SubCutaneous every 6 hours  lactated ringers Bolus 500 milliLiter(s) IV Bolus once  lactulose Syrup 10 Gram(s) Oral every 8 hours  norepinephrine Infusion 0.05 MICROgram(s)/kG/Min (4.03 mL/Hr) IV Continuous <Continuous>  pantoprazole  Injectable 40 milliGRAM(s) IV Push daily  piperacillin/tazobactam IVPB.. 3.375 Gram(s) IV Intermittent every 8 hours  thiamine 100 milliGRAM(s) Oral daily    MEDICATIONS  (PRN):  PHENobarbital Injectable 130 milliGRAM(s) IV Push every 30 minutes PRN Agitation  sodium chloride 0.9% lock flush 10 milliLiter(s) IV Push every 1 hour PRN Pre/post blood products, medications, blood draw, and to maintain line patency    __________________________________________________  REVIEW OF SYSTEMS:  Limited ROS iso AMS.  ________________________________________________  PHYSICAL EXAM    Vital Signs Last 24 Hrs  T(C): 38.8 (27 Mar 2024 10:00), Max: 38.9 (27 Mar 2024 09:00)  T(F): 101.8 (27 Mar 2024 10:00), Max: 102 (27 Mar 2024 09:00)  HR: 116 (27 Mar 2024 10:00) (87 - 119)  BP: 90/58 (27 Mar 2024 04:00) (76/49 - 97/70)  BP(mean): 70 (27 Mar 2024 04:00) (58 - 76)  RR: 20 (27 Mar 2024 10:00) (11 - 26)  SpO2: 97% (27 Mar 2024 10:00) (92% - 100%)    Parameters below as of 27 Mar 2024 07:00  Patient On (Oxygen Delivery Method): nasal cannula  O2 Flow (L/min): 3      GEN: NAD  HEENT: icteric sclerae neck supple, moist mucous membranes  PULM: LCTAB, no wheezing, rales, or rhonchi  CV: RRR, no m/r/g  GI: taut, distended, +BS, no fluid shift  MSK: No clubbing or cyanosis  NEURO: Unable to assess orientation    _________________________________________________  LABS:                        9.5    19.05 )-----------( 226      ( 27 Mar 2024 03:51 )             29.5     03-27    148<H>  |  115<H>  |  18  ----------------------------<  118<H>  3.5   |  26  |  0.62    Ca    8.2<L>      27 Mar 2024 03:51  Phos  1.6     03-27  Mg     2.5     03-27    TPro  5.3<L>  /  Alb  1.6<L>  /  TBili  9.2<H>  /  DBili  x   /  AST  172<H>  /  ALT  34  /  AlkPhos  245<H>  03-27    PT/INR - ( 26 Mar 2024 03:30 )   PT: 13.8 sec;   INR: 1.22 ratio         PTT - ( 26 Mar 2024 03:30 )  PTT:36.9 sec  Urinalysis Basic - ( 27 Mar 2024 03:51 )    Color: x / Appearance: x / SG: x / pH: x  Gluc: 118 mg/dL / Ketone: x  / Bili: x / Urobili: x   Blood: x / Protein: x / Nitrite: x   Leuk Esterase: x / RBC: x / WBC x   Sq Epi: x / Non Sq Epi: x / Bacteria: x      CAPILLARY BLOOD GLUCOSE      POCT Blood Glucose.: 115 mg/dL (27 Mar 2024 06:40)  POCT Blood Glucose.: 111 mg/dL (26 Mar 2024 23:44)  POCT Blood Glucose.: 108 mg/dL (26 Mar 2024 18:17)  POCT Blood Glucose.: 118 mg/dL (26 Mar 2024 12:16)    SARS-CoV-2: NotDetec (21 Mar 2024 18:45)      RADIOLOGY & ADDITIONAL TESTS:      Abd xray 3/26 pending.  GI Progress Note    Patient is a 38y old  Male who presents with a chief complaint of Alcohol withdrawal (27 Mar 2024 07:34)    GI was consulted for abdominal distention.    24-HOUR INTERVAL EVENTS: Patient resting in bed, extubated, remains on levophed, mentation still altered. Rectal tube in place, brown liquid stool output, abd xray performed 3/26 overall appears improved, cecum dilated, official read pending.     MEDICATIONS  (STANDING):  chlorhexidine 2% Cloths 1 Application(s) Topical <User Schedule>  folic acid Injectable 1 milliGRAM(s) IV Push daily  heparin   Injectable 5000 Unit(s) SubCutaneous every 12 hours  influenza   Vaccine 0.5 milliLiter(s) IntraMuscular once  insulin lispro (ADMELOG) corrective regimen sliding scale   SubCutaneous every 6 hours  lactated ringers Bolus 500 milliLiter(s) IV Bolus once  lactulose Syrup 10 Gram(s) Oral every 8 hours  norepinephrine Infusion 0.05 MICROgram(s)/kG/Min (4.03 mL/Hr) IV Continuous <Continuous>  pantoprazole  Injectable 40 milliGRAM(s) IV Push daily  piperacillin/tazobactam IVPB.. 3.375 Gram(s) IV Intermittent every 8 hours  thiamine 100 milliGRAM(s) Oral daily    MEDICATIONS  (PRN):  PHENobarbital Injectable 130 milliGRAM(s) IV Push every 30 minutes PRN Agitation  sodium chloride 0.9% lock flush 10 milliLiter(s) IV Push every 1 hour PRN Pre/post blood products, medications, blood draw, and to maintain line patency    __________________________________________________  REVIEW OF SYSTEMS:  Limited ROS iso AMS.  ________________________________________________  PHYSICAL EXAM    Vital Signs Last 24 Hrs  T(C): 38.8 (27 Mar 2024 10:00), Max: 38.9 (27 Mar 2024 09:00)  T(F): 101.8 (27 Mar 2024 10:00), Max: 102 (27 Mar 2024 09:00)  HR: 116 (27 Mar 2024 10:00) (87 - 119)  BP: 90/58 (27 Mar 2024 04:00) (76/49 - 97/70)  BP(mean): 70 (27 Mar 2024 04:00) (58 - 76)  RR: 20 (27 Mar 2024 10:00) (11 - 26)  SpO2: 97% (27 Mar 2024 10:00) (92% - 100%)    Parameters below as of 27 Mar 2024 07:00  Patient On (Oxygen Delivery Method): nasal cannula  O2 Flow (L/min): 3      GEN: NAD  HEENT: icteric sclerae neck supple, moist mucous membranes  PULM: LCTAB, no wheezing, rales, or rhonchi  CV: RRR, no m/r/g  GI: taut, distended, +BS, no fluid shift  rectal tube patent & intact, draining brown liquid stool  MSK: No clubbing or cyanosis  NEURO: A&O x 0-1    _________________________________________________  LABS:                        9.5    19.05 )-----------( 226      ( 27 Mar 2024 03:51 )             29.5     03-27    148<H>  |  115<H>  |  18  ----------------------------<  118<H>  3.5   |  26  |  0.62    Ca    8.2<L>      27 Mar 2024 03:51  Phos  1.6     03-27  Mg     2.5     03-27    TPro  5.3<L>  /  Alb  1.6<L>  /  TBili  9.2<H>  /  DBili  x   /  AST  172<H>  /  ALT  34  /  AlkPhos  245<H>  03-27    PT/INR - ( 26 Mar 2024 03:30 )   PT: 13.8 sec;   INR: 1.22 ratio         PTT - ( 26 Mar 2024 03:30 )  PTT:36.9 sec  Urinalysis Basic - ( 27 Mar 2024 03:51 )    Color: x / Appearance: x / SG: x / pH: x  Gluc: 118 mg/dL / Ketone: x  / Bili: x / Urobili: x   Blood: x / Protein: x / Nitrite: x   Leuk Esterase: x / RBC: x / WBC x   Sq Epi: x / Non Sq Epi: x / Bacteria: x      CAPILLARY BLOOD GLUCOSE      POCT Blood Glucose.: 115 mg/dL (27 Mar 2024 06:40)  POCT Blood Glucose.: 111 mg/dL (26 Mar 2024 23:44)  POCT Blood Glucose.: 108 mg/dL (26 Mar 2024 18:17)  POCT Blood Glucose.: 118 mg/dL (26 Mar 2024 12:16)    SARS-CoV-2: NotDetec (21 Mar 2024 18:45)      RADIOLOGY & ADDITIONAL TESTS:      Abd xray 3/26 pending.  GI Progress Note    Patient is a 38y old  Male who presents with a chief complaint of Alcohol withdrawal (27 Mar 2024 07:34)    GI was consulted for abdominal distention.    24-HOUR INTERVAL EVENTS: Patient resting in bed, extubated, remains on levophed, mentation still altered. Rectal tube in place, brown liquid stool output, abd xray performed 3/26 overall appears improved, cecum dilated, official read pending.     MEDICATIONS  (STANDING):  chlorhexidine 2% Cloths 1 Application(s) Topical <User Schedule>  folic acid Injectable 1 milliGRAM(s) IV Push daily  heparin   Injectable 5000 Unit(s) SubCutaneous every 12 hours  influenza   Vaccine 0.5 milliLiter(s) IntraMuscular once  insulin lispro (ADMELOG) corrective regimen sliding scale   SubCutaneous every 6 hours  lactated ringers Bolus 500 milliLiter(s) IV Bolus once  lactulose Syrup 10 Gram(s) Oral every 8 hours  norepinephrine Infusion 0.05 MICROgram(s)/kG/Min (4.03 mL/Hr) IV Continuous <Continuous>  pantoprazole  Injectable 40 milliGRAM(s) IV Push daily  piperacillin/tazobactam IVPB.. 3.375 Gram(s) IV Intermittent every 8 hours  thiamine 100 milliGRAM(s) Oral daily    MEDICATIONS  (PRN):  PHENobarbital Injectable 130 milliGRAM(s) IV Push every 30 minutes PRN Agitation  sodium chloride 0.9% lock flush 10 milliLiter(s) IV Push every 1 hour PRN Pre/post blood products, medications, blood draw, and to maintain line patency    __________________________________________________  REVIEW OF SYSTEMS:  Limited ROS iso AMS.  ________________________________________________  PHYSICAL EXAM    Vital Signs Last 24 Hrs  T(C): 38.8 (27 Mar 2024 10:00), Max: 38.9 (27 Mar 2024 09:00)  T(F): 101.8 (27 Mar 2024 10:00), Max: 102 (27 Mar 2024 09:00)  HR: 116 (27 Mar 2024 10:00) (87 - 119)  BP: 90/58 (27 Mar 2024 04:00) (76/49 - 97/70)  BP(mean): 70 (27 Mar 2024 04:00) (58 - 76)  RR: 20 (27 Mar 2024 10:00) (11 - 26)  SpO2: 97% (27 Mar 2024 10:00) (92% - 100%)    Parameters below as of 27 Mar 2024 07:00  Patient On (Oxygen Delivery Method): nasal cannula  O2 Flow (L/min): 3      GEN: NAD  HEENT: icteric sclerae neck supple, moist mucous membranes  PULM: LCTAB, no wheezing, rales, or rhonchi  CV: RRR, no m/r/g  GI: taut, distended, +BS, no fluid shift  rectal tube patent & intact, draining brown liquid stool  MSK: No clubbing or cyanosis  NEURO: A&O x 0-1    _________________________________________________  LABS:                        9.5    19.05 )-----------( 226      ( 27 Mar 2024 03:51 )             29.5   -ve blood cx x2 3/21 but no repeat since and persistently febrile    03-27    148<H>  |  115<H>  |  18  ----------------------------<  118<H>  3.5   |  26  |  0.62    Ca    8.2<L>      27 Mar 2024 03:51  Phos  1.6     03-27  Mg     2.5     03-27    TPro  5.3<L>  /  Alb  1.6<L>  /  TBili  9.2<H>  /  DBili  x   /  AST  172<H>  /  ALT  34  /  AlkPhos  245<H>  03-27    PT/INR - ( 26 Mar 2024 03:30 )   PT: 13.8 sec;   INR: 1.22 ratio         PTT - ( 26 Mar 2024 03:30 )  PTT:36.9 sec  Urinalysis Basic - ( 27 Mar 2024 03:51 )    Color: x / Appearance: x / SG: x / pH: x  Gluc: 118 mg/dL / Ketone: x  / Bili: x / Urobili: x   Blood: x / Protein: x / Nitrite: x   Leuk Esterase: x / RBC: x / WBC x   Sq Epi: x / Non Sq Epi: x / Bacteria: x      CAPILLARY BLOOD GLUCOSE      POCT Blood Glucose.: 115 mg/dL (27 Mar 2024 06:40)  POCT Blood Glucose.: 111 mg/dL (26 Mar 2024 23:44)  POCT Blood Glucose.: 108 mg/dL (26 Mar 2024 18:17)  POCT Blood Glucose.: 118 mg/dL (26 Mar 2024 12:16)    SARS-CoV-2: NotDetec (21 Mar 2024 18:45)      RADIOLOGY & ADDITIONAL TESTS:      Abd xray 3/26 pending.

## 2024-03-28 LAB
ALBUMIN SERPL ELPH-MCNC: 1.7 G/DL — LOW (ref 3.5–5)
ALP SERPL-CCNC: 314 U/L — HIGH (ref 40–120)
ALT FLD-CCNC: 41 U/L DA — SIGNIFICANT CHANGE UP (ref 10–60)
ANION GAP SERPL CALC-SCNC: 5 MMOL/L — SIGNIFICANT CHANGE UP (ref 5–17)
APTT BLD: 37.9 SEC — HIGH (ref 24.5–35.6)
AST SERPL-CCNC: 194 U/L — HIGH (ref 10–40)
BASOPHILS # BLD AUTO: 0.05 K/UL — SIGNIFICANT CHANGE UP (ref 0–0.2)
BASOPHILS NFR BLD AUTO: 0.3 % — SIGNIFICANT CHANGE UP (ref 0–2)
BILIRUB SERPL-MCNC: 10.8 MG/DL — HIGH (ref 0.2–1.2)
BUN SERPL-MCNC: 14 MG/DL — SIGNIFICANT CHANGE UP (ref 7–18)
CALCIUM SERPL-MCNC: 8.2 MG/DL — LOW (ref 8.4–10.5)
CHLORIDE SERPL-SCNC: 117 MMOL/L — HIGH (ref 96–108)
CO2 SERPL-SCNC: 26 MMOL/L — SIGNIFICANT CHANGE UP (ref 22–31)
CREAT SERPL-MCNC: 0.59 MG/DL — SIGNIFICANT CHANGE UP (ref 0.5–1.3)
EGFR: 129 ML/MIN/1.73M2 — SIGNIFICANT CHANGE UP
EOSINOPHIL # BLD AUTO: 0.54 K/UL — HIGH (ref 0–0.5)
EOSINOPHIL NFR BLD AUTO: 2.8 % — SIGNIFICANT CHANGE UP (ref 0–6)
GLUCOSE BLDC GLUCOMTR-MCNC: 116 MG/DL — HIGH (ref 70–99)
GLUCOSE BLDC GLUCOMTR-MCNC: 119 MG/DL — HIGH (ref 70–99)
GLUCOSE BLDC GLUCOMTR-MCNC: 125 MG/DL — HIGH (ref 70–99)
GLUCOSE BLDC GLUCOMTR-MCNC: 138 MG/DL — HIGH (ref 70–99)
GLUCOSE SERPL-MCNC: 147 MG/DL — HIGH (ref 70–99)
HCT VFR BLD CALC: 36.5 % — LOW (ref 39–50)
HGB BLD-MCNC: 11.8 G/DL — LOW (ref 13–17)
IMM GRANULOCYTES NFR BLD AUTO: 2.9 % — HIGH (ref 0–0.9)
INR BLD: 1.27 RATIO — HIGH (ref 0.85–1.18)
LYMPHOCYTES # BLD AUTO: 1.52 K/UL — SIGNIFICANT CHANGE UP (ref 1–3.3)
LYMPHOCYTES # BLD AUTO: 7.8 % — LOW (ref 13–44)
MAGNESIUM SERPL-MCNC: 2.7 MG/DL — HIGH (ref 1.6–2.6)
MCHC RBC-ENTMCNC: 32.3 GM/DL — SIGNIFICANT CHANGE UP (ref 32–36)
MCHC RBC-ENTMCNC: 33 PG — SIGNIFICANT CHANGE UP (ref 27–34)
MCV RBC AUTO: 102 FL — HIGH (ref 80–100)
MONOCYTES # BLD AUTO: 1.18 K/UL — HIGH (ref 0–0.9)
MONOCYTES NFR BLD AUTO: 6 % — SIGNIFICANT CHANGE UP (ref 2–14)
NEUTROPHILS # BLD AUTO: 15.67 K/UL — HIGH (ref 1.8–7.4)
NEUTROPHILS NFR BLD AUTO: 80.2 % — HIGH (ref 43–77)
NRBC # BLD: 2 /100 WBCS — HIGH (ref 0–0)
PHOSPHATE SERPL-MCNC: 2.9 MG/DL — SIGNIFICANT CHANGE UP (ref 2.5–4.5)
PLATELET # BLD AUTO: 272 K/UL — SIGNIFICANT CHANGE UP (ref 150–400)
POTASSIUM SERPL-MCNC: 3.4 MMOL/L — LOW (ref 3.5–5.3)
POTASSIUM SERPL-SCNC: 3.4 MMOL/L — LOW (ref 3.5–5.3)
PROT SERPL-MCNC: 5.7 G/DL — LOW (ref 6–8.3)
PROTHROM AB SERPL-ACNC: 14.4 SEC — HIGH (ref 9.5–13)
RBC # BLD: 3.58 M/UL — LOW (ref 4.2–5.8)
RBC # FLD: 19.9 % — HIGH (ref 10.3–14.5)
SODIUM SERPL-SCNC: 148 MMOL/L — HIGH (ref 135–145)
WBC # BLD: 19.52 K/UL — HIGH (ref 3.8–10.5)
WBC # FLD AUTO: 19.52 K/UL — HIGH (ref 3.8–10.5)

## 2024-03-28 PROCEDURE — 74018 RADEX ABDOMEN 1 VIEW: CPT | Mod: 26

## 2024-03-28 PROCEDURE — 99232 SBSQ HOSP IP/OBS MODERATE 35: CPT

## 2024-03-28 PROCEDURE — 99222 1ST HOSP IP/OBS MODERATE 55: CPT

## 2024-03-28 RX ORDER — POTASSIUM CHLORIDE 20 MEQ
40 PACKET (EA) ORAL ONCE
Refills: 0 | Status: COMPLETED | OUTPATIENT
Start: 2024-03-28 | End: 2024-03-28

## 2024-03-28 RX ORDER — LACTULOSE 10 G/15ML
10 SOLUTION ORAL EVERY 8 HOURS
Refills: 0 | Status: DISCONTINUED | OUTPATIENT
Start: 2024-03-28 | End: 2024-03-30

## 2024-03-28 RX ORDER — SODIUM CHLORIDE 9 MG/ML
1000 INJECTION, SOLUTION INTRAVENOUS
Refills: 0 | Status: DISCONTINUED | OUTPATIENT
Start: 2024-03-28 | End: 2024-03-29

## 2024-03-28 RX ORDER — FOLIC ACID 0.8 MG
1 TABLET ORAL DAILY
Refills: 0 | Status: DISCONTINUED | OUTPATIENT
Start: 2024-03-28 | End: 2024-04-11

## 2024-03-28 RX ORDER — PANTOPRAZOLE SODIUM 20 MG/1
40 TABLET, DELAYED RELEASE ORAL DAILY
Refills: 0 | Status: DISCONTINUED | OUTPATIENT
Start: 2024-03-28 | End: 2024-04-11

## 2024-03-28 RX ADMIN — PANTOPRAZOLE SODIUM 40 MILLIGRAM(S): 20 TABLET, DELAYED RELEASE ORAL at 11:30

## 2024-03-28 RX ADMIN — PIPERACILLIN AND TAZOBACTAM 25 GRAM(S): 4; .5 INJECTION, POWDER, LYOPHILIZED, FOR SOLUTION INTRAVENOUS at 06:18

## 2024-03-28 RX ADMIN — CHLORHEXIDINE GLUCONATE 1 APPLICATION(S): 213 SOLUTION TOPICAL at 06:19

## 2024-03-28 RX ADMIN — LACTULOSE 10 GRAM(S): 10 SOLUTION ORAL at 13:51

## 2024-03-28 RX ADMIN — HEPARIN SODIUM 5000 UNIT(S): 5000 INJECTION INTRAVENOUS; SUBCUTANEOUS at 06:18

## 2024-03-28 RX ADMIN — LACTULOSE 10 GRAM(S): 10 SOLUTION ORAL at 06:18

## 2024-03-28 RX ADMIN — Medication 100 MILLIGRAM(S): at 11:31

## 2024-03-28 RX ADMIN — Medication 40 MILLIEQUIVALENT(S): at 06:18

## 2024-03-28 RX ADMIN — Medication 1 MILLIGRAM(S): at 11:31

## 2024-03-28 RX ADMIN — PIPERACILLIN AND TAZOBACTAM 25 GRAM(S): 4; .5 INJECTION, POWDER, LYOPHILIZED, FOR SOLUTION INTRAVENOUS at 13:52

## 2024-03-28 RX ADMIN — Medication 130 MILLIGRAM(S): at 13:51

## 2024-03-28 RX ADMIN — LACTULOSE 10 GRAM(S): 10 SOLUTION ORAL at 22:27

## 2024-03-28 RX ADMIN — PIPERACILLIN AND TAZOBACTAM 25 GRAM(S): 4; .5 INJECTION, POWDER, LYOPHILIZED, FOR SOLUTION INTRAVENOUS at 22:27

## 2024-03-28 RX ADMIN — SODIUM CHLORIDE 100 MILLILITER(S): 9 INJECTION, SOLUTION INTRAVENOUS at 20:49

## 2024-03-28 RX ADMIN — Medication 130 MILLIGRAM(S): at 01:14

## 2024-03-28 RX ADMIN — Medication 130 MILLIGRAM(S): at 13:10

## 2024-03-28 RX ADMIN — HEPARIN SODIUM 5000 UNIT(S): 5000 INJECTION INTRAVENOUS; SUBCUTANEOUS at 17:31

## 2024-03-28 RX ADMIN — SODIUM CHLORIDE 100 MILLILITER(S): 9 INJECTION, SOLUTION INTRAVENOUS at 10:35

## 2024-03-28 NOTE — PROGRESS NOTE ADULT - SUBJECTIVE AND OBJECTIVE BOX
INTERVAL HPI/OVERNIGHT EVENTS: ***    PRESSORS: [ ] YES [ ] NO  WHICH:    Antimicrobial:  piperacillin/tazobactam IVPB.. 3.375 Gram(s) IV Intermittent every 8 hours    Cardiovascular:    Pulmonary:    Hematalogic:  heparin   Injectable 5000 Unit(s) SubCutaneous every 12 hours    Other:  chlorhexidine 2% Cloths 1 Application(s) Topical <User Schedule>  folic acid 1 milliGRAM(s) Oral daily  folic acid Injectable 1 milliGRAM(s) IV Push daily  influenza   Vaccine 0.5 milliLiter(s) IntraMuscular once  insulin lispro (ADMELOG) corrective regimen sliding scale   SubCutaneous every 6 hours  lactulose Syrup 10 Gram(s) Oral every 8 hours  pantoprazole   Suspension 40 milliGRAM(s) Oral daily  PHENobarbital Injectable 130 milliGRAM(s) IV Push every 30 minutes PRN  thiamine 100 milliGRAM(s) Oral daily    chlorhexidine 2% Cloths 1 Application(s) Topical <User Schedule>  folic acid 1 milliGRAM(s) Oral daily  folic acid Injectable 1 milliGRAM(s) IV Push daily  heparin   Injectable 5000 Unit(s) SubCutaneous every 12 hours  influenza   Vaccine 0.5 milliLiter(s) IntraMuscular once  insulin lispro (ADMELOG) corrective regimen sliding scale   SubCutaneous every 6 hours  lactulose Syrup 10 Gram(s) Oral every 8 hours  pantoprazole   Suspension 40 milliGRAM(s) Oral daily  PHENobarbital Injectable 130 milliGRAM(s) IV Push every 30 minutes PRN  piperacillin/tazobactam IVPB.. 3.375 Gram(s) IV Intermittent every 8 hours  thiamine 100 milliGRAM(s) Oral daily    Drug Dosing Weight  Height (cm): 167.6 (21 Mar 2024 12:45)  Weight (kg): 86 (21 Mar 2024 21:30)  BMI (kg/m2): 30.6 (21 Mar 2024 21:30)  BSA (m2): 1.95 (21 Mar 2024 21:30)    CENTRAL LINE: [ ] YES [ ] NO  LOCATION:   DATE INSERTED:      BLACK: [ ] YES [ ] NO    DATE INSERTED:      A-LINE:  [ ] YES [ ] NO  LOCATION:   DATE INSERTED:      [facility] -reviewed admission note, no change since admission  PAST MEDICAL & SURGICAL HISTORY:  No pertinent past medical history      History of seizure due to alcohol withdrawal      History of alcohol use disorder      No significant past surgical history      No significant past surgical history          ICU Vital Signs Last 24 Hrs  T(C): 37.2 (28 Mar 2024 07:05), Max: 38.8 (27 Mar 2024 09:15)  T(F): 99 (28 Mar 2024 07:05), Max: 101.8 (27 Mar 2024 09:15)  HR: 96 (28 Mar 2024 07:05) (85 - 116)  BP: 95/66 (28 Mar 2024 07:05) (72/61 - 115/96)  BP(mean): 74 (28 Mar 2024 07:05) (43 - 104)  ABP: 110/71 (27 Mar 2024 13:45) (92/54 - 178/100)  ABP(mean): 241 (27 Mar 2024 13:45) (67 - 241)  RR: 20 (28 Mar 2024 07:05) (14 - 27)  SpO2: 97% (28 Mar 2024 07:05) (94% - 100%)    O2 Parameters below as of 28 Mar 2024 07:00  Patient On (Oxygen Delivery Method): nasal cannula  O2 Flow (L/min): 3                03-27 @ 07:01  -  03-28 @ 07:00  --------------------------------------------------------  IN: 200 mL / OUT: 1640 mL / NET: -1440 mL            PHYSICAL EXAM:    GENERAL: NAD, well-developed  HEAD:  Atraumatic, Normocephalic  EYES: EOMI, PERRLA, conjunctiva and sclera clear  ENMT: Moist mucous membranes  NECK: Supple, normal appearance, No JVD; Normal thyroid; Trachea midline  NERVOUS SYSTEM:  Alert & Oriented X3,  Moving all extremities  CHEST/LUNG: No chest deformity; Clear to Auscultation; No rales, rhonchi, wheezing   HEART: Regular rate and rhythm; No murmurs, rubs, or gallops  ABDOMEN: Soft, Nontender, Nondistended; Bowel sounds present  EXTREMITIES:  2+ Peripheral Pulses, No clubbing, cyanosis, or edema  SKIN: No rashes or lesions;  Good capillary refill      LABS:  CBC Full  -  ( 28 Mar 2024 03:59 )  WBC Count : 19.52 K/uL  RBC Count : 3.58 M/uL  Hemoglobin : 11.8 g/dL  Hematocrit : 36.5 %  Platelet Count - Automated : 272 K/uL  Mean Cell Volume : 102.0 fl  Mean Cell Hemoglobin : 33.0 pg  Mean Cell Hemoglobin Concentration : 32.3 gm/dL  Auto Neutrophil # : 15.67 K/uL  Auto Lymphocyte # : 1.52 K/uL  Auto Monocyte # : 1.18 K/uL  Auto Eosinophil # : 0.54 K/uL  Auto Basophil # : 0.05 K/uL  Auto Neutrophil % : 80.2 %  Auto Lymphocyte % : 7.8 %  Auto Monocyte % : 6.0 %  Auto Eosinophil % : 2.8 %  Auto Basophil % : 0.3 %    03-28    148<H>  |  117<H>  |  14  ----------------------------<  147<H>  3.4<L>   |  26  |  0.59    Ca    8.2<L>      28 Mar 2024 03:59  Phos  2.9     03-28  Mg     2.7     03-28    TPro  5.7<L>  /  Alb  1.7<L>  /  TBili  10.8<H>  /  DBili  x   /  AST  194<H>  /  ALT  41  /  AlkPhos  314<H>  03-28    PT/INR - ( 28 Mar 2024 03:59 )   PT: 14.4 sec;   INR: 1.27 ratio         PTT - ( 28 Mar 2024 03:59 )  PTT:37.9 sec  Urinalysis Basic - ( 28 Mar 2024 03:59 )    Color: x / Appearance: x / SG: x / pH: x  Gluc: 147 mg/dL / Ketone: x  / Bili: x / Urobili: x   Blood: x / Protein: x / Nitrite: x   Leuk Esterase: x / RBC: x / WBC x   Sq Epi: x / Non Sq Epi: x / Bacteria: x          RADIOLOGY & ADDITIONAL STUDIES REVIEWED:  ***    [ ]GOALS OF CARE DISCUSSION WITH PATIENT/FAMILY/PROXY:    CRITICAL CARE TIME SPENT: 35 minutes INTERVAL HPI/OVERNIGHT EVENTS:  No acute events overnight.  Patient examined at bedside this AM.  Patient is now able to follow commands.    PRESSORS: [ ] YES [X] NO    Antimicrobial:  piperacillin/tazobactam IVPB.. 3.375 Gram(s) IV Intermittent every 8 hours    Cardiovascular:    Pulmonary:    Hematalogic:  heparin   Injectable 5000 Unit(s) SubCutaneous every 12 hours    Other:  chlorhexidine 2% Cloths 1 Application(s) Topical <User Schedule>  folic acid 1 milliGRAM(s) Oral daily  folic acid Injectable 1 milliGRAM(s) IV Push daily  influenza   Vaccine 0.5 milliLiter(s) IntraMuscular once  insulin lispro (ADMELOG) corrective regimen sliding scale   SubCutaneous every 6 hours  lactulose Syrup 10 Gram(s) Oral every 8 hours  pantoprazole   Suspension 40 milliGRAM(s) Oral daily  PHENobarbital Injectable 130 milliGRAM(s) IV Push every 30 minutes PRN  thiamine 100 milliGRAM(s) Oral daily    chlorhexidine 2% Cloths 1 Application(s) Topical <User Schedule>  folic acid 1 milliGRAM(s) Oral daily  folic acid Injectable 1 milliGRAM(s) IV Push daily  heparin   Injectable 5000 Unit(s) SubCutaneous every 12 hours  influenza   Vaccine 0.5 milliLiter(s) IntraMuscular once  insulin lispro (ADMELOG) corrective regimen sliding scale   SubCutaneous every 6 hours  lactulose Syrup 10 Gram(s) Oral every 8 hours  pantoprazole   Suspension 40 milliGRAM(s) Oral daily  PHENobarbital Injectable 130 milliGRAM(s) IV Push every 30 minutes PRN  piperacillin/tazobactam IVPB.. 3.375 Gram(s) IV Intermittent every 8 hours  thiamine 100 milliGRAM(s) Oral daily    Drug Dosing Weight  Height (cm): 167.6 (21 Mar 2024 12:45)  Weight (kg): 86 (21 Mar 2024 21:30)  BMI (kg/m2): 30.6 (21 Mar 2024 21:30)  BSA (m2): 1.95 (21 Mar 2024 21:30)    CENTRAL LINE: [ ] YES [X] NO  LOCATION:   DATE INSERTED:      BLACK: [ ] YES [X] NO    DATE INSERTED:      A-LINE:  [ ] YES [X] NO  LOCATION:   DATE INSERTED:      [facility] -reviewed admission note, no change since admission  PAST MEDICAL & SURGICAL HISTORY:  No pertinent past medical history      History of seizure due to alcohol withdrawal      History of alcohol use disorder      No significant past surgical history      No significant past surgical history          ICU Vital Signs Last 24 Hrs  T(C): 37.2 (28 Mar 2024 07:05), Max: 38.8 (27 Mar 2024 09:15)  T(F): 99 (28 Mar 2024 07:05), Max: 101.8 (27 Mar 2024 09:15)  HR: 96 (28 Mar 2024 07:05) (85 - 116)  BP: 95/66 (28 Mar 2024 07:05) (72/61 - 115/96)  BP(mean): 74 (28 Mar 2024 07:05) (43 - 104)  ABP: 110/71 (27 Mar 2024 13:45) (92/54 - 178/100)  ABP(mean): 241 (27 Mar 2024 13:45) (67 - 241)  RR: 20 (28 Mar 2024 07:05) (14 - 27)  SpO2: 97% (28 Mar 2024 07:05) (94% - 100%)    O2 Parameters below as of 28 Mar 2024 07:00  Patient On (Oxygen Delivery Method): nasal cannula  O2 Flow (L/min): 3                03-27 @ 07:01  -  03-28 @ 07:00  --------------------------------------------------------  IN: 200 mL / OUT: 1640 mL / NET: -1440 mL            PHYSICAL EXAM:    GENERAL: Mild distress, Rectal Tube w/ loose stool, 3L NC  HEAD:  Atraumatic, Normocephalic  EYES: Scleral Icterus  ENMT: Moist mucous membranes  NECK: Supple, normal appearance, No JVD; Normal thyroid; Trachea midline  NERVOUS SYSTEM:  Alert & Oriented X1,  Moving all extremities, following commands, exhibits episodes of confusion  CHEST/LUNG: No chest deformity; Clear to Auscultation; No rales, rhonchi, wheezing   HEART: Regular rate and rhythm; No murmurs, rubs, or gallops  ABDOMEN: +Distended, not getting softer  EXTREMITIES: +1 pitting edema b/l LE's  SKIN: No rashes or lesions;  Good capillary refill      LABS:  CBC Full  -  ( 28 Mar 2024 03:59 )  WBC Count : 19.52 K/uL  RBC Count : 3.58 M/uL  Hemoglobin : 11.8 g/dL  Hematocrit : 36.5 %  Platelet Count - Automated : 272 K/uL  Mean Cell Volume : 102.0 fl  Mean Cell Hemoglobin : 33.0 pg  Mean Cell Hemoglobin Concentration : 32.3 gm/dL  Auto Neutrophil # : 15.67 K/uL  Auto Lymphocyte # : 1.52 K/uL  Auto Monocyte # : 1.18 K/uL  Auto Eosinophil # : 0.54 K/uL  Auto Basophil # : 0.05 K/uL  Auto Neutrophil % : 80.2 %  Auto Lymphocyte % : 7.8 %  Auto Monocyte % : 6.0 %  Auto Eosinophil % : 2.8 %  Auto Basophil % : 0.3 %    03-28    148<H>  |  117<H>  |  14  ----------------------------<  147<H>  3.4<L>   |  26  |  0.59    Ca    8.2<L>      28 Mar 2024 03:59  Phos  2.9     03-28  Mg     2.7     03-28    TPro  5.7<L>  /  Alb  1.7<L>  /  TBili  10.8<H>  /  DBili  x   /  AST  194<H>  /  ALT  41  /  AlkPhos  314<H>  03-28    PT/INR - ( 28 Mar 2024 03:59 )   PT: 14.4 sec;   INR: 1.27 ratio         PTT - ( 28 Mar 2024 03:59 )  PTT:37.9 sec  Urinalysis Basic - ( 28 Mar 2024 03:59 )    Color: x / Appearance: x / SG: x / pH: x  Gluc: 147 mg/dL / Ketone: x  / Bili: x / Urobili: x   Blood: x / Protein: x / Nitrite: x   Leuk Esterase: x / RBC: x / WBC x   Sq Epi: x / Non Sq Epi: x / Bacteria: x          RADIOLOGY & ADDITIONAL STUDIES REVIEWED:  ***    [ ]GOALS OF CARE DISCUSSION WITH PATIENT/FAMILY/PROXY:    CRITICAL CARE TIME SPENT: 35 minutes

## 2024-03-28 NOTE — CONSULT NOTE ADULT - ASSESSMENT
38y obese (BMI 30) Male w/ Hx of AUD (since age 21, heavier last 2 weeks, last drink on the day of admission), Hx of withdrawal seizures, prior alcohol rehab, presented to [facility] ER 3/21/24 w/ AMS in setting of hyperammonemia (172), worsening abdominal distention and jaundice (serum bilirubin 6.6->10.8). He was admitted to MICU for withdrawal, aspirational pneumonia, required intubation 3/22/24. He was also found to be positive for coronavirus (non COVID-19), and noted increased intraabdominal pressure, ileus, and surgery and GI has been following.   CT a/p showed marked hepatomegaly (29 cm) w/ severe hepatic steatosis. MDF was < 32.   Hepatology was consulted today for his ALD, rising bilirubin.    # Alcoholic hepatitis w/ new onset jaundice, elevated transaminases (AST>ALT), and hepatomegaly w/ hepatic steatosis  # Elevated ALP  # Mild coagulopathy  # Hepatic encephalopathy  # Heterogenous foci in GB fossa and segment 6  # AUD w/ withdrawal  - MELD 3.0 22  - Jaundice, marked hepatomegaly, severe steatosis likely due to alcoholic hepatitis; MDF < 32, thus no role of steroid  - C/w monitoring LFTs; Please, fractionate bilirubin again  - C/w empiric antibiotic coverage to complete course  - No biliary dilation on CT and US abd, no signs of cholecystitis either on above. However, worsening bilirubin and focal hepatic abnormalities on CT, thus consider adding MRCP to the MRI abd w/wo if no contraindication  - Complete hepatitis viral serologies, add HBcAb, HBsAb, Hep E IgM/PCR, HCV RNA, EBV/CMV PCRs  - Would also send AI w/u, ceruloplasmin  - Aspiration, seizure, fall precautions; Avoid sedation; Obtain ammonia level; C/w bowel regimen / lactulose  - Consider MRI abd w/wo contrast if no contraindication for better evaluation of the reported focal hepatic abnormalities on CT from 3/21/24. Notably a 2018 CT also reported an enhancing liver lesion.  - C/w folic, thiamine  - Will need alcohol rehab once acute medical issues resolved      Thank you for consult  Will continue to monitor  D/w ICU

## 2024-03-28 NOTE — PROGRESS NOTE ADULT - ASSESSMENT
IMP: This is a 38 yr old man with  alcohol withdrawal seizure with alcohol use disorder, who p/w confusion starting this morning. Admitted for high risk alcohol withdrawal with persistent sinus tachycardia.    Assessment:   # Acute hypoxic resp failure   # Alcohol use disorder  # Alcohol withdrawal  # Acute metabolic encephalopathy  # Atypical pneumonia  # Hx of alcohol seizures  # Possible pancreatitis  # Liver steatosis  # Alcoholic hepatitis  # Sinus tachycardia  # SIRS  # ARABELLA  # Ileus       Plan:   =============NEURO:=============  # Acute metabolic encephalopathy  # Alcohol withdrawal  s/p Ativan however will pursue intubation for protecting the airways  s/p phenobarb IVP x1 on 3/23   - s/p ketamine drip and precedex  - continue phenobarb pushes when pt becomes agitated    # Alcohol use disorder  # Hx of alcohol seizures  - Pt has been in and out of alcohol rehab  - Most recently came out 6 months ago - as per sister pt was only hospitalized and not in rehab   - SW consult order placed     #intubated (3/22)  -Attempt to do SAT, SBT in afternoon 3/26  -EXTUBATED 3/26    =============CARDIO:================  # Sinus tachycardia  - P/w sinus tachycardia  - Likely due to alcohol withdrawal  - EKG = sinus tachycardia  - trop neg      ==========PULM:=============  #Atypical pneumonia   # coronavirus positive however not COVID  CT chest : Multifocal bilateral peripheral opacities suggesting atypical pneumonia. Probable compression atelectasis right lung base, related to elevated right hemidiaphragm.  - c/w zosyn       ==========GI:==========  # Constipation  #?acute abdomen with increased abdominal pressure of 25   surgery consulted - no indication for surgery at this time , recs paralyzing the patient   - abdominal pressure improving. No longer need to measure  - 3/24 Bladder pressure 16   - 3/24 started lactulose 10mg q8h  - 3/25 Neostigmine x1, led to bradycardia. Atropine given.  - 3/26 Pt had large BM, will continue lactulose  - Remove [place]ump    #Diet  -Clear Liquid    # Alcoholic hepatitis   #transaminitis  #Hyperbilirubinemia   P/w T bili 6.6 (Dir 5.1), , ALT 41,   Maddrey score = borderline at 31.4 points (32 point is the cutoff for steroid treatment)  MELD-Na score 17 points 6 % 90day mortality   - Transaminitis improving   - T. Hector 6.6 > 5.6 > 5.5>7.5   - 3/24 Maddrey score - 28.7     # Possible pancreatitis  Lipase 470, possible mid abdominal pain. Negative imaging.  - repeat Lipase 86, not concerning for pancreatitis    # Liver steatosis    # Liver lesion  Likely due to alcohol use disorder  CT = Has hypodense foci with central hyperdensity involving the pericholecystic region and segment 6, indeterminate.   - Rec outpt MRI, hepatology consult once more stable for outpt follow up       =======RENAL:==========  #Metabolic Acidosis  -ABG 3/25 showing bicarb of 10  -Vent settings adjusted  -RESOLVED    # ARABELLA  P/w SCr 1.77 (baseline 0.8)  S/p 23L IVF in ED  urinary retention 1.5 L s/p straight cath overnight  - Roman placed on 3/22  3/22 Scr improved to 0.85   Renal US: No hydronephrosis.   RESOLVED    # Lactic acidosis  P/w Lactate 10  Due to dehydration, infection-  s/p 3L IVF -> Lactate 1.6  RESOLVED    # Hypokalemia  - K 3.3 s/p Kcl 10 mEq x3 ->3.9   - repeat 3.3 will replete IV  RESOLVED     =====INFECTIOUS=====  # SIRS  -p/w Lactate 10, tachycardia, tachypnea>20  CXR neg for consolidation or effusion  Positive coronavirus  s/p Cefepime in ED  d/c'd Ceftriaoxone (3/23)  3/23 Started Zosyn   legionella neg   U/A positive. UCx not sent   - BCx NGTD 48h   - C/w with Zosyn    -3/27 pt continues to spike fevers  - Removing Central Line and A line  - Roman cath has been removed    ==============HEME:===========  # Leukocytosis  - As above in SIRS    =======ENDO:=======  A1c 7.9  - FS, SSI  - monitor     =========SKIN/CATHETERS=======  Roman  RIJ  [place]  Left A-Line    =========PPX:========  - Hep SC  - PPI    IMP: This is a 38 yr old man with  alcohol withdrawal seizure with alcohol use disorder, who p/w confusion starting this morning. Admitted for high risk alcohol withdrawal with persistent sinus tachycardia.    Assessment:   # Acute hypoxic resp failure   # Alcohol use disorder  # Alcohol withdrawal  # Acute metabolic encephalopathy  # Atypical pneumonia  # Hx of alcohol seizures  # Possible pancreatitis  # Liver steatosis  # Alcoholic hepatitis  # Sinus tachycardia  # SIRS  # ARABELLA  # Ileus       Plan:   =============NEURO:=============  # Acute metabolic encephalopathy  # Alcohol withdrawal  s/p Ativan however will pursue intubation for protecting the airways  s/p phenobarb IVP x1 on 3/23   - s/p ketamine drip and precedex  - continue phenobarb pushes when pt becomes agitated    # Alcohol use disorder  # Hx of alcohol seizures  - Pt has been in and out of alcohol rehab  - Most recently came out 6 months ago - as per sister pt was only hospitalized and not in rehab   - SW consult order placed     #intubated (3/22)  -Attempt to do SAT, SBT in afternoon 3/26  -EXTUBATED 3/26    =============CARDIO:================  # Sinus tachycardia  - P/w sinus tachycardia  - Likely due to alcohol withdrawal  - EKG = sinus tachycardia  - trop neg      ==========PULM:=============  #Atypical pneumonia   # coronavirus positive however not COVID  CT chest : Multifocal bilateral peripheral opacities suggesting atypical pneumonia. Probable compression atelectasis right lung base, related to elevated right hemidiaphragm.  - c/w zosyn       ==========GI:==========  # Constipation  #?acute abdomen with increased abdominal pressure of 25   surgery consulted - no indication for surgery at this time , recs paralyzing the patient   - abdominal pressure improving. No longer need to measure  - 3/24 Bladder pressure 16   - 3/24 started lactulose 10mg q8h  - 3/25 Neostigmine x1, led to bradycardia. Atropine given.  - 3/26 Pt had large BM, will continue lactulose  - Removed [place] Sump  - f/u daily abdominal x-rays    #Diet  -Clear Liquid    # Alcoholic hepatitis   #transaminitis  #Hyperbilirubinemia   P/w T bili 6.6 (Dir 5.1), , ALT 41,   Maddrey score = borderline at 31.4 points (32 point is the cutoff for steroid treatment)  MELD-Na score 17 points 6 % 90day mortality   - Transaminitis improving   - T. Hector 6.6 > 5.6 > 5.5>7.5   - 3/24 Maddrey score - 28.7   - Hepatology consulted- Dr. Wiley    # Possible pancreatitis  Lipase 470, possible mid abdominal pain. Negative imaging.  - repeat Lipase 86, not concerning for pancreatitis    # Liver steatosis    # Liver lesion  Likely due to alcohol use disorder  CT = Has hypodense foci with central hyperdensity involving the pericholecystic region and segment 6, indeterminate.   - Rec outpt MRI  - Hepatology consulted- Dr. Wiley       =======RENAL:==========  #Metabolic Acidosis  -ABG 3/25 showing bicarb of 10  -Vent settings adjusted  -RESOLVED    # ARABELLA  P/w SCr 1.77 (baseline 0.8)  S/p 23L IVF in ED  urinary retention 1.5 L s/p straight cath overnight  - Roman placed on 3/22, removed 3/28  3/22 Scr improved to 0.85   Renal US: No hydronephrosis.   RESOLVED    # Lactic acidosis  P/w Lactate 10  Due to dehydration, infection-  s/p 3L IVF -> Lactate 1.6  RESOLVED    # Hypokalemia  - K 3.3 s/p Kcl 10 mEq x3 ->3.9   - repeat 3.3 will replete IV  RESOLVED     =====INFECTIOUS=====  # SIRS  -p/w Lactate 10, tachycardia, tachypnea>20  CXR neg for consolidation or effusion  Positive coronavirus  s/p Cefepime in ED  d/c'd Ceftriaoxone (3/23)  3/23 Started Zosyn   legionella neg   U/A positive. UCx not sent   - BCx NGTD 48h   - C/w with Zosyn    - 3/27 continued to spike fevers  - Removing Central Line and A line  - Roman cath has been removed  - afebrile >24 hours    ==============HEME:===========  # Leukocytosis  - As above in SIRS    =======ENDO:=======  A1c 7.9  - FS, SSI  - monitor     =========SKIN/CATHETERS=======  Roman- REMOVED  RIJ- REMOVED  [place]- REMOVED  Left A-Line- REMOVED    =========PPX:========  - Hep SC  - PPI

## 2024-03-28 NOTE — CONSULT NOTE ADULT - SUBJECTIVE AND OBJECTIVE BOX
Chief Complaint:  Patient is a 38y old  Male who presents with a chief complaint of Alcohol withdrawal (28 Mar 2024 09:28)      HPI:ARIANA JOHANSEN is a 38y Male    PMHX/PSHX:  No pertinent past medical history    No pertinent past medical history    History of seizure due to alcohol withdrawal    Alcohol use    History of alcohol use disorder    No significant past surgical history    No significant past surgical history      Allergies:  No Known Allergies      Home Medications: reviewed  Hospital Medications:  chlorhexidine 2% Cloths 1 Application(s) Topical <User Schedule>  folic acid 1 milliGRAM(s) Oral daily  heparin   Injectable 5000 Unit(s) SubCutaneous every 12 hours  influenza   Vaccine 0.5 milliLiter(s) IntraMuscular once  insulin lispro (ADMELOG) corrective regimen sliding scale   SubCutaneous every 6 hours  lactated ringers. 1000 milliLiter(s) IV Continuous <Continuous>  lactulose Syrup 10 Gram(s) Oral every 8 hours  pantoprazole   Suspension 40 milliGRAM(s) Oral daily  PHENobarbital Injectable 130 milliGRAM(s) IV Push every 30 minutes PRN  piperacillin/tazobactam IVPB.. 3.375 Gram(s) IV Intermittent every 8 hours  thiamine 100 milliGRAM(s) Oral daily      Social History:   Tob: Denies  EtOH: Denies  Illicit Drugs: Denies    Family history:  No pertinent family history in first degree relatives    No pertinent family history in first degree relatives      Denies family history of colon cancer/polyps, stomach cancer/polyps, pancreatic cancer/masses, liver cancer/disease, ovarian cancer and endometrial cancer.    ROS:   General:  No  fevers, chills, night sweats, fatigue  Eyes:  Good vision, no reported pain  ENT:  No sore throat, pain, runny nose  CV:  No pain, palpitations  Pulm:  No dyspnea, cough  GI:  See HPI, otherwise negative  :  No  incontinence, nocturia  Muscle:  No pain, weakness  Neuro:  No memory problems  Psych:  No insomnia, mood problems, depression  Endocrine:  No polyuria, polydipsia, cold/heat intolerance  Heme:  No petechiae, ecchymosis, easy bruisability  Skin:  No rash    PHYSICAL EXAM:   Vital Signs:  Vital Signs Last 24 Hrs  T(C): 37.8 (28 Mar 2024 12:00), Max: 38.5 (27 Mar 2024 12:30)  T(F): 100 (28 Mar 2024 12:00), Max: 101.3 (27 Mar 2024 12:30)  HR: 106 (28 Mar 2024 12:00) (85 - 114)  BP: 113/78 (28 Mar 2024 12:00) (72/61 - 115/96)  BP(mean): 88 (28 Mar 2024 12:00) (43 - 104)  RR: 20 (28 Mar 2024 12:00) (14 - 27)  SpO2: 97% (28 Mar 2024 12:00) (94% - 100%)    Parameters below as of 28 Mar 2024 12:00  Patient On (Oxygen Delivery Method): nasal cannula  O2 Flow (L/min): 3    Daily     Daily Weight in k.6 (28 Mar 2024 07:00)    GENERAL: no acute distress  NEURO: alert, no asterixis  HEENT: anicteric sclera, no conjunctival pallor appreciated  CHEST: no respiratory distress, no accessory muscle use  CARDIAC: regular rate, rhythm  ABDOMEN: soft, non-tender, non-distended, no rebound or guarding  EXTREMITIES: warm, well perfused, no edema  SKIN: no lesions noted    LABS: reviewed                        11.8   19.52 )-----------( 272      ( 28 Mar 2024 03:59 )             36.5     03-28    148<H>  |  117<H>  |  14  ----------------------------<  147<H>  3.4<L>   |  26  |  0.59    Ca    8.2<L>      28 Mar 2024 03:59  Phos  2.9     03-  Mg     2.7     28    TPro  5.7<L>  /  Alb  1.7<L>  /  TBili  10.8<H>  /  DBili  x   /  AST  194<H>  /  ALT  41  /  AlkPhos  314<H>  28    LIVER FUNCTIONS - ( 28 Mar 2024 03:59 )  Alb: 1.7 g/dL / Pro: 5.7 g/dL / ALK PHOS: 314 U/L / ALT: 41 U/L DA / AST: 194 U/L / GGT: x               Diagnostic Studies: see sunrise for full report         Chief Complaint:  Patient is a 38y old  Male who presents with a chief complaint of Alcohol withdrawal (28 Mar 2024 09:28)      HPI:ARIANA JOHANSEN is a 38y obese (BMI 30) Male w/ Hx of AUD (since age 21, heavier last 2 weeks, last drink on the day of admission), Hx of withdrawal seizures, prior alcohol rehab, [facility]/ AMS in setting of hyperammonemia (172), worsening abdominal distention and jaundice (serum bilirubin 6.6->10.8). He was admitted to MICU for withdrawal, aspirational pneumonia, required intubation 3/22/24. He was also found to be positive for coronavirus (non COVID-19), and noted increased intraabdominal pressure, ileus, and surgery and GI has been following.   CT a/p showed marked hepatomegaly (29 cm) w/ severe hepatic steatosis. MDF was < 32.   Hepatology was consulted today for his ALD.       PMHX/PSHX:   History of seizure due to alcohol withdrawal  History of alcohol use disorder    Allergies:  No Known Allergies      Home Medications: reviewed  Hospital Medications:  chlorhexidine 2% Cloths 1 Application(s) Topical <User Schedule>  folic acid 1 milliGRAM(s) Oral daily  heparin   Injectable 5000 Unit(s) SubCutaneous every 12 hours  influenza   Vaccine 0.5 milliLiter(s) IntraMuscular once  insulin lispro (ADMELOG) corrective regimen sliding scale   SubCutaneous every 6 hours  lactated ringers. 1000 milliLiter(s) IV Continuous <Continuous>  lactulose Syrup 10 Gram(s) Oral every 8 hours  pantoprazole   Suspension 40 milliGRAM(s) Oral daily  PHENobarbital Injectable 130 milliGRAM(s) IV Push every 30 minutes PRN  piperacillin/tazobactam IVPB.. 3.375 Gram(s) IV Intermittent every 8 hours  thiamine 100 milliGRAM(s) Oral daily      Social History:   Unable to obtain due to patient condition.     Family history:  Unable to obtain due to patient condition.     ROS:   Unable to obtain due to patient condition.     PHYSICAL EXAM:   Vital Signs:  Vital Signs Last 24 Hrs  T(C): 37.8 (28 Mar 2024 12:00), Max: 38.5 (27 Mar 2024 12:30)  T(F): 100 (28 Mar 2024 12:00), Max: 101.3 (27 Mar 2024 12:30)  HR: 106 (28 Mar 2024 12:00) (85 - 114)  BP: 113/78 (28 Mar 2024 12:00) (72/61 - 115/96)  BP(mean): 88 (28 Mar 2024 12:00) (43 - 104)  RR: 20 (28 Mar 2024 12:00) (14 - 27)  SpO2: 97% (28 Mar 2024 12:00) (94% - 100%)    Parameters below as of 28 Mar 2024 12:00  Patient On (Oxygen Delivery Method): nasal cannula  O2 Flow (L/min): 3    Daily     Daily Weight in k.6 (28 Mar 2024 07:00)    GENERAL: ill appearing but no acute distress  NEURO: s/p sedation for agiatation (per d/w ICU), per RN  HEENT: icteric sclera, no conjunctival pallor appreciated  CHEST: no respiratory distress, no accessory muscle use  CARDIAC: regular rate, rhythm  ABDOMEN: soft, non-tender, distended, no rebound or guarding  EXTREMITIES: warm, well perfused, no edema  SKIN: jaundice    LABS: reviewed                        11.8   19.52 )-----------( 272      ( 28 Mar 2024 03:59 )             36.5     03-28    148<H>  |  117<H>  |  14  ----------------------------<  147<H>  3.4<L>   |  26  |  0.59    Ca    8.2<L>      28 Mar 2024 03:59  Phos  2.9       Mg     2.7     28    TPro  5.7<L>  /  Alb  1.7<L>  /  TBili  10.8<H>  /  DBili  x   /  AST  194<H>  /  ALT  41  /  AlkPhos  314<H>      LIVER FUNCTIONS - ( 28 Mar 2024 03:59 )  Alb: 1.7 g/dL / Pro: 5.7 g/dL / ALK PHOS: 314 U/L / ALT: 41 U/L DA / AST: 194 U/L / GGT: x               Diagnostic Studies: see sunrise for full report

## 2024-03-28 NOTE — PROGRESS NOTE ADULT - SUBJECTIVE AND OBJECTIVE BOX
GI Progress Note - incomplete    Patient is a 38y old  Male who presents with a chief complaint of Alcohol withdrawal (28 Mar 2024 09:00)    GI was consulted for abdominal distention.    24-HOUR INTERVAL EVENTS: Patient resting in bed, light brown liquid stool output noted in rectal tube, ~ 500cc, maintaining sats on NC, mentation remains altered. LFTs uptrending. Low grade temp 99, BP 83/60 -> 95/66, no longer on pressor support.     MEDICATIONS  (STANDING):  chlorhexidine 2% Cloths 1 Application(s) Topical <User Schedule>  folic acid 1 milliGRAM(s) Oral daily  folic acid Injectable 1 milliGRAM(s) IV Push daily  heparin   Injectable 5000 Unit(s) SubCutaneous every 12 hours  influenza   Vaccine 0.5 milliLiter(s) IntraMuscular once  insulin lispro (ADMELOG) corrective regimen sliding scale   SubCutaneous every 6 hours  lactulose Syrup 10 Gram(s) Oral every 8 hours  pantoprazole   Suspension 40 milliGRAM(s) Oral daily  piperacillin/tazobactam IVPB.. 3.375 Gram(s) IV Intermittent every 8 hours  thiamine 100 milliGRAM(s) Oral daily    MEDICATIONS  (PRN):  PHENobarbital Injectable 130 milliGRAM(s) IV Push every 30 minutes PRN Agitation    __________________________________________________  REVIEW OF SYSTEMS:  Unable to obtain ROS iso AMS.   ________________________________________________  PHYSICAL EXAM    Vital Signs Last 24 Hrs  T(C): 37.2 (28 Mar 2024 07:05), Max: 38.8 (27 Mar 2024 09:30)  T(F): 99 (28 Mar 2024 07:05), Max: 101.8 (27 Mar 2024 09:30)  HR: 96 (28 Mar 2024 07:05) (85 - 116)  BP: 95/66 (28 Mar 2024 07:05) (72/61 - 115/96)  BP(mean): 74 (28 Mar 2024 07:05) (43 - 104)  RR: 20 (28 Mar 2024 07:05) (14 - 27)  SpO2: 97% (28 Mar 2024 07:05) (94% - 100%)    Parameters below as of 28 Mar 2024 07:00  Patient On (Oxygen Delivery Method): nasal cannula  O2 Flow (L/min): 3      GEN: NAD  HEENT: icteric sclerae, neck supple, moist mucous membranes  PULM: LCTAB, no wheezing, rales, or rhonchi  CV: RRR, no m/r/g  GI: taut, distended, +BS, no fluid shift  MSK: NO clubbing  NEURO: A&O x 0-1  _________________________________________________  LABS:                        11.8   19.52 )-----------( 272      ( 28 Mar 2024 03:59 )             36.5     03-28    148<H>  |  117<H>  |  14  ----------------------------<  147<H>  3.4<L>   |  26  |  0.59    Ca    8.2<L>      28 Mar 2024 03:59  Phos  2.9     03-28  Mg     2.7     03-28    TPro  5.7<L>  /  Alb  1.7<L>  /  TBili  10.8<H>  /  DBili  x   /  AST  194<H>  /  ALT  41  /  AlkPhos  314<H>  03-28    PT/INR - ( 28 Mar 2024 03:59 )   PT: 14.4 sec;   INR: 1.27 ratio         PTT - ( 28 Mar 2024 03:59 )  PTT:37.9 sec  Urinalysis Basic - ( 28 Mar 2024 03:59 )    Color: x / Appearance: x / SG: x / pH: x  Gluc: 147 mg/dL / Ketone: x  / Bili: x / Urobili: x   Blood: x / Protein: x / Nitrite: x   Leuk Esterase: x / RBC: x / WBC x   Sq Epi: x / Non Sq Epi: x / Bacteria: x      CAPILLARY BLOOD GLUCOSE      POCT Blood Glucose.: 119 mg/dL (28 Mar 2024 06:31)  POCT Blood Glucose.: 110 mg/dL (27 Mar 2024 23:56)  POCT Blood Glucose.: 118 mg/dL (27 Mar 2024 17:16)  POCT Blood Glucose.: 114 mg/dL (27 Mar 2024 12:02)    SARS-CoV-2: NotDetec (21 Mar 2024 18:45)      RADIOLOGY & ADDITIONAL TESTS:      < from: Xray Abdomen 1 View PORTABLE -Routine (Xray Abdomen 1 View PORTABLE -Routine .) (03.26.24 @ 16:55) >  IMPRESSION: Improvement in the colonic distention. Cecum presently   measures 11.8 cm.    < end of copied text >   GI Progress Note    Patient is a 38y old  Male who presents with a chief complaint of Alcohol withdrawal (28 Mar 2024 09:00)    GI was consulted for abdominal distention.    24-HOUR INTERVAL EVENTS: Patient resting in bed, light brown liquid stool output noted in rectal tube, ~ 500cc, maintaining sats on NC, mentation remains altered. LFTs uptrending. Low grade temp 99, BP 83/60 -> 95/66, no longer on pressor support. Hepatology consulted.     MEDICATIONS  (STANDING):  chlorhexidine 2% Cloths 1 Application(s) Topical <User Schedule>  folic acid 1 milliGRAM(s) Oral daily  folic acid Injectable 1 milliGRAM(s) IV Push daily  heparin   Injectable 5000 Unit(s) SubCutaneous every 12 hours  influenza   Vaccine 0.5 milliLiter(s) IntraMuscular once  insulin lispro (ADMELOG) corrective regimen sliding scale   SubCutaneous every 6 hours  lactulose Syrup 10 Gram(s) Oral every 8 hours  pantoprazole   Suspension 40 milliGRAM(s) Oral daily  piperacillin/tazobactam IVPB.. 3.375 Gram(s) IV Intermittent every 8 hours  thiamine 100 milliGRAM(s) Oral daily    MEDICATIONS  (PRN):  PHENobarbital Injectable 130 milliGRAM(s) IV Push every 30 minutes PRN Agitation    __________________________________________________  REVIEW OF SYSTEMS:  Unable to obtain ROS iso AMS.   ________________________________________________  PHYSICAL EXAM    Vital Signs Last 24 Hrs  T(C): 37.2 (28 Mar 2024 07:05), Max: 38.8 (27 Mar 2024 09:30)  T(F): 99 (28 Mar 2024 07:05), Max: 101.8 (27 Mar 2024 09:30)  HR: 96 (28 Mar 2024 07:05) (85 - 116)  BP: 95/66 (28 Mar 2024 07:05) (72/61 - 115/96)  BP(mean): 74 (28 Mar 2024 07:05) (43 - 104)  RR: 20 (28 Mar 2024 07:05) (14 - 27)  SpO2: 97% (28 Mar 2024 07:05) (94% - 100%)    Parameters below as of 28 Mar 2024 07:00  Patient On (Oxygen Delivery Method): nasal cannula  O2 Flow (L/min): 3      GEN: NAD  HEENT: icteric sclerae, neck supple, moist mucous membranes  PULM: LCTAB, no wheezing, rales, or rhonchi  CV: RRR, no m/r/g  GI: taut, distended, +BS, no fluid shift  MSK: NO clubbing  NEURO: A&O x 0-1  _________________________________________________  LABS:                        11.8   19.52 )-----------( 272      ( 28 Mar 2024 03:59 )             36.5     03-28    148<H>  |  117<H>  |  14  ----------------------------<  147<H>  3.4<L>   |  26  |  0.59    Ca    8.2<L>      28 Mar 2024 03:59  Phos  2.9     03-28  Mg     2.7     03-28    TPro  5.7<L>  /  Alb  1.7<L>  /  TBili  10.8<H>  /  DBili  x   /  AST  194<H>  /  ALT  41  /  AlkPhos  314<H>  03-28    PT/INR - ( 28 Mar 2024 03:59 )   PT: 14.4 sec;   INR: 1.27 ratio         PTT - ( 28 Mar 2024 03:59 )  PTT:37.9 sec  Urinalysis Basic - ( 28 Mar 2024 03:59 )    Color: x / Appearance: x / SG: x / pH: x  Gluc: 147 mg/dL / Ketone: x  / Bili: x / Urobili: x   Blood: x / Protein: x / Nitrite: x   Leuk Esterase: x / RBC: x / WBC x   Sq Epi: x / Non Sq Epi: x / Bacteria: x      CAPILLARY BLOOD GLUCOSE      POCT Blood Glucose.: 119 mg/dL (28 Mar 2024 06:31)  POCT Blood Glucose.: 110 mg/dL (27 Mar 2024 23:56)  POCT Blood Glucose.: 118 mg/dL (27 Mar 2024 17:16)  POCT Blood Glucose.: 114 mg/dL (27 Mar 2024 12:02)    SARS-CoV-2: NotDetec (21 Mar 2024 18:45)      RADIOLOGY & ADDITIONAL TESTS:      < from: Xray Abdomen 1 View PORTABLE -Routine (Xray Abdomen 1 View PORTABLE -Routine .) (03.26.24 @ 16:55) >  IMPRESSION: Improvement in the colonic distention. Cecum presently   measures 11.8 cm.    < end of copied text >

## 2024-03-28 NOTE — PROGRESS NOTE ADULT - ASSESSMENT
Patient is a 38M with a PMHx of alcohol use disorder with hx of withdrawal seizures who presented to the ED on 3/21 with AMS. GI was consulted for abdominal distention.     #Abdominal distention  #Colonic distention  #Hepatomegaly  #Hepatic steatosis  #Alcohol-associated hepatitis  #Elevated INR  #Elevated lipase  #Acute metabolic encephalopathy  #Elevated ammonia  #Macrocytic anemia  #Hypoalbuminemia  #Alcohol use disorder  #ETOH withdrawal  #Liver lesion  Patient presented with AMS, extensive etoh use hx and prior withdrawals and seizures, c/f encephalopathy given presentation, last drink 3/21 AM day of admission. In the ED, ammonia 107 -> 172, WBC 24.09, INR 1.55, TBili 6.6, alk phos 377, , ALT 41.  , lipase 479, lactate 10 -> 5.6 -> 1.9. Admitted to the ICU for high risk alcohol withdrawal and persistent sinus tach. LFTs remain elevated however stable, intubated 3/22. AFP 2.7.  ICU course c/b worsening abdominal distention, serial abd xrays showing colonic distention, surgery consulted for increased intra-abdominal pressure 26mmHg, suspect likely ileus, rec NPO, NGT, and GI eval, no surgical intervention at this time.   3/24: TBili 7.5, alk phos 249, , ALT 35, INR 1.47  3/25: TBili 7.7, alk phos 246, , ALT 28, WBC 21.6, continues on Zosyn. Tmax 38.3, on norepinephrine & dexmedetomidine. MELD 3.0: 21. MDF 16, may benefit from steroids however given active infection (pna, UTI), risks > benefits, defer at this time. s/p neostigmine x 1.  3/26: Large BM this morning. WBC 21.9, Hgb 10.1, .8, INR 1.22, TBili 9.2, alk phos 236, , ALT 32, lipase 86.   Meld 3.0: 21. MDF: 12.9, continue to defer steroids 2/2 active infection (pna, UTI, febrile), remains on Zosyn. Intermittent use of restraints, minimal sedation this morning. Of note, on repeat review of CTAP noncon 3/21 - there are hypodense foci with central hyperdensity involving the pericholecystic region and segment 6, indeterminate. Extubated.   3/27: abd xray done 3/26 - improvement in colonic distention, cecum 11.8cm. TBili 9.2, alk phos 245, , ALT 34, Na 148, WBC 19.05, Hgb 9.5, .4, plt 226. Leukocytosis downtrending.   3/28: TBili 10.8, alk phos 314, , ALT 41, WBC 19.52, Hgb 11.8, , INR 1.27, Na 148, K 3.4. Rising hyperbilirubinemia as well as alk phos and AST. R factor 0.3 indicating cholestatic injury however bile ducts wnl on CTAP noncon 3/21, ? hepatic congestion vs biliary etiology e.g. acute cholecystitis vs acalculous cholangitis vs other.   MELD 3.0: 22, MDF: 17.2, good prognosis for steroids however unclear etiology for leukocytosis with neutrophil dominance, intermittent low grade temp, and labile BP (BP 83/60 this AM), despite Zosyn, would not initiate steroids until infectious source ruled out. Child-Roman Class: 11C.     	- Consider repeat imaging with IV contrast given renal function much improved  	- Continue CLD  	- Continue lactulose   	- Agree with abx  	- Serial abd exams, serial abd xrays  	- s/p Neostigmine x 1 on 3/25 with good effect  	- IV PPI BID  	- Maintain active T&S, 2 large bore peripheral IVs, transfuse for goal Hgb >7 or if symptomatic  	- Trend H/H  	- No acute GI intervention warranted at this time    This note and its recommendations herein are preliminary until such time as cosigned by an attending.   Patient is a 38M with a PMHx of alcohol use disorder with hx of withdrawal seizures who presented to the ED on 3/21 with AMS. GI was consulted for abdominal distention.     #Abdominal distention  #Colonic distention  #Hepatomegaly  #Hepatic steatosis  #Alcohol-associated hepatitis  #Elevated INR  #Elevated lipase  #Acute metabolic encephalopathy  #Elevated ammonia  #Macrocytic anemia  #Hypoalbuminemia  #Alcohol use disorder  #ETOH withdrawal  #Liver lesion  Patient presented with AMS, extensive etoh use hx and prior withdrawals and seizures, c/f encephalopathy given presentation, last drink 3/21 AM day of admission. In the ED, ammonia 107 -> 172, WBC 24.09, INR 1.55, TBili 6.6, alk phos 377, , ALT 41.  , lipase 479, lactate 10 -> 5.6 -> 1.9. Admitted to the ICU for high risk alcohol withdrawal and persistent sinus tach. LFTs remain elevated however stable, intubated 3/22. AFP 2.7.  ICU course c/b worsening abdominal distention, serial abd xrays showing colonic distention, surgery consulted for increased intra-abdominal pressure 26mmHg, suspect likely ileus, rec NPO, NGT, and GI eval, no surgical intervention at this time.   3/24: TBili 7.5, alk phos 249, , ALT 35, INR 1.47  3/25: TBili 7.7, alk phos 246, , ALT 28, WBC 21.6, continues on Zosyn. Tmax 38.3, on norepinephrine & dexmedetomidine. MELD 3.0: 21. MDF 16, may benefit from steroids however given active infection (pna, UTI), risks > benefits, defer at this time. s/p neostigmine x 1.  3/26: Large BM this morning. WBC 21.9, Hgb 10.1, .8, INR 1.22, TBili 9.2, alk phos 236, , ALT 32, lipase 86.   Meld 3.0: 21. MDF: 12.9, continue to defer steroids 2/2 active infection (pna, UTI, febrile), remains on Zosyn. Intermittent use of restraints, minimal sedation this morning. Of note, on repeat review of CTAP noncon 3/21 - there are hypodense foci with central hyperdensity involving the pericholecystic region and segment 6, indeterminate. Extubated.   3/27: abd xray done 3/26 - improvement in colonic distention, cecum 11.8cm. TBili 9.2, alk phos 245, , ALT 34, Na 148, WBC 19.05, Hgb 9.5, .4, plt 226. Leukocytosis downtrending.   3/28: TBili 10.8, alk phos 314, , ALT 41, WBC 19.52, Hgb 11.8, , INR 1.27, Na 148, K 3.4. Rising hyperbilirubinemia as well as alk phos and AST. R factor 0.3 indicating cholestatic injury however bile ducts wnl on CTAP noncon 3/21, ? hepatic congestion vs biliary etiology e.g. acute cholecystitis vs acalculous cholangitis vs other. Hepatology Dr. Wiley consulted.  MELD 3.0: 22, MDF: 17.2, good prognosis for steroids however unclear etiology for leukocytosis with neutrophil dominance, intermittent low grade temp, and labile BP (BP 83/60 this AM), despite Zosyn, would not initiate steroids until infectious source ruled out. Child-Roman Class: 11C.     	- Hepatology consulted, appreciate recs  	- Continue CLD  	- Continue lactulose   	- Agree with abx  	- Serial abd exams, serial abd xrays  	- s/p Neostigmine x 1 on 3/25 with good effect  	- IV PPI BID  	- Maintain active T&S, 2 large bore peripheral IVs, transfuse for goal Hgb >7 or if symptomatic  	- Trend H/H  	- No acute GI intervention warranted at this time    This note and its recommendations herein are preliminary until such time as cosigned by an attending.    GI will sign off at this time.  Thank you for involving us in the care of Mr. Ernesto Jack.  Please re-consult GI PRN.

## 2024-03-28 NOTE — PROGRESS NOTE ADULT - NUTRITIONAL ASSESSMENT
This patient has been assessed with a concern for Malnutrition and has been determined to have a diagnosis/diagnoses of Moderate protein-calorie malnutrition.    This patient is being managed with:   Diet Clear Liquid-  Entered: Mar 27 2024 11:45AM

## 2024-03-29 LAB
AMMONIA BLD-MCNC: 61 UMOL/L — HIGH (ref 11–32)
ANION GAP SERPL CALC-SCNC: 2 MMOL/L — LOW (ref 5–17)
ANION GAP SERPL CALC-SCNC: 3 MMOL/L — LOW (ref 5–17)
BILIRUB DIRECT SERPL-MCNC: 8.9 MG/DL — HIGH (ref 0–0.3)
BILIRUB SERPL-MCNC: 10.2 MG/DL — HIGH (ref 0.2–1.2)
BUN SERPL-MCNC: 13 MG/DL — SIGNIFICANT CHANGE UP (ref 7–18)
BUN SERPL-MCNC: 13 MG/DL — SIGNIFICANT CHANGE UP (ref 7–18)
CALCIUM SERPL-MCNC: 7.8 MG/DL — LOW (ref 8.4–10.5)
CALCIUM SERPL-MCNC: 8.1 MG/DL — LOW (ref 8.4–10.5)
CHLORIDE SERPL-SCNC: 114 MMOL/L — HIGH (ref 96–108)
CHLORIDE SERPL-SCNC: 115 MMOL/L — HIGH (ref 96–108)
CO2 SERPL-SCNC: 27 MMOL/L — SIGNIFICANT CHANGE UP (ref 22–31)
CO2 SERPL-SCNC: 29 MMOL/L — SIGNIFICANT CHANGE UP (ref 22–31)
CREAT SERPL-MCNC: 0.51 MG/DL — SIGNIFICANT CHANGE UP (ref 0.5–1.3)
CREAT SERPL-MCNC: 0.74 MG/DL — SIGNIFICANT CHANGE UP (ref 0.5–1.3)
EGFR: 120 ML/MIN/1.73M2 — SIGNIFICANT CHANGE UP
EGFR: 135 ML/MIN/1.73M2 — SIGNIFICANT CHANGE UP
GLUCOSE BLDC GLUCOMTR-MCNC: 110 MG/DL — HIGH (ref 70–99)
GLUCOSE BLDC GLUCOMTR-MCNC: 137 MG/DL — HIGH (ref 70–99)
GLUCOSE BLDC GLUCOMTR-MCNC: 98 MG/DL — SIGNIFICANT CHANGE UP (ref 70–99)
GLUCOSE SERPL-MCNC: 113 MG/DL — HIGH (ref 70–99)
GLUCOSE SERPL-MCNC: 150 MG/DL — HIGH (ref 70–99)
HCT VFR BLD CALC: 30.2 % — LOW (ref 39–50)
HGB BLD-MCNC: 10 G/DL — LOW (ref 13–17)
MAGNESIUM SERPL-MCNC: 2.5 MG/DL — SIGNIFICANT CHANGE UP (ref 1.6–2.6)
MCHC RBC-ENTMCNC: 33.1 GM/DL — SIGNIFICANT CHANGE UP (ref 32–36)
MCHC RBC-ENTMCNC: 34.2 PG — HIGH (ref 27–34)
MCV RBC AUTO: 103.4 FL — HIGH (ref 80–100)
NRBC # BLD: 2 /100 WBCS — HIGH (ref 0–0)
PHOSPHATE SERPL-MCNC: 3.2 MG/DL — SIGNIFICANT CHANGE UP (ref 2.5–4.5)
PLATELET # BLD AUTO: 264 K/UL — SIGNIFICANT CHANGE UP (ref 150–400)
POTASSIUM SERPL-MCNC: 3.3 MMOL/L — LOW (ref 3.5–5.3)
POTASSIUM SERPL-MCNC: 3.3 MMOL/L — LOW (ref 3.5–5.3)
POTASSIUM SERPL-SCNC: 3.3 MMOL/L — LOW (ref 3.5–5.3)
POTASSIUM SERPL-SCNC: 3.3 MMOL/L — LOW (ref 3.5–5.3)
RBC # BLD: 2.92 M/UL — LOW (ref 4.2–5.8)
RBC # FLD: 20.2 % — HIGH (ref 10.3–14.5)
SODIUM SERPL-SCNC: 144 MMOL/L — SIGNIFICANT CHANGE UP (ref 135–145)
SODIUM SERPL-SCNC: 146 MMOL/L — HIGH (ref 135–145)
WBC # BLD: 19.78 K/UL — HIGH (ref 3.8–10.5)
WBC # FLD AUTO: 19.78 K/UL — HIGH (ref 3.8–10.5)

## 2024-03-29 PROCEDURE — 99232 SBSQ HOSP IP/OBS MODERATE 35: CPT

## 2024-03-29 PROCEDURE — 74018 RADEX ABDOMEN 1 VIEW: CPT | Mod: 26

## 2024-03-29 RX ORDER — POTASSIUM CHLORIDE 20 MEQ
40 PACKET (EA) ORAL EVERY 4 HOURS
Refills: 0 | Status: DISCONTINUED | OUTPATIENT
Start: 2024-03-29 | End: 2024-03-29

## 2024-03-29 RX ORDER — POTASSIUM CHLORIDE 20 MEQ
40 PACKET (EA) ORAL EVERY 4 HOURS
Refills: 0 | Status: COMPLETED | OUTPATIENT
Start: 2024-03-29 | End: 2024-03-31

## 2024-03-29 RX ORDER — POTASSIUM CHLORIDE 20 MEQ
40 PACKET (EA) ORAL ONCE
Refills: 0 | Status: DISCONTINUED | OUTPATIENT
Start: 2024-03-29 | End: 2024-03-29

## 2024-03-29 RX ORDER — POTASSIUM CHLORIDE 20 MEQ
40 PACKET (EA) ORAL EVERY 4 HOURS
Refills: 0 | Status: COMPLETED | OUTPATIENT
Start: 2024-03-29 | End: 2024-03-29

## 2024-03-29 RX ADMIN — Medication 40 MILLIEQUIVALENT(S): at 18:07

## 2024-03-29 RX ADMIN — LACTULOSE 10 GRAM(S): 10 SOLUTION ORAL at 05:19

## 2024-03-29 RX ADMIN — PIPERACILLIN AND TAZOBACTAM 25 GRAM(S): 4; .5 INJECTION, POWDER, LYOPHILIZED, FOR SOLUTION INTRAVENOUS at 05:19

## 2024-03-29 RX ADMIN — PIPERACILLIN AND TAZOBACTAM 25 GRAM(S): 4; .5 INJECTION, POWDER, LYOPHILIZED, FOR SOLUTION INTRAVENOUS at 22:31

## 2024-03-29 RX ADMIN — HEPARIN SODIUM 5000 UNIT(S): 5000 INJECTION INTRAVENOUS; SUBCUTANEOUS at 17:06

## 2024-03-29 RX ADMIN — CHLORHEXIDINE GLUCONATE 1 APPLICATION(S): 213 SOLUTION TOPICAL at 05:20

## 2024-03-29 RX ADMIN — HEPARIN SODIUM 5000 UNIT(S): 5000 INJECTION INTRAVENOUS; SUBCUTANEOUS at 05:19

## 2024-03-29 RX ADMIN — Medication 100 MILLIGRAM(S): at 11:39

## 2024-03-29 RX ADMIN — PIPERACILLIN AND TAZOBACTAM 25 GRAM(S): 4; .5 INJECTION, POWDER, LYOPHILIZED, FOR SOLUTION INTRAVENOUS at 14:17

## 2024-03-29 RX ADMIN — LACTULOSE 10 GRAM(S): 10 SOLUTION ORAL at 14:17

## 2024-03-29 RX ADMIN — Medication 130 MILLIGRAM(S): at 07:37

## 2024-03-29 RX ADMIN — Medication 1 MILLIGRAM(S): at 11:39

## 2024-03-29 RX ADMIN — PANTOPRAZOLE SODIUM 40 MILLIGRAM(S): 20 TABLET, DELAYED RELEASE ORAL at 11:39

## 2024-03-29 RX ADMIN — Medication 40 MILLIEQUIVALENT(S): at 06:04

## 2024-03-29 RX ADMIN — LACTULOSE 10 GRAM(S): 10 SOLUTION ORAL at 23:09

## 2024-03-29 RX ADMIN — Medication 40 MILLIEQUIVALENT(S): at 10:10

## 2024-03-29 RX ADMIN — Medication 40 MILLIEQUIVALENT(S): at 22:31

## 2024-03-29 NOTE — PROGRESS NOTE ADULT - SUBJECTIVE AND OBJECTIVE BOX
ICU VISIT  38y Male    Meds:  piperacillin/tazobactam IVPB.. 3.375 Gram(s) IV Intermittent every 8 hours    Allergies    No Known Allergies    Intolerances        VITALS:  Vital Signs Last 24 Hrs  T(C): 37.2 (29 Mar 2024 16:00), Max: 37.7 (28 Mar 2024 19:00)  T(F): 98.9 (29 Mar 2024 16:00), Max: 99.9 (28 Mar 2024 19:00)  HR: 104 (29 Mar 2024 18:12) (87 - 105)  BP: 98/58 (29 Mar 2024 18:12) (80/62 - 113/100)  BP(mean): 70 (29 Mar 2024 18:12) (63 - 106)  RR: 19 (29 Mar 2024 18:12) (11 - 30)  SpO2: 96% (29 Mar 2024 18:12) (93% - 100%)    Parameters below as of 29 Mar 2024 18:12  Patient On (Oxygen Delivery Method): nasal cannula  O2 Flow (L/min): 2      LABS/DIAGNOSTIC TESTS:                          10.0   19.78 )-----------( 264      ( 29 Mar 2024 04:19 )             30.2         03-29    144  |  114<H>  |  13  ----------------------------<  150<H>  3.3<L>   |  27  |  0.74    Ca    8.1<L>      29 Mar 2024 12:55  Phos  3.2     03-29  Mg     2.5     03-29    TPro  x   /  Alb  x   /  TBili  10.2<H>  /  DBili  8.9<H>  /  AST  x   /  ALT  x   /  AlkPhos  x   03-29      LIVER FUNCTIONS - ( 28 Mar 2024 03:59 )  Alb: 1.7 g/dL / Pro: 5.7 g/dL / ALK PHOS: 314 U/L / ALT: 41 U/L DA / AST: 194 U/L / GGT: x             CULTURES: .Blood Blood  03-21 @ 14:22   No growth at 5 days  --  --      .Blood Blood  03-21 @ 14:12   No growth at 5 days  --  --            RADIOLOGY:< from: Xray Abdomen 1 View PORTABLE -Routine (Xray Abdomen 1 View PORTABLE -Routine in AM.) (03.29.24 @ 10:09) >  ACC: [id number] EXAM:  XR ABDOMEN PORTABLE ROUTINE 1V   ORDERED BY: SANTOSH KIMBALL     ACC: [id number] EXAM:  XR ABDOMEN PORTABLE ROUTINE 1V   ORDERED BY: SANTOSH KIMBALL     PROCEDURE DATE:  03/28/2024          INTERPRETATION:  Clinical data: Alcohol withdrawal with abdominal   distention    Portable abdomen 3/28/2024    Comparison 3/26/2024    FINDINGS: Overlying artifact. NG tube removed. Cecum still distended, not   as much now measures 10.5 cm in greatest dimension. Small bowel mildly   dilated. No gross free air    Portable abdomen 3/29/2024    FINDINGS: Nonspecific bowel gas pattern. Cecum less distended measuring   up to 8.8 cm. No free air. Bony structures are intact.    IMPRESSION: Nonspecific bowel gas pattern with less cecal distention. Air   in rectum    --- End of Report ---             JOSE VIDAL DO; Attending Radiologist  This document has been electronically signed. Mar 29 2024  3:12PM    < end of copied text >        ROS:  [  ] UNABLE TO ELICIT ICU VISIT  38y Male who remains in the ICU, he is very lethargic secondary to getting ativan , he is arousable says a word or two and then drifts off to sleep, he is is afebrile but his WBC count is still high. He has no diarrhea, he is coughing a little but does not appear SOB, his abdomen appears a little less distended. He has some abdominal pain when I palpate it.He is completing 7 days of antibiotics on 3/30.    Meds:  piperacillin/tazobactam IVPB.. 3.375 Gram(s) IV Intermittent every 8 hours    Allergies    No Known Allergies    Intolerances        VITALS:  Vital Signs Last 24 Hrs  T(C): 37.2 (29 Mar 2024 16:00), Max: 37.7 (28 Mar 2024 19:00)  T(F): 98.9 (29 Mar 2024 16:00), Max: 99.9 (28 Mar 2024 19:00)  HR: 104 (29 Mar 2024 18:12) (87 - 105)  BP: 98/58 (29 Mar 2024 18:12) (80/62 - 113/100)  BP(mean): 70 (29 Mar 2024 18:12) (63 - 106)  RR: 19 (29 Mar 2024 18:12) (11 - 30)  SpO2: 96% (29 Mar 2024 18:12) (93% - 100%)    Parameters below as of 29 Mar 2024 18:12  Patient On (Oxygen Delivery Method): nasal cannula  O2 Flow (L/min): 2      LABS/DIAGNOSTIC TESTS:                          10.0   19.78 )-----------( 264      ( 29 Mar 2024 04:19 )             30.2         03-29    144  |  114<H>  |  13  ----------------------------<  150<H>  3.3<L>   |  27  |  0.74    Ca    8.1<L>      29 Mar 2024 12:55  Phos  3.2     03-29  Mg     2.5     03-29    TPro  x   /  Alb  x   /  TBili  10.2<H>  /  DBili  8.9<H>  /  AST  x   /  ALT  x   /  AlkPhos  x   03-29      LIVER FUNCTIONS - ( 28 Mar 2024 03:59 )  Alb: 1.7 g/dL / Pro: 5.7 g/dL / ALK PHOS: 314 U/L / ALT: 41 U/L DA / AST: 194 U/L / GGT: x             CULTURES: .Blood Blood  03-21 @ 14:22   No growth at 5 days  --  --      .Blood Blood  03-21 @ 14:12   No growth at 5 days  --  --            RADIOLOGY:< from: Xray Abdomen 1 View PORTABLE -Routine (Xray Abdomen 1 View PORTABLE -Routine in AM.) (03.29.24 @ 10:09) >  ACC: [id number] EXAM:  XR ABDOMEN PORTABLE ROUTINE 1V   ORDERED BY: SANTOSH KIMBALL     ACC: [id number] EXAM:  XR ABDOMEN PORTABLE ROUTINE 1V   ORDERED BY: SANTOSH KIMBALL     PROCEDURE DATE:  03/28/2024          INTERPRETATION:  Clinical data: Alcohol withdrawal with abdominal   distention    Portable abdomen 3/28/2024    Comparison 3/26/2024    FINDINGS: Overlying artifact. NG tube removed. Cecum still distended, not   as much now measures 10.5 cm in greatest dimension. Small bowel mildly   dilated. No gross free air    Portable abdomen 3/29/2024    FINDINGS: Nonspecific bowel gas pattern. Cecum less distended measuring   up to 8.8 cm. No free air. Bony structures are intact.    IMPRESSION: Nonspecific bowel gas pattern with less cecal distention. Air   in rectum    --- End of Report ---             JOSE VIDAL DO; Attending Radiologist  This document has been electronically signed. Mar 29 2024  3:12PM    < end of copied text >        ROS:  [ x ] UNABLE TO ELICIT

## 2024-03-29 NOTE — PROGRESS NOTE ADULT - ASSESSMENT
IMP: This is a 38 yr old man with  alcohol withdrawal seizure with alcohol use disorder, who p/w confusion starting this morning. Admitted for high risk alcohol withdrawal with persistent sinus tachycardia.    Assessment:   # Acute hypoxic resp failure   # Alcohol use disorder  # Alcohol withdrawal  # Acute metabolic encephalopathy  # Atypical pneumonia  # Hx of alcohol seizures  # Possible pancreatitis  # Liver steatosis  # Alcoholic hepatitis  # Sinus tachycardia  # SIRS  # ARABELLA  # Ileus       Plan:   =============NEURO:=============  # Acute metabolic encephalopathy  # Alcohol withdrawal  s/p Ativan however will pursue intubation for protecting the airways  s/p phenobarb IVP x1 on 3/23   - s/p ketamine drip and precedex  - continue phenobarb pushes when pt becomes agitated    # Alcohol use disorder  # Hx of alcohol seizures  - Pt has been in and out of alcohol rehab  - Most recently came out 6 months ago - as per sister pt was only hospitalized and not in rehab   - SW consult order placed     #intubated (3/22)  -Attempt to do SAT, SBT in afternoon 3/26  -EXTUBATED 3/26    =============CARDIO:================  # Sinus tachycardia  - P/w sinus tachycardia  - Likely due to alcohol withdrawal  - EKG = sinus tachycardia  - trop neg      ==========PULM:=============  #Atypical pneumonia   # coronavirus positive however not COVID  CT chest : Multifocal bilateral peripheral opacities suggesting atypical pneumonia. Probable compression atelectasis right lung base, related to elevated right hemidiaphragm.  - c/w zosyn       ==========GI:==========  # Constipation  #?acute abdomen with increased abdominal pressure of 25   surgery consulted - no indication for surgery at this time , recs paralyzing the patient   - abdominal pressure improving. No longer need to measure  - 3/24 Bladder pressure 16   - 3/24 started lactulose 10mg q8h  - 3/25 Neostigmine x1, led to bradycardia. Atropine given.  - 3/26 Pt had large BM, will continue lactulose  - Removed [place] Sump  - f/u daily abdominal x-rays    #Diet  -Clear Liquid    # Alcoholic hepatitis   #transaminitis  #Hyperbilirubinemia   P/w T bili 6.6 (Dir 5.1), , ALT 41,   Maddrey score = borderline at 31.4 points (32 point is the cutoff for steroid treatment)  MELD-Na score 17 points 6 % 90day mortality   - Transaminitis improving   - T. Hector 6.6 > 5.6 > 5.5>7.5   - 3/24 Maddrey score - 28.7   - Hepatology consulted- Dr. Wiley    # Possible pancreatitis  Lipase 470, possible mid abdominal pain. Negative imaging.  - repeat Lipase 86, not concerning for pancreatitis    # Liver steatosis    # Liver lesion  Likely due to alcohol use disorder  CT = Has hypodense foci with central hyperdensity involving the pericholecystic region and segment 6, indeterminate.   - Rec outpt MRI  - Hepatology consulted- Dr. Wiley       =======RENAL:==========  #Metabolic Acidosis  -ABG 3/25 showing bicarb of 10  -Vent settings adjusted  -RESOLVED    # ARABELLA  P/w SCr 1.77 (baseline 0.8)  S/p 23L IVF in ED  urinary retention 1.5 L s/p straight cath overnight  - Roman placed on 3/22, removed 3/28  3/22 Scr improved to 0.85   Renal US: No hydronephrosis.   RESOLVED    # Lactic acidosis  P/w Lactate 10  Due to dehydration, infection-  s/p 3L IVF -> Lactate 1.6  RESOLVED    # Hypokalemia  - K 3.3 s/p Kcl 10 mEq x3 ->3.9   - repeat 3.3 will replete IV  RESOLVED     =====INFECTIOUS=====  # SIRS  -p/w Lactate 10, tachycardia, tachypnea>20  CXR neg for consolidation or effusion  Positive coronavirus  s/p Cefepime in ED  d/c'd Ceftriaoxone (3/23)  3/23 Started Zosyn   legionella neg   U/A positive. UCx not sent   - BCx NGTD 48h   - C/w with Zosyn    - 3/27 continued to spike fevers  - Removing Central Line and A line  - Roman cath has been removed  - afebrile >24 hours    ==============HEME:===========  # Leukocytosis  - As above in SIRS    =======ENDO:=======  A1c 7.9  - FS, SSI  - monitor     =========SKIN/CATHETERS=======  Roman- REMOVED  RIJ- REMOVED  [place] Sump- REMOVED  Left A-Line- REMOVED    =========PPX:========  - Hep SC  - PPI    IMP: This is a 38 yr old man with  alcohol withdrawal seizure with alcohol use disorder, who p/w confusion starting this morning. Admitted for high risk alcohol withdrawal with persistent sinus tachycardia.    Assessment:   # Acute hypoxic resp failure   # Alcohol use disorder  # Alcohol withdrawal  # Acute metabolic encephalopathy  # Atypical pneumonia  # Hx of alcohol seizures  # Possible pancreatitis  # Liver steatosis  # Alcoholic hepatitis  # Sinus tachycardia  # SIRS  # ARABELLA  # Ileus       Plan:   =============NEURO:=============  # Acute metabolic encephalopathy  # Alcohol withdrawal  s/p Ativan however will pursue intubation for protecting the airways  s/p phenobarb IVP x1 on 3/23   - s/p ketamine drip and precedex  - d/c Phenobarb    # Alcohol use disorder  # Hx of alcohol seizures  - Pt has been in and out of alcohol rehab  - Most recently came out 6 months ago - as per sister pt was only hospitalized and not in rehab   - SW consult order placed     #intubated (3/22)  -Attempt to do SAT, SBT in afternoon 3/26  -EXTUBATED 3/26    =============CARDIO:================  # Sinus tachycardia  - P/w sinus tachycardia  - Likely due to alcohol withdrawal  - EKG = sinus tachycardia  - trop neg      ==========PULM:=============  #Atypical pneumonia   # coronavirus positive however not COVID  CT chest : Multifocal bilateral peripheral opacities suggesting atypical pneumonia. Probable compression atelectasis right lung base, related to elevated right hemidiaphragm.  - c/w zosyn until 3/30      ==========GI:==========  # Constipation  #?acute abdomen with increased abdominal pressure of 25   surgery consulted - no indication for surgery at this time , recs paralyzing the patient   - abdominal pressure improving. No longer need to measure  - 3/24 Bladder pressure 16   - 3/24 started lactulose 10mg q8h  - 3/25 Neostigmine x1, led to bradycardia. Atropine given.  - 3/26 Pt had large BM, will continue lactulose  - Removed [place] Sump  - f/u daily abdominal x-rays    #Diet  -Clear Liquid    # Alcoholic hepatitis   #transaminitis  #Hyperbilirubinemia   P/w T bili 6.6 (Dir 5.1), , ALT 41,   Maddrey score = borderline at 31.4 points (32 point is the cutoff for steroid treatment)  MELD-Na score 17 points 6 % 90day mortality   - Transaminitis improving   - T. Hector 6.6 > 5.6 > 5.5>7.5   - 3/24 Maddrey score - 28.7   - Hepatology consulted- Dr. Wiley  -f/u hepatitis labs  -f/u ceruloplasmin    # Possible pancreatitis  Lipase 470, possible mid abdominal pain. Negative imaging.  - repeat Lipase 86, not concerning for pancreatitis    # Liver steatosis    # Liver lesion  Likely due to alcohol use disorder  CT = Has hypodense foci with central hyperdensity involving the pericholecystic region and segment 6, indeterminate.   - Rec outpt MRI  - Hepatology consulted- Dr. Wiley       =======RENAL:==========  #Metabolic Acidosis  -ABG 3/25 showing bicarb of 10  -Vent settings adjusted  -RESOLVED    # ARABELLA  P/w SCr 1.77 (baseline 0.8)  S/p 23L IVF in ED  urinary retention 1.5 L s/p straight cath overnight  - Roman placed on 3/22, removed 3/28  3/22 Scr improved to 0.85   Renal US: No hydronephrosis.   RESOLVED    # Lactic acidosis  P/w Lactate 10  Due to dehydration, infection-  s/p 3L IVF -> Lactate 1.6  RESOLVED    # Hypokalemia  - K 3.3 s/p Kcl 10 mEq x3 ->3.9   - repeat 3.3 will replete IV  RESOLVED     =====INFECTIOUS=====  # SIRS  -p/w Lactate 10, tachycardia, tachypnea>20  CXR neg for consolidation or effusion  Positive coronavirus  s/p Cefepime in ED  d/c'd Ceftriaoxone (3/23)  3/23 Started Zosyn   legionella neg   U/A positive. UCx not sent   - BCx NGTD 48h   - C/w with Zosyn until 3/30    - 3/27 continued to spike fevers  - Removing Central Line and A line  - Roman cath has been removed  - afebrile >24 hours    ==============HEME:===========  # Leukocytosis  - As above in SIRS    =======ENDO:=======  A1c 7.9  - FS, SSI  - monitor     =========SKIN/CATHETERS=======  Roman- REMOVED  RIJ- REMOVED  [place] Sump- REMOVED  Left A-Line- REMOVED    =========PPX:========  - Hep SC  - PPI

## 2024-03-29 NOTE — SBIRT NOTE ADULT - NSSBIRTUNABLESCROTHER_GEN_A_CORE
Emergency contact /sister reports Pt  consumes alcohol excessively daily. Inpt and outpt. SA resources emailed to Pt emergency contact as per request at [email or web address]

## 2024-03-29 NOTE — PHYSICAL THERAPY INITIAL EVALUATION ADULT - GENERAL OBSERVATIONS, REHAB EVAL
icu patient, awake on revisit and team rounding, pt alert, verbal, cooperative and active movement on all fours on command, assistance required to sit patient at the edge of bed

## 2024-03-29 NOTE — PROGRESS NOTE ADULT - ASSESSMENT
38y obese (BMI 30) Male w/ Hx of AUD (since age 21, heavier last 2 weeks, last drink on the day of admission), Hx of withdrawal seizures, prior alcohol rehab, presented to [facility] ER 3/21/24 w/ AMS in setting of hyperammonemia (172), worsening abdominal distention and jaundice (serum bilirubin 6.6->10.8). He was admitted to MICU for withdrawal, aspirational pneumonia, required intubation 3/22/24. He was also found to be positive for coronavirus (non COVID-19), and noted increased intraabdominal pressure, ileus, and surgery and GI has been following.   CT a/p showed marked hepatomegaly (29 cm) w/ severe hepatic steatosis. MDF was < 32.   Hepatology was consulted today for his ALD, rising bilirubin.    # Alcoholic hepatitis w/ new onset jaundice, elevated transaminases (AST>ALT), and hepatomegaly w/ hepatic steatosis  # Elevated ALP  # Mild coagulopathy  # Hepatic encephalopathy  # Heterogenous foci in GB fossa and segment 6  # AUD w/ withdrawal  - MELD 3.0 22  - Jaundice, marked hepatomegaly, severe steatosis likely due to alcoholic hepatitis; MDF < 32, thus no role of steroid  - C/w monitoring LFTs, including INR.   - C/w empiric antibiotic coverage to complete course  - No biliary dilation on CT and US abd, no signs of cholecystitis either on above. However, worsening bilirubin (almost all direct) and focal hepatic abnormalities on CT, thus consider adding MRCP to the MRI abd w/wo if no contraindication  - Complete hepatitis viral serologies, add HBcAb, HBsAb, Hep E IgM/PCR, HCV RNA, EBV/CMV/ HSV/VZV PCRs  - Would also send AI w/u (TRESA, SMA, LKM, AMA, Ig panel), ceruloplasmin, ferritin, iron studies  - Aspiration, seizure, fall precautions; Avoid sedation; C/w bowel regimen / lactulose  - Consider MRI abd w/wo contrast if no contraindication for better evaluation of the reported focal hepatic abnormalities on CT from 3/21/24. Notably a 2018 CT also reported an enhancing liver lesion.  - C/w folic, thiamine  - Will need alcohol rehab once acute medical issues resolved      Thank you for consult  Will continue to monitor. Hepatology returns Monday. Please, consult GI on call if change in status, questions, concerns.   D/w ICU

## 2024-03-29 NOTE — PROGRESS NOTE ADULT - SUBJECTIVE AND OBJECTIVE BOX
INTERVAL HPI/OVERNIGHT EVENTS: ***    PRESSORS: [ ] YES [ ] NO  WHICH:    Antimicrobial:  piperacillin/tazobactam IVPB.. 3.375 Gram(s) IV Intermittent every 8 hours    Cardiovascular:    Pulmonary:    Hematalogic:  heparin   Injectable 5000 Unit(s) SubCutaneous every 12 hours    Other:  chlorhexidine 2% Cloths 1 Application(s) Topical <User Schedule>  folic acid 1 milliGRAM(s) Oral daily  influenza   Vaccine 0.5 milliLiter(s) IntraMuscular once  insulin lispro (ADMELOG) corrective regimen sliding scale   SubCutaneous every 6 hours  lactated ringers. 1000 milliLiter(s) IV Continuous <Continuous>  lactulose Syrup 10 Gram(s) Oral every 8 hours  pantoprazole   Suspension 40 milliGRAM(s) Oral daily  PHENobarbital Injectable 130 milliGRAM(s) IV Push every 30 minutes PRN  potassium chloride   Powder 40 milliEquivalent(s) Oral every 4 hours  thiamine 100 milliGRAM(s) Oral daily    chlorhexidine 2% Cloths 1 Application(s) Topical <User Schedule>  folic acid 1 milliGRAM(s) Oral daily  heparin   Injectable 5000 Unit(s) SubCutaneous every 12 hours  influenza   Vaccine 0.5 milliLiter(s) IntraMuscular once  insulin lispro (ADMELOG) corrective regimen sliding scale   SubCutaneous every 6 hours  lactated ringers. 1000 milliLiter(s) IV Continuous <Continuous>  lactulose Syrup 10 Gram(s) Oral every 8 hours  pantoprazole   Suspension 40 milliGRAM(s) Oral daily  PHENobarbital Injectable 130 milliGRAM(s) IV Push every 30 minutes PRN  piperacillin/tazobactam IVPB.. 3.375 Gram(s) IV Intermittent every 8 hours  potassium chloride   Powder 40 milliEquivalent(s) Oral every 4 hours  thiamine 100 milliGRAM(s) Oral daily    Drug Dosing Weight  Height (cm): 167.6 (21 Mar 2024 12:45)  Weight (kg): 86 (21 Mar 2024 21:30)  BMI (kg/m2): 30.6 (21 Mar 2024 21:30)  BSA (m2): 1.95 (21 Mar 2024 21:30)    CENTRAL LINE: [ ] YES [ ] NO  LOCATION:   DATE INSERTED:      BLACK: [ ] YES [ ] NO    DATE INSERTED:      A-LINE:  [ ] YES [ ] NO  LOCATION:   DATE INSERTED:      [facility] -reviewed admission note, no change since admission  PAST MEDICAL & SURGICAL HISTORY:  No pertinent past medical history      History of seizure due to alcohol withdrawal      History of alcohol use disorder      No significant past surgical history      No significant past surgical history          ICU Vital Signs Last 24 Hrs  T(C): 37.4 (29 Mar 2024 07:00), Max: 38.3 (28 Mar 2024 15:00)  T(F): 99.3 (29 Mar 2024 07:00), Max: 100.9 (28 Mar 2024 15:00)  HR: 97 (29 Mar 2024 07:00) (88 - 113)  BP: 107/70 (29 Mar 2024 07:00) (84/65 - 115/72)  BP(mean): 81 (29 Mar 2024 07:00) (71 - 88)  ABP: --  ABP(mean): --  RR: 19 (29 Mar 2024 07:00) (11 - 30)  SpO2: 94% (29 Mar 2024 07:00) (94% - 100%)    O2 Parameters below as of 29 Mar 2024 07:00  Patient On (Oxygen Delivery Method): room air                  03-28 @ 07:01  -  03-29 @ 07:00  --------------------------------------------------------  IN: 2730 mL / OUT: 1800 mL / NET: 930 mL            PHYSICAL EXAM:    GENERAL: NAD, well-developed  HEAD:  Atraumatic, Normocephalic  EYES: EOMI, PERRLA, conjunctiva and sclera clear  ENMT: Moist mucous membranes  NECK: Supple, normal appearance, No JVD; Normal thyroid; Trachea midline  NERVOUS SYSTEM:  Alert & Oriented X3,  Moving all extremities  CHEST/LUNG: No chest deformity; Clear to Auscultation; No rales, rhonchi, wheezing   HEART: Regular rate and rhythm; No murmurs, rubs, or gallops  ABDOMEN: Soft, Nontender, Nondistended; Bowel sounds present  EXTREMITIES:  2+ Peripheral Pulses, No clubbing, cyanosis, or edema  SKIN: No rashes or lesions;  Good capillary refill      LABS:  CBC Full  -  ( 29 Mar 2024 04:19 )  WBC Count : 19.78 K/uL  RBC Count : 2.92 M/uL  Hemoglobin : 10.0 g/dL  Hematocrit : 30.2 %  Platelet Count - Automated : 264 K/uL  Mean Cell Volume : 103.4 fl  Mean Cell Hemoglobin : 34.2 pg  Mean Cell Hemoglobin Concentration : 33.1 gm/dL  Auto Neutrophil # : x  Auto Lymphocyte # : x  Auto Monocyte # : x  Auto Eosinophil # : x  Auto Basophil # : x  Auto Neutrophil % : x  Auto Lymphocyte % : x  Auto Monocyte % : x  Auto Eosinophil % : x  Auto Basophil % : x    03-29    146<H>  |  115<H>  |  13  ----------------------------<  113<H>  3.3<L>   |  29  |  0.51    Ca    7.8<L>      29 Mar 2024 04:19  Phos  3.2     03-29  Mg     2.5     03-29    TPro  5.7<L>  /  Alb  1.7<L>  /  TBili  10.8<H>  /  DBili  x   /  AST  194<H>  /  ALT  41  /  AlkPhos  314<H>  03-28    PT/INR - ( 28 Mar 2024 03:59 )   PT: 14.4 sec;   INR: 1.27 ratio         PTT - ( 28 Mar 2024 03:59 )  PTT:37.9 sec  Urinalysis Basic - ( 29 Mar 2024 04:19 )    Color: x / Appearance: x / SG: x / pH: x  Gluc: 113 mg/dL / Ketone: x  / Bili: x / Urobili: x   Blood: x / Protein: x / Nitrite: x   Leuk Esterase: x / RBC: x / WBC x   Sq Epi: x / Non Sq Epi: x / Bacteria: x          RADIOLOGY & ADDITIONAL STUDIES REVIEWED:  ***    [ ]GOALS OF CARE DISCUSSION WITH PATIENT/FAMILY/PROXY:    CRITICAL CARE TIME SPENT: 35 minutes INTERVAL HPI/OVERNIGHT EVENTS:  Pt spiked fever late afternoon yesterday.  Patient examined at bedside this AM.  Pt removed rectal tube. Patient denies acute complaints.     PRESSORS: [ ] YES [X] NO    Antimicrobial:  piperacillin/tazobactam IVPB.. 3.375 Gram(s) IV Intermittent every 8 hours    Cardiovascular:    Pulmonary:    Hematalogic:  heparin   Injectable 5000 Unit(s) SubCutaneous every 12 hours    Other:  chlorhexidine 2% Cloths 1 Application(s) Topical <User Schedule>  folic acid 1 milliGRAM(s) Oral daily  influenza   Vaccine 0.5 milliLiter(s) IntraMuscular once  insulin lispro (ADMELOG) corrective regimen sliding scale   SubCutaneous every 6 hours  lactated ringers. 1000 milliLiter(s) IV Continuous <Continuous>  lactulose Syrup 10 Gram(s) Oral every 8 hours  pantoprazole   Suspension 40 milliGRAM(s) Oral daily  PHENobarbital Injectable 130 milliGRAM(s) IV Push every 30 minutes PRN  potassium chloride   Powder 40 milliEquivalent(s) Oral every 4 hours  thiamine 100 milliGRAM(s) Oral daily    chlorhexidine 2% Cloths 1 Application(s) Topical <User Schedule>  folic acid 1 milliGRAM(s) Oral daily  heparin   Injectable 5000 Unit(s) SubCutaneous every 12 hours  influenza   Vaccine 0.5 milliLiter(s) IntraMuscular once  insulin lispro (ADMELOG) corrective regimen sliding scale   SubCutaneous every 6 hours  lactated ringers. 1000 milliLiter(s) IV Continuous <Continuous>  lactulose Syrup 10 Gram(s) Oral every 8 hours  pantoprazole   Suspension 40 milliGRAM(s) Oral daily  PHENobarbital Injectable 130 milliGRAM(s) IV Push every 30 minutes PRN  piperacillin/tazobactam IVPB.. 3.375 Gram(s) IV Intermittent every 8 hours  potassium chloride   Powder 40 milliEquivalent(s) Oral every 4 hours  thiamine 100 milliGRAM(s) Oral daily    Drug Dosing Weight  Height (cm): 167.6 (21 Mar 2024 12:45)  Weight (kg): 86 (21 Mar 2024 21:30)  BMI (kg/m2): 30.6 (21 Mar 2024 21:30)  BSA (m2): 1.95 (21 Mar 2024 21:30)    CENTRAL LINE: [ ] YES [X] NO  LOCATION:   DATE INSERTED:      BLACK: [ ] YES [X] NO    DATE INSERTED:      A-LINE:  [ ] YES [X] NO  LOCATION:   DATE INSERTED:      [facility] -reviewed admission note, no change since admission  PAST MEDICAL & SURGICAL HISTORY:  No pertinent past medical history      History of seizure due to alcohol withdrawal      History of alcohol use disorder      No significant past surgical history      No significant past surgical history          ICU Vital Signs Last 24 Hrs  T(C): 37.4 (29 Mar 2024 07:00), Max: 38.3 (28 Mar 2024 15:00)  T(F): 99.3 (29 Mar 2024 07:00), Max: 100.9 (28 Mar 2024 15:00)  HR: 97 (29 Mar 2024 07:00) (88 - 113)  BP: 107/70 (29 Mar 2024 07:00) (84/65 - 115/72)  BP(mean): 81 (29 Mar 2024 07:00) (71 - 88)  ABP: --  ABP(mean): --  RR: 19 (29 Mar 2024 07:00) (11 - 30)  SpO2: 94% (29 Mar 2024 07:00) (94% - 100%)    O2 Parameters below as of 29 Mar 2024 07:00  Patient On (Oxygen Delivery Method): room air                  03-28 @ 07:01  -  03-29 @ 07:00  --------------------------------------------------------  IN: 2730 mL / OUT: 1800 mL / NET: 930 mL            PHYSICAL EXAM:    GENERAL: Lethargic  HEAD:  Atraumatic, Normocephalic  EYES: EOMI, PERRLA, conjunctiva and sclera clear  ENMT: Moist mucous membranes  NECK: Supple, normal appearance, No JVD; Normal thyroid; Trachea midline  NERVOUS SYSTEM:  Alert & Oriented X3,  Moving all extremities  CHEST/LUNG: No chest deformity; Clear to Auscultation; No rales, rhonchi, wheezing   HEART: Regular rate and rhythm; No murmurs, rubs, or gallops  ABDOMEN: Soft, Nontender, Nondistended; Bowel sounds present  EXTREMITIES:  2+ Peripheral Pulses, No clubbing, cyanosis, or edema  SKIN: No rashes or lesions;  Good capillary refill      LABS:  CBC Full  -  ( 29 Mar 2024 04:19 )  WBC Count : 19.78 K/uL  RBC Count : 2.92 M/uL  Hemoglobin : 10.0 g/dL  Hematocrit : 30.2 %  Platelet Count - Automated : 264 K/uL  Mean Cell Volume : 103.4 fl  Mean Cell Hemoglobin : 34.2 pg  Mean Cell Hemoglobin Concentration : 33.1 gm/dL  Auto Neutrophil # : x  Auto Lymphocyte # : x  Auto Monocyte # : x  Auto Eosinophil # : x  Auto Basophil # : x  Auto Neutrophil % : x  Auto Lymphocyte % : x  Auto Monocyte % : x  Auto Eosinophil % : x  Auto Basophil % : x    03-29    146<H>  |  115<H>  |  13  ----------------------------<  113<H>  3.3<L>   |  29  |  0.51    Ca    7.8<L>      29 Mar 2024 04:19  Phos  3.2     03-29  Mg     2.5     03-29    TPro  5.7<L>  /  Alb  1.7<L>  /  TBili  10.8<H>  /  DBili  x   /  AST  194<H>  /  ALT  41  /  AlkPhos  314<H>  03-28    PT/INR - ( 28 Mar 2024 03:59 )   PT: 14.4 sec;   INR: 1.27 ratio         PTT - ( 28 Mar 2024 03:59 )  PTT:37.9 sec  Urinalysis Basic - ( 29 Mar 2024 04:19 )    Color: x / Appearance: x / SG: x / pH: x  Gluc: 113 mg/dL / Ketone: x  / Bili: x / Urobili: x   Blood: x / Protein: x / Nitrite: x   Leuk Esterase: x / RBC: x / WBC x   Sq Epi: x / Non Sq Epi: x / Bacteria: x          RADIOLOGY & ADDITIONAL STUDIES REVIEWED:  ***    [ ]GOALS OF CARE DISCUSSION WITH PATIENT/FAMILY/PROXY:    CRITICAL CARE TIME SPENT: 35 minutes INTERVAL HPI/OVERNIGHT EVENTS:  Pt spiked fever late afternoon yesterday.  Patient examined at bedside this AM.  Pt removed rectal tube. Patient denies acute complaints.     PRESSORS: [ ] YES [X] NO    Antimicrobial:  piperacillin/tazobactam IVPB.. 3.375 Gram(s) IV Intermittent every 8 hours    Cardiovascular:    Pulmonary:    Hematalogic:  heparin   Injectable 5000 Unit(s) SubCutaneous every 12 hours    Other:  chlorhexidine 2% Cloths 1 Application(s) Topical <User Schedule>  folic acid 1 milliGRAM(s) Oral daily  influenza   Vaccine 0.5 milliLiter(s) IntraMuscular once  insulin lispro (ADMELOG) corrective regimen sliding scale   SubCutaneous every 6 hours  lactated ringers. 1000 milliLiter(s) IV Continuous <Continuous>  lactulose Syrup 10 Gram(s) Oral every 8 hours  pantoprazole   Suspension 40 milliGRAM(s) Oral daily  PHENobarbital Injectable 130 milliGRAM(s) IV Push every 30 minutes PRN  potassium chloride   Powder 40 milliEquivalent(s) Oral every 4 hours  thiamine 100 milliGRAM(s) Oral daily    chlorhexidine 2% Cloths 1 Application(s) Topical <User Schedule>  folic acid 1 milliGRAM(s) Oral daily  heparin   Injectable 5000 Unit(s) SubCutaneous every 12 hours  influenza   Vaccine 0.5 milliLiter(s) IntraMuscular once  insulin lispro (ADMELOG) corrective regimen sliding scale   SubCutaneous every 6 hours  lactated ringers. 1000 milliLiter(s) IV Continuous <Continuous>  lactulose Syrup 10 Gram(s) Oral every 8 hours  pantoprazole   Suspension 40 milliGRAM(s) Oral daily  PHENobarbital Injectable 130 milliGRAM(s) IV Push every 30 minutes PRN  piperacillin/tazobactam IVPB.. 3.375 Gram(s) IV Intermittent every 8 hours  potassium chloride   Powder 40 milliEquivalent(s) Oral every 4 hours  thiamine 100 milliGRAM(s) Oral daily    Drug Dosing Weight  Height (cm): 167.6 (21 Mar 2024 12:45)  Weight (kg): 86 (21 Mar 2024 21:30)  BMI (kg/m2): 30.6 (21 Mar 2024 21:30)  BSA (m2): 1.95 (21 Mar 2024 21:30)    CENTRAL LINE: [ ] YES [X] NO  LOCATION:   DATE INSERTED:      BLACK: [ ] YES [X] NO    DATE INSERTED:      A-LINE:  [ ] YES [X] NO  LOCATION:   DATE INSERTED:      [facility] -reviewed admission note, no change since admission  PAST MEDICAL & SURGICAL HISTORY:  No pertinent past medical history      History of seizure due to alcohol withdrawal      History of alcohol use disorder      No significant past surgical history      No significant past surgical history          ICU Vital Signs Last 24 Hrs  T(C): 37.4 (29 Mar 2024 07:00), Max: 38.3 (28 Mar 2024 15:00)  T(F): 99.3 (29 Mar 2024 07:00), Max: 100.9 (28 Mar 2024 15:00)  HR: 97 (29 Mar 2024 07:00) (88 - 113)  BP: 107/70 (29 Mar 2024 07:00) (84/65 - 115/72)  BP(mean): 81 (29 Mar 2024 07:00) (71 - 88)  ABP: --  ABP(mean): --  RR: 19 (29 Mar 2024 07:00) (11 - 30)  SpO2: 94% (29 Mar 2024 07:00) (94% - 100%)    O2 Parameters below as of 29 Mar 2024 07:00  Patient On (Oxygen Delivery Method): room air                  03-28 @ 07:01  -  03-29 @ 07:00  --------------------------------------------------------  IN: 2730 mL / OUT: 1800 mL / NET: 930 mL            PHYSICAL EXAM:    GENERAL: Lethargic  HEAD:  Atraumatic, Normocephalic  EYES: Scleral Icterus. EOMI, PERRLA  ENMT: Moist mucous membranes  NECK: Supple, normal appearance, No JVD; Normal thyroid; Trachea midline  NERVOUS SYSTEM:  Alert & Oriented X1,  Moving all extremities  CHEST/LUNG: No chest deformity; Clear to Auscultation; No rales, rhonchi, wheezing   HEART: Regular rate and rhythm; No murmurs, rubs, or gallops  ABDOMEN: Distended, not getting softer  EXTREMITIES:  +1 pitting edema LE's b/l  SKIN: No rashes or lesions;  Good capillary refill      LABS:  CBC Full  -  ( 29 Mar 2024 04:19 )  WBC Count : 19.78 K/uL  RBC Count : 2.92 M/uL  Hemoglobin : 10.0 g/dL  Hematocrit : 30.2 %  Platelet Count - Automated : 264 K/uL  Mean Cell Volume : 103.4 fl  Mean Cell Hemoglobin : 34.2 pg  Mean Cell Hemoglobin Concentration : 33.1 gm/dL  Auto Neutrophil # : x  Auto Lymphocyte # : x  Auto Monocyte # : x  Auto Eosinophil # : x  Auto Basophil # : x  Auto Neutrophil % : x  Auto Lymphocyte % : x  Auto Monocyte % : x  Auto Eosinophil % : x  Auto Basophil % : x    03-29    146<H>  |  115<H>  |  13  ----------------------------<  113<H>  3.3<L>   |  29  |  0.51    Ca    7.8<L>      29 Mar 2024 04:19  Phos  3.2     03-29  Mg     2.5     03-29    TPro  5.7<L>  /  Alb  1.7<L>  /  TBili  10.8<H>  /  DBili  x   /  AST  194<H>  /  ALT  41  /  AlkPhos  314<H>  03-28    PT/INR - ( 28 Mar 2024 03:59 )   PT: 14.4 sec;   INR: 1.27 ratio         PTT - ( 28 Mar 2024 03:59 )  PTT:37.9 sec  Urinalysis Basic - ( 29 Mar 2024 04:19 )    Color: x / Appearance: x / SG: x / pH: x  Gluc: 113 mg/dL / Ketone: x  / Bili: x / Urobili: x   Blood: x / Protein: x / Nitrite: x   Leuk Esterase: x / RBC: x / WBC x   Sq Epi: x / Non Sq Epi: x / Bacteria: x          RADIOLOGY & ADDITIONAL STUDIES REVIEWED:  ***    [ ]GOALS OF CARE DISCUSSION WITH PATIENT/FAMILY/PROXY:    CRITICAL CARE TIME SPENT: 35 minutes INTERVAL HPI/OVERNIGHT EVENTS:  Pt spiked fever late afternoon yesterday.  Patient examined at bedside this AM.  Pt removed rectal tube. Patient denies acute complaints.     PRESSORS: [ ] YES [X] NO    Antimicrobial:  piperacillin/tazobactam IVPB.. 3.375 Gram(s) IV Intermittent every 8 hours    Cardiovascular:    Pulmonary:    Hematalogic:  heparin   Injectable 5000 Unit(s) SubCutaneous every 12 hours    Other:  chlorhexidine 2% Cloths 1 Application(s) Topical <User Schedule>  folic acid 1 milliGRAM(s) Oral daily  influenza   Vaccine 0.5 milliLiter(s) IntraMuscular once  insulin lispro (ADMELOG) corrective regimen sliding scale   SubCutaneous every 6 hours  lactated ringers. 1000 milliLiter(s) IV Continuous <Continuous>  lactulose Syrup 10 Gram(s) Oral every 8 hours  pantoprazole   Suspension 40 milliGRAM(s) Oral daily  PHENobarbital Injectable 130 milliGRAM(s) IV Push every 30 minutes PRN  potassium chloride   Powder 40 milliEquivalent(s) Oral every 4 hours  thiamine 100 milliGRAM(s) Oral daily    chlorhexidine 2% Cloths 1 Application(s) Topical <User Schedule>  folic acid 1 milliGRAM(s) Oral daily  heparin   Injectable 5000 Unit(s) SubCutaneous every 12 hours  influenza   Vaccine 0.5 milliLiter(s) IntraMuscular once  insulin lispro (ADMELOG) corrective regimen sliding scale   SubCutaneous every 6 hours  lactated ringers. 1000 milliLiter(s) IV Continuous <Continuous>  lactulose Syrup 10 Gram(s) Oral every 8 hours  pantoprazole   Suspension 40 milliGRAM(s) Oral daily  PHENobarbital Injectable 130 milliGRAM(s) IV Push every 30 minutes PRN  piperacillin/tazobactam IVPB.. 3.375 Gram(s) IV Intermittent every 8 hours  potassium chloride   Powder 40 milliEquivalent(s) Oral every 4 hours  thiamine 100 milliGRAM(s) Oral daily    Drug Dosing Weight  Height (cm): 167.6 (21 Mar 2024 12:45)  Weight (kg): 86 (21 Mar 2024 21:30)  BMI (kg/m2): 30.6 (21 Mar 2024 21:30)  BSA (m2): 1.95 (21 Mar 2024 21:30)    CENTRAL LINE: [ ] YES [X] NO  LOCATION:   DATE INSERTED:      BLACK: [ ] YES [X] NO    DATE INSERTED:      A-LINE:  [ ] YES [X] NO  LOCATION:   DATE INSERTED:      [facility] -reviewed admission note, no change since admission  PAST MEDICAL & SURGICAL HISTORY:  No pertinent past medical history      History of seizure due to alcohol withdrawal      History of alcohol use disorder      No significant past surgical history      No significant past surgical history          ICU Vital Signs Last 24 Hrs  T(C): 37.4 (29 Mar 2024 07:00), Max: 38.3 (28 Mar 2024 15:00)  T(F): 99.3 (29 Mar 2024 07:00), Max: 100.9 (28 Mar 2024 15:00)  HR: 97 (29 Mar 2024 07:00) (88 - 113)  BP: 107/70 (29 Mar 2024 07:00) (84/65 - 115/72)  BP(mean): 81 (29 Mar 2024 07:00) (71 - 88)  ABP: --  ABP(mean): --  RR: 19 (29 Mar 2024 07:00) (11 - 30)  SpO2: 94% (29 Mar 2024 07:00) (94% - 100%)    O2 Parameters below as of 29 Mar 2024 07:00  Patient On (Oxygen Delivery Method): room air                  03-28 @ 07:01  -  03-29 @ 07:00  --------------------------------------------------------  IN: 2730 mL / OUT: 1800 mL / NET: 930 mL            PHYSICAL EXAM:    GENERAL: Lethargic  HEAD:  Atraumatic, Normocephalic  EYES: Scleral Icterus. EOMI, PERRLA  ENMT: Moist mucous membranes  NECK: Supple, normal appearance, No JVD; Normal thyroid; Trachea midline  NERVOUS SYSTEM:  Alert & Oriented X1,  Moving all extremities  CHEST/LUNG: No chest deformity; Clear to Auscultation; No rales, rhonchi, wheezing   HEART: Regular rate and rhythm; No murmurs, rubs, or gallops  ABDOMEN: Distended, slightly softer  EXTREMITIES:  +1 pitting edema LE's b/l  SKIN: No rashes or lesions;  Good capillary refill      LABS:  CBC Full  -  ( 29 Mar 2024 04:19 )  WBC Count : 19.78 K/uL  RBC Count : 2.92 M/uL  Hemoglobin : 10.0 g/dL  Hematocrit : 30.2 %  Platelet Count - Automated : 264 K/uL  Mean Cell Volume : 103.4 fl  Mean Cell Hemoglobin : 34.2 pg  Mean Cell Hemoglobin Concentration : 33.1 gm/dL  Auto Neutrophil # : x  Auto Lymphocyte # : x  Auto Monocyte # : x  Auto Eosinophil # : x  Auto Basophil # : x  Auto Neutrophil % : x  Auto Lymphocyte % : x  Auto Monocyte % : x  Auto Eosinophil % : x  Auto Basophil % : x    03-29    146<H>  |  115<H>  |  13  ----------------------------<  113<H>  3.3<L>   |  29  |  0.51    Ca    7.8<L>      29 Mar 2024 04:19  Phos  3.2     03-29  Mg     2.5     03-29    TPro  5.7<L>  /  Alb  1.7<L>  /  TBili  10.8<H>  /  DBili  x   /  AST  194<H>  /  ALT  41  /  AlkPhos  314<H>  03-28    PT/INR - ( 28 Mar 2024 03:59 )   PT: 14.4 sec;   INR: 1.27 ratio         PTT - ( 28 Mar 2024 03:59 )  PTT:37.9 sec  Urinalysis Basic - ( 29 Mar 2024 04:19 )    Color: x / Appearance: x / SG: x / pH: x  Gluc: 113 mg/dL / Ketone: x  / Bili: x / Urobili: x   Blood: x / Protein: x / Nitrite: x   Leuk Esterase: x / RBC: x / WBC x   Sq Epi: x / Non Sq Epi: x / Bacteria: x          RADIOLOGY & ADDITIONAL STUDIES REVIEWED:  ***    [ ]GOALS OF CARE DISCUSSION WITH PATIENT/FAMILY/PROXY:    CRITICAL CARE TIME SPENT: 35 minutes

## 2024-03-29 NOTE — PROGRESS NOTE ADULT - SUBJECTIVE AND OBJECTIVE BOX
Chief Complaint:  Patient is a 38y old  Male who presents with a chief complaint of Alcohol withdrawal (29 Mar 2024 07:35)      Reason for consult:    Interval Events:     Hospital Medications:  chlorhexidine 2% Cloths 1 Application(s) Topical <User Schedule>  folic acid 1 milliGRAM(s) Oral daily  heparin   Injectable 5000 Unit(s) SubCutaneous every 12 hours  influenza   Vaccine 0.5 milliLiter(s) IntraMuscular once  insulin lispro (ADMELOG) corrective regimen sliding scale   SubCutaneous every 6 hours  lactulose Syrup 10 Gram(s) Oral every 8 hours  pantoprazole   Suspension 40 milliGRAM(s) Oral daily  piperacillin/tazobactam IVPB.. 3.375 Gram(s) IV Intermittent every 8 hours  thiamine 100 milliGRAM(s) Oral daily      ROS:   General:  No  fevers, chills, night sweats, fatigue  Eyes:  Good vision, no reported pain  ENT:  No sore throat, pain, runny nose  CV:  No pain, palpitations  Pulm:  No dyspnea, cough  GI:  See HPI, otherwise negative  :  No  incontinence, nocturia  Muscle:  No pain, weakness  Neuro:  No memory problems  Psych:  No insomnia, mood problems, depression  Endocrine:  No polyuria, polydipsia, cold/heat intolerance  Heme:  No petechiae, ecchymosis, easy bruisability  Skin:  No rash    PHYSICAL EXAM:   Vital Signs:  Vital Signs Last 24 Hrs  T(C): 37.3 (29 Mar 2024 12:00), Max: 38.3 (28 Mar 2024 15:00)  T(F): 99.1 (29 Mar 2024 12:00), Max: 100.9 (28 Mar 2024 15:00)  HR: 93 (29 Mar 2024 12:00) (87 - 108)  BP: 94/67 (29 Mar 2024 12:00) (84/65 - 115/72)  BP(mean): 77 (29 Mar 2024 12:00) (71 - 106)  RR: 18 (29 Mar 2024 12:00) (11 - 30)  SpO2: 98% (29 Mar 2024 12:00) (94% - 100%)    Parameters below as of 29 Mar 2024 11:00  Patient On (Oxygen Delivery Method): room air      Daily     Daily Weight in k.4 (29 Mar 2024 07:00)    GENERAL: no acute distress  NEURO: alert, no asterixis  HEENT: anicteric sclera, no conjunctival pallor appreciated  CHEST: no respiratory distress, no accessory muscle use  CARDIAC: regular rate, rhythm  ABDOMEN: soft, non-tender, non-distended, no rebound or guarding  EXTREMITIES: warm, well perfused, no edema  SKIN: no lesions noted    LABS: reviewed                        10.0   19.78 )-----------( 264      ( 29 Mar 2024 04:19 )             30.2         144  |  114<H>  |  13  ----------------------------<  150<H>  3.3<L>   |  27  |  0.74    Ca    8.1<L>      29 Mar 2024 12:55  Phos  3.2       Mg     2.5         TPro  x   /  Alb  x   /  TBili  10.2<H>  /  DBili  8.9<H>  /  AST  x   /  ALT  x   /  AlkPhos  x       LIVER FUNCTIONS - ( 28 Mar 2024 03:59 )  Alb: 1.7 g/dL / Pro: 5.7 g/dL / ALK PHOS: 314 U/L / ALT: 41 U/L DA / AST: 194 U/L / GGT: x             Interval Diagnostic Studies: see sunrise for full report   Chief Complaint:  Patient is a 38y old  Male who presents with a chief complaint of Alcohol withdrawal (29 Mar 2024 07:35)      Reason for consult:    Interval Events: Patient was seen and examined at bedside in ICU. Lethargic, but arousable to verbal stimuli, following commands and oriented in self, place and time till Y/M.  MELD 3.0 22.      Hospital Medications:  chlorhexidine 2% Cloths 1 Application(s) Topical <User Schedule>  folic acid 1 milliGRAM(s) Oral daily  heparin   Injectable 5000 Unit(s) SubCutaneous every 12 hours  influenza   Vaccine 0.5 milliLiter(s) IntraMuscular once  insulin lispro (ADMELOG) corrective regimen sliding scale   SubCutaneous every 6 hours  lactulose Syrup 10 Gram(s) Oral every 8 hours  pantoprazole   Suspension 40 milliGRAM(s) Oral daily  piperacillin/tazobactam IVPB.. 3.375 Gram(s) IV Intermittent every 8 hours  thiamine 100 milliGRAM(s) Oral daily      ROS:   General:  No  fevers, chills  Eyes:  Good vision, no reported pain  ENT:  No sore throat, pain, runny nose  CV:  No pain, palpitations  Pulm:  No dyspnea, cough  GI:  C/o abdominal distention, no pain, no N/V, no reported bleeding, 2 BM so far  :  No  dysuria  Muscle:  No pain, weakness  Neuro:  Lethargic, but arousable to verbal stimuli, following commands and oriented in self, place and time till Y/M.  Skin:  Jaundice    PHYSICAL EXAM:   Vital Signs:  Vital Signs Last 24 Hrs  T(C): 37.3 (29 Mar 2024 12:00), Max: 38.3 (28 Mar 2024 15:00)  T(F): 99.1 (29 Mar 2024 12:00), Max: 100.9 (28 Mar 2024 15:00)  HR: 93 (29 Mar 2024 12:00) (87 - 108)  BP: 94/67 (29 Mar 2024 12:00) (84/65 - 115/72)  BP(mean): 77 (29 Mar 2024 12:00) (71 - 106)  RR: 18 (29 Mar 2024 12:00) (11 - 30)  SpO2: 98% (29 Mar 2024 12:00) (94% - 100%)    Parameters below as of 29 Mar 2024 11:00  Patient On (Oxygen Delivery Method): room air      Daily     Daily Weight in k.4 (29 Mar 2024 07:00)    GENERAL: no acute distress  NEURO: Lethargic, but arousable to verbal stimuli, following commands and oriented in self, place and time till Y/M, no asterixis  HEENT: icteric sclera  CHEST: no respiratory distress, no accessory muscle use  CARDIAC: regular rate, rhythm  ABDOMEN: soft, non-tender, distended, no rebound or guarding, BS+  EXTREMITIES: warm, well perfused, no edema  SKIN: jaundice  LABS: reviewed                        10.0   19.78 )-----------( 264      ( 29 Mar 2024 04:19 )             30.2         144  |  114<H>  |  13  ----------------------------<  150<H>  3.3<L>   |  27  |  0.74    Ca    8.1<L>      29 Mar 2024 12:55  Phos  3.2       Mg     2.5         TPro  x   /  Alb  x   /  TBili  10.2<H>  /  DBili  8.9<H>  /  AST  x   /  ALT  x   /  AlkPhos  x       LIVER FUNCTIONS - ( 28 Mar 2024 03:59 )  Alb: 1.7 g/dL / Pro: 5.7 g/dL / ALK PHOS: 314 U/L / ALT: 41 U/L DA / AST: 194 U/L / GGT: x             Interval Diagnostic Studies: see sunrise for full report

## 2024-03-29 NOTE — PROGRESS NOTE ADULT - ASSESSMENT
UTI  Pneumonia - likely aspiration  ? Spontaneous Bacterial Peritonitis  Leukocytosis      Plan - Cont Zosyn 3.375gms iv q8hrs will be finishing his course tomorrow  Time spent - 30 mins

## 2024-03-29 NOTE — PROGRESS NOTE ADULT - NUTRITIONAL ASSESSMENT
This patient has been assessed with a concern for Malnutrition and has been determined to have a diagnosis/diagnoses of Moderate protein-calorie malnutrition.    This patient is being managed with:   Diet Full Liquid-  Supplement Feeding Modality:  Oral  Ensure Enlive Cans or Servings Per Day:  2       Frequency:  Two Times a day  Entered: Mar 28 2024 11:51AM

## 2024-03-30 LAB
ALBUMIN SERPL ELPH-MCNC: 1.5 G/DL — LOW (ref 3.5–5)
ALP SERPL-CCNC: 322 U/L — HIGH (ref 40–120)
ALT FLD-CCNC: 38 U/L DA — SIGNIFICANT CHANGE UP (ref 10–60)
ANION GAP SERPL CALC-SCNC: 6 MMOL/L — SIGNIFICANT CHANGE UP (ref 5–17)
ANISOCYTOSIS BLD QL: SIGNIFICANT CHANGE UP
AST SERPL-CCNC: 145 U/L — HIGH (ref 10–40)
BASOPHILS # BLD AUTO: 0 K/UL — SIGNIFICANT CHANGE UP (ref 0–0.2)
BASOPHILS NFR BLD AUTO: 0 % — SIGNIFICANT CHANGE UP (ref 0–2)
BILIRUB SERPL-MCNC: 9.1 MG/DL — HIGH (ref 0.2–1.2)
BUN SERPL-MCNC: 14 MG/DL — SIGNIFICANT CHANGE UP (ref 7–18)
CALCIUM SERPL-MCNC: 7.8 MG/DL — LOW (ref 8.4–10.5)
CERULOPLASMIN SERPL-MCNC: 39 MG/DL — HIGH (ref 15–30)
CHLORIDE SERPL-SCNC: 117 MMOL/L — HIGH (ref 96–108)
CO2 SERPL-SCNC: 24 MMOL/L — SIGNIFICANT CHANGE UP (ref 22–31)
CREAT SERPL-MCNC: 0.6 MG/DL — SIGNIFICANT CHANGE UP (ref 0.5–1.3)
EGFR: 128 ML/MIN/1.73M2 — SIGNIFICANT CHANGE UP
ELLIPTOCYTES BLD QL SMEAR: SLIGHT — SIGNIFICANT CHANGE UP
EOSINOPHIL # BLD AUTO: 0.2 K/UL — SIGNIFICANT CHANGE UP (ref 0–0.5)
EOSINOPHIL NFR BLD AUTO: 1 % — SIGNIFICANT CHANGE UP (ref 0–6)
FERRITIN SERPL-MCNC: 1105 NG/ML — HIGH (ref 30–400)
GLUCOSE BLDC GLUCOMTR-MCNC: 108 MG/DL — HIGH (ref 70–99)
GLUCOSE BLDC GLUCOMTR-MCNC: 85 MG/DL — SIGNIFICANT CHANGE UP (ref 70–99)
GLUCOSE SERPL-MCNC: 102 MG/DL — HIGH (ref 70–99)
HBV CORE AB SER-ACNC: SIGNIFICANT CHANGE UP
HBV SURFACE AB SER-ACNC: SIGNIFICANT CHANGE UP
HCT VFR BLD CALC: 31 % — LOW (ref 39–50)
HCV RNA SPEC NAA+PROBE-LOG IU: SIGNIFICANT CHANGE UP
HCV RNA SPEC NAA+PROBE-LOG IU: SIGNIFICANT CHANGE UP LOGIU/ML
HGB BLD-MCNC: 9.8 G/DL — LOW (ref 13–17)
HYPOCHROMIA BLD QL: SIGNIFICANT CHANGE UP
IRON SATN MFR SERPL: 56 % — HIGH (ref 20–55)
IRON SATN MFR SERPL: 58 UG/DL — LOW (ref 65–170)
LYMPHOCYTES # BLD AUTO: 11 % — LOW (ref 13–44)
LYMPHOCYTES # BLD AUTO: 2.18 K/UL — SIGNIFICANT CHANGE UP (ref 1–3.3)
MACROCYTES BLD QL: SIGNIFICANT CHANGE UP
MAGNESIUM SERPL-MCNC: 2.5 MG/DL — SIGNIFICANT CHANGE UP (ref 1.6–2.6)
MANUAL SMEAR VERIFICATION: SIGNIFICANT CHANGE UP
MCHC RBC-ENTMCNC: 31.6 GM/DL — LOW (ref 32–36)
MCHC RBC-ENTMCNC: 33.8 PG — SIGNIFICANT CHANGE UP (ref 27–34)
MCV RBC AUTO: 106.9 FL — HIGH (ref 80–100)
MONOCYTES # BLD AUTO: 0.4 K/UL — SIGNIFICANT CHANGE UP (ref 0–0.9)
MONOCYTES NFR BLD AUTO: 2 % — SIGNIFICANT CHANGE UP (ref 2–14)
NEUTROPHILS # BLD AUTO: 16.47 K/UL — HIGH (ref 1.8–7.4)
NEUTROPHILS NFR BLD AUTO: 81 % — HIGH (ref 43–77)
NEUTS BAND # BLD: 2 % — SIGNIFICANT CHANGE UP (ref 0–8)
NRBC # BLD: 0 /100 WBCS — SIGNIFICANT CHANGE UP (ref 0–0)
OVALOCYTES BLD QL SMEAR: SLIGHT — SIGNIFICANT CHANGE UP
PHOSPHATE SERPL-MCNC: 2.2 MG/DL — LOW (ref 2.5–4.5)
PLAT MORPH BLD: NORMAL — SIGNIFICANT CHANGE UP
PLATELET # BLD AUTO: 308 K/UL — SIGNIFICANT CHANGE UP (ref 150–400)
PLATELET COUNT - ESTIMATE: ABNORMAL
POTASSIUM SERPL-MCNC: 3.7 MMOL/L — SIGNIFICANT CHANGE UP (ref 3.5–5.3)
POTASSIUM SERPL-SCNC: 3.7 MMOL/L — SIGNIFICANT CHANGE UP (ref 3.5–5.3)
PROT SERPL-MCNC: 5.1 G/DL — LOW (ref 6–8.3)
RBC # BLD: 2.9 M/UL — LOW (ref 4.2–5.8)
RBC # FLD: 21.1 % — HIGH (ref 10.3–14.5)
RBC BLD AUTO: ABNORMAL
SCHISTOCYTES BLD QL AUTO: SLIGHT — SIGNIFICANT CHANGE UP
SODIUM SERPL-SCNC: 147 MMOL/L — HIGH (ref 135–145)
STOMATOCYTES BLD QL SMEAR: SLIGHT — SIGNIFICANT CHANGE UP
TIBC SERPL-MCNC: 103 UG/DL — LOW (ref 250–450)
UIBC SERPL-MCNC: 45 UG/DL — LOW (ref 110–370)
VARIANT LYMPHS # BLD: 3 % — SIGNIFICANT CHANGE UP (ref 0–6)
WBC # BLD: 19.84 K/UL — HIGH (ref 3.8–10.5)
WBC # FLD AUTO: 19.84 K/UL — HIGH (ref 3.8–10.5)

## 2024-03-30 PROCEDURE — 74018 RADEX ABDOMEN 1 VIEW: CPT | Mod: 26

## 2024-03-30 RX ORDER — ALBUMIN HUMAN 25 %
50 VIAL (ML) INTRAVENOUS EVERY 6 HOURS
Refills: 0 | Status: COMPLETED | OUTPATIENT
Start: 2024-03-30 | End: 2024-04-01

## 2024-03-30 RX ORDER — SODIUM CHLORIDE 9 MG/ML
500 INJECTION, SOLUTION INTRAVENOUS ONCE
Refills: 0 | Status: COMPLETED | OUTPATIENT
Start: 2024-03-30 | End: 2024-03-30

## 2024-03-30 RX ORDER — POTASSIUM PHOSPHATE, MONOBASIC POTASSIUM PHOSPHATE, DIBASIC 236; 224 MG/ML; MG/ML
30 INJECTION, SOLUTION INTRAVENOUS ONCE
Refills: 0 | Status: COMPLETED | OUTPATIENT
Start: 2024-03-30 | End: 2024-03-30

## 2024-03-30 RX ORDER — ALBUMIN HUMAN 25 %
50 VIAL (ML) INTRAVENOUS EVERY 6 HOURS
Refills: 0 | Status: DISCONTINUED | OUTPATIENT
Start: 2024-03-30 | End: 2024-03-30

## 2024-03-30 RX ORDER — LACTULOSE 10 G/15ML
10 SOLUTION ORAL EVERY 12 HOURS
Refills: 0 | Status: DISCONTINUED | OUTPATIENT
Start: 2024-03-30 | End: 2024-04-01

## 2024-03-30 RX ADMIN — LACTULOSE 10 GRAM(S): 10 SOLUTION ORAL at 06:09

## 2024-03-30 RX ADMIN — Medication 50 MILLILITER(S): at 11:04

## 2024-03-30 RX ADMIN — PIPERACILLIN AND TAZOBACTAM 25 GRAM(S): 4; .5 INJECTION, POWDER, LYOPHILIZED, FOR SOLUTION INTRAVENOUS at 06:09

## 2024-03-30 RX ADMIN — Medication 100 MILLIGRAM(S): at 11:04

## 2024-03-30 RX ADMIN — Medication 40 MILLIEQUIVALENT(S): at 06:11

## 2024-03-30 RX ADMIN — Medication 1 TABLET(S): at 13:01

## 2024-03-30 RX ADMIN — Medication 40 MILLIEQUIVALENT(S): at 17:07

## 2024-03-30 RX ADMIN — LACTULOSE 10 GRAM(S): 10 SOLUTION ORAL at 17:07

## 2024-03-30 RX ADMIN — HEPARIN SODIUM 5000 UNIT(S): 5000 INJECTION INTRAVENOUS; SUBCUTANEOUS at 06:08

## 2024-03-30 RX ADMIN — POTASSIUM PHOSPHATE, MONOBASIC POTASSIUM PHOSPHATE, DIBASIC 83.33 MILLIMOLE(S): 236; 224 INJECTION, SOLUTION INTRAVENOUS at 06:12

## 2024-03-30 RX ADMIN — CHLORHEXIDINE GLUCONATE 1 APPLICATION(S): 213 SOLUTION TOPICAL at 06:10

## 2024-03-30 RX ADMIN — Medication 40 MILLIEQUIVALENT(S): at 06:08

## 2024-03-30 RX ADMIN — HEPARIN SODIUM 5000 UNIT(S): 5000 INJECTION INTRAVENOUS; SUBCUTANEOUS at 17:07

## 2024-03-30 RX ADMIN — Medication 40 MILLIEQUIVALENT(S): at 13:02

## 2024-03-30 RX ADMIN — Medication 40 MILLIEQUIVALENT(S): at 21:56

## 2024-03-30 RX ADMIN — Medication 40 MILLIEQUIVALENT(S): at 09:45

## 2024-03-30 RX ADMIN — SODIUM CHLORIDE 500 MILLILITER(S): 9 INJECTION, SOLUTION INTRAVENOUS at 10:22

## 2024-03-30 RX ADMIN — Medication 50 MILLILITER(S): at 17:07

## 2024-03-30 RX ADMIN — Medication 1 MILLIGRAM(S): at 11:04

## 2024-03-30 RX ADMIN — PANTOPRAZOLE SODIUM 40 MILLIGRAM(S): 20 TABLET, DELAYED RELEASE ORAL at 11:04

## 2024-03-30 NOTE — PROGRESS NOTE ADULT - SUBJECTIVE AND OBJECTIVE BOX
INTERVAL HPI/OVERNIGHT EVENTS: ***    PRESSORS: [ ] YES [ ] NO  WHICH:    ANTIBIOTICS:                      Antimicrobial:  piperacillin/tazobactam IVPB.. 3.375 Gram(s) IV Intermittent every 8 hours    Cardiovascular:    Pulmonary:    Hematalogic:  heparin   Injectable 5000 Unit(s) SubCutaneous every 12 hours    Other:  chlorhexidine 2% Cloths 1 Application(s) Topical <User Schedule>  folic acid 1 milliGRAM(s) Oral daily  influenza   Vaccine 0.5 milliLiter(s) IntraMuscular once  insulin lispro (ADMELOG) corrective regimen sliding scale   SubCutaneous every 6 hours  lactulose Syrup 10 Gram(s) Oral every 8 hours  pantoprazole   Suspension 40 milliGRAM(s) Oral daily  potassium chloride   Powder 40 milliEquivalent(s) Oral every 4 hours  thiamine 100 milliGRAM(s) Oral daily    chlorhexidine 2% Cloths 1 Application(s) Topical <User Schedule>  folic acid 1 milliGRAM(s) Oral daily  heparin   Injectable 5000 Unit(s) SubCutaneous every 12 hours  influenza   Vaccine 0.5 milliLiter(s) IntraMuscular once  insulin lispro (ADMELOG) corrective regimen sliding scale   SubCutaneous every 6 hours  lactulose Syrup 10 Gram(s) Oral every 8 hours  pantoprazole   Suspension 40 milliGRAM(s) Oral daily  piperacillin/tazobactam IVPB.. 3.375 Gram(s) IV Intermittent every 8 hours  potassium chloride   Powder 40 milliEquivalent(s) Oral every 4 hours  thiamine 100 milliGRAM(s) Oral daily    Drug Dosing Weight  Height (cm): 167.6 (21 Mar 2024 12:45)  Weight (kg): 86 (21 Mar 2024 21:30)  BMI (kg/m2): 30.6 (21 Mar 2024 21:30)  BSA (m2): 1.95 (21 Mar 2024 21:30)    CENTRAL LINE: [ ] YES [ ] NO  LOCATION:   DATE INSERTED:  REMOVE: [ ] YES [ ] NO  EXPLAIN:    BLACK: [ ] YES [ ] NO    DATE INSERTED:  REMOVE:  [ ] YES [ ] NO  EXPLAIN:    A-LINE:  [ ] YES [ ] NO  LOCATION:   DATE INSERTED:  REMOVE:  [ ] YES [ ] NO  EXPLAIN:    PMH -reviewed admission note, no change since admission    ICU Vital Signs Last 24 Hrs  T(C): 37.4 (29 Mar 2024 23:34), Max: 37.4 (29 Mar 2024 06:00)  T(F): 99.3 (29 Mar 2024 23:34), Max: 99.3 (29 Mar 2024 06:00)  HR: 100 (30 Mar 2024 01:10) (87 - 105)  BP: 124/83 (30 Mar 2024 01:10) (80/62 - 124/83)  BP(mean): 94 (30 Mar 2024 01:10) (63 - 106)  ABP: --  ABP(mean): --  RR: 24 (30 Mar 2024 01:10) (14 - 28)  SpO2: 92% (30 Mar 2024 01:10) (80% - 98%)    O2 Parameters below as of 30 Mar 2024 01:10  Patient On (Oxygen Delivery Method): nasal cannula  O2 Flow (L/min): 2                03-28 @ 07:01  -  03-29 @ 07:00  --------------------------------------------------------  IN: 2730 mL / OUT: 1800 mL / NET: 930 mL            PHYSICAL EXAM:    GENERAL: NAD, well-groomed, well-developed  HEAD:  Atraumatic, Normocephalic  EYES: EOMI, PERRLA, conjunctiva and sclera clear  ENMT: No tonsillar erythema, exudates, or enlargement; Moist mucous membranes, Good dentition, No lesions  NECK: Supple, normal appearance, No JVD; Normal thyroid; Trachea midline  NERVOUS SYSTEM:  Alert & Oriented X3, Good concentration; Motor Strength 5/5 B/L upper and lower extremities; DTRs 2+ intact and symmetric  CHEST/LUNG: No chest deformity; Normal percussion bilaterally; No rales, rhonchi, wheezing   HEART: Regular rate and rhythm; No murmurs, rubs, or gallops  ABDOMEN: Soft, Nontender, Nondistended; Bowel sounds present  EXTREMITIES:  2+ Peripheral Pulses, No clubbing, cyanosis, or edema  LYMPH: No lymphadenopathy noted  SKIN: No rashes or lesions; Good capillary refill      LABS:  CBC Full  -  ( 29 Mar 2024 04:19 )  WBC Count : 19.78 K/uL  RBC Count : 2.92 M/uL  Hemoglobin : 10.0 g/dL  Hematocrit : 30.2 %  Platelet Count - Automated : 264 K/uL  Mean Cell Volume : 103.4 fl  Mean Cell Hemoglobin : 34.2 pg  Mean Cell Hemoglobin Concentration : 33.1 gm/dL  Auto Neutrophil # : x  Auto Lymphocyte # : x  Auto Monocyte # : x  Auto Eosinophil # : x  Auto Basophil # : x  Auto Neutrophil % : x  Auto Lymphocyte % : x  Auto Monocyte % : x  Auto Eosinophil % : x  Auto Basophil % : x    03-29    144  |  114<H>  |  13  ----------------------------<  150<H>  3.3<L>   |  27  |  0.74    Ca    8.1<L>      29 Mar 2024 12:55  Phos  3.2     03-29  Mg     2.5     03-29    TPro  x   /  Alb  x   /  TBili  10.2<H>  /  DBili  8.9<H>  /  AST  x   /  ALT  x   /  AlkPhos  x   03-29    PT/INR - ( 28 Mar 2024 03:59 )   PT: 14.4 sec;   INR: 1.27 ratio         PTT - ( 28 Mar 2024 03:59 )  PTT:37.9 sec  Urinalysis Basic - ( 29 Mar 2024 12:55 )    Color: x / Appearance: x / SG: x / pH: x  Gluc: 150 mg/dL / Ketone: x  / Bili: x / Urobili: x   Blood: x / Protein: x / Nitrite: x   Leuk Esterase: x / RBC: x / WBC x   Sq Epi: x / Non Sq Epi: x / Bacteria: x          RADIOLOGY & ADDITIONAL STUDIES REVIEWED:  ***    GOALS OF CARE DISCUSSION WITH PATIENT/FAMILY/PROXY:    CRITICAL CARE TIME SPENT: 35 minutes INTERVAL HPI/OVERNIGHT EVENTS: ***    PRESSORS: [ ] YES [ ] NO  WHICH:    ANTIBIOTICS:                      Antimicrobial:  piperacillin/tazobactam IVPB.. 3.375 Gram(s) IV Intermittent every 8 hours    Cardiovascular:    Pulmonary:    Hematalogic:  heparin   Injectable 5000 Unit(s) SubCutaneous every 12 hours    Other:  chlorhexidine 2% Cloths 1 Application(s) Topical <User Schedule>  folic acid 1 milliGRAM(s) Oral daily  influenza   Vaccine 0.5 milliLiter(s) IntraMuscular once  insulin lispro (ADMELOG) corrective regimen sliding scale   SubCutaneous every 6 hours  lactulose Syrup 10 Gram(s) Oral every 8 hours  pantoprazole   Suspension 40 milliGRAM(s) Oral daily  potassium chloride   Powder 40 milliEquivalent(s) Oral every 4 hours  thiamine 100 milliGRAM(s) Oral daily    chlorhexidine 2% Cloths 1 Application(s) Topical <User Schedule>  folic acid 1 milliGRAM(s) Oral daily  heparin   Injectable 5000 Unit(s) SubCutaneous every 12 hours  influenza   Vaccine 0.5 milliLiter(s) IntraMuscular once  insulin lispro (ADMELOG) corrective regimen sliding scale   SubCutaneous every 6 hours  lactulose Syrup 10 Gram(s) Oral every 8 hours  pantoprazole   Suspension 40 milliGRAM(s) Oral daily  piperacillin/tazobactam IVPB.. 3.375 Gram(s) IV Intermittent every 8 hours  potassium chloride   Powder 40 milliEquivalent(s) Oral every 4 hours  thiamine 100 milliGRAM(s) Oral daily    Drug Dosing Weight  Height (cm): 167.6 (21 Mar 2024 12:45)  Weight (kg): 86 (21 Mar 2024 21:30)  BMI (kg/m2): 30.6 (21 Mar 2024 21:30)  BSA (m2): 1.95 (21 Mar 2024 21:30)    CENTRAL LINE: [ ] YES [ ] NO  LOCATION:   DATE INSERTED:  REMOVE: [ ] YES [ ] NO  EXPLAIN:    BLACK: [ ] YES [ ] NO    DATE INSERTED:  REMOVE:  [ ] YES [ ] NO  EXPLAIN:    A-LINE:  [ ] YES [ ] NO  LOCATION:   DATE INSERTED:  REMOVE:  [ ] YES [ ] NO  EXPLAIN:    PMH -reviewed admission note, no change since admission    ICU Vital Signs Last 24 Hrs  T(C): 37.4 (29 Mar 2024 23:34), Max: 37.4 (29 Mar 2024 06:00)  T(F): 99.3 (29 Mar 2024 23:34), Max: 99.3 (29 Mar 2024 06:00)  HR: 100 (30 Mar 2024 01:10) (87 - 105)  BP: 124/83 (30 Mar 2024 01:10) (80/62 - 124/83)  BP(mean): 94 (30 Mar 2024 01:10) (63 - 106)  ABP: --  ABP(mean): --  RR: 24 (30 Mar 2024 01:10) (14 - 28)  SpO2: 92% (30 Mar 2024 01:10) (80% - 98%)    O2 Parameters below as of 30 Mar 2024 01:10  Patient On (Oxygen Delivery Method): nasal cannula  O2 Flow (L/min): 2                03-28 @ 07:01  -  03-29 @ 07:00  --------------------------------------------------------  IN: 2730 mL / OUT: 1800 mL / NET: 930 mL            PHYSICAL EXAM:    GENERAL: NAD, mentation slowly improving  HEAD:  Atraumatic, Normocephalic  EYES: Scleral Icterus. EOMI, PERRLA  ENMT: Dry mucous membranes  NECK: Supple, normal appearance, No JVD; Normal thyroid; Trachea midline  NERVOUS SYSTEM:  Alert & Oriented X3,  Moving all extremities  CHEST/LUNG: No chest deformity; Clear to Auscultation; No rales, rhonchi, wheezing   HEART: Regular rate and rhythm; No murmurs, rubs, or gallops  ABDOMEN: Distended, slightly softer  EXTREMITIES:  +1 pitting edema LE's b/l  SKIN: No rashes or lesions;  Good capillary refill      LABS:  CBC Full  -  ( 29 Mar 2024 04:19 )  WBC Count : 19.78 K/uL  RBC Count : 2.92 M/uL  Hemoglobin : 10.0 g/dL  Hematocrit : 30.2 %  Platelet Count - Automated : 264 K/uL  Mean Cell Volume : 103.4 fl  Mean Cell Hemoglobin : 34.2 pg  Mean Cell Hemoglobin Concentration : 33.1 gm/dL  Auto Neutrophil # : x  Auto Lymphocyte # : x  Auto Monocyte # : x  Auto Eosinophil # : x  Auto Basophil # : x  Auto Neutrophil % : x  Auto Lymphocyte % : x  Auto Monocyte % : x  Auto Eosinophil % : x  Auto Basophil % : x    03-29    144  |  114<H>  |  13  ----------------------------<  150<H>  3.3<L>   |  27  |  0.74    Ca    8.1<L>      29 Mar 2024 12:55  Phos  3.2     03-29  Mg     2.5     03-29    TPro  x   /  Alb  x   /  TBili  10.2<H>  /  DBili  8.9<H>  /  AST  x   /  ALT  x   /  AlkPhos  x   03-29    PT/INR - ( 28 Mar 2024 03:59 )   PT: 14.4 sec;   INR: 1.27 ratio         PTT - ( 28 Mar 2024 03:59 )  PTT:37.9 sec  Urinalysis Basic - ( 29 Mar 2024 12:55 )    Color: x / Appearance: x / SG: x / pH: x  Gluc: 150 mg/dL / Ketone: x  / Bili: x / Urobili: x   Blood: x / Protein: x / Nitrite: x   Leuk Esterase: x / RBC: x / WBC x   Sq Epi: x / Non Sq Epi: x / Bacteria: x          RADIOLOGY & ADDITIONAL STUDIES REVIEWED:  ***    GOALS OF CARE DISCUSSION WITH PATIENT/FAMILY/PROXY:    CRITICAL CARE TIME SPENT: 35 minutes INTERVAL HPI/OVERNIGHT EVENTS:  No acute events overnight.  Patient examined at bedside this AM.  Patient denies acute complaints. Mentation slowly improving.    PRESSORS: [ ] YES [X] NO    ANTIBIOTICS:                      Antimicrobial:  piperacillin/tazobactam IVPB.. 3.375 Gram(s) IV Intermittent every 8 hours    Cardiovascular:    Pulmonary:    Hematalogic:  heparin   Injectable 5000 Unit(s) SubCutaneous every 12 hours    Other:  chlorhexidine 2% Cloths 1 Application(s) Topical <User Schedule>  folic acid 1 milliGRAM(s) Oral daily  influenza   Vaccine 0.5 milliLiter(s) IntraMuscular once  insulin lispro (ADMELOG) corrective regimen sliding scale   SubCutaneous every 6 hours  lactulose Syrup 10 Gram(s) Oral every 8 hours  pantoprazole   Suspension 40 milliGRAM(s) Oral daily  potassium chloride   Powder 40 milliEquivalent(s) Oral every 4 hours  thiamine 100 milliGRAM(s) Oral daily    chlorhexidine 2% Cloths 1 Application(s) Topical <User Schedule>  folic acid 1 milliGRAM(s) Oral daily  heparin   Injectable 5000 Unit(s) SubCutaneous every 12 hours  influenza   Vaccine 0.5 milliLiter(s) IntraMuscular once  insulin lispro (ADMELOG) corrective regimen sliding scale   SubCutaneous every 6 hours  lactulose Syrup 10 Gram(s) Oral every 8 hours  pantoprazole   Suspension 40 milliGRAM(s) Oral daily  piperacillin/tazobactam IVPB.. 3.375 Gram(s) IV Intermittent every 8 hours  potassium chloride   Powder 40 milliEquivalent(s) Oral every 4 hours  thiamine 100 milliGRAM(s) Oral daily    Drug Dosing Weight  Height (cm): 167.6 (21 Mar 2024 12:45)  Weight (kg): 86 (21 Mar 2024 21:30)  BMI (kg/m2): 30.6 (21 Mar 2024 21:30)  BSA (m2): 1.95 (21 Mar 2024 21:30)    CENTRAL LINE: [ ] YES [X] NO  LOCATION:   DATE INSERTED:  REMOVE: [ ] YES [ ] NO  EXPLAIN:    BLACK: [ ] YES [X] NO    DATE INSERTED:  REMOVE:  [ ] YES [ ] NO  EXPLAIN:    A-LINE:  [ ] YES [X] NO  LOCATION:   DATE INSERTED:  REMOVE:  [ ] YES [ ] NO  EXPLAIN:    PMH -reviewed admission note, no change since admission    ICU Vital Signs Last 24 Hrs  T(C): 37.4 (29 Mar 2024 23:34), Max: 37.4 (29 Mar 2024 06:00)  T(F): 99.3 (29 Mar 2024 23:34), Max: 99.3 (29 Mar 2024 06:00)  HR: 100 (30 Mar 2024 01:10) (87 - 105)  BP: 124/83 (30 Mar 2024 01:10) (80/62 - 124/83)  BP(mean): 94 (30 Mar 2024 01:10) (63 - 106)  ABP: --  ABP(mean): --  RR: 24 (30 Mar 2024 01:10) (14 - 28)  SpO2: 92% (30 Mar 2024 01:10) (80% - 98%)    O2 Parameters below as of 30 Mar 2024 01:10  Patient On (Oxygen Delivery Method): nasal cannula  O2 Flow (L/min): 2                03-28 @ 07:01  -  03-29 @ 07:00  --------------------------------------------------------  IN: 2730 mL / OUT: 1800 mL / NET: 930 mL            PHYSICAL EXAM:    GENERAL: NAD, mentation slowly improving  HEAD:  Atraumatic, Normocephalic  EYES: Scleral Icterus. EOMI, PERRLA  ENMT: Dry mucous membranes  NECK: Supple, normal appearance, No JVD; Normal thyroid; Trachea midline  NERVOUS SYSTEM:  Alert & Oriented X3,  Moving all extremities  CHEST/LUNG: No chest deformity; Clear to Auscultation; No rales, rhonchi, wheezing   HEART: Regular rate and rhythm; No murmurs, rubs, or gallops  ABDOMEN: Distended, slightly softer  EXTREMITIES:  +1 pitting edema LE's b/l  SKIN: No rashes or lesions;  Good capillary refill      LABS:  CBC Full  -  ( 29 Mar 2024 04:19 )  WBC Count : 19.78 K/uL  RBC Count : 2.92 M/uL  Hemoglobin : 10.0 g/dL  Hematocrit : 30.2 %  Platelet Count - Automated : 264 K/uL  Mean Cell Volume : 103.4 fl  Mean Cell Hemoglobin : 34.2 pg  Mean Cell Hemoglobin Concentration : 33.1 gm/dL  Auto Neutrophil # : x  Auto Lymphocyte # : x  Auto Monocyte # : x  Auto Eosinophil # : x  Auto Basophil # : x  Auto Neutrophil % : x  Auto Lymphocyte % : x  Auto Monocyte % : x  Auto Eosinophil % : x  Auto Basophil % : x    03-29    144  |  114<H>  |  13  ----------------------------<  150<H>  3.3<L>   |  27  |  0.74    Ca    8.1<L>      29 Mar 2024 12:55  Phos  3.2     03-29  Mg     2.5     03-29    TPro  x   /  Alb  x   /  TBili  10.2<H>  /  DBili  8.9<H>  /  AST  x   /  ALT  x   /  AlkPhos  x   03-29    PT/INR - ( 28 Mar 2024 03:59 )   PT: 14.4 sec;   INR: 1.27 ratio         PTT - ( 28 Mar 2024 03:59 )  PTT:37.9 sec  Urinalysis Basic - ( 29 Mar 2024 12:55 )    Color: x / Appearance: x / SG: x / pH: x  Gluc: 150 mg/dL / Ketone: x  / Bili: x / Urobili: x   Blood: x / Protein: x / Nitrite: x   Leuk Esterase: x / RBC: x / WBC x   Sq Epi: x / Non Sq Epi: x / Bacteria: x          RADIOLOGY & ADDITIONAL STUDIES REVIEWED:  ***    GOALS OF CARE DISCUSSION WITH PATIENT/FAMILY/PROXY:    CRITICAL CARE TIME SPENT: 35 minutes

## 2024-03-30 NOTE — PROGRESS NOTE ADULT - ASSESSMENT
IMP: This is a 38 yr old man with  alcohol withdrawal seizure with alcohol use disorder, who p/w confusion starting this morning. Admitted for high risk alcohol withdrawal with persistent sinus tachycardia.    Assessment:   # Acute hypoxic resp failure   # Alcohol use disorder  # Alcohol withdrawal  # Acute metabolic encephalopathy  # Atypical pneumonia  # Hx of alcohol seizures  # Possible pancreatitis  # Liver steatosis  # Alcoholic hepatitis  # Sinus tachycardia  # SIRS  # ARABELLA  # Ileus       Plan:   =============NEURO:=============  # Acute metabolic encephalopathy  # Alcohol withdrawal  s/p Ativan however will pursue intubation for protecting the airways  s/p phenobarb IVP x1 on 3/23   - s/p ketamine drip and precedex  - d/c Phenobarb    # Alcohol use disorder  # Hx of alcohol seizures  - Pt has been in and out of alcohol rehab  - Most recently came out 6 months ago - as per sister pt was only hospitalized and not in rehab   - SW consult order placed     #intubated (3/22)  -Attempt to do SAT, SBT in afternoon 3/26  -EXTUBATED 3/26    =============CARDIO:================  # Sinus tachycardia  - P/w sinus tachycardia  - Likely due to alcohol withdrawal  - EKG = sinus tachycardia  - trop neg      ==========PULM:=============  #Atypical pneumonia   # coronavirus positive however not COVID  CT chest : Multifocal bilateral peripheral opacities suggesting atypical pneumonia. Probable compression atelectasis right lung base, related to elevated right hemidiaphragm.  - c/w zosyn until 3/30      ==========GI:==========  # Constipation  #?acute abdomen with increased abdominal pressure of 25   surgery consulted - no indication for surgery at this time , recs paralyzing the patient   - abdominal pressure improving. No longer need to measure  - 3/24 Bladder pressure 16   - 3/24 started lactulose 10mg q8h  - 3/25 Neostigmine x1, led to bradycardia. Atropine given.  - 3/26 Pt had large BM, will continue lactulose  - Removed [place] Sump  - f/u daily abdominal x-rays    #Diet  -Clear Liquid    # Alcoholic hepatitis   #transaminitis  #Hyperbilirubinemia   P/w T bili 6.6 (Dir 5.1), , ALT 41,   Maddrey score = borderline at 31.4 points (32 point is the cutoff for steroid treatment)  MELD-Na score 17 points 6 % 90day mortality   - Transaminitis improving   - T. Hector 6.6 > 5.6 > 5.5>7.5   - 3/24 Maddrey score - 28.7   - Hepatology consulted- Dr. Wiley  -f/u hepatitis labs  -f/u ceruloplasmin    # Possible pancreatitis  Lipase 470, possible mid abdominal pain. Negative imaging.  - repeat Lipase 86, not concerning for pancreatitis    # Liver steatosis    # Liver lesion  Likely due to alcohol use disorder  CT = Has hypodense foci with central hyperdensity involving the pericholecystic region and segment 6, indeterminate.   - Rec outpt MRI  - Hepatology consulted- Dr. Wiley       =======RENAL:==========  #Metabolic Acidosis  -ABG 3/25 showing bicarb of 10  -Vent settings adjusted  -RESOLVED    # ARABELLA  P/w SCr 1.77 (baseline 0.8)  S/p 23L IVF in ED  urinary retention 1.5 L s/p straight cath overnight  - Roman placed on 3/22, removed 3/28  3/22 Scr improved to 0.85   Renal US: No hydronephrosis.   RESOLVED    # Lactic acidosis  P/w Lactate 10  Due to dehydration, infection-  s/p 3L IVF -> Lactate 1.6  RESOLVED    # Hypokalemia  - K 3.3 s/p Kcl 10 mEq x3 ->3.9   - repeat 3.3 will replete IV  RESOLVED     =====INFECTIOUS=====  # SIRS  -p/w Lactate 10, tachycardia, tachypnea>20  CXR neg for consolidation or effusion  Positive coronavirus  s/p Cefepime in ED  d/c'd Ceftriaoxone (3/23)  3/23 Started Zosyn   legionella neg   U/A positive. UCx not sent   - BCx NGTD 48h   - C/w with Zosyn until 3/30    - 3/27 continued to spike fevers  - Removing Central Line and A line  - Roman cath has been removed  - afebrile >24 hours    ==============HEME:===========  # Leukocytosis  - As above in SIRS    =======ENDO:=======  A1c 7.9  - FS, SSI  - monitor     =========SKIN/CATHETERS=======  Roman- REMOVED  RIJ- REMOVED  [place] Sump- REMOVED  Left A-Line- REMOVED    =========PPX:========  - Hep SC  - PPI    IMP: This is a 38 yr old man with  alcohol withdrawal seizure with alcohol use disorder, who p/w confusion starting this morning. Admitted for high risk alcohol withdrawal with persistent sinus tachycardia.    Assessment:   # Acute hypoxic resp failure   # Alcohol use disorder  # Alcohol withdrawal  # Acute metabolic encephalopathy  # Atypical pneumonia  # Hx of alcohol seizures  # Possible pancreatitis  # Liver steatosis  # Alcoholic hepatitis  # Sinus tachycardia  # SIRS  # ARABELLA  # Ileus       Plan:   =============NEURO:=============  # Acute metabolic encephalopathy  # Alcohol withdrawal  s/p Ativan however will pursue intubation for protecting the airways  s/p phenobarb IVP x1 on 3/23   - s/p ketamine drip and precedex  - d/c Phenobarb    # Alcohol use disorder  # Hx of alcohol seizures  - Pt has been in and out of alcohol rehab  - Most recently came out 6 months ago - as per sister pt was only hospitalized and not in rehab   - SW consult order placed     #intubated (3/22)  -Attempt to do SAT, SBT in afternoon 3/26  -EXTUBATED 3/26    =============CARDIO:================  # Sinus tachycardia  - P/w sinus tachycardia  - Likely due to alcohol withdrawal  - EKG = sinus tachycardia  - trop neg      ==========PULM:=============  #Atypical pneumonia   # coronavirus positive however not COVID  CT chest : Multifocal bilateral peripheral opacities suggesting atypical pneumonia. Probable compression atelectasis right lung base, related to elevated right hemidiaphragm.  - Completed zosyn 3/30      ==========GI:==========  # Constipation  #?acute abdomen with increased abdominal pressure of 25   surgery consulted - no indication for surgery at this time , recs paralyzing the patient   - abdominal pressure improving. No longer need to measure  - 3/24 Bladder pressure 16   - 3/24 started lactulose 10mg q8h  - 3/25 Neostigmine x1, led to bradycardia. Atropine given.  - 3/26 Pt had large BM, will continue lactulose  - Removed [place] Sump  - f/u daily abdominal x-rays    #Diet  -Advance as tolerated    # Alcoholic hepatitis   #transaminitis  #Hyperbilirubinemia   P/w T bili 6.6 (Dir 5.1), , ALT 41,   Maddrey score = borderline at 31.4 points (32 point is the cutoff for steroid treatment)  MELD-Na score 17 points 6 % 90day mortality   - Transaminitis improving   - T. Hector 6.6 > 5.6 > 5.5>7.5   - 3/24 Maddrey score - 28.7   - Hepatology consulted- Dr. Wiley  -f/u hepatitis labs  -f/u ceruloplasmin    # Possible pancreatitis  Lipase 470, possible mid abdominal pain. Negative imaging.  - repeat Lipase 86, not concerning for pancreatitis    # Liver steatosis    # Liver lesion  Likely due to alcohol use disorder  CT = Has hypodense foci with central hyperdensity involving the pericholecystic region and segment 6, indeterminate.   - Rec outpt MRI  - Hepatology consulted- Dr. Wiley       =======RENAL:==========  #Hypoalbuminemia  -IV Albumin 50 q6 until 4/1    #Metabolic Acidosis  -ABG 3/25 showing bicarb of 10  -Vent settings adjusted  -RESOLVED    # ARABELLA  P/w SCr 1.77 (baseline 0.8)  S/p 23L IVF in ED  urinary retention 1.5 L s/p straight cath overnight  - Roman placed on 3/22, removed 3/28  3/22 Scr improved to 0.85   Renal US: No hydronephrosis.   RESOLVED    # Lactic acidosis  P/w Lactate 10  Due to dehydration, infection-  s/p 3L IVF -> Lactate 1.6  RESOLVED    # Hypokalemia  - K 3.3 s/p Kcl 10 mEq x3 ->3.9   - repeat 3.3 will replete IV  RESOLVED     =====INFECTIOUS=====  # SIRS  -p/w Lactate 10, tachycardia, tachypnea>20  CXR neg for consolidation or effusion  Positive coronavirus  s/p Cefepime in ED  d/c'd Ceftriaoxone (3/23)  3/23 Started Zosyn   legionella neg   U/A positive. UCx not sent   - BCx NGTD 48h   - Zosyn completed 3/30    - 3/27 continued to spike fevers  - Removing Central Line and A line  - Roman cath has been removed  - afebrile >48 hours    ==============HEME:===========  # Leukocytosis  - As above in SIRS    =======ENDO:=======  A1c 7.9  - glucose wnl  - d/c sliding scale    =========SKIN/CATHETERS=======  Roman- REMOVED  RIJ- REMOVED  [place] Sump- REMOVED  Left A-Line- REMOVED    =========PPX:========  - Hep SC  - PPI    IMP: This is a 38 yr old man with  alcohol withdrawal seizure with alcohol use disorder, who p/w confusion starting this morning. Admitted for high risk alcohol withdrawal with persistent sinus tachycardia.    Assessment:   # Acute hypoxic resp failure   # Alcohol use disorder  # Alcohol withdrawal  # Acute metabolic encephalopathy  # Atypical pneumonia  # Hx of alcohol seizures  # Possible pancreatitis  # Liver steatosis  # Alcoholic hepatitis  # Sinus tachycardia  # SIRS  # ARABELLA  # Ileus       Plan:   =============NEURO:=============  # Acute metabolic encephalopathy  # Alcohol withdrawal  s/p Ativan however will pursue intubation for protecting the airways  s/p phenobarb IVP x1 on 3/23   - s/p ketamine drip and precedex  - d/c Phenobarb    # Alcohol use disorder  # Hx of alcohol seizures  - Pt has been in and out of alcohol rehab  - Most recently came out 6 months ago - as per sister pt was only hospitalized and not in rehab   - SW consult order placed     #intubated (3/22)  -Attempt to do SAT, SBT in afternoon 3/26  -EXTUBATED 3/26    =============CARDIO:================  # Sinus tachycardia  - P/w sinus tachycardia  - Likely due to alcohol withdrawal  - EKG = sinus tachycardia  - trop neg      ==========PULM:=============  #Atypical pneumonia   # coronavirus positive however not COVID  CT chest : Multifocal bilateral peripheral opacities suggesting atypical pneumonia. Probable compression atelectasis right lung base, related to elevated right hemidiaphragm.  - Completed zosyn 3/30      ==========GI:==========  # Constipation  #?acute abdomen with increased abdominal pressure of 25   surgery consulted - no indication for surgery at this time , recs paralyzing the patient   - abdominal pressure improving. No longer need to measure  - 3/24 Bladder pressure 16   - 3/24 started lactulose 10mg  - 3/25 Neostigmine x1, led to bradycardia. Atropine given.  - 3/26 Pt had large BM, will continue lactulose  - Removed [place] Sump  - f/u daily abdominal x-rays  - cont lactulose BID    #Diet  -Advance as tolerated    # Alcoholic hepatitis   #transaminitis  #Hyperbilirubinemia   P/w T bili 6.6 (Dir 5.1), , ALT 41,   Maddrey score = borderline at 31.4 points (32 point is the cutoff for steroid treatment)  MELD-Na score 17 points 6 % 90day mortality   - Transaminitis improving   - T. Hector 6.6 > 5.6 > 5.5>7.5   - 3/24 Maddrey score - 28.7   - Hepatology consulted- Dr. Wiley  -f/u hepatitis labs  -f/u ceruloplasmin    # Possible pancreatitis  Lipase 470, possible mid abdominal pain. Negative imaging.  - repeat Lipase 86, not concerning for pancreatitis    # Liver steatosis    # Liver lesion  Likely due to alcohol use disorder  CT = Has hypodense foci with central hyperdensity involving the pericholecystic region and segment 6, indeterminate.   - Rec outpt MRI  - Hepatology consulted- Dr. Wiley       =======RENAL:==========  #Hypoalbuminemia  -IV Albumin 50 q6 until 4/1    #Metabolic Acidosis  -ABG 3/25 showing bicarb of 10  -Vent settings adjusted  -RESOLVED    # ARABELLA  P/w SCr 1.77 (baseline 0.8)  S/p 23L IVF in ED  urinary retention 1.5 L s/p straight cath overnight  - Roman placed on 3/22, removed 3/28  3/22 Scr improved to 0.85   Renal US: No hydronephrosis.   RESOLVED    # Lactic acidosis  P/w Lactate 10  Due to dehydration, infection-  s/p 3L IVF -> Lactate 1.6  RESOLVED    # Hypokalemia  - K 3.3 s/p Kcl 10 mEq x3 ->3.9   - repeat 3.3 will replete IV  RESOLVED     =====INFECTIOUS=====  # SIRS  -p/w Lactate 10, tachycardia, tachypnea>20  CXR neg for consolidation or effusion  Positive coronavirus  s/p Cefepime in ED  d/c'd Ceftriaoxone (3/23)  3/23 Started Zosyn   legionella neg   U/A positive. UCx not sent   - BCx NGTD 48h   - Zosyn completed 3/30    - 3/27 continued to spike fevers  - Removing Central Line and A line  - Roman cath has been removed  - afebrile >48 hours    ==============HEME:===========  # Leukocytosis  - As above in SIRS    =======ENDO:=======  A1c 7.9  - glucose wnl  - d/c sliding scale    =========SKIN/CATHETERS=======  Roman- REMOVED  RIJ- REMOVED  [place] Sump- REMOVED  Left A-Line- REMOVED    =========PPX:========  - Hep SC  - PPI

## 2024-03-31 LAB
ALBUMIN SERPL ELPH-MCNC: 2.3 G/DL — LOW (ref 3.5–5)
ALP SERPL-CCNC: 324 U/L — HIGH (ref 40–120)
ALT FLD-CCNC: 42 U/L DA — SIGNIFICANT CHANGE UP (ref 10–60)
ANION GAP SERPL CALC-SCNC: 4 MMOL/L — LOW (ref 5–17)
ANISOCYTOSIS BLD QL: SLIGHT — SIGNIFICANT CHANGE UP
AST SERPL-CCNC: 143 U/L — HIGH (ref 10–40)
BASOPHILS # BLD AUTO: 0 K/UL — SIGNIFICANT CHANGE UP (ref 0–0.2)
BASOPHILS NFR BLD AUTO: 0 % — SIGNIFICANT CHANGE UP (ref 0–2)
BILIRUB SERPL-MCNC: 10.8 MG/DL — HIGH (ref 0.2–1.2)
BUN SERPL-MCNC: 11 MG/DL — SIGNIFICANT CHANGE UP (ref 7–18)
CALCIUM SERPL-MCNC: 7.9 MG/DL — LOW (ref 8.4–10.5)
CHLORIDE SERPL-SCNC: 117 MMOL/L — HIGH (ref 96–108)
CO2 SERPL-SCNC: 23 MMOL/L — SIGNIFICANT CHANGE UP (ref 22–31)
CREAT SERPL-MCNC: 0.68 MG/DL — SIGNIFICANT CHANGE UP (ref 0.5–1.3)
EGFR: 124 ML/MIN/1.73M2 — SIGNIFICANT CHANGE UP
ELLIPTOCYTES BLD QL SMEAR: SLIGHT — SIGNIFICANT CHANGE UP
EOSINOPHIL # BLD AUTO: 0.2 K/UL — SIGNIFICANT CHANGE UP (ref 0–0.5)
EOSINOPHIL NFR BLD AUTO: 1 % — SIGNIFICANT CHANGE UP (ref 0–6)
GLUCOSE SERPL-MCNC: 108 MG/DL — HIGH (ref 70–99)
HCT VFR BLD CALC: 30 % — LOW (ref 39–50)
HEV IGM SER QL: NEGATIVE — SIGNIFICANT CHANGE UP
HGB BLD-MCNC: 9.3 G/DL — LOW (ref 13–17)
HYPOCHROMIA BLD QL: SIGNIFICANT CHANGE UP
LYMPHOCYTES # BLD AUTO: 1.76 K/UL — SIGNIFICANT CHANGE UP (ref 1–3.3)
LYMPHOCYTES # BLD AUTO: 9 % — LOW (ref 13–44)
MACROCYTES BLD QL: SIGNIFICANT CHANGE UP
MAGNESIUM SERPL-MCNC: 2.5 MG/DL — SIGNIFICANT CHANGE UP (ref 1.6–2.6)
MANUAL SMEAR VERIFICATION: SIGNIFICANT CHANGE UP
MCHC RBC-ENTMCNC: 31 GM/DL — LOW (ref 32–36)
MCHC RBC-ENTMCNC: 33.9 PG — SIGNIFICANT CHANGE UP (ref 27–34)
MCV RBC AUTO: 109.5 FL — HIGH (ref 80–100)
MICROCYTES BLD QL: SLIGHT — SIGNIFICANT CHANGE UP
MITOCHONDRIA AB SER-ACNC: SIGNIFICANT CHANGE UP
MONOCYTES # BLD AUTO: 0.59 K/UL — SIGNIFICANT CHANGE UP (ref 0–0.9)
MONOCYTES NFR BLD AUTO: 3 % — SIGNIFICANT CHANGE UP (ref 2–14)
NEUTROPHILS # BLD AUTO: 16.46 K/UL — HIGH (ref 1.8–7.4)
NEUTROPHILS NFR BLD AUTO: 84 % — HIGH (ref 43–77)
NRBC # BLD: 1 /100 WBCS — HIGH (ref 0–0)
OVALOCYTES BLD QL SMEAR: SIGNIFICANT CHANGE UP
PHOSPHATE SERPL-MCNC: 2.5 MG/DL — SIGNIFICANT CHANGE UP (ref 2.5–4.5)
PLAT MORPH BLD: NORMAL — SIGNIFICANT CHANGE UP
PLATELET # BLD AUTO: 314 K/UL — SIGNIFICANT CHANGE UP (ref 150–400)
PLATELET COUNT - ESTIMATE: NORMAL — SIGNIFICANT CHANGE UP
POTASSIUM SERPL-MCNC: 4.3 MMOL/L — SIGNIFICANT CHANGE UP (ref 3.5–5.3)
POTASSIUM SERPL-SCNC: 4.3 MMOL/L — SIGNIFICANT CHANGE UP (ref 3.5–5.3)
PROT SERPL-MCNC: 5.9 G/DL — LOW (ref 6–8.3)
RBC # BLD: 2.74 M/UL — LOW (ref 4.2–5.8)
RBC # FLD: 21.9 % — HIGH (ref 10.3–14.5)
RBC BLD AUTO: ABNORMAL
SODIUM SERPL-SCNC: 144 MMOL/L — SIGNIFICANT CHANGE UP (ref 135–145)
STOMATOCYTES BLD QL SMEAR: SIGNIFICANT CHANGE UP
VARIANT LYMPHS # BLD: 3 % — SIGNIFICANT CHANGE UP (ref 0–6)
WBC # BLD: 19.59 K/UL — HIGH (ref 3.8–10.5)
WBC # FLD AUTO: 19.59 K/UL — HIGH (ref 3.8–10.5)

## 2024-03-31 PROCEDURE — 74018 RADEX ABDOMEN 1 VIEW: CPT | Mod: 26

## 2024-03-31 RX ORDER — SODIUM CHLORIDE 9 MG/ML
500 INJECTION INTRAMUSCULAR; INTRAVENOUS; SUBCUTANEOUS ONCE
Refills: 0 | Status: COMPLETED | OUTPATIENT
Start: 2024-03-31 | End: 2024-03-31

## 2024-03-31 RX ORDER — ACETAMINOPHEN 500 MG
650 TABLET ORAL ONCE
Refills: 0 | Status: COMPLETED | OUTPATIENT
Start: 2024-03-31 | End: 2024-03-31

## 2024-03-31 RX ADMIN — Medication 50 MILLILITER(S): at 05:42

## 2024-03-31 RX ADMIN — SODIUM CHLORIDE 1000 MILLILITER(S): 9 INJECTION INTRAMUSCULAR; INTRAVENOUS; SUBCUTANEOUS at 05:43

## 2024-03-31 RX ADMIN — Medication 40 MILLIEQUIVALENT(S): at 13:52

## 2024-03-31 RX ADMIN — LACTULOSE 10 GRAM(S): 10 SOLUTION ORAL at 17:01

## 2024-03-31 RX ADMIN — Medication 40 MILLIEQUIVALENT(S): at 05:42

## 2024-03-31 RX ADMIN — Medication 40 MILLIEQUIVALENT(S): at 03:14

## 2024-03-31 RX ADMIN — Medication 50 MILLILITER(S): at 11:38

## 2024-03-31 RX ADMIN — Medication 650 MILLIGRAM(S): at 15:19

## 2024-03-31 RX ADMIN — Medication 50 MILLILITER(S): at 01:10

## 2024-03-31 RX ADMIN — LACTULOSE 10 GRAM(S): 10 SOLUTION ORAL at 05:42

## 2024-03-31 RX ADMIN — Medication 1 TABLET(S): at 11:39

## 2024-03-31 RX ADMIN — Medication 50 MILLILITER(S): at 23:16

## 2024-03-31 RX ADMIN — HEPARIN SODIUM 5000 UNIT(S): 5000 INJECTION INTRAVENOUS; SUBCUTANEOUS at 17:02

## 2024-03-31 RX ADMIN — PANTOPRAZOLE SODIUM 40 MILLIGRAM(S): 20 TABLET, DELAYED RELEASE ORAL at 11:39

## 2024-03-31 RX ADMIN — CHLORHEXIDINE GLUCONATE 1 APPLICATION(S): 213 SOLUTION TOPICAL at 05:43

## 2024-03-31 RX ADMIN — Medication 100 MILLIGRAM(S): at 11:40

## 2024-03-31 RX ADMIN — Medication 50 MILLILITER(S): at 17:25

## 2024-03-31 RX ADMIN — HEPARIN SODIUM 5000 UNIT(S): 5000 INJECTION INTRAVENOUS; SUBCUTANEOUS at 05:42

## 2024-03-31 RX ADMIN — Medication 40 MILLIEQUIVALENT(S): at 09:19

## 2024-03-31 RX ADMIN — Medication 1 MILLIGRAM(S): at 11:39

## 2024-03-31 RX ADMIN — Medication 650 MILLIGRAM(S): at 15:56

## 2024-03-31 NOTE — PROGRESS NOTE ADULT - ASSESSMENT
IMP: This is a 38 yr old man with  alcohol withdrawal seizure with alcohol use disorder, who p/w confusion starting this morning. Admitted for high risk alcohol withdrawal with persistent sinus tachycardia.    Assessment:   # Acute hypoxic resp failure   # Alcohol use disorder  # Alcohol withdrawal  # Acute metabolic encephalopathy  # Atypical pneumonia  # Hx of alcohol seizures  # Possible pancreatitis  # Liver steatosis  # Alcoholic hepatitis  # Sinus tachycardia  # SIRS  # ARABELLA  # Ileus       Plan:   =============NEURO:=============  # Acute metabolic encephalopathy  # Alcohol withdrawal  s/p Ativan however will pursue intubation for protecting the airways  s/p phenobarb IVP x1 on 3/23   - s/p ketamine drip and precedex  - d/c Phenobarb    # Alcohol use disorder  # Hx of alcohol seizures  - Pt has been in and out of alcohol rehab  - Most recently came out 6 months ago - as per sister pt was only hospitalized and not in rehab   - SW consult order placed     #intubated (3/22)  -Attempt to do SAT, SBT in afternoon 3/26  -EXTUBATED 3/26    =============CARDIO:================  # Sinus tachycardia  - P/w sinus tachycardia  - Likely due to alcohol withdrawal  - EKG = sinus tachycardia  - trop neg      ==========PULM:=============  #Atypical pneumonia   # coronavirus positive however not COVID  CT chest : Multifocal bilateral peripheral opacities suggesting atypical pneumonia. Probable compression atelectasis right lung base, related to elevated right hemidiaphragm.  - Completed zosyn 3/30      ==========GI:==========  # Constipation  #?acute abdomen with increased abdominal pressure of 25   surgery consulted - no indication for surgery at this time , recs paralyzing the patient   - abdominal pressure improving. No longer need to measure  - 3/24 Bladder pressure 16   - 3/24 started lactulose 10mg  - 3/25 Neostigmine x1, led to bradycardia. Atropine given.  - 3/26 Pt had large BM, will continue lactulose  - Removed [place] Sump  - f/u daily abdominal x-rays  - cont lactulose BID    #Diet  -Advance as tolerated    # Alcoholic hepatitis   #transaminitis  #Hyperbilirubinemia   P/w T bili 6.6 (Dir 5.1), , ALT 41,   Maddrey score = borderline at 31.4 points (32 point is the cutoff for steroid treatment)  MELD-Na score 17 points 6 % 90day mortality   - Transaminitis improving   - T. Hector 6.6 > 5.6 > 5.5>7.5   - 3/24 Maddrey score - 28.7   - Hepatology consulted- Dr. Wiley  -f/u hepatitis labs  -f/u ceruloplasmin    # Possible pancreatitis  Lipase 470, possible mid abdominal pain. Negative imaging.  - repeat Lipase 86, not concerning for pancreatitis    # Liver steatosis    # Liver lesion  Likely due to alcohol use disorder  CT = Has hypodense foci with central hyperdensity involving the pericholecystic region and segment 6, indeterminate.   - Rec outpt MRI  - Hepatology consulted- Dr. Wiley       =======RENAL:==========  #Hypoalbuminemia  -IV Albumin 50 q6 until 4/1    #Metabolic Acidosis  -ABG 3/25 showing bicarb of 10  -Vent settings adjusted  -RESOLVED    # ARABELLA  P/w SCr 1.77 (baseline 0.8)  S/p 23L IVF in ED  urinary retention 1.5 L s/p straight cath overnight  - Roman placed on 3/22, removed 3/28  3/22 Scr improved to 0.85   Renal US: No hydronephrosis.   RESOLVED    # Lactic acidosis  P/w Lactate 10  Due to dehydration, infection-  s/p 3L IVF -> Lactate 1.6  RESOLVED    # Hypokalemia  - K 3.3 s/p Kcl 10 mEq x3 ->3.9   - repeat 3.3 will replete IV  RESOLVED     =====INFECTIOUS=====  # SIRS  -p/w Lactate 10, tachycardia, tachypnea>20  CXR neg for consolidation or effusion  Positive coronavirus  s/p Cefepime in ED  d/c'd Ceftriaoxone (3/23)  3/23 Started Zosyn   legionella neg   U/A positive. UCx not sent   - BCx NGTD 48h   - Zosyn completed 3/30    - 3/27 continued to spike fevers  - Removing Central Line and A line  - Roman cath has been removed  - afebrile >48 hours    ==============HEME:===========  # Leukocytosis  - As above in SIRS    =======ENDO:=======  A1c 7.9  - glucose wnl  - d/c sliding scale    =========SKIN/CATHETERS=======  Roman- REMOVED  RIJ- REMOVED  [place] Sump- REMOVED  Left A-Line- REMOVED    =========PPX:========  - Hep SC  - PPI    IMP: This is a 38 yr old man with  alcohol withdrawal seizure with alcohol use disorder, who p/w confusion starting this morning. Admitted for high risk alcohol withdrawal with persistent sinus tachycardia.    Assessment:   # Acute hypoxic resp failure   # Alcohol use disorder  # Alcohol withdrawal  # Acute metabolic encephalopathy  # Atypical pneumonia  # Hx of alcohol seizures  # Possible pancreatitis  # Liver steatosis  # Alcoholic hepatitis  # Sinus tachycardia  # SIRS  # ARABELLA  # Ileus       Plan:   =============NEURO:=============  # Acute metabolic encephalopathy  #sedation  # Alcohol withdrawal  - s/p Ativan, phenobarb, ketamine drip, and precedex drip in the setting of mechanical ventilation and AMS  - now off all sedation, mental status improved, A&Ox2     # Alcohol use disorder  # Hx of alcohol seizures  - Pt has been in and out of alcohol rehab  - Most recently came out 6 months ago - as per sister pt was only hospitalized and not in rehab   - SW consult order placed     =============CARDIO:================  # Sinus tachycardia in the setting of acute alcohol withdrawal  - P/w sinus tachycardia  - EKG = sinus tachycardia  - trop neg  - RESOLVED      ==========PULM:=============  #Atypical pneumonia   # coronavirus positive however not COVID  CT chest : Multifocal bilateral peripheral opacities suggesting atypical pneumonia. Probable compression atelectasis right lung base, related to elevated right hemidiaphragm.  - Completed zosyn 3/30      ==========GI:==========  # Constipation  #?acute abdomen with increased abdominal pressure of 25   surgery consulted - no indication for surgery at this time , recs paralyzing the patient   - abdominal pressure improving. No longer need to measure  - 3/24 Bladder pressure 16   - 3/24 started lactulose 10mg  - 3/25 Neostigmine x1, led to bradycardia. Atropine given.  - 3/26 Pt had large BM, will continue lactulose  - Removed [place] Sump  - f/u daily abdominal x-rays  - cont lactulose BID    #Diet  -Advance as tolerated    # Alcoholic hepatitis   #transaminitis  #Hyperbilirubinemia   P/w T bili 6.6 (Dir 5.1), , ALT 41,   Maddrey score = borderline at 31.4 points (32 point is the cutoff for steroid treatment)  MELD-Na score 17 points 6 % 90day mortality   - Transaminitis improving   - T. Hector 6.6 > 5.6 > 5.5>7.5   - 3/24 Maddrey score - 28.7   - Hepatology consulted- Dr. Wiley  -f/u hepatitis labs  -f/u ceruloplasmin    # Possible pancreatitis  Lipase 470, possible mid abdominal pain. Negative imaging.  - repeat Lipase 86, not concerning for pancreatitis    # Liver steatosis    # Liver lesion  Likely due to alcohol use disorder  CT = Has hypodense foci with central hyperdensity involving the pericholecystic region and segment 6, indeterminate.   - Rec outpt MRI  - Hepatology consulted- Dr. Wiley       =======RENAL:==========  #Hypoalbuminemia  -IV Albumin 50 q6 until 4/1    #Metabolic Acidosis  -ABG 3/25 showing bicarb of 10  -Vent settings adjusted  -RESOLVED    # ARABELLA  P/w SCr 1.77 (baseline 0.8)  S/p 23L IVF in ED  urinary retention 1.5 L s/p straight cath overnight  - Roman placed on 3/22, removed 3/28  3/22 Scr improved to 0.85   Renal US: No hydronephrosis.   RESOLVED    # Lactic acidosis  P/w Lactate 10  Due to dehydration, infection-  s/p 3L IVF -> Lactate 1.6  RESOLVED    # Hypokalemia  - K 3.3 s/p Kcl 10 mEq x3 ->3.9   - repeat 3.3 will replete IV  RESOLVED     =====INFECTIOUS=====  # SIRS  -p/w Lactate 10, tachycardia, tachypnea>20  CXR neg for consolidation or effusion  Positive coronavirus  s/p Cefepime in ED  d/c'd Ceftriaoxone (3/23)  3/23 Started Zosyn   legionella neg   U/A positive. UCx not sent   - BCx NGTD 48h   - Zosyn completed 3/30    - 3/27 continued to spike fevers  - Removing Central Line and A line  - Roman cath has been removed  - afebrile >48 hours    ==============HEME:===========  # Leukocytosis  - As above in SIRS    =======ENDO:=======  A1c 7.9  - glucose wnl  - d/c sliding scale    =========SKIN/CATHETERS=======  Roman- REMOVED  RIJ- REMOVED  [place] Sump- REMOVED  Left A-Line- REMOVED    =========PPX:========  - Hep SC  - PPI    IMP: This is a 38 yr old man with  alcohol withdrawal seizure with alcohol use disorder, who p/w confusion starting this morning. Admitted for high risk alcohol withdrawal with persistent sinus tachycardia.    Assessment:   # Acute hypoxic resp failure   # Alcohol use disorder  # Alcohol withdrawal  # Acute metabolic encephalopathy  # Atypical pneumonia  # Hx of alcohol seizures  # Possible pancreatitis  # Liver steatosis  # Alcoholic hepatitis  # Sinus tachycardia  # SIRS  # ARABELLA  # Ileus       Plan:   =============NEURO:=============  # Acute metabolic encephalopathy  #sedation  # Alcohol withdrawal  - s/p Ativan, phenobarb, ketamine drip, and precedex drip in the setting of mechanical ventilation and AMS  - now off all sedation, mental status improved, A&Ox2     # Alcohol use disorder  # Hx of alcohol seizures  - Pt has been in and out of alcohol rehab  - Most recently came out 6 months ago - as per sister pt was only hospitalized and not in rehab   - SW consult order placed     =============CARDIO:================  # Sinus tachycardia in the setting of acute alcohol withdrawal  - P/w sinus tachycardia  - EKG = sinus tachycardia  - trop neg  - RESOLVED      ==========PULM:=============  #Atypical pneumonia   # coronavirus positive however not COVID-19  - CT chest : Multifocal bilateral peripheral opacities suggesting atypical pneumonia. Probable compression atelectasis right lung base, related to elevated right hemidiaphragm.  - Completed course of zosyn 3/30      ==========GI:==========  #ileus with increased abdominal pressure of 25   - initial abdominal pressures 25,   - surgery consulted, no indication for surgery, no indication for rectal tube for colonic decompression  - status post Sump tube for gastric decompression, now d/c'd  - following several days without improvement, Neostigmine administered on 3/25 x1, complicated by bradycardia requiring atropine  - abdominal pressure now downtrended to 16, abdomen palpably soft,  - status post 2 days of lactulose first BM passed (large size) on 3/26   - will continue lactulose BID,   - continues to have distension with +flatus and BMs  - f/u daily abdominal x-rays      # Alcoholic hepatitis   #transaminitis  #Hyperbilirubinemia   - P/w T bili 6.6 (Dir 5.1), , ALT 41,   - initial Maddrey score = borderline at 31.4 points (32 point is the cutoff for steroid treatment), has since decreased to 28.7 as of 3/24  - MELD-Na score 17 points 6 % 90day mortality   - Transaminitis improving   - T. Hector initially downtrended 6.6 > 5.5, now up trended, now 10.8 today  -  hepatitis panel negative   - ceruloplasmin elevated, 39  - Hepatology following Dr. Wiley      # concern for pancreatitis  - Lipase 470, possible mid abdominal pain. Negative imaging.  - repeat Lipase 86, not concerning for pancreatitis    # Liver steatosis    # Liver lesion  - CT = Has hypodense foci with central hyper density involving the pericholecystic region and segment 6, indeterminate.   - Rec outpt MRI  - Hepatology following- Dr. Wiley       #Nutrition  -tolerating soft and bite sized diet    =======RENAL:==========  #Hypoalbuminemia  -IV Albumin 50 q6 until 4/1    #Metabolic Acidosis  -ABG 3/25 showing bicarb of 10  -Vent settings adjusted  -RESOLVED    # ARABELLA  P/w SCr 1.77 (baseline 0.8)  S/p 23L IVF in ED  urinary retention 1.5 L s/p straight cath overnight  - Roman placed on 3/22, removed 3/28  3/22 Scr improved to 0.85   Renal US: No hydronephrosis.   RESOLVED    # Lactic acidosis  P/w Lactate 10  Due to dehydration, infection-  s/p 3L IVF -> Lactate 1.6  RESOLVED    # Hypokalemia  - K 3.3 s/p Kcl 10 mEq x3 ->3.9   - repeat 3.3 will replete IV  RESOLVED     =====INFECTIOUS=====  # SIRS in the setting of coronavirus infection  -p/w Lactate 10, tachycardia, tachypnea>20, RVP Positive coronavirus  -CXR neg for consolidation or effusion, legionella neg   - UA pos, culture not sent  - s/p Cefepime in ED and s/p Ceftriaxone (d/c'd 3/23)  - completed course of Zosyn ( 3/23-3/30)   - BCx 3/21 NGTD   - however on 3/27 continued to spike fevers  - all indwelling lines and tubes discontinued: Central Line, A line, and Roman   - now afebrile >48 hours    ==============HEME:===========  # Leukocytosis  - As above in SIRS    =======ENDO:=======  A1c 7.9  - glucose wnl  - d/c sliding scale    =========SKIN/CATHETERS=======  Roman- REMOVED  RIJ- REMOVED  [place] Sump- REMOVED  Left A-Line- REMOVED    =========PPX:========  - Hep SC  - PPI

## 2024-03-31 NOTE — PROGRESS NOTE ADULT - SUBJECTIVE AND OBJECTIVE BOX
INTERVAL HPI/OVERNIGHT EVENTS:     PRESSORS: [ ] YES [ ] NO  WHICH:    ANTIBIOTICS:                  DATE STARTED:  ANTIBIOTICS:                  DATE STARTED:  ANTIBIOTICS:                  DATE STARTED:    Antimicrobial:    Cardiovascular:    Pulmonary:    Hematalogic:  heparin   Injectable 5000 Unit(s) SubCutaneous every 12 hours    Other:  albumin human 25% IVPB 50 milliLiter(s) IV Intermittent every 6 hours  chlorhexidine 2% Cloths 1 Application(s) Topical <User Schedule>  folic acid 1 milliGRAM(s) Oral daily  influenza   Vaccine 0.5 milliLiter(s) IntraMuscular once  lactulose Syrup 10 Gram(s) Oral every 12 hours  multivitamin 1 Tablet(s) Oral daily  pantoprazole   Suspension 40 milliGRAM(s) Oral daily  potassium chloride   Powder 40 milliEquivalent(s) Oral every 4 hours  thiamine 100 milliGRAM(s) Oral daily      Drug Dosing Weight  Height (cm): 167.6 (21 Mar 2024 12:45)  Weight (kg): 86 (21 Mar 2024 21:30)  BMI (kg/m2): 30.6 (21 Mar 2024 21:30)  BSA (m2): 1.95 (21 Mar 2024 21:30)    CENTRAL LINE: [ ] YES [ ] NO  LOCATION:   DATE INSERTED:  REMOVE: [ ] YES [ ] NO  EXPLAIN:    BLACK: [ ] YES [ ] NO    DATE INSERTED:  REMOVE:  [ ] YES [ ] NO  EXPLAIN:    A-LINE:  [ ] YES [ ] NO  LOCATION:   DATE INSERTED:  REMOVE:  [ ] YES [ ] NO  EXPLAIN:    PMH/Social Hx/Fam Hx -reviewed admission note, no change since admission  PAST MEDICAL & SURGICAL HISTORY:  No pertinent past medical history      History of seizure due to alcohol withdrawal      History of alcohol use disorder      No significant past surgical history      No significant past surgical history        Heart faliure: acute [ ] chronic [ ] acute or chronic [ ] diastolic [ ] systolic [ ] combied systolic and diastolic[ ]  ARABELLA: ATN[ ] renal medullary necrosis [ ] CKD I [ ]CKDII [ ]CKD III [ ]CKD IV [ ]CKD V [ ]Other pathological lesions [ ]  Abdominal Nutrition Status: malnutrition [ ] cachexia [ ] morbid obesity/BMI=40 [ ] Supplement ordered [___________]     T(C): 37.7 (03-30-24 @ 17:00), Max: 38.3 (03-30-24 @ 15:00)  HR: 108 (03-30-24 @ 21:00)  BP: 91/57 (03-30-24 @ 21:00)  BP(mean): 68 (03-30-24 @ 21:00)  ABP: --  ABP(mean): --  RR: 20 (03-30-24 @ 21:00)  SpO2: 97% (03-30-24 @ 21:00)  Wt(kg): --          03-29 @ 07:01  -  03-30 @ 07:00  --------------------------------------------------------  IN: 1656.6 mL / OUT: 2200 mL / NET: -543.4 mL            PHYSICAL EXAM:    GENERAL: [ ]NAD, [ ]well-groomed, [ ]well-developed  HEAD:  [ ]Atraumatic, [ ]Normocephalic  EYES: [ ]EOMI, [ ]PERRLA, [ ]conjunctiva and sclera clear  ENMT: [ ]No tonsillar erythema, exudates, or enlargement; [ ]Moist mucous membranes, [ ]Good dentition, [ ]No lesions  NECK: [ ]Supple, normal appearance, [ ]No JVD; [ ]Normal thyroid; [ ]Trachea midline  NERVOUS SYSTEM:  [ ]Alert & Oriented X3, [ ]Good concentration; [ ]Motor Strength 5/5 B/L upper and lower extremities; [ ]DTRs 2+ intact and symmetric  CHEST/LUNG: [ ]No chest deformity; [ ]Normal percussion bilaterally; [ ]No rales, rhonchi, wheezing; [ ]Crackles at bases  HEART: [ ]Regular rate and rhythm; [ ]No murmurs, rubs, or gallops  ABDOMEN: [ ]Soft, Nontender, Nondistended; [ ]Bowel sounds present  EXTREMITIES:  [ ]2+ Peripheral Pulses, [ ]No clubbing, cyanosis, or edema [ ]Bilat lower extremity edema  LYMPH: [ ]No lymphadenopathy noted  SKIN: [ ]No rashes or lesions; [ ]Good capillary refill      LABS:  CBC Full  -  ( 30 Mar 2024 03:31 )  WBC Count : 19.84 K/uL  RBC Count : 2.90 M/uL  Hemoglobin : 9.8 g/dL  Hematocrit : 31.0 %  Platelet Count - Automated : 308 K/uL  Mean Cell Volume : 106.9 fl  Mean Cell Hemoglobin : 33.8 pg  Mean Cell Hemoglobin Concentration : 31.6 gm/dL  Auto Neutrophil # : 16.47 K/uL  Auto Lymphocyte # : 2.18 K/uL  Auto Monocyte # : 0.40 K/uL  Auto Eosinophil # : 0.20 K/uL  Auto Basophil # : 0.00 K/uL  Auto Neutrophil % : 81.0 %  Auto Lymphocyte % : 11.0 %  Auto Monocyte % : 2.0 %  Auto Eosinophil % : 1.0 %  Auto Basophil % : 0.0 %    03-30    147<H>  |  117<H>  |  14  ----------------------------<  102<H>  3.7   |  24  |  0.60    Ca    7.8<L>      30 Mar 2024 03:31  Phos  2.2     03-30  Mg     2.5     03-30    TPro  5.1<L>  /  Alb  1.5<L>  /  TBili  9.1<H>  /  DBili  x   /  AST  145<H>  /  ALT  38  /  AlkPhos  322<H>  03-30      Urinalysis Basic - ( 30 Mar 2024 03:31 )    Color: x / Appearance: x / SG: x / pH: x  Gluc: 102 mg/dL / Ketone: x  / Bili: x / Urobili: x   Blood: x / Protein: x / Nitrite: x   Leuk Esterase: x / RBC: x / WBC x   Sq Epi: x / Non Sq Epi: x / Bacteria: x          RADIOLOGY & ADDITIONAL STUDIES REVIEWED:      [ ]GOALS OF CARE DISCUSSION WITH PATIENT/FAMILY/PROXY:    CRITICAL CARE TIME SPENT: 35 minutes INTERVAL HPI/OVERNIGHT EVENTS:     PRESSORS: [ ] YES [ ] NO  WHICH:    ANTIBIOTICS:                  DATE STARTED:  ANTIBIOTICS:                  DATE STARTED:  ANTIBIOTICS:                  DATE STARTED:    Antimicrobial:    Cardiovascular:    Pulmonary:    Hematalogic:  heparin   Injectable 5000 Unit(s) SubCutaneous every 12 hours    Other:  albumin human 25% IVPB 50 milliLiter(s) IV Intermittent every 6 hours  chlorhexidine 2% Cloths 1 Application(s) Topical <User Schedule>  folic acid 1 milliGRAM(s) Oral daily  influenza   Vaccine 0.5 milliLiter(s) IntraMuscular once  lactulose Syrup 10 Gram(s) Oral every 12 hours  multivitamin 1 Tablet(s) Oral daily  pantoprazole   Suspension 40 milliGRAM(s) Oral daily  potassium chloride   Powder 40 milliEquivalent(s) Oral every 4 hours  thiamine 100 milliGRAM(s) Oral daily      Drug Dosing Weight  Height (cm): 167.6 (21 Mar 2024 12:45)  Weight (kg): 86 (21 Mar 2024 21:30)  BMI (kg/m2): 30.6 (21 Mar 2024 21:30)  BSA (m2): 1.95 (21 Mar 2024 21:30)    CENTRAL LINE: [ ] YES [ ] NO  LOCATION:   DATE INSERTED:  REMOVE: [ ] YES [ ] NO  EXPLAIN:    BLACK: [ ] YES [ ] NO    DATE INSERTED:  REMOVE:  [ ] YES [ ] NO  EXPLAIN:    A-LINE:  [ ] YES [ ] NO  LOCATION:   DATE INSERTED:  REMOVE:  [ ] YES [ ] NO  EXPLAIN:    PMH/Social Hx/Fam Hx -reviewed admission note, no change since admission  PAST MEDICAL & SURGICAL HISTORY:  No pertinent past medical history      History of seizure due to alcohol withdrawal      History of alcohol use disorder      No significant past surgical history      No significant past surgical history        Heart faliure: acute [ ] chronic [ ] acute or chronic [ ] diastolic [ ] systolic [ ] combied systolic and diastolic[ ]  ARABELLA: ATN[ ] renal medullary necrosis [ ] CKD I [ ]CKDII [ ]CKD III [ ]CKD IV [ ]CKD V [ ]Other pathological lesions [ ]  Abdominal Nutrition Status: malnutrition [ ] cachexia [ ] morbid obesity/BMI=40 [ ] Supplement ordered [___________]     T(C): 37.7 (03-30-24 @ 17:00), Max: 38.3 (03-30-24 @ 15:00)  HR: 108 (03-30-24 @ 21:00)  BP: 91/57 (03-30-24 @ 21:00)  BP(mean): 68 (03-30-24 @ 21:00)  ABP: --  ABP(mean): --  RR: 20 (03-30-24 @ 21:00)  SpO2: 97% (03-30-24 @ 21:00)  Wt(kg): --          03-29 @ 07:01  -  03-30 @ 07:00  --------------------------------------------------------  IN: 1656.6 mL / OUT: 2200 mL / NET: -543.4 mL            PHYSICAL EXAM:  GENERAL: jaundiced adult male, reclining in bed in no acute distress  HEAD:  Atraumatic, Normocephalic  EYES: Scleral Icterus  NECK: Supple, normal appearance, No JVD; Normal thyroid; Trachea midline  NERVOUS SYSTEM:  Alert & Oriented X2,  Moving all extremities, following commands, making needs known  CHEST/LUNG: No chest deformity; Clear to Auscultation; No rales, rhonchi, wheezing   HEART: Regular rate and rhythm; No murmurs, rubs, or gallops  ABDOMEN: softly distended, +bowel sounds x 4,   EXTREMITIES: +1 pitting edema b/l LE's  SKIN: No rashes or lesions;  brisk capillary refill      LABS:  CBC Full  -  ( 30 Mar 2024 03:31 )  WBC Count : 19.84 K/uL  RBC Count : 2.90 M/uL  Hemoglobin : 9.8 g/dL  Hematocrit : 31.0 %  Platelet Count - Automated : 308 K/uL  Mean Cell Volume : 106.9 fl  Mean Cell Hemoglobin : 33.8 pg  Mean Cell Hemoglobin Concentration : 31.6 gm/dL  Auto Neutrophil # : 16.47 K/uL  Auto Lymphocyte # : 2.18 K/uL  Auto Monocyte # : 0.40 K/uL  Auto Eosinophil # : 0.20 K/uL  Auto Basophil # : 0.00 K/uL  Auto Neutrophil % : 81.0 %  Auto Lymphocyte % : 11.0 %  Auto Monocyte % : 2.0 %  Auto Eosinophil % : 1.0 %  Auto Basophil % : 0.0 %    03-30    147<H>  |  117<H>  |  14  ----------------------------<  102<H>  3.7   |  24  |  0.60    Ca    7.8<L>      30 Mar 2024 03:31  Phos  2.2     03-30  Mg     2.5     03-30    TPro  5.1<L>  /  Alb  1.5<L>  /  TBili  9.1<H>  /  DBili  x   /  AST  145<H>  /  ALT  38  /  AlkPhos  322<H>  03-30      Urinalysis Basic - ( 30 Mar 2024 03:31 )    Color: x / Appearance: x / SG: x / pH: x  Gluc: 102 mg/dL / Ketone: x  / Bili: x / Urobili: x   Blood: x / Protein: x / Nitrite: x   Leuk Esterase: x / RBC: x / WBC x   Sq Epi: x / Non Sq Epi: x / Bacteria: x          RADIOLOGY & ADDITIONAL STUDIES REVIEWED:      [ ]GOALS OF CARE DISCUSSION WITH PATIENT/FAMILY/PROXY:    CRITICAL CARE TIME SPENT: 35 minutes

## 2024-04-01 LAB
ALBUMIN SERPL ELPH-MCNC: 2.3 G/DL — LOW (ref 3.5–5)
ALP SERPL-CCNC: 299 U/L — HIGH (ref 40–120)
ALT FLD-CCNC: 35 U/L DA — SIGNIFICANT CHANGE UP (ref 10–60)
ANA TITR SER: NEGATIVE — SIGNIFICANT CHANGE UP
ANION GAP SERPL CALC-SCNC: 4 MMOL/L — LOW (ref 5–17)
APTT BLD: 38.8 SEC — HIGH (ref 24.5–35.6)
AST SERPL-CCNC: 111 U/L — HIGH (ref 10–40)
BASOPHILS # BLD AUTO: 0.21 K/UL — HIGH (ref 0–0.2)
BASOPHILS NFR BLD AUTO: 1 % — SIGNIFICANT CHANGE UP (ref 0–2)
BILIRUB SERPL-MCNC: 12.9 MG/DL — HIGH (ref 0.2–1.2)
BUN SERPL-MCNC: 10 MG/DL — SIGNIFICANT CHANGE UP (ref 7–18)
CALCIUM SERPL-MCNC: 7.9 MG/DL — LOW (ref 8.4–10.5)
CHLORIDE SERPL-SCNC: 116 MMOL/L — HIGH (ref 96–108)
CMV DNA CSF QL NAA+PROBE: ABNORMAL IU/ML
CMV DNA SPEC NAA+PROBE-LOG#: ABNORMAL LOG10IU/ML
CO2 SERPL-SCNC: 22 MMOL/L — SIGNIFICANT CHANGE UP (ref 22–31)
CREAT SERPL-MCNC: 0.68 MG/DL — SIGNIFICANT CHANGE UP (ref 0.5–1.3)
EBV DNA SERPL NAA+PROBE-ACNC: ABNORMAL IU/ML
EBVPCR LOG: ABNORMAL LOG10IU/ML
EGFR: 124 ML/MIN/1.73M2 — SIGNIFICANT CHANGE UP
EOSINOPHIL # BLD AUTO: 0.52 K/UL — HIGH (ref 0–0.5)
EOSINOPHIL NFR BLD AUTO: 2.6 % — SIGNIFICANT CHANGE UP (ref 0–6)
GLUCOSE SERPL-MCNC: 123 MG/DL — HIGH (ref 70–99)
HCT VFR BLD CALC: 27.9 % — LOW (ref 39–50)
HGB BLD-MCNC: 8.6 G/DL — LOW (ref 13–17)
IMM GRANULOCYTES NFR BLD AUTO: 2.8 % — HIGH (ref 0–0.9)
INR BLD: 1.5 RATIO — HIGH (ref 0.85–1.18)
LYMPHOCYTES # BLD AUTO: 1.56 K/UL — SIGNIFICANT CHANGE UP (ref 1–3.3)
LYMPHOCYTES # BLD AUTO: 7.8 % — LOW (ref 13–44)
MAGNESIUM SERPL-MCNC: 2.3 MG/DL — SIGNIFICANT CHANGE UP (ref 1.6–2.6)
MCHC RBC-ENTMCNC: 30.8 GM/DL — LOW (ref 32–36)
MCHC RBC-ENTMCNC: 34.4 PG — HIGH (ref 27–34)
MCV RBC AUTO: 111.6 FL — HIGH (ref 80–100)
MONOCYTES # BLD AUTO: 1.34 K/UL — HIGH (ref 0–0.9)
MONOCYTES NFR BLD AUTO: 6.7 % — SIGNIFICANT CHANGE UP (ref 2–14)
NEUTROPHILS # BLD AUTO: 15.82 K/UL — HIGH (ref 1.8–7.4)
NEUTROPHILS NFR BLD AUTO: 79.1 % — HIGH (ref 43–77)
NRBC # BLD: 1 /100 WBCS — HIGH (ref 0–0)
PHOSPHATE SERPL-MCNC: 2.3 MG/DL — LOW (ref 2.5–4.5)
PLATELET # BLD AUTO: 342 K/UL — SIGNIFICANT CHANGE UP (ref 150–400)
POTASSIUM SERPL-MCNC: 4.2 MMOL/L — SIGNIFICANT CHANGE UP (ref 3.5–5.3)
POTASSIUM SERPL-SCNC: 4.2 MMOL/L — SIGNIFICANT CHANGE UP (ref 3.5–5.3)
PROT SERPL-MCNC: 5.6 G/DL — LOW (ref 6–8.3)
PROTHROM AB SERPL-ACNC: 16.9 SEC — HIGH (ref 9.5–13)
RBC # BLD: 2.5 M/UL — LOW (ref 4.2–5.8)
RBC # FLD: 22.3 % — HIGH (ref 10.3–14.5)
SODIUM SERPL-SCNC: 142 MMOL/L — SIGNIFICANT CHANGE UP (ref 135–145)
WBC # BLD: 20.02 K/UL — HIGH (ref 3.8–10.5)
WBC # FLD AUTO: 20.02 K/UL — HIGH (ref 3.8–10.5)

## 2024-04-01 PROCEDURE — 74018 RADEX ABDOMEN 1 VIEW: CPT | Mod: 26

## 2024-04-01 RX ORDER — SODIUM,POTASSIUM PHOSPHATES 278-250MG
1 POWDER IN PACKET (EA) ORAL ONCE
Refills: 0 | Status: COMPLETED | OUTPATIENT
Start: 2024-04-01 | End: 2024-04-01

## 2024-04-01 RX ORDER — LACTULOSE 10 G/15ML
10 SOLUTION ORAL EVERY 12 HOURS
Refills: 0 | Status: DISCONTINUED | OUTPATIENT
Start: 2024-04-01 | End: 2024-04-01

## 2024-04-01 RX ORDER — LACTULOSE 10 G/15ML
10 SOLUTION ORAL DAILY
Refills: 0 | Status: DISCONTINUED | OUTPATIENT
Start: 2024-04-01 | End: 2024-04-01

## 2024-04-01 RX ADMIN — Medication 1 MILLIGRAM(S): at 11:54

## 2024-04-01 RX ADMIN — CHLORHEXIDINE GLUCONATE 1 APPLICATION(S): 213 SOLUTION TOPICAL at 05:30

## 2024-04-01 RX ADMIN — Medication 50 MILLILITER(S): at 05:23

## 2024-04-01 RX ADMIN — LACTULOSE 10 GRAM(S): 10 SOLUTION ORAL at 05:23

## 2024-04-01 RX ADMIN — Medication 1 TABLET(S): at 12:55

## 2024-04-01 RX ADMIN — Medication 100 MILLIGRAM(S): at 11:57

## 2024-04-01 RX ADMIN — LACTULOSE 10 GRAM(S): 10 SOLUTION ORAL at 17:35

## 2024-04-01 RX ADMIN — HEPARIN SODIUM 5000 UNIT(S): 5000 INJECTION INTRAVENOUS; SUBCUTANEOUS at 05:23

## 2024-04-01 RX ADMIN — HEPARIN SODIUM 5000 UNIT(S): 5000 INJECTION INTRAVENOUS; SUBCUTANEOUS at 17:35

## 2024-04-01 RX ADMIN — PANTOPRAZOLE SODIUM 40 MILLIGRAM(S): 20 TABLET, DELAYED RELEASE ORAL at 11:54

## 2024-04-01 RX ADMIN — Medication 1 PACKET(S): at 05:23

## 2024-04-01 NOTE — PROGRESS NOTE ADULT - ASSESSMENT
IMP: This is a 38 yr old man with  alcohol withdrawal seizure with alcohol use disorder, who p/w confusion starting this morning. Admitted for high risk alcohol withdrawal with persistent sinus tachycardia.    Assessment:   # Acute hypoxic resp failure   # Alcohol use disorder  # Alcohol withdrawal  # Acute metabolic encephalopathy  # Atypical pneumonia  # Hx of alcohol seizures  # Possible pancreatitis  # Liver steatosis  # Alcoholic hepatitis  # Sinus tachycardia  # SIRS  # ARABELLA  # Ileus       Plan:   =============NEURO:=============  # Acute metabolic encephalopathy  #sedation  # Alcohol withdrawal  - s/p Ativan, phenobarb, ketamine drip, and precedex drip in the setting of mechanical ventilation and AMS  - now off all sedation, mental status improved, A&Ox2     # Alcohol use disorder  # Hx of alcohol seizures  - Pt has been in and out of alcohol rehab  - Most recently came out 6 months ago - as per sister pt was only hospitalized and not in rehab   - SW consult order placed     =============CARDIO:================  # Sinus tachycardia in the setting of acute alcohol withdrawal  - P/w sinus tachycardia  - EKG = sinus tachycardia  - trop neg  - RESOLVED      ==========PULM:=============  #Atypical pneumonia   # coronavirus positive however not COVID-19  - CT chest : Multifocal bilateral peripheral opacities suggesting atypical pneumonia. Probable compression atelectasis right lung base, related to elevated right hemidiaphragm.  - Completed course of zosyn 3/30      ==========GI:==========  #ileus with increased abdominal pressure of 25   - initial abdominal pressures 25,   - surgery consulted, no indication for surgery, no indication for rectal tube for colonic decompression  - status post Sump tube for gastric decompression, now d/c'd  - following several days without improvement, Neostigmine administered on 3/25 x1, complicated by bradycardia requiring atropine  - abdominal pressure now downtrended to 16, abdomen palpably soft,  - status post 2 days of lactulose first BM passed (large size) on 3/26   - will continue lactulose BID,   - continues to have distension with +flatus and BMs  - f/u daily abdominal x-rays      # Alcoholic hepatitis   #transaminitis  #Hyperbilirubinemia   - P/w T bili 6.6 (Dir 5.1), , ALT 41,   - initial Maddrey score = borderline at 31.4 points (32 point is the cutoff for steroid treatment), has since decreased to 28.7 as of 3/24  - MELD-Na score 17 points 6 % 90day mortality   - Transaminitis improving   - T. Hector initially downtrended 6.6 > 5.5, now up trended, now 10.8 today  -  hepatitis panel negative   - ceruloplasmin elevated, 39  - Hepatology following Dr. Wiley      # concern for pancreatitis  - Lipase 470, possible mid abdominal pain. Negative imaging.  - repeat Lipase 86, not concerning for pancreatitis    # Liver steatosis    # Liver lesion  - CT = Has hypodense foci with central hyper density involving the pericholecystic region and segment 6, indeterminate.   - Rec outpt MRI  - Hepatology following- Dr. Wiley       #Nutrition  -tolerating soft and bite sized diet    =======RENAL:==========  #Hypoalbuminemia  -IV Albumin 50 q6 until 4/1    #Metabolic Acidosis  -ABG 3/25 showing bicarb of 10  -Vent settings adjusted  -RESOLVED    # ARABELLA  P/w SCr 1.77 (baseline 0.8)  S/p 23L IVF in ED  urinary retention 1.5 L s/p straight cath overnight  - Roman placed on 3/22, removed 3/28  3/22 Scr improved to 0.85   Renal US: No hydronephrosis.   RESOLVED    # Lactic acidosis  P/w Lactate 10  Due to dehydration, infection-  s/p 3L IVF -> Lactate 1.6  RESOLVED    # Hypokalemia  - K 3.3 s/p Kcl 10 mEq x3 ->3.9   - repeat 3.3 will replete IV  RESOLVED     =====INFECTIOUS=====  # SIRS in the setting of coronavirus infection  -p/w Lactate 10, tachycardia, tachypnea>20, RVP Positive coronavirus  -CXR neg for consolidation or effusion, legionella neg   - UA pos, culture not sent  - s/p Cefepime in ED and s/p Ceftriaxone (d/c'd 3/23)  - completed course of Zosyn ( 3/23-3/30)   - BCx 3/21 NGTD   - however on 3/27 continued to spike fevers  - all indwelling lines and tubes discontinued: Central Line, A line, and Roman   - now afebrile >48 hours    ==============HEME:===========  # Leukocytosis  - As above in SIRS    =======ENDO:=======  A1c 7.9  - glucose wnl  - d/c sliding scale    =========SKIN/CATHETERS=======  Roman- REMOVED  RIJ- REMOVED  [place] Sump- REMOVED  Left A-Line- REMOVED    =========PPX:========  - Hep SC  - PPI    IMP: This is a 38 yr old man with  alcohol withdrawal seizure with alcohol use disorder, who p/w confusion starting this morning. Admitted for high risk alcohol withdrawal with persistent sinus tachycardia.    Assessment:   # Acute hypoxic resp failure   # Alcohol use disorder  # Alcohol withdrawal  # Acute metabolic encephalopathy  # Atypical pneumonia  # Hx of alcohol seizures  # Possible pancreatitis  # Liver steatosis  # Alcoholic hepatitis  # Sinus tachycardia  # SIRS  # ARABELLA  # Ileus       Plan:   =============NEURO:=============  # Acute metabolic encephalopathy  #sedation  # Alcohol withdrawal  - s/p Ativan, phenobarb, ketamine drip, and precedex drip in the setting of mechanical ventilation and AMS  - now off all sedation, mental status improved, A&Ox3    # Alcohol use disorder  # Hx of alcohol seizures  - Pt has been in and out of alcohol rehab  - Most recently came out 6 months ago - as per sister pt was only hospitalized and not in rehab   - SW consult order placed     =============CARDIO:================  # Sinus tachycardia in the setting of acute alcohol withdrawal  - P/w sinus tachycardia  - EKG = sinus tachycardia  - trop neg  - RESOLVED      ==========PULM:=============  #Atypical pneumonia   # coronavirus positive however not COVID-19  - CT chest : Multifocal bilateral peripheral opacities suggesting atypical pneumonia. Probable compression atelectasis right lung base, related to elevated right hemidiaphragm.  - Completed course of zosyn 3/30      ==========GI:==========  #ileus with increased abdominal pressure of 25   - initial abdominal pressures 25,   - surgery consulted, no indication for surgery, no indication for rectal tube for colonic decompression  - status post Sump tube for gastric decompression, now d/c'd  - following several days without improvement, Neostigmine administered on 3/25 x1, complicated by bradycardia requiring atropine  - abdominal pressure downtrended  - status post 2 days of lactulose first BM passed (large size) on 3/26   - will continue lactulose BID,   - continues to have distension with +flatus and BMs  - f/u daily abdominal x-rays; showing gradual improvement      # Alcoholic hepatitis   #transaminitis  #Hyperbilirubinemia   - P/w T bili 6.6 (Dir 5.1), , ALT 41,   - initial Maddrey score = borderline at 31.4 points (32 point is the cutoff for steroid treatment), has since decreased to 28.7 as of 3/24  - MELD-Na score 17 points 6 % 90day mortality   - Transaminitis improving   - T. Hector initially downtrended 6.6 > 5.5, now up trended, now 10.8 today  -  hepatitis panel negative   - ceruloplasmin elevated, 39  - Hepatology following Dr. Wiley      # concern for pancreatitis  - Lipase 470, possible mid abdominal pain. Negative imaging.  - repeat Lipase 86, not concerning for pancreatitis    # Liver steatosis    # Liver lesion  - CT = Has hypodense foci with central hyper density involving the pericholecystic region and segment 6, indeterminate.   - Rec outpt MRI  - Hepatology following- Dr. Wiley       #Nutrition  -tolerating soft and bite sized diet    =======RENAL:==========  #Hypoalbuminemia  -IV Albumin 50 q6 until 4/1    #Metabolic Acidosis  -ABG 3/25 showing bicarb of 10  -Vent settings adjusted  -RESOLVED    # ARABELLA  P/w SCr 1.77 (baseline 0.8)  S/p 23L IVF in ED  urinary retention 1.5 L s/p straight cath overnight  - Roman placed on 3/22, removed 3/28  3/22 Scr improved to 0.85   Renal US: No hydronephrosis.   RESOLVED    # Lactic acidosis  P/w Lactate 10  Due to dehydration, infection-  s/p 3L IVF -> Lactate 1.6  RESOLVED    # Hypokalemia  - K 3.3 s/p Kcl 10 mEq x3 ->3.9   - repeat 3.3 will replete IV  RESOLVED     =====INFECTIOUS=====  # SIRS in the setting of coronavirus infection  -p/w Lactate 10, tachycardia, tachypnea>20, RVP Positive coronavirus  -CXR neg for consolidation or effusion, legionella neg   - UA pos, culture not sent  - s/p Cefepime in ED and s/p Ceftriaxone (d/c'd 3/23)  - completed course of Zosyn ( 3/23-3/30)   - BCx 3/21 NGTD   - however on 3/27 continued to spike fevers  - all indwelling lines and tubes discontinued: Central Line, A line, and Roman   - now afebrile >48 hours    ==============HEME:===========  # Leukocytosis  - As above in SIRS    =======ENDO:=======  A1c 7.9  - glucose wnl  - d/c sliding scale    =========SKIN/CATHETERS=======  Roman- REMOVED  RIJ- REMOVED  [place] Sump- REMOVED  Left A-Line- REMOVED    =========PPX:========  - Hep SC  - PPI

## 2024-04-01 NOTE — PROGRESS NOTE ADULT - ASSESSMENT
Pneumonia - completed treatment  ? Spontaneous Bacterial Peritonitis  Leukocytosis - increased  Fevers - low grade      Plan - completed antibiotic course   will monitor off antibiotics for now.  Time spent - 31 mins

## 2024-04-01 NOTE — PROGRESS NOTE ADULT - ASSESSMENT
Patient is a 38M with a PMHx of alcohol use disorder (since 22 y/o, heavier last 2 weeks, last drink on the day of admission), withdrawal seizures, prior alcohol rehab, who presented to the ED 3/21 with AMS iso hyperammonemia (172), worsening abdominal distention and jaundice (TBili 6.6 -> 12.9). He was admitted to MICU for withdrawal, aspiration pneumonia, required intubation 3/22, +coronavirus (not Covid-19), and noted increased intraabdominal pressure, ileus, and surgery and GI has been following.   CT a/p showed marked hepatomegaly (29 cm) w/ severe hepatic steatosis. MDF was < 32.   Hepatology was consulted for his ALD, rising bilirubin.    #Alcoholic hepatitis w/ new onset jaundice, elevated transaminases (AST>ALT), and hepatomegaly w/ hepatic steatosis  # Elevated ALP  # Mild coagulopathy  # Hepatic encephalopathy  # Heterogenous foci in GB fossa and segment 6  # AUD w/ withdrawal  No biliary dilatation on CT and US abd, no signs of cholecystitis, however worsening bilirubin (almost mostly direct) and focal hepatic abnormalities on CT, consider adding MRCP to the MRI abd w/wo if no contraindication.   4/1: TBili 12.9, alk phos 299, , ALT 35.  - Completed Zosyn 3/30  - Meld 3.0: 24.   - Jaundice, marked hepatomegaly, severe steatosis likely due to alcoholic hepatitis; MDF 30.8, no indication for glucocorticoid therapy.   - Ferritin 1105H, iron 58L, UIBC 45, TIBC 103L, %sat 56H, ammonia 61, ceruloplasmin 39. Elevated ceruloplasmin and ferritin likely due to acute phase reactants/inflammatory setting.   - EBV DNA PCR detected  - CMV PCR detected  - AMA neg  - HBcAb and HBsAb nonreactive  - Hep C RNA not detected  - Hep E IgM neg  [p] Hep E PCR    	- Trend LFTs and PT/INR daily   	- Please fractionate bilirubin again  	- Please obtain MRI abd/MRCP w/wo IV contrast for better eval of reported focal hepatic abnormalities on CT (3/21/24, notably a 2018 CT also reported an enhancing liver lesion)  	- Please obtain abd US to r/o ascites, if + ascites with tappable pocket, patient may benefit from diagnostic para to r/o SBP   	- Please obtain: HSV/VZV PCRs  	- Please obtain: TRESA, SMA, LKM, IgG subclasses  	- Aspiration, seizure, fall precautions, avoid sedation, continue bowel regimen/lactulose  	- Folic/thiamine supplements  	- Will need alcohol rehab once acute medical issues resolved    This note and its recommendations herein are preliminary until such time as cosigned by an attending.

## 2024-04-01 NOTE — PROGRESS NOTE ADULT - NUTRITIONAL ASSESSMENT
This patient has been assessed with a concern for Malnutrition and has been determined to have a diagnosis/diagnoses of Moderate protein-calorie malnutrition.    This patient is being managed with:   Diet Soft and Bite Sized-  Entered: Mar 30 2024 11:37AM

## 2024-04-01 NOTE — PROGRESS NOTE ADULT - SUBJECTIVE AND OBJECTIVE BOX
ICU VISIT  38y Male    Meds:    Allergies    No Known Allergies    Intolerances        VITALS:  Vital Signs Last 24 Hrs  T(C): 37.8 (01 Apr 2024 15:00), Max: 37.9 (01 Apr 2024 13:00)  T(F): 100.1 (01 Apr 2024 15:00), Max: 100.2 (01 Apr 2024 13:00)  HR: 109 (01 Apr 2024 16:00) (103 - 117)  BP: 107/81 (01 Apr 2024 16:00) (80/67 - 107/81)  BP(mean): 89 (01 Apr 2024 16:00) (62 - 93)  RR: 18 (01 Apr 2024 16:00) (14 - 29)  SpO2: 99% (01 Apr 2024 16:00) (91% - 100%)    Parameters below as of 01 Apr 2024 11:13  Patient On (Oxygen Delivery Method): nasal cannula  O2 Flow (L/min): 2      LABS/DIAGNOSTIC TESTS:                          8.6    20.02 )-----------( 342      ( 01 Apr 2024 03:48 )             27.9         04-01    142  |  116<H>  |  10  ----------------------------<  123<H>  4.2   |  22  |  0.68    Ca    7.9<L>      01 Apr 2024 03:48  Phos  2.3     04-01  Mg     2.3     04-01    TPro  5.6<L>  /  Alb  2.3<L>  /  TBili  12.9<H>  /  DBili  x   /  AST  111<H>  /  ALT  35  /  AlkPhos  299<H>  04-01      LIVER FUNCTIONS - ( 01 Apr 2024 03:48 )  Alb: 2.3 g/dL / Pro: 5.6 g/dL / ALK PHOS: 299 U/L / ALT: 35 U/L DA / AST: 111 U/L / GGT: x             CULTURES: .Blood Blood  03-21 @ 14:22   No growth at 5 days  --  --      .Blood Blood  03-21 @ 14:12   No growth at 5 days  --  --            RADIOLOGY:< from: Xray Abdomen 1 View PORTABLE -Routine (Xray Abdomen 1 View PORTABLE -Routine in AM.) (03.31.24 @ 10:18) >  ACC: [id number] EXAM:  XR ABDOMEN PORTABLE ROUTINE 1V   ORDERED BY: SANTOSH KIMBALL     ACC: [id number] EXAM:  XR ABDOMEN PORTABLE ROUTINE 1V   ORDERED BY: SANTOSH KIMBALL     PROCEDURE DATE:  03/31/2024          INTERPRETATION:  Clinical Information: Abdominal distention    Technique: 2 AP abdomen x-rays.    Comparison: None    Findings/  Impression: Nonobstructive bowel gas pattern. No free air.    --- End of Report ---            YOAN SALEH MD; Attending Interventional Radiologist  This document has been electronically signed. Mar 31 2024 10:51PM    < end of copied text >        ROS:  [  ] UNABLE TO ELICIT ICU VISIT  38y Male who remains in the ICU , he is still lethargic but is oriented x 2 , his WBC count has increased and has been having low grade fevers since the last 24 hrs or so. He denies any SOB,  chest pain, no nausea , vomiting or abdominal pain, he has been having a little coughing and diarrhea but has been on lactulose. He completed his course of antibiotics 2 days ago.    Meds:    Allergies    No Known Allergies    Intolerances        VITALS:  Vital Signs Last 24 Hrs  T(C): 37.8 (01 Apr 2024 15:00), Max: 37.9 (01 Apr 2024 13:00)  T(F): 100.1 (01 Apr 2024 15:00), Max: 100.2 (01 Apr 2024 13:00)  HR: 109 (01 Apr 2024 16:00) (103 - 117)  BP: 107/81 (01 Apr 2024 16:00) (80/67 - 107/81)  BP(mean): 89 (01 Apr 2024 16:00) (62 - 93)  RR: 18 (01 Apr 2024 16:00) (14 - 29)  SpO2: 99% (01 Apr 2024 16:00) (91% - 100%)    Parameters below as of 01 Apr 2024 11:13  Patient On (Oxygen Delivery Method): nasal cannula  O2 Flow (L/min): 2      LABS/DIAGNOSTIC TESTS:                          8.6    20.02 )-----------( 342      ( 01 Apr 2024 03:48 )             27.9         04-01    142  |  116<H>  |  10  ----------------------------<  123<H>  4.2   |  22  |  0.68    Ca    7.9<L>      01 Apr 2024 03:48  Phos  2.3     04-01  Mg     2.3     04-01    TPro  5.6<L>  /  Alb  2.3<L>  /  TBili  12.9<H>  /  DBili  x   /  AST  111<H>  /  ALT  35  /  AlkPhos  299<H>  04-01      LIVER FUNCTIONS - ( 01 Apr 2024 03:48 )  Alb: 2.3 g/dL / Pro: 5.6 g/dL / ALK PHOS: 299 U/L / ALT: 35 U/L DA / AST: 111 U/L / GGT: x             CULTURES: .Blood Blood  03-21 @ 14:22   No growth at 5 days  --  --      .Blood Blood  03-21 @ 14:12   No growth at 5 days  --  --            RADIOLOGY:< from: Xray Abdomen 1 View PORTABLE -Routine (Xray Abdomen 1 View PORTABLE -Routine in AM.) (03.31.24 @ 10:18) >  ACC: [id number] EXAM:  XR ABDOMEN PORTABLE ROUTINE 1V   ORDERED BY: SANTOSH KIMBALL     ACC: [id number] EXAM:  XR ABDOMEN PORTABLE ROUTINE 1V   ORDERED BY: SANTOSH KIMBALL     PROCEDURE DATE:  03/31/2024          INTERPRETATION:  Clinical Information: Abdominal distention    Technique: 2 AP abdomen x-rays.    Comparison: None    Findings/  Impression: Nonobstructive bowel gas pattern. No free air.    --- End of Report ---            YOAN SALEH MD; Attending Interventional Radiologist  This document has been electronically signed. Mar 31 2024 10:51PM    < end of copied text >        ROS:  [  ] UNABLE TO ELICIT

## 2024-04-01 NOTE — PROGRESS NOTE ADULT - SUBJECTIVE AND OBJECTIVE BOX
Hepatology Progress Note    Patient is a 38y old  Male who presents with a chief complaint of Alcohol withdrawal (01 Apr 2024 07:28)    Hepatology was consulted for ALD .    24-HOUR INTERVAL EVENTS: Patient resting in bed, endorses abdominal pain, otherwise denies cp, sob. WBC 20.02, Hgb 8.6, plt 342.     MEDICATIONS  (STANDING):  chlorhexidine 2% Cloths 1 Application(s) Topical <User Schedule>  folic acid 1 milliGRAM(s) Oral daily  heparin   Injectable 5000 Unit(s) SubCutaneous every 12 hours  influenza   Vaccine 0.5 milliLiter(s) IntraMuscular once  lactulose Syrup 10 Gram(s) Oral every 12 hours  multivitamin 1 Tablet(s) Oral daily  pantoprazole   Suspension 40 milliGRAM(s) Oral daily  thiamine 100 milliGRAM(s) Oral daily    MEDICATIONS  (PRN):    __________________________________________________  REVIEW OF SYSTEMS:  Limited ROS iso encephalopathy.   ________________________________________________  PHYSICAL EXAM    Vital Signs Last 24 Hrs  T(C): 37.8 (01 Apr 2024 15:00), Max: 37.9 (31 Mar 2024 16:00)  T(F): 100.1 (01 Apr 2024 15:00), Max: 100.3 (31 Mar 2024 16:00)  HR: 111 (01 Apr 2024 15:00) (103 - 117)  BP: 84/49 (01 Apr 2024 15:00) (80/67 - 109/66)  BP(mean): 62 (01 Apr 2024 15:00) (62 - 93)  RR: 21 (01 Apr 2024 15:00) (14 - 29)  SpO2: 96% (01 Apr 2024 15:00) (91% - 100%)    Parameters below as of 01 Apr 2024 11:13  Patient On (Oxygen Delivery Method): nasal cannula  O2 Flow (L/min): 2      GEN: NAD, jaundice  HEENT: EOMI, icteric cslerae, neck supple, moist mucous membranes  PULM: LCTAB, no wheezing, rales, or rhonchi  CV: RRR, no m/r/g  GI: soft, NT, distended; +BS in all four quadrants  MSK: MICHELLE  NEURO: A&O x 1-2  _________________________________________________  LABS:                        8.6    20.02 )-----------( 342      ( 01 Apr 2024 03:48 )             27.9     04-01    142  |  116<H>  |  10  ----------------------------<  123<H>  4.2   |  22  |  0.68    Ca    7.9<L>      01 Apr 2024 03:48  Phos  2.3     04-01  Mg     2.3     04-01    TPro  5.6<L>  /  Alb  2.3<L>  /  TBili  12.9<H>  /  DBili  x   /  AST  111<H>  /  ALT  35  /  AlkPhos  299<H>  04-01    PT/INR - ( 01 Apr 2024 03:48 )   PT: 16.9 sec;   INR: 1.50 ratio         PTT - ( 01 Apr 2024 03:48 )  PTT:38.8 sec  Urinalysis Basic - ( 01 Apr 2024 03:48 )    Color: x / Appearance: x / SG: x / pH: x  Gluc: 123 mg/dL / Ketone: x  / Bili: x / Urobili: x   Blood: x / Protein: x / Nitrite: x   Leuk Esterase: x / RBC: x / WBC x   Sq Epi: x / Non Sq Epi: x / Bacteria: x      CAPILLARY BLOOD GLUCOSE        SARS-CoV-2: NotDetec (21 Mar 2024 18:45)      RADIOLOGY & ADDITIONAL TESTS:      < from: Xray Abdomen 1 View PORTABLE -Routine (Xray Abdomen 1 View PORTABLE -Routine in AM.) (03.31.24 @ 10:18) >    INTERPRETATION:  Clinical Information: Abdominal distention    Technique: 2 AP abdomen x-rays.    Comparison: None    Findings/  Impression: Nonobstructive bowel gas pattern. No free air.    < end of copied text >

## 2024-04-01 NOTE — PROGRESS NOTE ADULT - SUBJECTIVE AND OBJECTIVE BOX
INTERVAL HPI/OVERNIGHT EVENTS: ***    PRESSORS: [ ] YES [ ] NO  WHICH:    Antimicrobial:    Cardiovascular:    Pulmonary:    Hematalogic:  heparin   Injectable 5000 Unit(s) SubCutaneous every 12 hours    Other:  chlorhexidine 2% Cloths 1 Application(s) Topical <User Schedule>  folic acid 1 milliGRAM(s) Oral daily  influenza   Vaccine 0.5 milliLiter(s) IntraMuscular once  lactulose Syrup 10 Gram(s) Oral every 12 hours  multivitamin 1 Tablet(s) Oral daily  pantoprazole   Suspension 40 milliGRAM(s) Oral daily  thiamine 100 milliGRAM(s) Oral daily    chlorhexidine 2% Cloths 1 Application(s) Topical <User Schedule>  folic acid 1 milliGRAM(s) Oral daily  heparin   Injectable 5000 Unit(s) SubCutaneous every 12 hours  influenza   Vaccine 0.5 milliLiter(s) IntraMuscular once  lactulose Syrup 10 Gram(s) Oral every 12 hours  multivitamin 1 Tablet(s) Oral daily  pantoprazole   Suspension 40 milliGRAM(s) Oral daily  thiamine 100 milliGRAM(s) Oral daily    Drug Dosing Weight  Height (cm): 167.6 (21 Mar 2024 12:45)  Weight (kg): 86 (21 Mar 2024 21:30)  BMI (kg/m2): 30.6 (21 Mar 2024 21:30)  BSA (m2): 1.95 (21 Mar 2024 21:30)    CENTRAL LINE: [ ] YES [ ] NO  LOCATION:   DATE INSERTED:      BLACK: [ ] YES [ ] NO    DATE INSERTED:      A-LINE:  [ ] YES [ ] NO  LOCATION:   DATE INSERTED:      [facility] -reviewed admission note, no change since admission  PAST MEDICAL & SURGICAL HISTORY:  No pertinent past medical history      History of seizure due to alcohol withdrawal      History of alcohol use disorder      No significant past surgical history      No significant past surgical history          ICU Vital Signs Last 24 Hrs  T(C): 36.8 (01 Apr 2024 04:00), Max: 38.1 (31 Mar 2024 15:00)  T(F): 98.3 (01 Apr 2024 04:00), Max: 100.5 (31 Mar 2024 15:00)  HR: 107 (01 Apr 2024 06:00) (103 - 119)  BP: 97/73 (01 Apr 2024 06:00) (84/65 - 116/74)  BP(mean): 80 (01 Apr 2024 06:00) (70 - 93)  ABP: --  ABP(mean): --  RR: 18 (01 Apr 2024 06:00) (14 - 29)  SpO2: 95% (01 Apr 2024 06:00) (91% - 100%)    O2 Parameters below as of 31 Mar 2024 19:00  Patient On (Oxygen Delivery Method): room air                  03-31 @ 07:01  -  04-01 @ 07:00  --------------------------------------------------------  IN: 2840 mL / OUT: 2350 mL / NET: 490 mL            PHYSICAL EXAM:    GENERAL: NAD, well-developed  HEAD:  Atraumatic, Normocephalic  EYES: EOMI, PERRLA, conjunctiva and sclera clear  ENMT: Moist mucous membranes  NECK: Supple, normal appearance, No JVD; Normal thyroid; Trachea midline  NERVOUS SYSTEM:  Alert & Oriented X3,  Moving all extremities  CHEST/LUNG: No chest deformity; Clear to Auscultation; No rales, rhonchi, wheezing   HEART: Regular rate and rhythm; No murmurs, rubs, or gallops  ABDOMEN: Soft, Nontender, Nondistended; Bowel sounds present  EXTREMITIES:  2+ Peripheral Pulses, No clubbing, cyanosis, or edema  SKIN: No rashes or lesions;  Good capillary refill      LABS:  CBC Full  -  ( 01 Apr 2024 03:48 )  WBC Count : 20.02 K/uL  RBC Count : 2.50 M/uL  Hemoglobin : 8.6 g/dL  Hematocrit : 27.9 %  Platelet Count - Automated : 342 K/uL  Mean Cell Volume : 111.6 fl  Mean Cell Hemoglobin : 34.4 pg  Mean Cell Hemoglobin Concentration : 30.8 gm/dL  Auto Neutrophil # : 15.82 K/uL  Auto Lymphocyte # : 1.56 K/uL  Auto Monocyte # : 1.34 K/uL  Auto Eosinophil # : 0.52 K/uL  Auto Basophil # : 0.21 K/uL  Auto Neutrophil % : 79.1 %  Auto Lymphocyte % : 7.8 %  Auto Monocyte % : 6.7 %  Auto Eosinophil % : 2.6 %  Auto Basophil % : 1.0 %    04-01    142  |  116<H>  |  10  ----------------------------<  123<H>  4.2   |  22  |  0.68    Ca    7.9<L>      01 Apr 2024 03:48  Phos  2.3     04-01  Mg     2.3     04-01    TPro  5.6<L>  /  Alb  2.3<L>  /  TBili  12.9<H>  /  DBili  x   /  AST  111<H>  /  ALT  35  /  AlkPhos  299<H>  04-01    PT/INR - ( 01 Apr 2024 03:48 )   PT: 16.9 sec;   INR: 1.50 ratio         PTT - ( 01 Apr 2024 03:48 )  PTT:38.8 sec  Urinalysis Basic - ( 01 Apr 2024 03:48 )    Color: x / Appearance: x / SG: x / pH: x  Gluc: 123 mg/dL / Ketone: x  / Bili: x / Urobili: x   Blood: x / Protein: x / Nitrite: x   Leuk Esterase: x / RBC: x / WBC x   Sq Epi: x / Non Sq Epi: x / Bacteria: x          RADIOLOGY & ADDITIONAL STUDIES REVIEWED:  ***    [ ]GOALS OF CARE DISCUSSION WITH PATIENT/FAMILY/PROXY:    CRITICAL CARE TIME SPENT: 35 minutes INTERVAL HPI/OVERNIGHT EVENTS:  No acute events overnight.  Patient examined at bedside this AM.  Patient endorses abdomen feels full.    PRESSORS: [ ] YES [X] NO      Antimicrobial:    Cardiovascular:    Pulmonary:    Hematalogic:  heparin   Injectable 5000 Unit(s) SubCutaneous every 12 hours    Other:  chlorhexidine 2% Cloths 1 Application(s) Topical <User Schedule>  folic acid 1 milliGRAM(s) Oral daily  influenza   Vaccine 0.5 milliLiter(s) IntraMuscular once  lactulose Syrup 10 Gram(s) Oral every 12 hours  multivitamin 1 Tablet(s) Oral daily  pantoprazole   Suspension 40 milliGRAM(s) Oral daily  thiamine 100 milliGRAM(s) Oral daily    chlorhexidine 2% Cloths 1 Application(s) Topical <User Schedule>  folic acid 1 milliGRAM(s) Oral daily  heparin   Injectable 5000 Unit(s) SubCutaneous every 12 hours  influenza   Vaccine 0.5 milliLiter(s) IntraMuscular once  lactulose Syrup 10 Gram(s) Oral every 12 hours  multivitamin 1 Tablet(s) Oral daily  pantoprazole   Suspension 40 milliGRAM(s) Oral daily  thiamine 100 milliGRAM(s) Oral daily    Drug Dosing Weight  Height (cm): 167.6 (21 Mar 2024 12:45)  Weight (kg): 86 (21 Mar 2024 21:30)  BMI (kg/m2): 30.6 (21 Mar 2024 21:30)  BSA (m2): 1.95 (21 Mar 2024 21:30)    CENTRAL LINE: [ ] YES [X] NO  LOCATION:   DATE INSERTED:      BLACK: [ ] YES [X] NO    DATE INSERTED:      A-LINE:  [ ] YES [X] NO  LOCATION:   DATE INSERTED:      [facility] -reviewed admission note, no change since admission  PAST MEDICAL & SURGICAL HISTORY:  No pertinent past medical history      History of seizure due to alcohol withdrawal      History of alcohol use disorder      No significant past surgical history      No significant past surgical history          ICU Vital Signs Last 24 Hrs  T(C): 36.8 (01 Apr 2024 04:00), Max: 38.1 (31 Mar 2024 15:00)  T(F): 98.3 (01 Apr 2024 04:00), Max: 100.5 (31 Mar 2024 15:00)  HR: 107 (01 Apr 2024 06:00) (103 - 119)  BP: 97/73 (01 Apr 2024 06:00) (84/65 - 116/74)  BP(mean): 80 (01 Apr 2024 06:00) (70 - 93)  ABP: --  ABP(mean): --  RR: 18 (01 Apr 2024 06:00) (14 - 29)  SpO2: 95% (01 Apr 2024 06:00) (91% - 100%)    O2 Parameters below as of 31 Mar 2024 19:00  Patient On (Oxygen Delivery Method): room air                  03-31 @ 07:01  -  04-01 @ 07:00  --------------------------------------------------------  IN: 2840 mL / OUT: 2350 mL / NET: 490 mL            PHYSICAL EXAM:    GENERAL: Mild distress, diaphoretic, eating breakfast, condom cath, rectal tube  HEAD:  Atraumatic, Normocephalic  EYES: Scleral Icterus, PERRLA  ENMT: Moist mucous membranes  NECK: Supple, normal appearance, No JVD; Normal thyroid; Trachea midline  NERVOUS SYSTEM:  Alert & Oriented X3,  Moving all extremities  CHEST/LUNG: No chest deformity; Clear to Auscultation; No rales, rhonchi, wheezing   HEART: tachycardic; No murmurs, rubs, or gallops  ABDOMEN: Distended, firm  EXTREMITIES:  +1 pitting edema in b/l LE's  SKIN: No rashes or lesions;  Good capillary refill      LABS:  CBC Full  -  ( 01 Apr 2024 03:48 )  WBC Count : 20.02 K/uL  RBC Count : 2.50 M/uL  Hemoglobin : 8.6 g/dL  Hematocrit : 27.9 %  Platelet Count - Automated : 342 K/uL  Mean Cell Volume : 111.6 fl  Mean Cell Hemoglobin : 34.4 pg  Mean Cell Hemoglobin Concentration : 30.8 gm/dL  Auto Neutrophil # : 15.82 K/uL  Auto Lymphocyte # : 1.56 K/uL  Auto Monocyte # : 1.34 K/uL  Auto Eosinophil # : 0.52 K/uL  Auto Basophil # : 0.21 K/uL  Auto Neutrophil % : 79.1 %  Auto Lymphocyte % : 7.8 %  Auto Monocyte % : 6.7 %  Auto Eosinophil % : 2.6 %  Auto Basophil % : 1.0 %    04-01    142  |  116<H>  |  10  ----------------------------<  123<H>  4.2   |  22  |  0.68    Ca    7.9<L>      01 Apr 2024 03:48  Phos  2.3     04-01  Mg     2.3     04-01    TPro  5.6<L>  /  Alb  2.3<L>  /  TBili  12.9<H>  /  DBili  x   /  AST  111<H>  /  ALT  35  /  AlkPhos  299<H>  04-01    PT/INR - ( 01 Apr 2024 03:48 )   PT: 16.9 sec;   INR: 1.50 ratio         PTT - ( 01 Apr 2024 03:48 )  PTT:38.8 sec  Urinalysis Basic - ( 01 Apr 2024 03:48 )    Color: x / Appearance: x / SG: x / pH: x  Gluc: 123 mg/dL / Ketone: x  / Bili: x / Urobili: x   Blood: x / Protein: x / Nitrite: x   Leuk Esterase: x / RBC: x / WBC x   Sq Epi: x / Non Sq Epi: x / Bacteria: x          RADIOLOGY & ADDITIONAL STUDIES REVIEWED:  ***    [ ]GOALS OF CARE DISCUSSION WITH PATIENT/FAMILY/PROXY:    CRITICAL CARE TIME SPENT: 35 minutes

## 2024-04-02 LAB
ALBUMIN SERPL ELPH-MCNC: 1.9 G/DL — LOW (ref 3.5–5)
ALP SERPL-CCNC: 301 U/L — HIGH (ref 40–120)
ALT FLD-CCNC: 42 U/L DA — SIGNIFICANT CHANGE UP (ref 10–60)
AMMONIA BLD-MCNC: 64 UMOL/L — HIGH (ref 11–32)
ANION GAP SERPL CALC-SCNC: 3 MMOL/L — LOW (ref 5–17)
ANISOCYTOSIS BLD QL: SLIGHT — SIGNIFICANT CHANGE UP
APTT BLD: 38.7 SEC — HIGH (ref 24.5–35.6)
AST SERPL-CCNC: 127 U/L — HIGH (ref 10–40)
BASOPHILS # BLD AUTO: 0 K/UL — SIGNIFICANT CHANGE UP (ref 0–0.2)
BASOPHILS NFR BLD AUTO: 0 % — SIGNIFICANT CHANGE UP (ref 0–2)
BILIRUB DIRECT SERPL-MCNC: 10.9 MG/DL — HIGH (ref 0–0.3)
BILIRUB SERPL-MCNC: 13.3 MG/DL — HIGH (ref 0.2–1.2)
BUN SERPL-MCNC: 11 MG/DL — SIGNIFICANT CHANGE UP (ref 7–18)
CALCIUM SERPL-MCNC: 8 MG/DL — LOW (ref 8.4–10.5)
CHLORIDE SERPL-SCNC: 118 MMOL/L — HIGH (ref 96–108)
CO2 SERPL-SCNC: 23 MMOL/L — SIGNIFICANT CHANGE UP (ref 22–31)
CREAT SERPL-MCNC: 0.61 MG/DL — SIGNIFICANT CHANGE UP (ref 0.5–1.3)
EGFR: 128 ML/MIN/1.73M2 — SIGNIFICANT CHANGE UP
EOSINOPHIL # BLD AUTO: 1.52 K/UL — HIGH (ref 0–0.5)
EOSINOPHIL NFR BLD AUTO: 7 % — HIGH (ref 0–6)
GLUCOSE SERPL-MCNC: 101 MG/DL — HIGH (ref 70–99)
HCT VFR BLD CALC: 30.6 % — LOW (ref 39–50)
HGB BLD-MCNC: 9.5 G/DL — LOW (ref 13–17)
HYPOCHROMIA BLD QL: SLIGHT — SIGNIFICANT CHANGE UP
INR BLD: 1.58 RATIO — HIGH (ref 0.85–1.18)
LYMPHOCYTES # BLD AUTO: 11 % — LOW (ref 13–44)
LYMPHOCYTES # BLD AUTO: 2.39 K/UL — SIGNIFICANT CHANGE UP (ref 1–3.3)
MACROCYTES BLD QL: SLIGHT — SIGNIFICANT CHANGE UP
MAGNESIUM SERPL-MCNC: 2.5 MG/DL — SIGNIFICANT CHANGE UP (ref 1.6–2.6)
MANUAL SMEAR VERIFICATION: SIGNIFICANT CHANGE UP
MCHC RBC-ENTMCNC: 31 GM/DL — LOW (ref 32–36)
MCHC RBC-ENTMCNC: 34.5 PG — HIGH (ref 27–34)
MCV RBC AUTO: 111.3 FL — HIGH (ref 80–100)
MONOCYTES # BLD AUTO: 2.83 K/UL — HIGH (ref 0–0.9)
MONOCYTES NFR BLD AUTO: 13 % — SIGNIFICANT CHANGE UP (ref 2–14)
MYELOCYTES NFR BLD: 2 % — HIGH (ref 0–0)
NEUTROPHILS # BLD AUTO: 14.59 K/UL — HIGH (ref 1.8–7.4)
NEUTROPHILS NFR BLD AUTO: 67 % — SIGNIFICANT CHANGE UP (ref 43–77)
NRBC # BLD: 0 /100 WBCS — SIGNIFICANT CHANGE UP (ref 0–0)
PHOSPHATE SERPL-MCNC: 3.3 MG/DL — SIGNIFICANT CHANGE UP (ref 2.5–4.5)
PLAT MORPH BLD: NORMAL — SIGNIFICANT CHANGE UP
PLATELET # BLD AUTO: 332 K/UL — SIGNIFICANT CHANGE UP (ref 150–400)
PLATELET CLUMP BLD QL SMEAR: SLIGHT
PLATELET COUNT - ESTIMATE: NORMAL — SIGNIFICANT CHANGE UP
POLYCHROMASIA BLD QL SMEAR: SLIGHT — SIGNIFICANT CHANGE UP
POTASSIUM SERPL-MCNC: 4 MMOL/L — SIGNIFICANT CHANGE UP (ref 3.5–5.3)
POTASSIUM SERPL-SCNC: 4 MMOL/L — SIGNIFICANT CHANGE UP (ref 3.5–5.3)
PROT SERPL-MCNC: 5.6 G/DL — LOW (ref 6–8.3)
PROTHROM AB SERPL-ACNC: 17.8 SEC — HIGH (ref 9.5–13)
RBC # BLD: 2.75 M/UL — LOW (ref 4.2–5.8)
RBC # FLD: 22.3 % — HIGH (ref 10.3–14.5)
RBC BLD AUTO: ABNORMAL
SODIUM SERPL-SCNC: 144 MMOL/L — SIGNIFICANT CHANGE UP (ref 135–145)
WBC # BLD: 21.77 K/UL — HIGH (ref 3.8–10.5)
WBC # FLD AUTO: 21.77 K/UL — HIGH (ref 3.8–10.5)

## 2024-04-02 PROCEDURE — 74018 RADEX ABDOMEN 1 VIEW: CPT | Mod: 26

## 2024-04-02 PROCEDURE — 76700 US EXAM ABDOM COMPLETE: CPT | Mod: 26

## 2024-04-02 RX ORDER — SIMETHICONE 80 MG/1
80 TABLET, CHEWABLE ORAL DAILY
Refills: 0 | Status: DISCONTINUED | OUTPATIENT
Start: 2024-04-02 | End: 2024-04-11

## 2024-04-02 RX ADMIN — HEPARIN SODIUM 5000 UNIT(S): 5000 INJECTION INTRAVENOUS; SUBCUTANEOUS at 17:45

## 2024-04-02 RX ADMIN — PANTOPRAZOLE SODIUM 40 MILLIGRAM(S): 20 TABLET, DELAYED RELEASE ORAL at 11:10

## 2024-04-02 RX ADMIN — CHLORHEXIDINE GLUCONATE 1 APPLICATION(S): 213 SOLUTION TOPICAL at 05:29

## 2024-04-02 RX ADMIN — Medication 1 TABLET(S): at 11:09

## 2024-04-02 RX ADMIN — Medication 1 MILLIGRAM(S): at 11:10

## 2024-04-02 RX ADMIN — HEPARIN SODIUM 5000 UNIT(S): 5000 INJECTION INTRAVENOUS; SUBCUTANEOUS at 05:28

## 2024-04-02 RX ADMIN — Medication 100 MILLIGRAM(S): at 11:09

## 2024-04-02 RX ADMIN — SIMETHICONE 80 MILLIGRAM(S): 80 TABLET, CHEWABLE ORAL at 14:30

## 2024-04-02 NOTE — PROGRESS NOTE ADULT - NUTRITIONAL ASSESSMENT
This patient has been assessed with a concern for Malnutrition and has been determined to have a diagnosis/diagnoses of Moderate protein-calorie malnutrition.    This patient is being managed with:   Diet Regular-  Entered: Apr 2 2024  9:12AM

## 2024-04-02 NOTE — PROGRESS NOTE ADULT - ASSESSMENT
IMP: This is a 38 yr old man with  alcohol withdrawal seizure with alcohol use disorder, who p/w confusion starting this morning. Admitted for high risk alcohol withdrawal with persistent sinus tachycardia.    Assessment:   # Acute hypoxic resp failure   # Alcohol use disorder  # Alcohol withdrawal  # Acute metabolic encephalopathy  # Atypical pneumonia  # Hx of alcohol seizures  # Possible pancreatitis  # Liver steatosis  # Alcoholic hepatitis  # Sinus tachycardia  # SIRS  # ARABELLA  # Ileus       Plan:   =============NEURO:=============  # Acute metabolic encephalopathy  #sedation  # Alcohol withdrawal  - s/p Ativan, phenobarb, ketamine drip, and precedex drip in the setting of mechanical ventilation and AMS  - now off all sedation, mental status improved, A&Ox3    # Alcohol use disorder  # Hx of alcohol seizures  - Pt has been in and out of alcohol rehab  - Most recently came out 6 months ago - as per sister pt was only hospitalized and not in rehab   - SW consult order placed     =============CARDIO:================  # Sinus tachycardia in the setting of acute alcohol withdrawal  - P/w sinus tachycardia  - EKG = sinus tachycardia  - trop neg  - RESOLVED      ==========PULM:=============  #Atypical pneumonia   # coronavirus positive however not COVID-19  - CT chest : Multifocal bilateral peripheral opacities suggesting atypical pneumonia. Probable compression atelectasis right lung base, related to elevated right hemidiaphragm.  - Completed course of zosyn 3/30      ==========GI:==========  #ileus with increased abdominal pressure of 25   - initial abdominal pressures 25,   - surgery consulted, no indication for surgery, no indication for rectal tube for colonic decompression  - status post Sump tube for gastric decompression, now d/c'd  - following several days without improvement, Neostigmine administered on 3/25 x1, complicated by bradycardia requiring atropine  - abdominal pressure downtrended  - status post 2 days of lactulose first BM passed (large size) on 3/26   - will continue lactulose BID,   - continues to have distension with +flatus and BMs  - f/u daily abdominal x-rays; showing gradual improvement      # Alcoholic hepatitis   #transaminitis  #Hyperbilirubinemia   - P/w T bili 6.6 (Dir 5.1), , ALT 41,   - initial Maddrey score = borderline at 31.4 points (32 point is the cutoff for steroid treatment), has since decreased to 28.7 as of 3/24  - MELD-Na score 17 points 6 % 90day mortality   - Transaminitis improving   - T. Hector initially downtrended 6.6 > 5.5, now up trended, now 10.8 today  -  hepatitis panel negative   - ceruloplasmin elevated, 39  - Hepatology following Dr. Wiley      # concern for pancreatitis  - Lipase 470, possible mid abdominal pain. Negative imaging.  - repeat Lipase 86, not concerning for pancreatitis    # Liver steatosis    # Liver lesion  - CT = Has hypodense foci with central hyper density involving the pericholecystic region and segment 6, indeterminate.   - Rec outpt MRI  - Hepatology following- Dr. Wiley       #Nutrition  -tolerating soft and bite sized diet    =======RENAL:==========  #Hypoalbuminemia  -IV Albumin 50 q6 until 4/1    #Metabolic Acidosis  -ABG 3/25 showing bicarb of 10  -Vent settings adjusted  -RESOLVED    # ARABELLA  P/w SCr 1.77 (baseline 0.8)  S/p 23L IVF in ED  urinary retention 1.5 L s/p straight cath overnight  - Roman placed on 3/22, removed 3/28  3/22 Scr improved to 0.85   Renal US: No hydronephrosis.   RESOLVED    # Lactic acidosis  P/w Lactate 10  Due to dehydration, infection-  s/p 3L IVF -> Lactate 1.6  RESOLVED    # Hypokalemia  - K 3.3 s/p Kcl 10 mEq x3 ->3.9   - repeat 3.3 will replete IV  RESOLVED     =====INFECTIOUS=====  # SIRS in the setting of coronavirus infection  -p/w Lactate 10, tachycardia, tachypnea>20, RVP Positive coronavirus  -CXR neg for consolidation or effusion, legionella neg   - UA pos, culture not sent  - s/p Cefepime in ED and s/p Ceftriaxone (d/c'd 3/23)  - completed course of Zosyn ( 3/23-3/30)   - BCx 3/21 NGTD   - however on 3/27 continued to spike fevers  - all indwelling lines and tubes discontinued: Central Line, A line, and Roman   - now afebrile >48 hours    ==============HEME:===========  # Leukocytosis  - As above in SIRS    =======ENDO:=======  A1c 7.9  - glucose wnl  - d/c sliding scale    =========SKIN/CATHETERS=======  Roman- REMOVED  RIJ- REMOVED  [place] Sump- REMOVED  Left A-Line- REMOVED    =========PPX:========  - Hep SC  - PPI    IMP: This is a 38 yr old man with  alcohol withdrawal seizure with alcohol use disorder, who p/w confusion starting this morning. Admitted for high risk alcohol withdrawal with persistent sinus tachycardia.    Assessment:   # Acute hypoxic resp failure   # Alcohol use disorder  # Alcohol withdrawal  # Acute metabolic encephalopathy  # Atypical pneumonia  # Hx of alcohol seizures  # Possible pancreatitis  # Liver steatosis  # Alcoholic hepatitis  # Sinus tachycardia  # SIRS  # ARABELLA  # Ileus       Plan:   =============NEURO:=============  # Acute metabolic encephalopathy  #sedation  # Alcohol withdrawal  - s/p Ativan, phenobarb, ketamine drip, and precedex drip in the setting of mechanical ventilation and AMS  - now off all sedation, mental status improved, A&Ox3    # Alcohol use disorder  # Hx of alcohol seizures  - Pt has been in and out of alcohol rehab  - Most recently came out 6 months ago - as per sister pt was only hospitalized and not in rehab   - SW consult order placed     =============CARDIO:================  # Sinus tachycardia in the setting of acute alcohol withdrawal  - P/w sinus tachycardia  - EKG = sinus tachycardia  - trop neg  - RESOLVED      ==========PULM:=============  #Atypical pneumonia   # coronavirus positive however not COVID-19  - CT chest : Multifocal bilateral peripheral opacities suggesting atypical pneumonia. Probable compression atelectasis right lung base, related to elevated right hemidiaphragm.  - Completed course of zosyn 3/30      ==========GI:==========  #ileus with increased abdominal pressure of 25   - initial abdominal pressures 25,   - surgery consulted, no indication for surgery, no indication for rectal tube for colonic decompression  - status post Sump tube for gastric decompression, now d/c'd  - following several days without improvement, Neostigmine administered on 3/25 x1, complicated by bradycardia requiring atropine  - abdominal pressure downtrended  - after 2 days of lactulose, first BM passed (large size) on 3/26   - d/c lactulose  - continues to have distension with +flatus and BMs  - f/u daily abdominal x-rays; showing gradual improvement  - Encourage pt to be OOBTC  -advance to regular diet  - started Rifaximin 4/2      # Alcoholic hepatitis   #transaminitis  #Hyperbilirubinemia   - P/w T bili 6.6 (Dir 5.1), , ALT 41,   - initial Maddrey score = borderline at 31.4 points (32 point is the cutoff for steroid treatment), has since decreased to 28.7 as of 3/24  - MELD-Na score 17 points 6 % 90day mortality   - Transaminitis improving   - T. Hector initially downtrended 6.6 > 5.5, now up trended, now 10.8 today  -  hepatitis panel negative   - ceruloplasmin elevated, 39  - Hepatology following Dr. Wiley      # concern for pancreatitis  - Lipase 470, possible mid abdominal pain. Negative imaging.  - repeat Lipase 86, not concerning for pancreatitis    # Liver steatosis    # Liver lesion  - CT = Has hypodense foci with central hyper density involving the pericholecystic region and segment 6, indeterminate.   - Rec outpt MRI  - Hepatology following- Dr. Wiley       #Nutrition  -Advance to regular diet    =======RENAL:==========  #Hypoalbuminemia  -IV Albumin 50 q6 until 4/1    #Metabolic Acidosis  -ABG 3/25 showing bicarb of 10  -Vent settings adjusted  -RESOLVED    # ARABELLA  P/w SCr 1.77 (baseline 0.8)  S/p 23L IVF in ED  urinary retention 1.5 L s/p straight cath overnight  - Roman placed on 3/22, removed 3/28  3/22 Scr improved to 0.85   Renal US: No hydronephrosis.   RESOLVED    # Lactic acidosis  P/w Lactate 10  Due to dehydration, infection-  s/p 3L IVF -> Lactate 1.6  RESOLVED    # Hypokalemia  - K 3.3 s/p Kcl 10 mEq x3 ->3.9   - repeat 3.3 will replete IV  RESOLVED     =====INFECTIOUS=====  # SIRS in the setting of coronavirus infection  -p/w Lactate 10, tachycardia, tachypnea>20, RVP Positive coronavirus  -CXR neg for consolidation or effusion, legionella neg   - UA pos, culture not sent  - s/p Cefepime in ED and s/p Ceftriaxone (d/c'd 3/23)  - completed course of Zosyn ( 3/23-3/30)   - BCx 3/21 NGTD   - however on 3/27 continued to spike fevers  - all indwelling lines and tubes discontinued: Central Line, A line, and Roman   - now afebrile >48 hours    ==============HEME:===========  # Leukocytosis  - As above in SIRS    =======ENDO:=======  A1c 7.9  - glucose wnl  - d/c sliding scale    =========SKIN/CATHETERS=======  Roman- REMOVED  RIJ- REMOVED  [place] Sump- REMOVED  Left A-Line- REMOVED    =========PPX:========  - Hep SC  - PPI

## 2024-04-02 NOTE — PROGRESS NOTE ADULT - SUBJECTIVE AND OBJECTIVE BOX
INTERVAL HPI/OVERNIGHT EVENTS: ***    PRESSORS: [ ] YES [ ] NO  WHICH:    Antimicrobial:  rifAXIMin 550 milliGRAM(s) Oral two times a day    Cardiovascular:    Pulmonary:    Hematalogic:  heparin   Injectable 5000 Unit(s) SubCutaneous every 12 hours    Other:  chlorhexidine 2% Cloths 1 Application(s) Topical <User Schedule>  folic acid 1 milliGRAM(s) Oral daily  influenza   Vaccine 0.5 milliLiter(s) IntraMuscular once  multivitamin 1 Tablet(s) Oral daily  pantoprazole   Suspension 40 milliGRAM(s) Oral daily  thiamine 100 milliGRAM(s) Oral daily    chlorhexidine 2% Cloths 1 Application(s) Topical <User Schedule>  folic acid 1 milliGRAM(s) Oral daily  heparin   Injectable 5000 Unit(s) SubCutaneous every 12 hours  influenza   Vaccine 0.5 milliLiter(s) IntraMuscular once  multivitamin 1 Tablet(s) Oral daily  pantoprazole   Suspension 40 milliGRAM(s) Oral daily  rifAXIMin 550 milliGRAM(s) Oral two times a day  thiamine 100 milliGRAM(s) Oral daily    Drug Dosing Weight  Height (cm): 167.6 (21 Mar 2024 12:45)  Weight (kg): 86 (21 Mar 2024 21:30)  BMI (kg/m2): 30.6 (21 Mar 2024 21:30)  BSA (m2): 1.95 (21 Mar 2024 21:30)    CENTRAL LINE: [ ] YES [ ] NO  LOCATION:   DATE INSERTED:      BLACK: [ ] YES [ ] NO    DATE INSERTED:      A-LINE:  [ ] YES [ ] NO  LOCATION:   DATE INSERTED:      [facility] -reviewed admission note, no change since admission  PAST MEDICAL & SURGICAL HISTORY:  No pertinent past medical history      History of seizure due to alcohol withdrawal      History of alcohol use disorder      No significant past surgical history      No significant past surgical history          ICU Vital Signs Last 24 Hrs  T(C): 37 (02 Apr 2024 07:00), Max: 37.9 (01 Apr 2024 13:00)  T(F): 98.6 (02 Apr 2024 07:00), Max: 100.2 (01 Apr 2024 13:00)  HR: 106 (02 Apr 2024 11:00) (102 - 115)  BP: 106/71 (02 Apr 2024 11:00) (84/49 - 119/73)  BP(mean): 81 (02 Apr 2024 11:00) (62 - 89)  ABP: --  ABP(mean): --  RR: 19 (02 Apr 2024 09:00) (17 - 29)  SpO2: 98% (02 Apr 2024 11:00) (92% - 100%)    O2 Parameters below as of 02 Apr 2024 07:00  Patient On (Oxygen Delivery Method): nasal cannula  O2 Flow (L/min): 2                04-01 @ 07:01  -  04-02 @ 07:00  --------------------------------------------------------  IN: 930 mL / OUT: 2100 mL / NET: -1170 mL            PHYSICAL EXAM:    GENERAL: NAD, well-developed  HEAD:  Atraumatic, Normocephalic  EYES: EOMI, PERRLA, conjunctiva and sclera clear  ENMT: Moist mucous membranes  NECK: Supple, normal appearance, No JVD; Normal thyroid; Trachea midline  NERVOUS SYSTEM:  Alert & Oriented X3,  Moving all extremities  CHEST/LUNG: No chest deformity; Clear to Auscultation; No rales, rhonchi, wheezing   HEART: Regular rate and rhythm; No murmurs, rubs, or gallops  ABDOMEN: Soft, Nontender, Nondistended; Bowel sounds present  EXTREMITIES:  2+ Peripheral Pulses, No clubbing, cyanosis, or edema  SKIN: No rashes or lesions;  Good capillary refill      LABS:  CBC Full  -  ( 02 Apr 2024 03:37 )  WBC Count : 21.77 K/uL  RBC Count : 2.75 M/uL  Hemoglobin : 9.5 g/dL  Hematocrit : 30.6 %  Platelet Count - Automated : 332 K/uL  Mean Cell Volume : 111.3 fl  Mean Cell Hemoglobin : 34.5 pg  Mean Cell Hemoglobin Concentration : 31.0 gm/dL  Auto Neutrophil # : 14.59 K/uL  Auto Lymphocyte # : 2.39 K/uL  Auto Monocyte # : 2.83 K/uL  Auto Eosinophil # : 1.52 K/uL  Auto Basophil # : 0.00 K/uL  Auto Neutrophil % : 67.0 %  Auto Lymphocyte % : 11.0 %  Auto Monocyte % : 13.0 %  Auto Eosinophil % : 7.0 %  Auto Basophil % : 0.0 %    04-02    144  |  118<H>  |  11  ----------------------------<  101<H>  4.0   |  23  |  0.61    Ca    8.0<L>      02 Apr 2024 03:37  Phos  3.3     04-02  Mg     2.5     04-02    TPro  5.6<L>  /  Alb  1.9<L>  /  TBili  13.3<H>  /  DBili  10.9<H>  /  AST  127<H>  /  ALT  42  /  AlkPhos  301<H>  04-02    PT/INR - ( 02 Apr 2024 03:37 )   PT: 17.8 sec;   INR: 1.58 ratio         PTT - ( 02 Apr 2024 03:37 )  PTT:38.7 sec  Urinalysis Basic - ( 02 Apr 2024 03:37 )    Color: x / Appearance: x / SG: x / pH: x  Gluc: 101 mg/dL / Ketone: x  / Bili: x / Urobili: x   Blood: x / Protein: x / Nitrite: x   Leuk Esterase: x / RBC: x / WBC x   Sq Epi: x / Non Sq Epi: x / Bacteria: x          RADIOLOGY & ADDITIONAL STUDIES REVIEWED:  ***    [ ]GOALS OF CARE DISCUSSION WITH PATIENT/FAMILY/PROXY:    CRITICAL CARE TIME SPENT: 35 minutes INTERVAL HPI/OVERNIGHT EVENTS:  No acute events overnight.  Patient examined at bedside this AM.  Patient denies acute complaints. Abdomen remains distended and firm. Total bilirubin rising.    PRESSORS: [ ] YES [X] NO    Antimicrobial:  rifAXIMin 550 milliGRAM(s) Oral two times a day    Cardiovascular:    Pulmonary:    Hematalogic:  heparin   Injectable 5000 Unit(s) SubCutaneous every 12 hours    Other:  chlorhexidine 2% Cloths 1 Application(s) Topical <User Schedule>  folic acid 1 milliGRAM(s) Oral daily  influenza   Vaccine 0.5 milliLiter(s) IntraMuscular once  multivitamin 1 Tablet(s) Oral daily  pantoprazole   Suspension 40 milliGRAM(s) Oral daily  thiamine 100 milliGRAM(s) Oral daily    chlorhexidine 2% Cloths 1 Application(s) Topical <User Schedule>  folic acid 1 milliGRAM(s) Oral daily  heparin   Injectable 5000 Unit(s) SubCutaneous every 12 hours  influenza   Vaccine 0.5 milliLiter(s) IntraMuscular once  multivitamin 1 Tablet(s) Oral daily  pantoprazole   Suspension 40 milliGRAM(s) Oral daily  rifAXIMin 550 milliGRAM(s) Oral two times a day  thiamine 100 milliGRAM(s) Oral daily    Drug Dosing Weight  Height (cm): 167.6 (21 Mar 2024 12:45)  Weight (kg): 86 (21 Mar 2024 21:30)  BMI (kg/m2): 30.6 (21 Mar 2024 21:30)  BSA (m2): 1.95 (21 Mar 2024 21:30)    CENTRAL LINE: [ ] YES [X] NO  LOCATION:   DATE INSERTED:      BLACK: [ ] YES [X] NO    DATE INSERTED:      A-LINE:  [ ] YES [X] NO  LOCATION:   DATE INSERTED:      [facility] -reviewed admission note, no change since admission  PAST MEDICAL & SURGICAL HISTORY:  No pertinent past medical history      History of seizure due to alcohol withdrawal      History of alcohol use disorder      No significant past surgical history      No significant past surgical history          ICU Vital Signs Last 24 Hrs  T(C): 37 (02 Apr 2024 07:00), Max: 37.9 (01 Apr 2024 13:00)  T(F): 98.6 (02 Apr 2024 07:00), Max: 100.2 (01 Apr 2024 13:00)  HR: 106 (02 Apr 2024 11:00) (102 - 115)  BP: 106/71 (02 Apr 2024 11:00) (84/49 - 119/73)  BP(mean): 81 (02 Apr 2024 11:00) (62 - 89)  ABP: --  ABP(mean): --  RR: 19 (02 Apr 2024 09:00) (17 - 29)  SpO2: 98% (02 Apr 2024 11:00) (92% - 100%)    O2 Parameters below as of 02 Apr 2024 07:00  Patient On (Oxygen Delivery Method): nasal cannula  O2 Flow (L/min): 2                04-01 @ 07:01  -  04-02 @ 07:00  --------------------------------------------------------  IN: 930 mL / OUT: 2100 mL / NET: -1170 mL            PHYSICAL EXAM:    GENERAL: Mild distress, eating breakfast, condom cath, rectal tube  HEAD:  Atraumatic, Normocephalic  EYES: Scleral Icterus, PERRLA  ENMT: Moist mucous membranes  NECK: Supple, normal appearance, No JVD; Normal thyroid; Trachea midline  NERVOUS SYSTEM:  Alert & Oriented X3, orientation waxes and wanes throughout day, Moving all extremities  CHEST/LUNG: No chest deformity; Clear to Auscultation; No rales, rhonchi, wheezing   HEART: tachycardic; No murmurs, rubs, or gallops  ABDOMEN: Distended, firm  EXTREMITIES:  +1 pitting edema in b/l LE's  SKIN: No rashes or lesions;  Good capillary refill      LABS:  CBC Full  -  ( 02 Apr 2024 03:37 )  WBC Count : 21.77 K/uL  RBC Count : 2.75 M/uL  Hemoglobin : 9.5 g/dL  Hematocrit : 30.6 %  Platelet Count - Automated : 332 K/uL  Mean Cell Volume : 111.3 fl  Mean Cell Hemoglobin : 34.5 pg  Mean Cell Hemoglobin Concentration : 31.0 gm/dL  Auto Neutrophil # : 14.59 K/uL  Auto Lymphocyte # : 2.39 K/uL  Auto Monocyte # : 2.83 K/uL  Auto Eosinophil # : 1.52 K/uL  Auto Basophil # : 0.00 K/uL  Auto Neutrophil % : 67.0 %  Auto Lymphocyte % : 11.0 %  Auto Monocyte % : 13.0 %  Auto Eosinophil % : 7.0 %  Auto Basophil % : 0.0 %    04-02    144  |  118<H>  |  11  ----------------------------<  101<H>  4.0   |  23  |  0.61    Ca    8.0<L>      02 Apr 2024 03:37  Phos  3.3     04-02  Mg     2.5     04-02    TPro  5.6<L>  /  Alb  1.9<L>  /  TBili  13.3<H>  /  DBili  10.9<H>  /  AST  127<H>  /  ALT  42  /  AlkPhos  301<H>  04-02    PT/INR - ( 02 Apr 2024 03:37 )   PT: 17.8 sec;   INR: 1.58 ratio         PTT - ( 02 Apr 2024 03:37 )  PTT:38.7 sec  Urinalysis Basic - ( 02 Apr 2024 03:37 )    Color: x / Appearance: x / SG: x / pH: x  Gluc: 101 mg/dL / Ketone: x  / Bili: x / Urobili: x   Blood: x / Protein: x / Nitrite: x   Leuk Esterase: x / RBC: x / WBC x   Sq Epi: x / Non Sq Epi: x / Bacteria: x          RADIOLOGY & ADDITIONAL STUDIES REVIEWED:  ***    [ ]GOALS OF CARE DISCUSSION WITH PATIENT/FAMILY/PROXY:    CRITICAL CARE TIME SPENT: 35 minutes

## 2024-04-02 NOTE — CHART NOTE - NSCHARTNOTEFT_GEN_A_CORE
Patient is a 39 y/o M w/ PMHx of alcohol withdrawal seizure with alcohol use disorder, who p/w confusion starting on morning of admission. Patient was admitted to ICU for high risk alcohol withdrawal with persistent sinus tachycardia on 3/21.    Patient was started on CIWA protocol and was initially started on ativan which did not control his withdrawal symptoms and then patient was started on precedex drip. Patient was intubated on 3/22 for airway protection. Patient was also tried on phenobarbital PRN and ketamine drip to treat his withdrawal. Patient was extubated 3/26 and given ativan which was stopped after 1 day as patient was no longer withdrawing from alcohol. All sedating medications were stopped as patient was initially lethargic after extubation but eventually his mental status improved and patient is now AAOx3.     Patient also had alcoholic hepatitis not requiring steroid treatment. Patient had possible pancreatitis with lipase of 400s and abdominal pain (without CT imaging evidence). Patient tested positive for coronavirus (not covid). Patient met sepsis criteria with a positive UA, coronavirus +ve, CXR - multifocal bilateral peripheral opacities and distended abdomen. Patient developed septic shock. A cental line was placed and he was started on vasopressors. Patient was also started on Zosyn. Pneumonia workup was negative. BCX showed NG. Central line, A-Line, and Roman removed on 3/27.    Patient also had distended abdomen. CT abdomen showed distension but no bowel obstruction. NG tube was placed to suction. Patient had ileus. Abdominal pressure was as high as 25 but it trended down. Surgery was consulted, and did not plan any acute intervention. Pt was started on lactulose and given one dose of neostigmine, which resulted in bradycardia and required atropine to reverse the cholinergic effects. The following morning, pt had a large bowel movement and surgery signed off. Repeat abdominal x-ray showed great improvement from previous image.  He continued to spike fevers while on Zosyn therapy however has been a febrile since 3/29.     Mental status has improved but abdomen still remains distended. He had persistent diarrhea on the lactulose so that was discontinued and he began Rifaximin on 4/1 to treat suspected underlying hepatic encephalopathy. He is currently A&Ox3 but can wax and wane throughout the day. He had abdominal ultrasound on 4/2 showing a dilated CBD and sludge. It is recommended he have GI follow-up and encouraged to get out of bed to chair.      Patient signed out to resident Dr. Cano and attending Dr. Sandoval.     Things to follow:  Abdominal x-rays Patient is a 37 y/o M w/ PMHx of alcohol withdrawal seizure with alcohol use disorder, who p/w confusion starting on morning of admission. Patient was admitted to ICU for high risk alcohol withdrawal with persistent sinus tachycardia on 3/21.    Patient was started on CIWA protocol and was initially started on ativan which did not control his withdrawal symptoms and then patient was started on precedex drip. Patient was intubated on 3/22 for airway protection. Patient was also tried on phenobarbital PRN and ketamine drip to treat his withdrawal. Patient was extubated 3/26 and given ativan which was stopped after 1 day as patient was no longer withdrawing from alcohol. All sedating medications were stopped as patient was initially lethargic after extubation but eventually his mental status improved and patient is now AAOx3.     Patient also had alcoholic hepatitis not requiring steroid treatment. Patient had possible pancreatitis with lipase of 400s and abdominal pain (without CT imaging evidence). Patient tested positive for coronavirus (not covid). Patient met sepsis criteria with a positive UA, coronavirus +ve, CXR - multifocal bilateral peripheral opacities and distended abdomen. Patient developed septic shock. A cental line was placed and he was started on vasopressors. Patient was also started on Zosyn. Pneumonia workup was negative. BCX showed NG. Central line, A-Line, and Roman removed on 3/27.    Patient also had distended abdomen. CT abdomen showed distension but no bowel obstruction. NG tube was placed to suction. Patient had ileus. Abdominal pressure was as high as 25 but it trended down. Surgery was consulted, and did not plan any acute intervention. Pt was started on lactulose and given one dose of neostigmine, which resulted in bradycardia and required atropine to reverse the cholinergic effects. The following morning, pt had a large bowel movement and surgery signed off. Repeat abdominal x-ray showed great improvement from previous image.  He continued to spike fevers while on Zosyn therapy however has been a febrile since 3/29.     Mental status has improved but abdomen still remains distended. He had persistent diarrhea on the lactulose so that was discontinued and he began Rifaximin on 4/1 to treat suspected underlying hepatic encephalopathy. He is currently A&Ox3 but can wax and wane throughout the day. He had abdominal ultrasound on 4/2 showing a dilated CBD and sludge. It is recommended he have GI follow-up and encouraged to get out of bed to chair.      Patient signed out to resident Dr. Cano and attending Dr. Sandoval.     Things to follow:  [ ] Abdominal x-rays  [ ] Trend WBC  [ ] Repeat sepsis work up if febrile.   [ ] Trend T. Bili. Patient is a 39 y/o M w/ PMHx of alcohol withdrawal seizure with alcohol use disorder, who p/w confusion starting on morning of admission. Patient was admitted to ICU for high risk alcohol withdrawal with persistent sinus tachycardia on 3/21.    Patient was started on CIWA protocol and was initially started on ativan which did not control his withdrawal symptoms and then patient was started on precedex drip. Patient was intubated on 3/22 for airway protection. Patient was also tried on phenobarbital PRN and ketamine drip to treat his withdrawal. Patient was extubated 3/26 and given ativan which was stopped after 1 day as patient was no longer withdrawing from alcohol. All sedating medications were stopped as patient was initially lethargic after extubation but eventually his mental status improved and patient is now AAOx3.     Patient also had alcoholic hepatitis not requiring steroid treatment. Patient had possible pancreatitis with lipase of 400s and abdominal pain (without CT imaging evidence). Patient tested positive for coronavirus (not covid). Patient met sepsis criteria with a positive UA, coronavirus +ve, CXR - multifocal bilateral peripheral opacities and distended abdomen. Patient developed septic shock. A cental line was placed and he was started on vasopressors. Patient was also started on Zosyn. Pneumonia workup was negative. BCX showed NG. Central line, A-Line, and Roman removed on 3/27.    Patient also had distended abdomen. CT abdomen showed distension but no bowel obstruction. NG tube was placed to suction. Patient had ileus. Abdominal pressure was as high as 25 but it trended down. Surgery was consulted, and did not plan any acute intervention. Pt was started on lactulose and given one dose of neostigmine, which resulted in bradycardia and required atropine to reverse the cholinergic effects. The following morning, pt had a large bowel movement and surgery signed off. Repeat abdominal x-ray showed great improvement from previous image.  He continued to spike fevers while on Zosyn therapy however has been a febrile since 3/29.     Mental status has improved but abdomen still remains distended. He had persistent diarrhea on the lactulose so that was discontinued and he began Rifaximin on 4/1 to treat suspected underlying hepatic encephalopathy. He is currently A&Ox3 but can wax and wane throughout the day. He had abdominal ultrasound on 4/2 showing a dilated CBD and sludge. It is recommended he have GI follow-up and encouraged to get out of bed to chair.      Patient signed out to resident Dr. Cano and attending Dr. Sandoval.     Things to follow:  [ ] Abdominal x-rays  [ ] Trend WBC  [ ] Repeat sepsis work up if febrile.   [ ] Trend T. Bili.  [ ] MRCP

## 2024-04-03 DIAGNOSIS — A41.9 SEPSIS, UNSPECIFIED ORGANISM: ICD-10-CM

## 2024-04-03 DIAGNOSIS — K70.10 ALCOHOLIC HEPATITIS WITHOUT ASCITES: ICD-10-CM

## 2024-04-03 DIAGNOSIS — Z29.9 ENCOUNTER FOR PROPHYLACTIC MEASURES, UNSPECIFIED: ICD-10-CM

## 2024-04-03 DIAGNOSIS — K76.82 HEPATIC ENCEPHALOPATHY: ICD-10-CM

## 2024-04-03 DIAGNOSIS — K56.7 ILEUS, UNSPECIFIED: ICD-10-CM

## 2024-04-03 LAB
ALBUMIN SERPL ELPH-MCNC: 1.7 G/DL — LOW (ref 3.5–5)
ALP SERPL-CCNC: 303 U/L — HIGH (ref 40–120)
ALT FLD-CCNC: 43 U/L DA — SIGNIFICANT CHANGE UP (ref 10–60)
ANION GAP SERPL CALC-SCNC: 6 MMOL/L — SIGNIFICANT CHANGE UP (ref 5–17)
ANISOCYTOSIS BLD QL: SLIGHT — SIGNIFICANT CHANGE UP
ANNOTATION COMMENT IMP: SIGNIFICANT CHANGE UP
APPEARANCE UR: CLEAR — SIGNIFICANT CHANGE UP
AST SERPL-CCNC: 137 U/L — HIGH (ref 10–40)
BASOPHILS # BLD AUTO: 0.22 K/UL — HIGH (ref 0–0.2)
BASOPHILS NFR BLD AUTO: 1 % — SIGNIFICANT CHANGE UP (ref 0–2)
BILIRUB SERPL-MCNC: 14.6 MG/DL — HIGH (ref 0.2–1.2)
BILIRUB UR-MCNC: ABNORMAL
BUN SERPL-MCNC: 15 MG/DL — SIGNIFICANT CHANGE UP (ref 7–18)
CALCIUM SERPL-MCNC: 8.2 MG/DL — LOW (ref 8.4–10.5)
CHLORIDE SERPL-SCNC: 114 MMOL/L — HIGH (ref 96–108)
CO2 SERPL-SCNC: 22 MMOL/L — SIGNIFICANT CHANGE UP (ref 22–31)
COLOR SPEC: SIGNIFICANT CHANGE UP
CREAT SERPL-MCNC: 0.84 MG/DL — SIGNIFICANT CHANGE UP (ref 0.5–1.3)
DIFF PNL FLD: NEGATIVE — SIGNIFICANT CHANGE UP
EGFR: 116 ML/MIN/1.73M2 — SIGNIFICANT CHANGE UP
EOSINOPHIL # BLD AUTO: 0.44 K/UL — SIGNIFICANT CHANGE UP (ref 0–0.5)
EOSINOPHIL NFR BLD AUTO: 2 % — SIGNIFICANT CHANGE UP (ref 0–6)
GLUCOSE BLDC GLUCOMTR-MCNC: 112 MG/DL — HIGH (ref 70–99)
GLUCOSE BLDC GLUCOMTR-MCNC: 115 MG/DL — HIGH (ref 70–99)
GLUCOSE SERPL-MCNC: 107 MG/DL — HIGH (ref 70–99)
GLUCOSE UR QL: NEGATIVE MG/DL — SIGNIFICANT CHANGE UP
HCT VFR BLD CALC: 28.6 % — LOW (ref 39–50)
HEV RNA SERPL NAA+PROBE-ACNC: SIGNIFICANT CHANGE UP IU/ML
HEV RNA SERPL NAA+PROBE-LOG IU: <3.3 LOG IU/ML — SIGNIFICANT CHANGE UP
HGB BLD-MCNC: 9 G/DL — LOW (ref 13–17)
HYPOCHROMIA BLD QL: SLIGHT — SIGNIFICANT CHANGE UP
KETONES UR-MCNC: NEGATIVE MG/DL — SIGNIFICANT CHANGE UP
LEUKOCYTE ESTERASE UR-ACNC: ABNORMAL
LYMPHOCYTES # BLD AUTO: 12 % — LOW (ref 13–44)
LYMPHOCYTES # BLD AUTO: 2.64 K/UL — SIGNIFICANT CHANGE UP (ref 1–3.3)
MACROCYTES BLD QL: SLIGHT — SIGNIFICANT CHANGE UP
MAGNESIUM SERPL-MCNC: 2.5 MG/DL — SIGNIFICANT CHANGE UP (ref 1.6–2.6)
MANUAL SMEAR VERIFICATION: SIGNIFICANT CHANGE UP
MCHC RBC-ENTMCNC: 31.5 GM/DL — LOW (ref 32–36)
MCHC RBC-ENTMCNC: 34.1 PG — HIGH (ref 27–34)
MCV RBC AUTO: 108.3 FL — HIGH (ref 80–100)
MONOCYTES # BLD AUTO: 0.44 K/UL — SIGNIFICANT CHANGE UP (ref 0–0.9)
MONOCYTES NFR BLD AUTO: 2 % — SIGNIFICANT CHANGE UP (ref 2–14)
MYELOCYTES NFR BLD: 2 % — HIGH (ref 0–0)
NEUTROPHILS # BLD AUTO: 17.58 K/UL — HIGH (ref 1.8–7.4)
NEUTROPHILS NFR BLD AUTO: 72 % — SIGNIFICANT CHANGE UP (ref 43–77)
NEUTS BAND # BLD: 8 % — SIGNIFICANT CHANGE UP (ref 0–8)
NITRITE UR-MCNC: POSITIVE
NRBC # BLD: 1 /100 WBCS — HIGH (ref 0–0)
PH UR: 5.5 — SIGNIFICANT CHANGE UP (ref 5–8)
PHOSPHATE SERPL-MCNC: 2.7 MG/DL — SIGNIFICANT CHANGE UP (ref 2.5–4.5)
PLAT MORPH BLD: NORMAL — SIGNIFICANT CHANGE UP
PLATELET # BLD AUTO: 339 K/UL — SIGNIFICANT CHANGE UP (ref 150–400)
PLATELET COUNT - ESTIMATE: NORMAL — SIGNIFICANT CHANGE UP
POIKILOCYTOSIS BLD QL AUTO: SLIGHT — SIGNIFICANT CHANGE UP
POLYCHROMASIA BLD QL SMEAR: SLIGHT — SIGNIFICANT CHANGE UP
POTASSIUM SERPL-MCNC: 3.8 MMOL/L — SIGNIFICANT CHANGE UP (ref 3.5–5.3)
POTASSIUM SERPL-SCNC: 3.8 MMOL/L — SIGNIFICANT CHANGE UP (ref 3.5–5.3)
PROT SERPL-MCNC: 5.4 G/DL — LOW (ref 6–8.3)
PROT UR-MCNC: 30 MG/DL
RBC # BLD: 2.64 M/UL — LOW (ref 4.2–5.8)
RBC # FLD: 22.6 % — HIGH (ref 10.3–14.5)
RBC BLD AUTO: ABNORMAL
RBC CASTS # UR COMP ASSIST: 2 /HPF — SIGNIFICANT CHANGE UP (ref 0–4)
SODIUM SERPL-SCNC: 142 MMOL/L — SIGNIFICANT CHANGE UP (ref 135–145)
SP GR SPEC: 1.02 — SIGNIFICANT CHANGE UP (ref 1–1.03)
SPECIMEN SOURCE: SIGNIFICANT CHANGE UP
UROBILINOGEN FLD QL: 1 MG/DL — SIGNIFICANT CHANGE UP (ref 0.2–1)
VARIANT LYMPHS # BLD: 1 % — SIGNIFICANT CHANGE UP (ref 0–6)
WBC # BLD: 21.98 K/UL — HIGH (ref 3.8–10.5)
WBC # FLD AUTO: 21.98 K/UL — HIGH (ref 3.8–10.5)
WBC UR QL: 3 /HPF — SIGNIFICANT CHANGE UP (ref 0–5)

## 2024-04-03 PROCEDURE — 99233 SBSQ HOSP IP/OBS HIGH 50: CPT | Mod: GC

## 2024-04-03 PROCEDURE — 74018 RADEX ABDOMEN 1 VIEW: CPT | Mod: 26

## 2024-04-03 PROCEDURE — 71045 X-RAY EXAM CHEST 1 VIEW: CPT | Mod: 26

## 2024-04-03 PROCEDURE — 74182 MRI ABDOMEN W/CONTRAST: CPT | Mod: 26

## 2024-04-03 RX ORDER — MEROPENEM 1 G/30ML
INJECTION INTRAVENOUS
Refills: 0 | Status: DISCONTINUED | OUTPATIENT
Start: 2024-04-03 | End: 2024-04-09

## 2024-04-03 RX ORDER — ACETAMINOPHEN 500 MG
650 TABLET ORAL EVERY 6 HOURS
Refills: 0 | Status: DISCONTINUED | OUTPATIENT
Start: 2024-04-03 | End: 2024-04-11

## 2024-04-03 RX ORDER — LACTULOSE 10 G/15ML
10 SOLUTION ORAL
Refills: 0 | Status: DISCONTINUED | OUTPATIENT
Start: 2024-04-03 | End: 2024-04-06

## 2024-04-03 RX ORDER — SODIUM CHLORIDE 9 MG/ML
500 INJECTION, SOLUTION INTRAVENOUS ONCE
Refills: 0 | Status: COMPLETED | OUTPATIENT
Start: 2024-04-03 | End: 2024-04-03

## 2024-04-03 RX ORDER — ACETAMINOPHEN 500 MG
650 TABLET ORAL EVERY 6 HOURS
Refills: 0 | Status: DISCONTINUED | OUTPATIENT
Start: 2024-04-03 | End: 2024-04-03

## 2024-04-03 RX ORDER — MEROPENEM 1 G/30ML
1000 INJECTION INTRAVENOUS EVERY 8 HOURS
Refills: 0 | Status: DISCONTINUED | OUTPATIENT
Start: 2024-04-03 | End: 2024-04-09

## 2024-04-03 RX ORDER — MEROPENEM 1 G/30ML
1000 INJECTION INTRAVENOUS ONCE
Refills: 0 | Status: COMPLETED | OUTPATIENT
Start: 2024-04-03 | End: 2024-04-03

## 2024-04-03 RX ORDER — ALBUMIN HUMAN 25 %
100 VIAL (ML) INTRAVENOUS EVERY 6 HOURS
Refills: 0 | Status: COMPLETED | OUTPATIENT
Start: 2024-04-03 | End: 2024-04-04

## 2024-04-03 RX ORDER — ACETAMINOPHEN 500 MG
1000 TABLET ORAL ONCE
Refills: 0 | Status: DISCONTINUED | OUTPATIENT
Start: 2024-04-03 | End: 2024-04-03

## 2024-04-03 RX ORDER — FUROSEMIDE 40 MG
20 TABLET ORAL ONCE
Refills: 0 | Status: COMPLETED | OUTPATIENT
Start: 2024-04-03 | End: 2024-04-03

## 2024-04-03 RX ADMIN — MEROPENEM 100 MILLIGRAM(S): 1 INJECTION INTRAVENOUS at 21:30

## 2024-04-03 RX ADMIN — Medication 650 MILLIGRAM(S): at 21:20

## 2024-04-03 RX ADMIN — PANTOPRAZOLE SODIUM 40 MILLIGRAM(S): 20 TABLET, DELAYED RELEASE ORAL at 11:29

## 2024-04-03 RX ADMIN — Medication 100 MILLIGRAM(S): at 18:20

## 2024-04-03 RX ADMIN — SIMETHICONE 80 MILLIGRAM(S): 80 TABLET, CHEWABLE ORAL at 11:30

## 2024-04-03 RX ADMIN — Medication 50 MILLILITER(S): at 17:39

## 2024-04-03 RX ADMIN — Medication 1 MILLIGRAM(S): at 11:29

## 2024-04-03 RX ADMIN — HEPARIN SODIUM 5000 UNIT(S): 5000 INJECTION INTRAVENOUS; SUBCUTANEOUS at 17:53

## 2024-04-03 RX ADMIN — SODIUM CHLORIDE 1000 MILLILITER(S): 9 INJECTION, SOLUTION INTRAVENOUS at 03:52

## 2024-04-03 RX ADMIN — LACTULOSE 10 GRAM(S): 10 SOLUTION ORAL at 17:54

## 2024-04-03 RX ADMIN — HEPARIN SODIUM 5000 UNIT(S): 5000 INJECTION INTRAVENOUS; SUBCUTANEOUS at 05:25

## 2024-04-03 RX ADMIN — Medication 20 MILLIGRAM(S): at 21:30

## 2024-04-03 RX ADMIN — Medication 650 MILLIGRAM(S): at 20:48

## 2024-04-03 RX ADMIN — MEROPENEM 100 MILLIGRAM(S): 1 INJECTION INTRAVENOUS at 15:01

## 2024-04-03 RX ADMIN — Medication 1 TABLET(S): at 11:29

## 2024-04-03 NOTE — PROGRESS NOTE ADULT - ASSESSMENT
IMP: This is a 38 yr old man with  alcohol withdrawal seizure with alcohol use disorder, who p/w confusion starting this morning. Admitted for high risk alcohol withdrawal with persistent sinus tachycardia.    Assessment:   # Acute hypoxic resp failure   # Alcohol use disorder  # Alcohol withdrawal  # Acute metabolic encephalopathy  # Atypical pneumonia  # Hx of alcohol seizures  # Possible pancreatitis  # Liver steatosis  # Alcoholic hepatitis  # Sinus tachycardia  # SIRS  # ARABELLA  # Ileus       Plan:   =============NEURO:=============  # Acute metabolic encephalopathy  #sedation  # Alcohol withdrawal  - s/p Ativan, phenobarb, ketamine drip, and precedex drip in the setting of mechanical ventilation and AMS  - now off all sedation, mental status improved, A&Ox3    # Alcohol use disorder  # Hx of alcohol seizures  - Pt has been in and out of alcohol rehab  - Most recently came out 6 months ago - as per sister pt was only hospitalized and not in rehab   - SW consult order placed     =============CARDIO:================  # Sinus tachycardia in the setting of acute alcohol withdrawal  - P/w sinus tachycardia  - EKG = sinus tachycardia  - trop neg  - RESOLVED      ==========PULM:=============  #Atypical pneumonia   # coronavirus positive however not COVID-19  - CT chest : Multifocal bilateral peripheral opacities suggesting atypical pneumonia. Probable compression atelectasis right lung base, related to elevated right hemidiaphragm.  - Completed course of zosyn 3/30      ==========GI:==========  #ileus with increased abdominal pressure of 25   - initial abdominal pressures 25,   - surgery consulted, no indication for surgery, no indication for rectal tube for colonic decompression  - status post Sump tube for gastric decompression, now d/c'd  - following several days without improvement, Neostigmine administered on 3/25 x1, complicated by bradycardia requiring atropine  - abdominal pressure downtrended  - after 2 days of lactulose, first BM passed (large size) on 3/26   - d/c lactulose  - continues to have distension with +flatus and BMs  - f/u daily abdominal x-rays; showing gradual improvement  - Encourage pt to be OOBTC  -advance to regular diet  - started Rifaximin 4/2      # Alcoholic hepatitis   #transaminitis  #Hyperbilirubinemia   - P/w T bili 6.6 (Dir 5.1), , ALT 41,   - initial Maddrey score = borderline at 31.4 points (32 point is the cutoff for steroid treatment), has since decreased to 28.7 as of 3/24  - MELD-Na score 17 points 6 % 90day mortality   - Transaminitis improving   - T. Hector initially downtrended 6.6 > 5.5, now up trended, now 10.8 today  -  hepatitis panel negative   - ceruloplasmin elevated, 39  - Hepatology following Dr. Wiley      # concern for pancreatitis  - Lipase 470, possible mid abdominal pain. Negative imaging.  - repeat Lipase 86, not concerning for pancreatitis    # Liver steatosis    # Liver lesion  - CT = Has hypodense foci with central hyper density involving the pericholecystic region and segment 6, indeterminate.   - Rec outpt MRI  - Hepatology following- Dr. Wiley       #Nutrition  -Advance to regular diet    =======RENAL:==========  #Hypoalbuminemia  -IV Albumin 50 q6 until 4/1    #Metabolic Acidosis  -ABG 3/25 showing bicarb of 10  -Vent settings adjusted  -RESOLVED    # ARABELLA  P/w SCr 1.77 (baseline 0.8)  S/p 23L IVF in ED  urinary retention 1.5 L s/p straight cath overnight  - Orman placed on 3/22, removed 3/28  3/22 Scr improved to 0.85   Renal US: No hydronephrosis.   RESOLVED    # Lactic acidosis  P/w Lactate 10  Due to dehydration, infection-  s/p 3L IVF -> Lactate 1.6  RESOLVED    # Hypokalemia  - K 3.3 s/p Kcl 10 mEq x3 ->3.9   - repeat 3.3 will replete IV  RESOLVED     =====INFECTIOUS=====  # SIRS in the setting of coronavirus infection  -p/w Lactate 10, tachycardia, tachypnea>20, RVP Positive coronavirus  -CXR neg for consolidation or effusion, legionella neg   - UA pos, culture not sent  - s/p Cefepime in ED and s/p Ceftriaxone (d/c'd 3/23)  - completed course of Zosyn ( 3/23-3/30)   - BCx 3/21 NGTD   - however on 3/27 continued to spike fevers  - all indwelling lines and tubes discontinued: Central Line, A line, and Roman   - now afebrile >48 hours    ==============HEME:===========  # Leukocytosis  - As above in SIRS    =======ENDO:=======  A1c 7.9  - glucose wnl  - d/c sliding scale    =========SKIN/CATHETERS=======  Roman- REMOVED  RIJ- REMOVED  [place] Sump- REMOVED  Left A-Line- REMOVED    =========PPX:========  - Hep SC  - PPI    Pt. is a 38 yr old man with  alcohol withdrawal seizure with alcohol use disorder, who p/w confusion starting this morning. Admitted for high risk alcohol withdrawal, intubated 3/22-26, s/p sedation with ketamine, precedex, phenobarb. Underwent sepsis w/u, however pt still presenting septic (WC 20s, and tachycardic, tachypnea). Pending repeat BCx this AM, CXR and repeat abdominal Xray. Course complicated by ileus, rectal tube in place. ICU downgrade 4/2, resuming treatment on medical floors.

## 2024-04-03 NOTE — PROGRESS NOTE ADULT - PROBLEM SELECTOR PLAN 3
- course complicated by abdominal distention 2/2 ileus (no obstruction)  - f/u abdominal Xray this AM   - s/p lactulose, neostigmine x1 in ICU   - rectal tube in place, consider discontinuing it

## 2024-04-03 NOTE — PROGRESS NOTE ADULT - PROBLEM SELECTOR PLAN 2
- pt found to be septic earlier in hospital course, thought to be secondary to coronavirus, no other specific source   - BCx from admission (3/21)- NGTD   - s/p zosyn course 3/23-/3/30  - Drawing repeat BCx this afternoon (pt still presenting septic (WC 20s, and tachycardic, tachypnea)  - obstructive pattern on LFTs, can be hepatocellular in nature due to the alcoholic hepatitis   - f/u MRCP (possible sepsis source abdomen - cholangitis, etc)  - f/u ID Dr. Sosa

## 2024-04-03 NOTE — PROGRESS NOTE ADULT - NUTRITIONAL ASSESSMENT
This patient has been assessed with a concern for Malnutrition and has been determined to have a diagnosis/diagnoses of Moderate protein-calorie malnutrition.    This patient is being managed with:   Diet Regular-  Entered: Apr 2 2024  9:12AM   This patient has been assessed with a concern for Malnutrition and has been determined to have a diagnosis/diagnoses of Moderate protein-calorie malnutrition.

## 2024-04-03 NOTE — PROGRESS NOTE ADULT - ASSESSMENT
Patient is a 38M with a PMHx of alcohol use disorder (since 20 y/o, heavier last 2 weeks, last drink on the day of admission), withdrawal seizures, prior alcohol rehab, who presented to the ED 3/21 with AMS iso hyperammonemia (172), worsening abdominal distention and jaundice (TBili 6.6 -> 12.9). He was admitted to MICU for withdrawal, aspiration pneumonia, required intubation 3/22, +coronavirus (not Covid-19), and noted increased intraabdominal pressure, ileus, and surgery and GI has been following.   CT a/p showed marked hepatomegaly (29 cm) w/ severe hepatic steatosis. MDF was < 32.   Hepatology was consulted for his ALD, rising bilirubin.    #Alcoholic hepatitis w/ new onset jaundice, elevated transaminases (AST>ALT), and hepatomegaly w/ hepatic steatosis  # Elevated ALP  # Mild coagulopathy  # Hepatic encephalopathy  # Heterogenous foci in GB fossa and segment 6  # AUD w/ withdrawal  No biliary dilatation on CT and US abd, no signs of cholecystitis, however worsening bilirubin (almost mostly direct) and focal hepatic abnormalities on CT, consider adding MRCP to the MRI abd w/wo if no contraindication.   4/1: TBili 12.9, alk phos 299, , ALT 35.  4/2: TBili 13.3, DBili 10.9, alk phos 301, , ALT 42, ammonia 64, INR 1.58, WBC 21.77, Hgb 9.5, plt 332, downgrade to   medical floor  4/3: TBili 14.6, alk phos 303, , ALT 43, WBC 21.98, Hgb 9.0, plt 339    - Completed Zosyn 3/30  4/2: - Meld 3.0: 25  - Jaundice, marked hepatomegaly, severe steatosis likely due to alcoholic hepatitis; 4/2 MDF 35.4, poor prognosis, would trial glucocorticoid therapy however persistent leukocytosis with no clear source. ID following.   - Ferritin 1105H, iron 58L, UIBC 45, TIBC 103L, %sat 56H, ammonia 61, ceruloplasmin 39. Elevated ceruloplasmin and ferritin likely due to acute phase reactants/inflammatory setting.   - EBV DNA PCR detected  - CMV PCR detected  - AMA neg  - HBcAb and HBsAb nonreactive  - Hep C RNA not detected  - Hep E IgM neg  [p] Hep E PCR    	- Trend LFTs and PT/INR daily   	- Fractionated bilirubin with predominant direct bili  	- MRI/MRCP pending, follow up  	- Abd US: no ascites. Hepatosplenomegaly, no intrahepatic biliary duct dilation, increased caliber of extrahepatic CBD 8mm, GB sludge.   	- ID following, appreciate recs   	- Please obtain: HSV/VZV PCRs  	- Please obtain: TRESA, SMA, LKM, IgG subclasses  	- Aspiration, seizure, fall precautions, avoid sedation, continue bowel regimen/lactulose  	- Folic/thiamine supplements  	- Will need alcohol rehab once acute medical issues resolved    This note and its recommendations herein are preliminary until such time as cosigned by an attending.

## 2024-04-03 NOTE — PROGRESS NOTE ADULT - PROBLEM SELECTOR PLAN 1
- presented with acute metabolic encephalopathy in the setting of alcohol withdrawl  - s/p Ativan, phenobarb, ketamine drip, and precedex drip in the setting of mechanical ventilation and AMS  - now extubated, off all sedation, mental status improved, A&Ox2-3  - c/w rifaximin , PPI , thiamine, folic acid, simethicone   - s/p course of lactulose d/c'd 4/1 (ammonia level: 107 > 172 > 61 > 64)   - Maddrey score today 36.7, would benefit from steroid course, however pt is currently septic, will continue to hold on steroids   - MELD score today 22  - AST/ALT/Tbili - 303/137/14.6  - Abdominal ultrasound - Hepatosplenomegaly. No evidence for intrahepatic biliary duct dilatation. Increased caliber of the extrahepatic CBD measuring 8 mm. Gallbladder sludge. No ascites.   - Dr. Hernandez GI/Hepatology following   - f/u MRCP today

## 2024-04-03 NOTE — PROGRESS NOTE ADULT - SUBJECTIVE AND OBJECTIVE BOX
Hepatology Progress Note    Patient is a 38y old  Male who presents with a chief complaint of Alcohol withdrawal (03 Apr 2024 11:27)    Hepatology was consulted for ALD.    24-HOUR INTERVAL EVENTS: Patient resting in bed. Rectal tube patent, endorses abd pain unchanged, otherwise denies cp, sob. Labs reviewed, MRCP pending.     MEDICATIONS  (STANDING):  albumin human 25% IVPB 100 milliLiter(s) IV Intermittent every 6 hours  folic acid 1 milliGRAM(s) Oral daily  heparin   Injectable 5000 Unit(s) SubCutaneous every 12 hours  influenza   Vaccine 0.5 milliLiter(s) IntraMuscular once  lactulose Syrup 10 Gram(s) Oral two times a day  meropenem  IVPB      meropenem  IVPB 1000 milliGRAM(s) IV Intermittent once  meropenem  IVPB 1000 milliGRAM(s) IV Intermittent every 8 hours  multivitamin 1 Tablet(s) Oral daily  pantoprazole   Suspension 40 milliGRAM(s) Oral daily  rifAXIMin 550 milliGRAM(s) Oral two times a day  simethicone 80 milliGRAM(s) Chew daily  thiamine 100 milliGRAM(s) Oral daily    MEDICATIONS  (PRN):    __________________________________________________  REVIEW OF SYSTEMS:  A detailed set of ROS were asked and negative except those outlined in GI HPI above/below.   ________________________________________________  PHYSICAL EXAM    Vital Signs Last 24 Hrs  T(C): 37.9 (03 Apr 2024 12:16), Max: 37.9 (03 Apr 2024 12:16)  T(F): 100.2 (03 Apr 2024 12:16), Max: 100.2 (03 Apr 2024 12:16)  HR: 121 (03 Apr 2024 12:16) (101 - 121)  BP: 94/54 (03 Apr 2024 12:16) (93/76 - 102/64)  BP(mean): 68 (02 Apr 2024 21:00) (68 - 87)  RR: 19 (03 Apr 2024 12:16) (18 - 23)  SpO2: 96% (03 Apr 2024 12:16) (94% - 98%)    Parameters below as of 03 Apr 2024 12:16  Patient On (Oxygen Delivery Method): nasal cannula  O2 Flow (L/min): 2        GEN: NAD, jaundice  HEENT: EOMI, icteric sclerae, neck supple, moist mucous membranes  PULM: LCTAB, no wheezing, rales, or rhonchi  CV: RRR, no m/r/g  GI: soft, NT, distended; +BS in all four quadrants  MSK: MICHELLE  NEURO: A&O x 2-3    _________________________________________________  LABS:                        9.0    21.98 )-----------( 339      ( 03 Apr 2024 07:10 )             28.6     04-03    142  |  114<H>  |  15  ----------------------------<  107<H>  3.8   |  22  |  0.84    Ca    8.2<L>      03 Apr 2024 07:10  Phos  2.7     04-03  Mg     2.5     04-03    TPro  5.4<L>  /  Alb  1.7<L>  /  TBili  14.6<H>  /  DBili  x   /  AST  137<H>  /  ALT  43  /  AlkPhos  303<H>  04-03    PT/INR - ( 02 Apr 2024 03:37 )   PT: 17.8 sec;   INR: 1.58 ratio         PTT - ( 02 Apr 2024 03:37 )  PTT:38.7 sec  Urinalysis Basic - ( 03 Apr 2024 07:10 )    Color: x / Appearance: x / SG: x / pH: x  Gluc: 107 mg/dL / Ketone: x  / Bili: x / Urobili: x   Blood: x / Protein: x / Nitrite: x   Leuk Esterase: x / RBC: x / WBC x   Sq Epi: x / Non Sq Epi: x / Bacteria: x      CAPILLARY BLOOD GLUCOSE        SARS-CoV-2: NotDetec (21 Mar 2024 18:45)      RADIOLOGY & ADDITIONAL TESTS:      < from: Xray Abdomen 1 View PORTABLE -Routine (04.03.24 @ 12:59) >    IMPRESSION:    Dense infiltrate/consolidation left upper lobe.  There may also be mild patchy infiltrate on the right.    Persistently distended bowel especially the cecum.  Consider CT scan.      < end of copied text >    < from: US Abdomen Complete (US Abdomen Complete .) (04.02.24 @ 12:20) >  IMPRESSION:  Hepatosplenomegaly. No evidence for intrahepatic biliary duct dilatation.   Increased caliber of the extrahepatic CBD measuring 8 mm. Gallbladder   sludge.    < end of copied text >

## 2024-04-03 NOTE — PROGRESS NOTE ADULT - SUBJECTIVE AND OBJECTIVE BOX
PGY-1 Progress Note discussed with attending    PLEASE CONTACT ON CALL TEAM:  - On Call Team (Please refer to Perez) FROM 5:00 PM - 8:30PM  - Nightfloat Team FROM 8:30 -7:30 AM    INTERVAL HPI/OVERNIGHT EVENTS: Pt A&Ox2, states that he still feels sick but knows he is feeling better. Abdomen still distended, rectal tube in place, condom cath in place.   MEDICATIONS  (STANDING):  folic acid 1 milliGRAM(s) Oral daily  heparin   Injectable 5000 Unit(s) SubCutaneous every 12 hours  influenza   Vaccine 0.5 milliLiter(s) IntraMuscular once  multivitamin 1 Tablet(s) Oral daily  pantoprazole   Suspension 40 milliGRAM(s) Oral daily  rifAXIMin 550 milliGRAM(s) Oral two times a day  simethicone 80 milliGRAM(s) Chew daily  thiamine 100 milliGRAM(s) Oral daily    MEDICATIONS  (PRN):      REVIEW OF SYSTEMS:  CONSTITUTIONAL: No fever, weight loss, or fatigue  RESPIRATORY: No cough, wheezing, chills or hemoptysis; No shortness of breath  CARDIOVASCULAR: No chest pain, palpitations, dizziness, or leg swelling  GASTROINTESTINAL: No abdominal pain. No nausea, vomiting, or hematemesis; No diarrhea or constipation. No melena or hematochezia.  GENITOURINARY: No dysuria or hematuria, urinary frequency  NEUROLOGICAL: No headaches, memory loss, loss of strength, numbness, or tremors  SKIN: No itching, burning, rashes, or lesions     Vital Signs Last 24 Hrs  T(C): 36.7 (03 Apr 2024 05:00), Max: 37.6 (03 Apr 2024 03:27)  T(F): 98.1 (03 Apr 2024 05:00), Max: 99.7 (03 Apr 2024 03:27)  HR: 120 (03 Apr 2024 11:15) (101 - 120)  BP: 97/56 (03 Apr 2024 11:15) (93/76 - 110/78)  BP(mean): 68 (02 Apr 2024 21:00) (68 - 88)  RR: 18 (03 Apr 2024 05:00) (18 - 25)  SpO2: 95% (03 Apr 2024 11:15) (94% - 98%)    Parameters below as of 03 Apr 2024 11:15  Patient On (Oxygen Delivery Method): nasal cannula  O2 Flow (L/min): 2      PHYSICAL EXAMINATION:  GENERAL: NAD, well built  HEAD:  Atraumatic, Normocephalic  EYES:  conjunctiva and sclera clear  NECK: Supple, No JVD, Normal thyroid  CHEST/LUNG: Clear to auscultation. Clear to percussion bilaterally; No rales, rhonchi, wheezing, or rubs  HEART: Regular rate and rhythm; No murmurs, rubs, or gallops  ABDOMEN: Soft, Nontender, Nondistended; Bowel sounds present  NERVOUS SYSTEM:  Alert & Oriented X3,    EXTREMITIES:  2+ Peripheral Pulses, No clubbing, cyanosis, or edema  SKIN: warm dry                          9.0    21.98 )-----------( 339      ( 03 Apr 2024 07:10 )             28.6     04-03    142  |  114<H>  |  15  ----------------------------<  107<H>  3.8   |  22  |  0.84    Ca    8.2<L>      03 Apr 2024 07:10  Phos  2.7     04-03  Mg     2.5     04-03    TPro  5.4<L>  /  Alb  1.7<L>  /  TBili  14.6<H>  /  DBili  x   /  AST  137<H>  /  ALT  43  /  AlkPhos  303<H>  04-03    LIVER FUNCTIONS - ( 03 Apr 2024 07:10 )  Alb: 1.7 g/dL / Pro: 5.4 g/dL / ALK PHOS: 303 U/L / ALT: 43 U/L DA / AST: 137 U/L / GGT: x               PT/INR - ( 02 Apr 2024 03:37 )   PT: 17.8 sec;   INR: 1.58 ratio         PTT - ( 02 Apr 2024 03:37 )  PTT:38.7 sec    CAPILLARY BLOOD GLUCOSE      RADIOLOGY & ADDITIONAL TESTS:                   PGY-1 Progress Note discussed with attending    PLEASE CONTACT ON CALL TEAM:  - On Call Team (Please refer to Perez) FROM 5:00 PM - 8:30PM  - Nightfloat Team FROM 8:30 -7:30 AM    INTERVAL HPI/OVERNIGHT EVENTS: Pt A&Ox2, states that he still feels sick but knows he is feeling better. Abdomen still distended, rectal tube in place, condom cath in place.   MEDICATIONS  (STANDING):  folic acid 1 milliGRAM(s) Oral daily  heparin   Injectable 5000 Unit(s) SubCutaneous every 12 hours  influenza   Vaccine 0.5 milliLiter(s) IntraMuscular once  multivitamin 1 Tablet(s) Oral daily  pantoprazole   Suspension 40 milliGRAM(s) Oral daily  rifAXIMin 550 milliGRAM(s) Oral two times a day  simethicone 80 milliGRAM(s) Chew daily  thiamine 100 milliGRAM(s) Oral daily    MEDICATIONS  (PRN):      REVIEW OF SYSTEMS:  CONSTITUTIONAL: No fever, weight loss, (+) fatigue  RESPIRATORY: No cough, wheezing, chills or hemoptysis; (+) shortness of breath  CARDIOVASCULAR: No chest pain, palpitations, dizziness, or leg swelling  GASTROINTESTINAL: No abdominal pain. No nausea, vomiting, or hematemesis; No diarrhea or constipation. No melena or hematochezia.  GENITOURINARY: No dysuria or hematuria, urinary frequency  NEUROLOGICAL: No headaches, memory loss, loss of strength, numbness, or tremors  SKIN: No itching, burning, rashes, or lesions     Vital Signs Last 24 Hrs  T(C): 36.7 (03 Apr 2024 05:00), Max: 37.6 (03 Apr 2024 03:27)  T(F): 98.1 (03 Apr 2024 05:00), Max: 99.7 (03 Apr 2024 03:27)  HR: 120 (03 Apr 2024 11:15) (101 - 120)  BP: 97/56 (03 Apr 2024 11:15) (93/76 - 110/78)  BP(mean): 68 (02 Apr 2024 21:00) (68 - 88)  RR: 18 (03 Apr 2024 05:00) (18 - 25)  SpO2: 95% (03 Apr 2024 11:15) (94% - 98%)    Parameters below as of 03 Apr 2024 11:15  Patient On (Oxygen Delivery Method): nasal cannula  O2 Flow (L/min): 2      PHYSICAL EXAMINATION:  GENERAL: NAD, disheveled appearing, warm to the touch and diaphoretic   HEAD:  Atraumatic, Normocephalic  EYES:  conjunctiva and sclera clear  NECK: Supple, No JVD, Normal thyroid  CHEST/LUNG: Clear to percussion bilaterally; No rales, rhonchi, (+) mild wheezing auscultated, or rubs, tachypnea noted on RA  HEART: Regular rate and rhythm; No murmurs, rubs, or gallops  ABDOMEN: (+) firm, distended, decreased bowel sounds   NERVOUS SYSTEM:  Alert & Oriented X2, not oriented to date, otherwise oriented to place, and name, no focal neuro deficits   EXTREMITIES:  2+ Peripheral Pulses, No clubbing, cyanosis, (+) +3 pitting edema in b/l LE , R>L   SKIN: warm, rectal tube in place, condom cath in place                           9.0    21.98 )-----------( 339      ( 03 Apr 2024 07:10 )             28.6     04-03    142  |  114<H>  |  15  ----------------------------<  107<H>  3.8   |  22  |  0.84    Ca    8.2<L>      03 Apr 2024 07:10  Phos  2.7     04-03  Mg     2.5     04-03    TPro  5.4<L>  /  Alb  1.7<L>  /  TBili  14.6<H>  /  DBili  x   /  AST  137<H>  /  ALT  43  /  AlkPhos  303<H>  04-03    LIVER FUNCTIONS - ( 03 Apr 2024 07:10 )  Alb: 1.7 g/dL / Pro: 5.4 g/dL / ALK PHOS: 303 U/L / ALT: 43 U/L DA / AST: 137 U/L / GGT: x               PT/INR - ( 02 Apr 2024 03:37 )   PT: 17.8 sec;   INR: 1.58 ratio         PTT - ( 02 Apr 2024 03:37 )  PTT:38.7 sec    CAPILLARY BLOOD GLUCOSE      RADIOLOGY & ADDITIONAL TESTS:

## 2024-04-04 LAB
ALBUMIN SERPL ELPH-MCNC: 2.2 G/DL — LOW (ref 3.5–5)
ALP SERPL-CCNC: 299 U/L — HIGH (ref 40–120)
ALT FLD-CCNC: 43 U/L DA — SIGNIFICANT CHANGE UP (ref 10–60)
ANION GAP SERPL CALC-SCNC: 6 MMOL/L — SIGNIFICANT CHANGE UP (ref 5–17)
APTT BLD: 40.3 SEC — HIGH (ref 24.5–35.6)
AST SERPL-CCNC: 132 U/L — HIGH (ref 10–40)
BASOPHILS # BLD AUTO: 0.31 K/UL — HIGH (ref 0–0.2)
BASOPHILS NFR BLD AUTO: 1.3 % — SIGNIFICANT CHANGE UP (ref 0–2)
BILIRUB SERPL-MCNC: 16.7 MG/DL — HIGH (ref 0.2–1.2)
BUN SERPL-MCNC: 14 MG/DL — SIGNIFICANT CHANGE UP (ref 7–18)
CALCIUM SERPL-MCNC: 7.8 MG/DL — LOW (ref 8.4–10.5)
CHLORIDE SERPL-SCNC: 114 MMOL/L — HIGH (ref 96–108)
CO2 SERPL-SCNC: 22 MMOL/L — SIGNIFICANT CHANGE UP (ref 22–31)
CREAT SERPL-MCNC: 0.84 MG/DL — SIGNIFICANT CHANGE UP (ref 0.5–1.3)
CULTURE RESULTS: NO GROWTH — SIGNIFICANT CHANGE UP
EGFR: 116 ML/MIN/1.73M2 — SIGNIFICANT CHANGE UP
EOSINOPHIL # BLD AUTO: 0.46 K/UL — SIGNIFICANT CHANGE UP (ref 0–0.5)
EOSINOPHIL NFR BLD AUTO: 1.9 % — SIGNIFICANT CHANGE UP (ref 0–6)
GLUCOSE SERPL-MCNC: 100 MG/DL — HIGH (ref 70–99)
HCT VFR BLD CALC: 27.9 % — LOW (ref 39–50)
HGB BLD-MCNC: 8.7 G/DL — LOW (ref 13–17)
IMM GRANULOCYTES NFR BLD AUTO: 3.1 % — HIGH (ref 0–0.9)
INR BLD: 2.07 RATIO — HIGH (ref 0.85–1.18)
LEGIONELLA AG UR QL: NEGATIVE — SIGNIFICANT CHANGE UP
LYMPHOCYTES # BLD AUTO: 1.84 K/UL — SIGNIFICANT CHANGE UP (ref 1–3.3)
LYMPHOCYTES # BLD AUTO: 7.8 % — LOW (ref 13–44)
M PNEUMO IGM SER-ACNC: 0.35 INDEX — SIGNIFICANT CHANGE UP (ref 0–0.9)
MAGNESIUM SERPL-MCNC: 2.3 MG/DL — SIGNIFICANT CHANGE UP (ref 1.6–2.6)
MCHC RBC-ENTMCNC: 31.2 GM/DL — LOW (ref 32–36)
MCHC RBC-ENTMCNC: 34.1 PG — HIGH (ref 27–34)
MCV RBC AUTO: 109.4 FL — HIGH (ref 80–100)
MONOCYTES # BLD AUTO: 1.26 K/UL — HIGH (ref 0–0.9)
MONOCYTES NFR BLD AUTO: 5.3 % — SIGNIFICANT CHANGE UP (ref 2–14)
MYCOPLASMA AG SPEC QL: NEGATIVE — SIGNIFICANT CHANGE UP
NEUTROPHILS # BLD AUTO: 19.07 K/UL — HIGH (ref 1.8–7.4)
NEUTROPHILS NFR BLD AUTO: 80.6 % — HIGH (ref 43–77)
NRBC # BLD: 0 /100 WBCS — SIGNIFICANT CHANGE UP (ref 0–0)
PHOSPHATE SERPL-MCNC: 3.4 MG/DL — SIGNIFICANT CHANGE UP (ref 2.5–4.5)
PLATELET # BLD AUTO: 355 K/UL — SIGNIFICANT CHANGE UP (ref 150–400)
POTASSIUM SERPL-MCNC: 3.3 MMOL/L — LOW (ref 3.5–5.3)
POTASSIUM SERPL-SCNC: 3.3 MMOL/L — LOW (ref 3.5–5.3)
PROT SERPL-MCNC: 5.5 G/DL — LOW (ref 6–8.3)
PROTHROM AB SERPL-ACNC: 23.1 SEC — HIGH (ref 9.5–13)
RBC # BLD: 2.55 M/UL — LOW (ref 4.2–5.8)
RBC # FLD: 22.9 % — HIGH (ref 10.3–14.5)
S PNEUM AG UR QL: NEGATIVE — SIGNIFICANT CHANGE UP
SODIUM SERPL-SCNC: 142 MMOL/L — SIGNIFICANT CHANGE UP (ref 135–145)
SPECIMEN SOURCE: SIGNIFICANT CHANGE UP
WBC # BLD: 23.68 K/UL — HIGH (ref 3.8–10.5)
WBC # FLD AUTO: 23.68 K/UL — HIGH (ref 3.8–10.5)

## 2024-04-04 PROCEDURE — 99233 SBSQ HOSP IP/OBS HIGH 50: CPT | Mod: GC

## 2024-04-04 PROCEDURE — 99233 SBSQ HOSP IP/OBS HIGH 50: CPT

## 2024-04-04 RX ORDER — PREDNISOLONE 5 MG
40 TABLET ORAL DAILY
Refills: 0 | Status: DISCONTINUED | OUTPATIENT
Start: 2024-04-04 | End: 2024-04-04

## 2024-04-04 RX ORDER — PREDNISOLONE 5 MG
40 TABLET ORAL DAILY
Refills: 0 | Status: DISCONTINUED | OUTPATIENT
Start: 2024-04-04 | End: 2024-04-08

## 2024-04-04 RX ORDER — FUROSEMIDE 40 MG
20 TABLET ORAL ONCE
Refills: 0 | Status: COMPLETED | OUTPATIENT
Start: 2024-04-04 | End: 2024-04-04

## 2024-04-04 RX ADMIN — LACTULOSE 10 GRAM(S): 10 SOLUTION ORAL at 17:29

## 2024-04-04 RX ADMIN — LACTULOSE 10 GRAM(S): 10 SOLUTION ORAL at 05:32

## 2024-04-04 RX ADMIN — Medication 40 MILLIGRAM(S): at 15:56

## 2024-04-04 RX ADMIN — MEROPENEM 100 MILLIGRAM(S): 1 INJECTION INTRAVENOUS at 15:42

## 2024-04-04 RX ADMIN — PANTOPRAZOLE SODIUM 40 MILLIGRAM(S): 20 TABLET, DELAYED RELEASE ORAL at 11:54

## 2024-04-04 RX ADMIN — SIMETHICONE 80 MILLIGRAM(S): 80 TABLET, CHEWABLE ORAL at 11:55

## 2024-04-04 RX ADMIN — Medication 1 TABLET(S): at 11:55

## 2024-04-04 RX ADMIN — Medication 50 MILLILITER(S): at 11:55

## 2024-04-04 RX ADMIN — HEPARIN SODIUM 5000 UNIT(S): 5000 INJECTION INTRAVENOUS; SUBCUTANEOUS at 05:31

## 2024-04-04 RX ADMIN — Medication 100 MILLIGRAM(S): at 11:55

## 2024-04-04 RX ADMIN — Medication 20 MILLIGRAM(S): at 15:42

## 2024-04-04 RX ADMIN — Medication 1 MILLIGRAM(S): at 11:55

## 2024-04-04 RX ADMIN — Medication 50 MILLILITER(S): at 00:03

## 2024-04-04 RX ADMIN — Medication 50 MILLILITER(S): at 05:03

## 2024-04-04 RX ADMIN — HEPARIN SODIUM 5000 UNIT(S): 5000 INJECTION INTRAVENOUS; SUBCUTANEOUS at 17:29

## 2024-04-04 RX ADMIN — MEROPENEM 100 MILLIGRAM(S): 1 INJECTION INTRAVENOUS at 21:21

## 2024-04-04 RX ADMIN — MEROPENEM 100 MILLIGRAM(S): 1 INJECTION INTRAVENOUS at 05:32

## 2024-04-04 NOTE — PROGRESS NOTE ADULT - ASSESSMENT
Fevers - low grade  Pneumonia - nosocomial  Leukocytosis      Plan - Cont Meropenem 1 gm iv q8hrs  No contraindication to starting steroids from an ID perspective.   Dr Barney will be covering me till 4/11/24.

## 2024-04-04 NOTE — PROGRESS NOTE ADULT - RESPIRATORY
clear to auscultation bilaterally/no wheezes/no rales/no rhonchi
clear to auscultation bilaterally/no wheezes/no rales/no rhonchi/breath sounds equal
no wheezes/no rhonchi/breath sounds equal/rales

## 2024-04-04 NOTE — CHART NOTE - NSCHARTNOTESELECT_GEN_ALL_CORE
Line Removals/Event Note
Surgery consult/Event Note
Transfer Note
Event Note
Family update/Event Note
Nutrition Services
Nutrition Services

## 2024-04-04 NOTE — PROGRESS NOTE ADULT - ASSESSMENT
Patient is a 38M with a PMHx of alcohol use disorder (since 22 y/o, heavier last 2 weeks, last drink on the day of admission), withdrawal seizures, prior alcohol rehab, who presented to the ED 3/21 with AMS iso hyperammonemia (172), worsening abdominal distention and jaundice (TBili 6.6 -> 12.9). He was admitted to MICU for withdrawal, aspiration pneumonia, required intubation 3/22, +coronavirus (not Covid-19), and noted increased intraabdominal pressure, ileus, and surgery and GI has been following.   CT a/p showed marked hepatomegaly (29 cm) w/ severe hepatic steatosis. MDF was < 32.   Hepatology was consulted for his ALD, rising bilirubin.    #Alcoholic hepatitis w/ new onset jaundice, elevated transaminases (AST>ALT), and hepatomegaly w/ hepatic steatosis  # Elevated ALP  # Mild coagulopathy  # Hepatic encephalopathy  # Heterogenous foci in GB fossa and segment 6  # AUD w/ withdrawal  No biliary dilatation on CT and US abd, no signs of cholecystitis, however worsening bilirubin (almost mostly direct) and focal hepatic abnormalities on CT, consider adding MRCP to the MRI abd w/wo if no contraindication.   4/1: TBili 12.9, alk phos 299, , ALT 35.  4/2: TBili 13.3, DBili 10.9, alk phos 301, , ALT 42, ammonia 64, INR 1.58, WBC 21.77, Hgb 9.5, plt 332, downgrade to AI  medical floor. MELD 3.0: 25.   4/3: TBili 14.6, alk phos 303, , ALT 43, WBC 21.98, Hgb 9.0, plt 339, UA+ with small LE and positive nitrite, on meropenem  4/4: WBC 23.68, Hgb 8.7, .4, plt 355, INR 2.07, K 3.3, TBili 16.7, alk phos 299, , ALT 43. Discussed with ID, no contraindication to initiating glucocorticoid therapy, plan to start oral prednisolone today. MRCP report pending.   MELD 3.0: 28.   MDF: 63.2, poor prognosis, may benefit from steroids.     - Completed Zosyn 3/30  - Started IV Meropenem (D1: 4/3)  - Jaundice, marked hepatomegaly, severe steatosis likely due to alcoholic hepatitis; 4/2 MDF 35.4, poor prognosis, would trial glucocorticoid therapy however persistent leukocytosis with no clear source. ID following.   - Ferritin 1105H, iron 58L, UIBC 45, TIBC 103L, %sat 56H, ammonia 61, ceruloplasmin 39. Elevated ceruloplasmin and ferritin likely due to acute phase reactants/inflammatory setting.   - EBV DNA PCR and CMV PCR detected: low suspicion of acute infection, likely old  - AMA neg  - HBcAb and HBsAb nonreactive  - Hep C RNA not detected  - Hep E IgM neg  - Hep E PCR neg    	- Follow up MRCP, read pending  	- Diet as tolerated  	- Initiate PO Prednisolone 40mg daily (D1: 4/4)  	- Trend LFTs and PT/INR daily  	- Fractionated TBili with predominant direct hyperbilirubinemia (4/2)  	- Abd US: no ascites.  Hepatosplenomegaly, no intrahepatic biliary duct dilation, increased caliber of extrahepatic CBD 8mm, GB sludge.   	- Continue lactulose and rifaximin  	- ID following, appreciate recs  	- Please obtain: HSV/VZV PCRs  	- Please obtain: TRESA, SMA, LKM, IgG subclasses  	- Aspiration, seizure, fall precautions, avoid sedation, continue bowel regimen/lactulose  	- Folic/thiamine supplements  	- Will need alcohol rehab once acute medical issues resolved    This note and its recommendations herein are preliminary until such time as cosigned by an attending.

## 2024-04-04 NOTE — PROGRESS NOTE ADULT - PROBLEM SELECTOR PLAN 3
- course complicated by abdominal distention 2/2 ileus (no obstruction)  - abdominal Xray 4/3- showing persistent bowel gas pattern   - s/p lactulose, neostigmine x1 in ICU   - rectal tube in place, consider

## 2024-04-04 NOTE — PROGRESS NOTE ADULT - PROBLEM SELECTOR PLAN 1
- presented with acute metabolic encephalopathy in the setting of alcohol withdrawl  - s/p Ativan, phenobarb, ketamine drip, and precedex drip in the setting of mechanical ventilation and AMS  - now extubated, off all sedation, mental status improved, A&Ox2-3  - c/w rifaximin , PPI , thiamine, folic acid, simethicone   - s/p course of lactulose d/c'd 4/1 (ammonia level: 107 > 172 > 61 > 64)   - Maddrey score 36.7, will start prednisolone 40mg qd   - c/w albumin q6  - MELD score 22  - AST/ALT/Tbili - 303/137/14.6  - Abdominal ultrasound - Hepatosplenomegaly. No evidence for intrahepatic biliary duct dilatation. Increased caliber of the extrahepatic CBD measuring 8 mm. Gallbladder sludge. No ascites.   - Dr. Hernandez GI/Hepatology following   - f/u MRCP read

## 2024-04-04 NOTE — PROGRESS NOTE ADULT - SUBJECTIVE AND OBJECTIVE BOX
Hepatology Progress Note    Patient is a 38y old  Male who presents with a chief complaint of Alcohol withdrawal (03 Apr 2024 14:26)    Hepatology was consulted for Alcohol-associated liver disease.    24-HOUR INTERVAL EVENTS: Patient resting in bed., endorses similar abd pain from prior, denies cp, sob. Labs reviewed. MRCP report pending. UA remains positive however now on meropenem. Discussed with ID, plan to initiate glucocorticoid therapy.     MEDICATIONS  (STANDING):  albumin human 25% IVPB 100 milliLiter(s) IV Intermittent every 6 hours  folic acid 1 milliGRAM(s) Oral daily  heparin   Injectable 5000 Unit(s) SubCutaneous every 12 hours  influenza   Vaccine 0.5 milliLiter(s) IntraMuscular once  lactulose Syrup 10 Gram(s) Oral two times a day  meropenem  IVPB      meropenem  IVPB 1000 milliGRAM(s) IV Intermittent every 8 hours  multivitamin 1 Tablet(s) Oral daily  pantoprazole   Suspension 40 milliGRAM(s) Oral daily  rifAXIMin 550 milliGRAM(s) Oral two times a day  simethicone 80 milliGRAM(s) Chew daily  thiamine 100 milliGRAM(s) Oral daily    MEDICATIONS  (PRN):  acetaminophen     Tablet .. 650 milliGRAM(s) Oral every 6 hours PRN Temp greater or equal to 38C (100.4F)    __________________________________________________  REVIEW OF SYSTEMS:  A detailed set of ROS were asked and negative except those outlined in GI HPI above/below.   ________________________________________________  PHYSICAL EXAM    Vital Signs Last 24 Hrs  T(C): 36.8 (04 Apr 2024 05:15), Max: 38 (03 Apr 2024 20:16)  T(F): 98.2 (04 Apr 2024 05:15), Max: 100.4 (03 Apr 2024 20:16)  HR: 108 (04 Apr 2024 05:15) (108 - 125)  BP: 103/63 (04 Apr 2024 05:15) (94/54 - 103/63)  BP(mean): --  RR: 20 (04 Apr 2024 05:15) (18 - 20)  SpO2: 93% (03 Apr 2024 21:25) (93% - 96%)    Parameters below as of 04 Apr 2024 05:15  Patient On (Oxygen Delivery Method): nasal cannula  O2 Flow (L/min): 95  O2 Concentration (%): 2    GEN: NAD, jaundice  HEENT: EOMI, icteric sclerae, neck supple, moist mucous membranes  PULM: LCTAB, no wheezing, rales, or rhonchi  CV: RRR, no m/r/g  GI: soft, NT, distended; +BS in all four quadrants  MSK: MICHELLE  NEURO: A&O x 2-3    _________________________________________________  LABS:                        8.7    23.68 )-----------( 355      ( 04 Apr 2024 05:18 )             27.9     04-04    142  |  114<H>  |  14  ----------------------------<  100<H>  3.3<L>   |  22  |  0.84    Ca    7.8<L>      04 Apr 2024 05:18  Phos  3.4     04-04  Mg     2.3     04-04    TPro  5.5<L>  /  Alb  2.2<L>  /  TBili  16.7<H>  /  DBili  x   /  AST  132<H>  /  ALT  43  /  AlkPhos  299<H>  04-04    PT/INR - ( 04 Apr 2024 05:18 )   PT: 23.1 sec;   INR: 2.07 ratio         PTT - ( 04 Apr 2024 05:18 )  PTT:40.3 sec  Urinalysis Basic - ( 04 Apr 2024 05:18 )    Color: x / Appearance: x / SG: x / pH: x  Gluc: 100 mg/dL / Ketone: x  / Bili: x / Urobili: x   Blood: x / Protein: x / Nitrite: x   Leuk Esterase: x / RBC: x / WBC x   Sq Epi: x / Non Sq Epi: x / Bacteria: x      CAPILLARY BLOOD GLUCOSE      POCT Blood Glucose.: 115 mg/dL (03 Apr 2024 21:36)  POCT Blood Glucose.: 112 mg/dL (03 Apr 2024 17:10)    SARS-CoV-2: NotDetec (21 Mar 2024 18:45)      RADIOLOGY & ADDITIONAL TESTS:      < from: Xray Abdomen 1 View PORTABLE -Routine (04.03.24 @ 12:59) >  IMPRESSION:    Dense infiltrate/consolidation left upper lobe.  There may also be mild patchy infiltrate on the right.    Persistently distended bowel especially the cecum.  Consider CT scan.    < end of copied text >

## 2024-04-04 NOTE — PROGRESS NOTE ADULT - SUBJECTIVE AND OBJECTIVE BOX
38y Male    Meds:  meropenem  IVPB      meropenem  IVPB 1000 milliGRAM(s) IV Intermittent every 8 hours  rifAXIMin 550 milliGRAM(s) Oral two times a day    Allergies    No Known Allergies    Intolerances        VITALS:  Vital Signs Last 24 Hrs  T(C): 37.1 (04 Apr 2024 13:47), Max: 38 (03 Apr 2024 20:16)  T(F): 98.8 (04 Apr 2024 13:47), Max: 100.4 (03 Apr 2024 20:16)  HR: 113 (04 Apr 2024 13:47) (108 - 125)  BP: 118/72 (04 Apr 2024 13:47) (98/61 - 118/72)  BP(mean): --  RR: 18 (04 Apr 2024 13:47) (18 - 20)  SpO2: 94% (04 Apr 2024 13:47) (93% - 94%)    Parameters below as of 04 Apr 2024 13:47  Patient On (Oxygen Delivery Method): nasal cannula  O2 Flow (L/min): 2      LABS/DIAGNOSTIC TESTS:                          8.7    23.68 )-----------( 355      ( 04 Apr 2024 05:18 )             27.9         04-04    142  |  114<H>  |  14  ----------------------------<  100<H>  3.3<L>   |  22  |  0.84    Ca    7.8<L>      04 Apr 2024 05:18  Phos  3.4     04-04  Mg     2.3     04-04    TPro  5.5<L>  /  Alb  2.2<L>  /  TBili  16.7<H>  /  DBili  x   /  AST  132<H>  /  ALT  43  /  AlkPhos  299<H>  04-04      LIVER FUNCTIONS - ( 04 Apr 2024 05:18 )  Alb: 2.2 g/dL / Pro: 5.5 g/dL / ALK PHOS: 299 U/L / ALT: 43 U/L DA / AST: 132 U/L / GGT: x             CULTURES: .Blood Blood  03-21 @ 14:22   No growth at 5 days  --  --      .Blood Blood  03-21 @ 14:12   No growth at 5 days  --  --            RADIOLOGY:< from: MR Abdomen w/ IV Cont (04.03.24 @ 17:22) >  ACC: [id number] EXAM:  MR ABDOMEN IC   ORDERED BY: JAIME CARTER     PROCEDURE DATE:  04/03/2024          INTERPRETATION:  CLINICAL INFORMATION: Alcoholic liver disease. Evaluate   bile duct enlargement. Liver lesion.    COMPARISON: CT abdomen/pelvis 3/21/2024 and 3/26/2018. Abdominal   ultrasound 4/2/2024.    CONTRAST/COMPLICATIONS:  IV Contrast: Gadavist  9 cc administered   1 cc discarded  Oral Contrast: NONE  Complications: None reported at time of study completion    PROCEDURE:  MRI of the abdomen was performed.  MRCP was performed.  The examination is degraded motion artifact. The examination was also   unable to be completed; delayed postcontrast imaging was not obtained.    FINDINGS:  LOWER CHEST: Within normal limits.    LIVER: Enlarged with fatty infiltration. Regions in the right hepatic   lobe which demonstrate increased signal loss on the out of phase imaging   including 3.1 cm (series 14 image 77) and 1.6 cm (image 50) with central   foci of high T2/high T1 signal, likely areas of more focal fatty   infiltration with associated liver lesions, possibly hemangiomas.   Evaluation of the liver adjacent to the gallbladder is limited secondary   to motion artifact on most of the sequences including a pre and   postcontrast sequences, however areas of low signal on the prior CT are   also likely areas of more focal fatty infiltration.  BILE DUCTS: Normal caliber. Common bile duct measures 3 mm.  GALLBLADDER: Distended with small amount of layering high T1 signal,   either sludge or concentrated bile. Small amount of pericholecystic fluid.  SPLEEN: Within normal limits.  PANCREAS: Within normal limits.  ADRENALS: Within normal limits.  KIDNEYS/URETERS: Within normal limits.    VISUALIZED PORTIONS:  BOWEL: Normal caliber.  PERITONEUM: Small amount of ascites.  VESSELS: Abdominal aorta normal in caliber. IVC and main portal vein are   patent.  RETROPERITONEUM/LYMPH NODES: No lymphadenopathy.  ABDOMINAL WALL: Unremarkable.  BONES: No aggressive osseous    IMPRESSION:    Examination limited secondary to motion artifact.    Hepatomegaly with fatty infiltration of the liver.    Areas of increased signal loss on the out of phase imaging measuring 3.1   and 1.6 cm in the right hepatic lobe, likely areas of more focal fatty  infiltration with superimposed liver lesions, possibly hemangiomas; a   follow-up pre and post IV contrast MRI of the abdomen is recommended in 3   months to assess for stability.    Limited evaluation of the liver adjacent to the gallbladder; abnormality   on the prior CT is also likely areas of more focal fatty infiltration;   continued attention is recommended on the follow-up study.    Small amount of ascites.    Distended gallbladder with a small amount of pericholecystic fluid; fluid   may be related to the abdominal ascites.    --- End of Report ---            SALVADOR GARCIA MD; Attending Radiologist  This document has been electronically signed. Apr 4 2024  2:07PM    < end of copied text >  ------------------------------------------------------------------------------------------------------------------------------------------------------------  ACC: [id number] EXAM:  XR CHEST PORTABLE URGENT 1V   ORDERED BY: COSME RIVERA     ACC: [id number] EXAM:  XR ABDOMEN PORTABLE ROUTINE 1V   ORDERED BY: COSME RIVERA     ACC: [id number] EXAM:  XR ABDOMEN PORTABLE ROUTINE 1V   ORDERED BY: SANTOSH KIMBALL    PROCEDURE DATE:  04/02/2024          INTERPRETATION:  Clinical data: Abdominal distention    Portable abdomen 4/2/2024 compared with 4/1/2024    FINDINGS: The stomach is again noted to be distended. Less distention of   the bowel and cecum with overall nonspecific bowel gas pattern. No free   air. Bony structures intact    Portable abdomen 4/3/2024    FINDINGS: Persistent mild distention of the bowel. Cecum presently   measures 10.7 cm increased from yesterday's exam and similar to 4/1/2024.   No free air. Bony structures are intact.    Portable chest 4/3/2024 compared with 3/23/2024    FINDINGS: ET tube, NG tube and right central line removed. Dense   infiltrate/consolidation left upper lobe. There may also be mild patchy   infiltrate onthe right. Elevated right diaphragm. Heart size and   mediastinum stable.    IMPRESSION:    Dense infiltrate/consolidation left upper lobe.  There may also be mild patchy infiltrate on the right.    Persistently distended bowel especially the cecum.  Consider CT scan.    --- End of Report ---             JOSE VIDAL DO; Attending Radiologist  This document has been electronically signed. Apr  3 2024  1:34PM    < end of copied text >        ROS:  [  ] UNABLE TO ELICIT 38y Male who is doing better clinically in that he is more alert and awake and is answering questions appropriately and knows that he is [facility] and his age etc. He started having low grade fevers again and has a persistently high WBC count and was found to have multifocal pneumonia Left > right hence started him on Meropenem yesterday as he completed a 7 day course of Zosyn on 3/30 for possible aspiration Pneumonia. He had an MRI of abdomen and shows  hepatomegaly with fatty infiltration and so GI wants to start Steroids for as he has high LFTs as well. The patient c/o a cough but has no SOB or chest pain. He has diarrhea and has a rectal tube in place.    Meds:  meropenem  IVPB      meropenem  IVPB 1000 milliGRAM(s) IV Intermittent every 8 hours  rifAXIMin 550 milliGRAM(s) Oral two times a day    Allergies    No Known Allergies    Intolerances        VITALS:  Vital Signs Last 24 Hrs  T(C): 37.1 (04 Apr 2024 13:47), Max: 38 (03 Apr 2024 20:16)  T(F): 98.8 (04 Apr 2024 13:47), Max: 100.4 (03 Apr 2024 20:16)  HR: 113 (04 Apr 2024 13:47) (108 - 125)  BP: 118/72 (04 Apr 2024 13:47) (98/61 - 118/72)  BP(mean): --  RR: 18 (04 Apr 2024 13:47) (18 - 20)  SpO2: 94% (04 Apr 2024 13:47) (93% - 94%)    Parameters below as of 04 Apr 2024 13:47  Patient On (Oxygen Delivery Method): nasal cannula  O2 Flow (L/min): 2      LABS/DIAGNOSTIC TESTS:                          8.7    23.68 )-----------( 355      ( 04 Apr 2024 05:18 )             27.9         04-04    142  |  114<H>  |  14  ----------------------------<  100<H>  3.3<L>   |  22  |  0.84    Ca    7.8<L>      04 Apr 2024 05:18  Phos  3.4     04-04  Mg     2.3     04-04    TPro  5.5<L>  /  Alb  2.2<L>  /  TBili  16.7<H>  /  DBili  x   /  AST  132<H>  /  ALT  43  /  AlkPhos  299<H>  04-04      LIVER FUNCTIONS - ( 04 Apr 2024 05:18 )  Alb: 2.2 g/dL / Pro: 5.5 g/dL / ALK PHOS: 299 U/L / ALT: 43 U/L DA / AST: 132 U/L / GGT: x             CULTURES: .Blood Blood  03-21 @ 14:22   No growth at 5 days  --  --      .Blood Blood  03-21 @ 14:12   No growth at 5 days  --  --            RADIOLOGY:< from: MR Abdomen w/ IV Cont (04.03.24 @ 17:22) >  ACC: [id number] EXAM:  MR ABDOMEN IC   ORDERED BY: JAIME CARTER     PROCEDURE DATE:  04/03/2024          INTERPRETATION:  CLINICAL INFORMATION: Alcoholic liver disease. Evaluate   bile duct enlargement. Liver lesion.    COMPARISON: CT abdomen/pelvis 3/21/2024 and 3/26/2018. Abdominal   ultrasound 4/2/2024.    CONTRAST/COMPLICATIONS:  IV Contrast: Gadavist  9 cc administered   1 cc discarded  Oral Contrast: NONE  Complications: None reported at time of study completion    PROCEDURE:  MRI of the abdomen was performed.  MRCP was performed.  The examination is degraded motion artifact. The examination was also   unable to be completed; delayed postcontrast imaging was not obtained.    FINDINGS:  LOWER CHEST: Within normal limits.    LIVER: Enlarged with fatty infiltration. Regions in the right hepatic   lobe which demonstrate increased signal loss on the out of phase imaging   including 3.1 cm (series 14 image 77) and 1.6 cm (image 50) with central   foci of high T2/high T1 signal, likely areas of more focal fatty   infiltration with associated liver lesions, possibly hemangiomas.   Evaluation of the liver adjacent to the gallbladder is limited secondary   to motion artifact on most of the sequences including a pre and   postcontrast sequences, however areas of low signal on the prior CT are   also likely areas of more focal fatty infiltration.  BILE DUCTS: Normal caliber. Common bile duct measures 3 mm.  GALLBLADDER: Distended with small amount of layering high T1 signal,   either sludge or concentrated bile. Small amount of pericholecystic fluid.  SPLEEN: Within normal limits.  PANCREAS: Within normal limits.  ADRENALS: Within normal limits.  KIDNEYS/URETERS: Within normal limits.    VISUALIZED PORTIONS:  BOWEL: Normal caliber.  PERITONEUM: Small amount of ascites.  VESSELS: Abdominal aorta normal in caliber. IVC and main portal vein are   patent.  RETROPERITONEUM/LYMPH NODES: No lymphadenopathy.  ABDOMINAL WALL: Unremarkable.  BONES: No aggressive osseous    IMPRESSION:    Examination limited secondary to motion artifact.    Hepatomegaly with fatty infiltration of the liver.    Areas of increased signal loss on the out of phase imaging measuring 3.1   and 1.6 cm in the right hepatic lobe, likely areas of more focal fatty  infiltration with superimposed liver lesions, possibly hemangiomas; a   follow-up pre and post IV contrast MRI of the abdomen is recommended in 3   months to assess for stability.    Limited evaluation of the liver adjacent to the gallbladder; abnormality   on the prior CT is also likely areas of more focal fatty infiltration;   continued attention is recommended on the follow-up study.    Small amount of ascites.    Distended gallbladder with a small amount of pericholecystic fluid; fluid   may be related to the abdominal ascites.    --- End of Report ---            SALVADOR GARCIA MD; Attending Radiologist  This document has been electronically signed. Apr 4 2024  2:07PM    < end of copied text >  ------------------------------------------------------------------------------------------------------------------------------------------------------------  ACC: [id number] EXAM:  XR CHEST PORTABLE URGENT 1V   ORDERED BY: COSME RIVERA     ACC: [id number] EXAM:  XR ABDOMEN PORTABLE ROUTINE 1V   ORDERED BY: COSME RIVERA     ACC: [id number] EXAM:  XR ABDOMEN PORTABLE ROUTINE 1V   ORDERED BY: SANTOSH CORREA DATE:  04/02/2024          INTERPRETATION:  Clinical data: Abdominal distention    Portable abdomen 4/2/2024 compared with 4/1/2024    FINDINGS: The stomach is again noted to be distended. Less distention of   the bowel and cecum with overall nonspecific bowel gas pattern. No free   air. Bony structures intact    Portable abdomen 4/3/2024    FINDINGS: Persistent mild distention of the bowel. Cecum presently   measures 10.7 cm increased from yesterday's exam and similar to 4/1/2024.   No free air. Bony structures are intact.    Portable chest 4/3/2024 compared with 3/23/2024    FINDINGS: ET tube, NG tube and right central line removed. Dense   infiltrate/consolidation left upper lobe. There may also be mild patchy   infiltrate onthe right. Elevated right diaphragm. Heart size and   mediastinum stable.    IMPRESSION:    Dense infiltrate/consolidation left upper lobe.  There may also be mild patchy infiltrate on the right.    Persistently distended bowel especially the cecum.  Consider CT scan.    --- End of Report ---             JOSE VIDAL DO; Attending Radiologist  This document has been electronically signed. Apr  3 2024  1:34PM    < end of copied text >        ROS:  [  ] UNABLE TO ELICIT

## 2024-04-04 NOTE — PROGRESS NOTE ADULT - PROBLEM SELECTOR PLAN 2
- pt found to be septic earlier in hospital course, thought to be secondary to coronavirus, no other specific source   - BCx from admission (3/21)- NGTD   - s/p zosyn course 3/23-/3/30  - Hossein repeat BCx 4/3 afternoon (pt still presenting septic (WC 20s, and tachycardic, tachypnea)  - WC uptrending today, and LFTs uptrending   - repeat U/A 4/3 negative   - CXR 4/3- showing increased infiltrates b/l   - pt started on meropenem 1g q8 yesterday ( 4/3-)  - obstructive pattern on LFTs, can be hepatocellular in nature due to the alcoholic hepatitis   - f/u MRCP read  - f/u ID Dr. Sosa

## 2024-04-04 NOTE — CHART NOTE - NSCHARTNOTEFT_GEN_A_CORE
Assessment:   38yMalePatient is a 38y old  Male who presents with a chief complaint of Alcohol withdrawal (04 Apr 2024 11:17) Pt visited. Pt is asleep. Observed Lunch meal Untouched. Pt Abdomen distended. Pt w Rectal tube for Decompression. GI Notes noted. Meds Noted. On MVi, Thiamine, Folic acid and Lactulose and Mylicon. Labs Noted. NH3 64 . Will suggest Ensure Enlive BID      Factors impacting intake: [ ] none [ ] nausea  [ ] vomiting [ ] diarrhea [ ] constipation  [ ]chewing problems [ ] swallowing issues  [x ] other:     Diet Prescription: Diet, Regular (04-02-24 @ 09:13)    Intake: < 50 % of meal.    Current Weight:   % Weight Change    Pertinent Medications: MEDICATIONS  (STANDING):  folic acid 1 milliGRAM(s) Oral daily  heparin   Injectable 5000 Unit(s) SubCutaneous every 12 hours  influenza   Vaccine 0.5 milliLiter(s) IntraMuscular once  lactulose Syrup 10 Gram(s) Oral two times a day  meropenem  IVPB      meropenem  IVPB 1000 milliGRAM(s) IV Intermittent every 8 hours  multivitamin 1 Tablet(s) Oral daily  pantoprazole   Suspension 40 milliGRAM(s) Oral daily  prednisoLONE    3 mG/mL Solution (ORAPRED) 40 milliGRAM(s) Oral daily  rifAXIMin 550 milliGRAM(s) Oral two times a day  simethicone 80 milliGRAM(s) Chew daily  thiamine 100 milliGRAM(s) Oral daily    MEDICATIONS  (PRN):  acetaminophen     Tablet .. 650 milliGRAM(s) Oral every 6 hours PRN Temp greater or equal to 38C (100.4F)    Pertinent Labs: 04-04 Na142 mmol/L Glu 100 mg/dL<H> K+ 3.3 mmol/L<L> Cr  0.84 mg/dL BUN 14 mg/dL 04-04 Phos 3.4 mg/dL 04-04 Alb 2.2 g/dL<L> 03-25 Chol --    LDL --    HDL --    Trig 759 mg/dL<H>     CAPILLARY BLOOD GLUCOSE      POCT Blood Glucose.: 115 mg/dL (03 Apr 2024 21:36)  POCT Blood Glucose.: 112 mg/dL (03 Apr 2024 17:10)    Skin: NO DTI    Estimated Needs:   [ ] no change since previous assessment  [ ] recalculated:     Previous Nutrition Diagnosis:   [ ] Inadequate Energy Intake [ ]Inadequate Oral Intake [ ] Excessive Energy Intake   [ ] Underweight [ ] Increased Nutrient Needs [ ] Overweight/Obesity   [ ] Altered GI Function [ ] Unintended Weight Loss [ ] Food & Nutrition Related Knowledge Deficit [x ] Malnutrition Moderate     Nutrition Diagnosis is [ x] ongoing  [ ] resolved [ ] not applicable     New Nutrition Diagnosis: [ ] not applicable       Interventions:   Recommend  [ ] Change Diet To:  [x ] Nutrition Supplement Ensure Enlive BID   [ ] Nutrition Support  [ ] Other:     Monitoring and Evaluation:   [ ] PO intake [ x ] Tolerance to diet prescription [ x ] weights [ x ] labs[ x ] follow up per protocol  [ ] other: Assessment:   38yMalePatient is a 38y old  Male who presents with a chief complaint of Alcohol withdrawal (04 Apr 2024 11:17) Pt visited. Pt is asleep. Observed Lunch meal Untouched. Pt Abdomen distended. Pt w Rectal tube for Decompression. GI Notes noted. Meds Noted. On MVi, Thiamine, Folic acid and Lactulose and Mylicon. Labs Noted. NH3 64 . Will suggest Ensure Enlive BID and  Easy to chew .      Factors impacting intake: [ ] none [ ] nausea  [ ] vomiting [ ] diarrhea [ ] constipation  [ ]chewing problems [ ] swallowing issues  [x ] other:     Diet Prescription: Diet, Regular (04-02-24 @ 09:13)    Intake: < 50 % of meal.    Current Weight:   % Weight Change    Pertinent Medications: MEDICATIONS  (STANDING):  folic acid 1 milliGRAM(s) Oral daily  heparin   Injectable 5000 Unit(s) SubCutaneous every 12 hours  influenza   Vaccine 0.5 milliLiter(s) IntraMuscular once  lactulose Syrup 10 Gram(s) Oral two times a day  meropenem  IVPB      meropenem  IVPB 1000 milliGRAM(s) IV Intermittent every 8 hours  multivitamin 1 Tablet(s) Oral daily  pantoprazole   Suspension 40 milliGRAM(s) Oral daily  prednisoLONE    3 mG/mL Solution (ORAPRED) 40 milliGRAM(s) Oral daily  rifAXIMin 550 milliGRAM(s) Oral two times a day  simethicone 80 milliGRAM(s) Chew daily  thiamine 100 milliGRAM(s) Oral daily    MEDICATIONS  (PRN):  acetaminophen     Tablet .. 650 milliGRAM(s) Oral every 6 hours PRN Temp greater or equal to 38C (100.4F)    Pertinent Labs: 04-04 Na142 mmol/L Glu 100 mg/dL<H> K+ 3.3 mmol/L<L> Cr  0.84 mg/dL BUN 14 mg/dL 04-04 Phos 3.4 mg/dL 04-04 Alb 2.2 g/dL<L> 03-25 Chol --    LDL --    HDL --    Trig 759 mg/dL<H>     CAPILLARY BLOOD GLUCOSE      POCT Blood Glucose.: 115 mg/dL (03 Apr 2024 21:36)  POCT Blood Glucose.: 112 mg/dL (03 Apr 2024 17:10)    Skin: NO DTI    Estimated Needs:   [ ] no change since previous assessment  [ ] recalculated:     Previous Nutrition Diagnosis:   [ ] Inadequate Energy Intake [ ]Inadequate Oral Intake [ ] Excessive Energy Intake   [ ] Underweight [ ] Increased Nutrient Needs [ ] Overweight/Obesity   [ ] Altered GI Function [ ] Unintended Weight Loss [ ] Food & Nutrition Related Knowledge Deficit [x ] Malnutrition Moderate     Nutrition Diagnosis is [ x] ongoing  [ ] resolved [ ] not applicable     New Nutrition Diagnosis: [ ] not applicable       Interventions:   Recommend  [ x] Change Diet To: Easy to chew   [x ] Nutrition Supplement Ensure Enlive BID   [ ] Nutrition Support  [ ] Other:     Monitoring and Evaluation:   [ ] PO intake [ x ] Tolerance to diet prescription [ x ] weights [ x ] labs[ x ] follow up per protocol  [ ] other:

## 2024-04-04 NOTE — PROGRESS NOTE ADULT - ASSESSMENT
Pt. is a 38 yr old man with  alcohol withdrawal seizure with alcohol use disorder, who p/w confusion starting this morning. Admitted for high risk alcohol withdrawal, intubated 3/22-26, s/p sedation with ketamine, precedex, phenobarb. Underwent sepsis w/u, however pt still presenting septic (WC 20s, and tachycardic, tachypnea). Pending repeat BCx this AM, CXR and repeat abdominal Xray. Course complicated by ileus, rectal tube in place. ICU downgrade 4/2, resuming treatment on medical floors.

## 2024-04-04 NOTE — PROGRESS NOTE ADULT - SUBJECTIVE AND OBJECTIVE BOX
PGY-1 Progress Note discussed with attending    PLEASE CONTACT ON CALL TEAM:  - On Call Team (Please refer to Perez) FROM 5:00 PM - 8:30PM  - Nightfloat Team FROM 8:30 -7:30 AM    INTERVAL HPI/OVERNIGHT EVENTS: No acute events overnight. Yesterday, pt underwent sepsis w/u. Rectal tube dislodged, will attempt to re-place.   MEDICATIONS  (STANDING):  folic acid 1 milliGRAM(s) Oral daily  heparin   Injectable 5000 Unit(s) SubCutaneous every 12 hours  influenza   Vaccine 0.5 milliLiter(s) IntraMuscular once  lactulose Syrup 10 Gram(s) Oral two times a day  meropenem  IVPB      meropenem  IVPB 1000 milliGRAM(s) IV Intermittent every 8 hours  multivitamin 1 Tablet(s) Oral daily  pantoprazole   Suspension 40 milliGRAM(s) Oral daily  prednisoLONE    3 mG/mL Solution (ORAPRED) 40 milliGRAM(s) Oral daily  rifAXIMin 550 milliGRAM(s) Oral two times a day  simethicone 80 milliGRAM(s) Chew daily  thiamine 100 milliGRAM(s) Oral daily    MEDICATIONS  (PRN):  acetaminophen     Tablet .. 650 milliGRAM(s) Oral every 6 hours PRN Temp greater or equal to 38C (100.4F)      REVIEW OF SYSTEMS:  CONSTITUTIONAL: No fever, weight loss, or fatigue  RESPIRATORY: No cough, wheezing, chills or hemoptysis; No shortness of breath  CARDIOVASCULAR: No chest pain, palpitations, dizziness, or leg swelling  GASTROINTESTINAL: (+) abdominal distention. No nausea, vomiting, or hematemesis; No diarrhea or constipation. No melena or hematochezia.  GENITOURINARY: No dysuria or hematuria, urinary frequency  NEUROLOGICAL: No headaches, memory loss, loss of strength, numbness, or tremors  SKIN: No itching, burning, rashes, or lesions     Vital Signs Last 24 Hrs  T(C): 36.8 (04 Apr 2024 05:15), Max: 38 (03 Apr 2024 20:16)  T(F): 98.2 (04 Apr 2024 05:15), Max: 100.4 (03 Apr 2024 20:16)  HR: 108 (04 Apr 2024 05:15) (108 - 125)  BP: 103/63 (04 Apr 2024 05:15) (98/61 - 103/63)  BP(mean): --  RR: 20 (04 Apr 2024 05:15) (18 - 20)  SpO2: 93% (03 Apr 2024 21:25) (93% - 94%)    Parameters below as of 04 Apr 2024 05:15  Patient On (Oxygen Delivery Method): nasal cannula  O2 Flow (L/min): 95  O2 Concentration (%): 2    PHYSICAL EXAMINATION:  GENERAL: NAD, disheveled appearing, warm to the touch and diaphoretic   HEAD:  Atraumatic, Normocephalic  EYES:  (+) conjunctiva and sclera have notable jaundice   NECK: Supple, No JVD, Normal thyroid  CHEST/LUNG: Clear to percussion bilaterally; No rales, rhonchi, wheezing, or rubs, tachypnea noted on RA  HEART: Regular rate and rhythm; No murmurs, rubs, or gallops  ABDOMEN: (+) firm, distended, decreased bowel sounds   NERVOUS SYSTEM:  Alert & Oriented X2, not oriented to date, otherwise oriented to place, and name, no focal neuro deficits   EXTREMITIES:  2+ Peripheral Pulses, No clubbing, cyanosis, (+) +3 pitting edema in b/l LE , R>L   SKIN: warm, rectal tube in place, condom cath in place                           8.7    23.68 )-----------( 355      ( 04 Apr 2024 05:18 )             27.9     04-04    142  |  114<H>  |  14  ----------------------------<  100<H>  3.3<L>   |  22  |  0.84    Ca    7.8<L>      04 Apr 2024 05:18  Phos  3.4     04-04  Mg     2.3     04-04    TPro  5.5<L>  /  Alb  2.2<L>  /  TBili  16.7<H>  /  DBili  x   /  AST  132<H>  /  ALT  43  /  AlkPhos  299<H>  04-04    LIVER FUNCTIONS - ( 04 Apr 2024 05:18 )  Alb: 2.2 g/dL / Pro: 5.5 g/dL / ALK PHOS: 299 U/L / ALT: 43 U/L DA / AST: 132 U/L / GGT: x               PT/INR - ( 04 Apr 2024 05:18 )   PT: 23.1 sec;   INR: 2.07 ratio         PTT - ( 04 Apr 2024 05:18 )  PTT:40.3 sec    CAPILLARY BLOOD GLUCOSE      RADIOLOGY & ADDITIONAL TESTS:

## 2024-04-05 LAB
ALBUMIN SERPL ELPH-MCNC: 2.1 G/DL — LOW (ref 3.5–5)
ALP SERPL-CCNC: 278 U/L — HIGH (ref 40–120)
ALT FLD-CCNC: 50 U/L DA — SIGNIFICANT CHANGE UP (ref 10–60)
ANION GAP SERPL CALC-SCNC: 5 MMOL/L — SIGNIFICANT CHANGE UP (ref 5–17)
ANISOCYTOSIS BLD QL: SLIGHT — SIGNIFICANT CHANGE UP
APTT BLD: 40.6 SEC — HIGH (ref 24.5–35.6)
AST SERPL-CCNC: 139 U/L — HIGH (ref 10–40)
BASOPHILS # BLD AUTO: 0 K/UL — SIGNIFICANT CHANGE UP (ref 0–0.2)
BASOPHILS NFR BLD AUTO: 0 % — SIGNIFICANT CHANGE UP (ref 0–2)
BILIRUB SERPL-MCNC: 16.5 MG/DL — HIGH (ref 0.2–1.2)
BUN SERPL-MCNC: 15 MG/DL — SIGNIFICANT CHANGE UP (ref 7–18)
CALCIUM SERPL-MCNC: 7.8 MG/DL — LOW (ref 8.4–10.5)
CHLORIDE SERPL-SCNC: 114 MMOL/L — HIGH (ref 96–108)
CO2 SERPL-SCNC: 24 MMOL/L — SIGNIFICANT CHANGE UP (ref 22–31)
CREAT SERPL-MCNC: 0.73 MG/DL — SIGNIFICANT CHANGE UP (ref 0.5–1.3)
EGFR: 121 ML/MIN/1.73M2 — SIGNIFICANT CHANGE UP
EOSINOPHIL # BLD AUTO: 0 K/UL — SIGNIFICANT CHANGE UP (ref 0–0.5)
EOSINOPHIL NFR BLD AUTO: 0 % — SIGNIFICANT CHANGE UP (ref 0–6)
GIANT PLATELETS BLD QL SMEAR: PRESENT — SIGNIFICANT CHANGE UP
GLUCOSE SERPL-MCNC: 125 MG/DL — HIGH (ref 70–99)
HCT VFR BLD CALC: 30.4 % — LOW (ref 39–50)
HGB BLD-MCNC: 9.3 G/DL — LOW (ref 13–17)
HYPOCHROMIA BLD QL: SLIGHT — SIGNIFICANT CHANGE UP
INR BLD: 2.02 RATIO — HIGH (ref 0.85–1.18)
LG PLATELETS BLD QL AUTO: SLIGHT — SIGNIFICANT CHANGE UP
LYMPHOCYTES # BLD AUTO: 10 % — LOW (ref 13–44)
LYMPHOCYTES # BLD AUTO: 2.58 K/UL — SIGNIFICANT CHANGE UP (ref 1–3.3)
MACROCYTES BLD QL: SLIGHT — SIGNIFICANT CHANGE UP
MAGNESIUM SERPL-MCNC: 2.5 MG/DL — SIGNIFICANT CHANGE UP (ref 1.6–2.6)
MANUAL SMEAR VERIFICATION: SIGNIFICANT CHANGE UP
MCHC RBC-ENTMCNC: 30.6 GM/DL — LOW (ref 32–36)
MCHC RBC-ENTMCNC: 33.8 PG — SIGNIFICANT CHANGE UP (ref 27–34)
MCV RBC AUTO: 110.5 FL — HIGH (ref 80–100)
METAMYELOCYTES # FLD: 2 % — HIGH (ref 0–0)
MICROCYTES BLD QL: SLIGHT — SIGNIFICANT CHANGE UP
MONOCYTES # BLD AUTO: 1.03 K/UL — HIGH (ref 0–0.9)
MONOCYTES NFR BLD AUTO: 4 % — SIGNIFICANT CHANGE UP (ref 2–14)
NEUTROPHILS # BLD AUTO: 21.63 K/UL — HIGH (ref 1.8–7.4)
NEUTROPHILS NFR BLD AUTO: 84 % — HIGH (ref 43–77)
NRBC # BLD: 0 /100 WBCS — SIGNIFICANT CHANGE UP (ref 0–0)
PHOSPHATE SERPL-MCNC: 2.6 MG/DL — SIGNIFICANT CHANGE UP (ref 2.5–4.5)
PLAT MORPH BLD: NORMAL — SIGNIFICANT CHANGE UP
PLATELET # BLD AUTO: 367 K/UL — SIGNIFICANT CHANGE UP (ref 150–400)
POIKILOCYTOSIS BLD QL AUTO: SLIGHT — SIGNIFICANT CHANGE UP
POLYCHROMASIA BLD QL SMEAR: SLIGHT — SIGNIFICANT CHANGE UP
POTASSIUM SERPL-MCNC: 3.3 MMOL/L — LOW (ref 3.5–5.3)
POTASSIUM SERPL-SCNC: 3.3 MMOL/L — LOW (ref 3.5–5.3)
PROT SERPL-MCNC: 5.5 G/DL — LOW (ref 6–8.3)
PROTHROM AB SERPL-ACNC: 22.6 SEC — HIGH (ref 9.5–13)
RBC # BLD: 2.75 M/UL — LOW (ref 4.2–5.8)
RBC # FLD: 23 % — HIGH (ref 10.3–14.5)
RBC BLD AUTO: ABNORMAL
SODIUM SERPL-SCNC: 143 MMOL/L — SIGNIFICANT CHANGE UP (ref 135–145)
STOMATOCYTES BLD QL SMEAR: SLIGHT — SIGNIFICANT CHANGE UP
WBC # BLD: 25.75 K/UL — HIGH (ref 3.8–10.5)
WBC # FLD AUTO: 25.75 K/UL — HIGH (ref 3.8–10.5)

## 2024-04-05 PROCEDURE — 74018 RADEX ABDOMEN 1 VIEW: CPT | Mod: 26

## 2024-04-05 PROCEDURE — 99232 SBSQ HOSP IP/OBS MODERATE 35: CPT

## 2024-04-05 RX ORDER — POTASSIUM CHLORIDE 20 MEQ
40 PACKET (EA) ORAL ONCE
Refills: 0 | Status: COMPLETED | OUTPATIENT
Start: 2024-04-05 | End: 2024-04-05

## 2024-04-05 RX ADMIN — HEPARIN SODIUM 5000 UNIT(S): 5000 INJECTION INTRAVENOUS; SUBCUTANEOUS at 06:48

## 2024-04-05 RX ADMIN — Medication 1 MILLIGRAM(S): at 11:15

## 2024-04-05 RX ADMIN — LACTULOSE 10 GRAM(S): 10 SOLUTION ORAL at 06:49

## 2024-04-05 RX ADMIN — Medication 40 MILLIEQUIVALENT(S): at 11:15

## 2024-04-05 RX ADMIN — MEROPENEM 100 MILLIGRAM(S): 1 INJECTION INTRAVENOUS at 06:48

## 2024-04-05 RX ADMIN — Medication 1 TABLET(S): at 11:15

## 2024-04-05 RX ADMIN — SIMETHICONE 80 MILLIGRAM(S): 80 TABLET, CHEWABLE ORAL at 11:16

## 2024-04-05 RX ADMIN — Medication 100 MILLIGRAM(S): at 11:15

## 2024-04-05 RX ADMIN — PANTOPRAZOLE SODIUM 40 MILLIGRAM(S): 20 TABLET, DELAYED RELEASE ORAL at 11:14

## 2024-04-05 RX ADMIN — LACTULOSE 10 GRAM(S): 10 SOLUTION ORAL at 18:05

## 2024-04-05 RX ADMIN — MEROPENEM 100 MILLIGRAM(S): 1 INJECTION INTRAVENOUS at 21:26

## 2024-04-05 RX ADMIN — MEROPENEM 100 MILLIGRAM(S): 1 INJECTION INTRAVENOUS at 13:38

## 2024-04-05 RX ADMIN — Medication 40 MILLIGRAM(S): at 06:49

## 2024-04-05 RX ADMIN — HEPARIN SODIUM 5000 UNIT(S): 5000 INJECTION INTRAVENOUS; SUBCUTANEOUS at 18:05

## 2024-04-05 NOTE — PROGRESS NOTE ADULT - NS ATTEND AMEND GEN_ALL_CORE FT
Total time spent to complete patient's bedside assessment, physical examination, review medical chart including labs & imaging, discuss medical plan of care with housestaff was more than 25 minutes
a 38M with a PMHx of alcohol use disorder (since 22 y/o, heavier more recently, last drink on the day of admission, LOS 14 days now), withdrawal seizures, prior alcohol rehab, who presented to the ED 3/21 with AMS iso hyperammonemia (172), worsening abdominal distention and jaundice (TBili 6.6 -> 12.9). He was admitted to MICU for withdrawal, aspiration pneumonia, required intubation 3/22, +coronavirus (not Covid-19), and noted increased intraabdominal pressure, ileus, and surgery and GI has been following.   MRCP: no obstructive process.   TB worsening now 16  INR also worsening now 2s  plts elevated 300s and WBC 20s - no source  on dre, ID thinks source PNA  would try steroids for severe alc hep to see if can improve these parameters  Not an OLT candidate due to active EtOH, relatively poor support system
S/p neostigmine with good response, abd less distended. Continue monitor electrolytes, replete aggressively PRN. Avoid opiates and anticholinergics.     Total time spent to complete patient's bedside assessment, physical examination, review medical chart including labs & imaging, discuss medical plan of care with housestaff was more than 25 minutes
Total time spent to complete patient's bedside assessment, physical examination, review medical chart including labs & imaging, discuss medical plan of care with housestaff was more than 25 minutes
Critical but stable. Abdomen - distended, no change in exam.   My impression is that the intra-abdominal hypertension is the result of his critical illness and not the cause of it. The risk of laparotomy outweighs the benefit. Will discuss flex sig / decompression with GI.
38 M EtOH abuse a/w EtOH encephalopathy  + coronavirus infection, not COVID  + febrile whole admission, on zosyn, no source  extubated, though still on pressors  NGT currently for PO meds since unable to follow commands  lactulose TID--dark brown stool in rectal tube bag  AXR reviewed, cecum is approx 13 cm (typically concern starts at 10 cm for perforation) but also rest of the colon much improved  3/25 neostigmine trial noted - good idea, since already in ICU, significant BM response since  stable for clears trial today  will f/u official AXR  will need support to stop drinking upon discharge

## 2024-04-05 NOTE — PROGRESS NOTE ADULT - SUBJECTIVE AND OBJECTIVE BOX
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    24-HOUR INTERVAL EVENTS: Patient resting in bed, mother at bedside spoon-feeding breakfast, patient is tolerating diet, endorses flatus and having a good appetite, denies cp, sob abd pain, n/v. MRCP reviewed with patient, extensive discussion regarding hepatology follow up and strict etoh cessation, he verbalized understanding. Mentation at baseline.     MEDICATIONS  (STANDING):  folic acid 1 milliGRAM(s) Oral daily  heparin   Injectable 5000 Unit(s) SubCutaneous every 12 hours  influenza   Vaccine 0.5 milliLiter(s) IntraMuscular once  lactulose Syrup 10 Gram(s) Oral two times a day  meropenem  IVPB      meropenem  IVPB 1000 milliGRAM(s) IV Intermittent every 8 hours  multivitamin 1 Tablet(s) Oral daily  pantoprazole   Suspension 40 milliGRAM(s) Oral daily  prednisoLONE    3 mG/mL Solution (ORAPRED) 40 milliGRAM(s) Oral daily  rifAXIMin 550 milliGRAM(s) Oral two times a day  simethicone 80 milliGRAM(s) Chew daily  thiamine 100 milliGRAM(s) Oral daily    MEDICATIONS  (PRN):  acetaminophen     Tablet .. 650 milliGRAM(s) Oral every 6 hours PRN Temp greater or equal to 38C (100.4F)    __________________________________________________  REVIEW OF SYSTEMS:  A detailed set of ROS were asked and negative except those outlined in GI HPI above/below.   ________________________________________________  PHYSICAL EXAM    Vital Signs Last 24 Hrs  T(C): 36.6 (05 Apr 2024 11:52), Max: 37.1 (04 Apr 2024 13:47)  T(F): 97.8 (05 Apr 2024 11:52), Max: 98.8 (04 Apr 2024 13:47)  HR: 101 (05 Apr 2024 11:52) (96 - 116)  BP: 114/55 (05 Apr 2024 11:52) (95/61 - 118/76)  BP(mean): 77 (04 Apr 2024 17:34) (77 - 77)  RR: 18 (05 Apr 2024 11:52) (18 - 18)  SpO2: 92% (05 Apr 2024 11:52) (92% - 94%)    Parameters below as of 05 Apr 2024 11:52  Patient On (Oxygen Delivery Method): nasal cannula    GEN: NAD, jaundice  HEENT: EOMI, icteric sclerae, neck supple, moist mucous membranes  PULM: LCTAB, no wheezing, rales, or rhonchi  CV: RRR, no m/r/g  GI: soft, NT, distended; +BS in all four quadrants  MSK: MICHELLE  NEURO: A&O x 3    _________________________________________________  LABS:                        9.3    25.75 )-----------( 367      ( 05 Apr 2024 07:40 )             30.4     04-05    143  |  114<H>  |  15  ----------------------------<  125<H>  3.3<L>   |  24  |  0.73    Ca    7.8<L>      05 Apr 2024 07:40  Phos  2.6     04-05  Mg     2.5     04-05    TPro  5.5<L>  /  Alb  2.1<L>  /  TBili  16.5<H>  /  DBili  x   /  AST  139<H>  /  ALT  50  /  AlkPhos  278<H>  04-05    PT/INR - ( 05 Apr 2024 07:40 )   PT: 22.6 sec;   INR: 2.02 ratio         PTT - ( 05 Apr 2024 07:40 )  PTT:40.6 sec  Urinalysis Basic - ( 05 Apr 2024 07:40 )    Color: x / Appearance: x / SG: x / pH: x  Gluc: 125 mg/dL / Ketone: x  / Bili: x / Urobili: x   Blood: x / Protein: x / Nitrite: x   Leuk Esterase: x / RBC: x / WBC x   Sq Epi: x / Non Sq Epi: x / Bacteria: x      RADIOLOGY & ADDITIONAL TESTS:    < from: MR Abdomen w/ IV Cont (04.03.24 @ 17:22) >  IMPRESSION:    Examination limited secondary to motion artifact.    Hepatomegaly with fatty infiltration of the liver.    Areas of increased signal loss on the out of phase imaging measuring 3.1   and 1.6 cm in the right hepatic lobe, likely areas of more focal fatty  infiltration with superimposed liver lesions, possibly hemangiomas; a   follow-up pre and post IV contrast MRI of the abdomen is recommended in 3   months to assess for stability.    Limited evaluation of the liver adjacent to the gallbladder; abnormality   on the prior CT is also likely areas of more focal fatty infiltration;   continued attention is recommended on the follow-up study.    Small amount of ascites.    Distended gallbladder with a small amount of pericholecystic fluid; fluid   may be related to the abdominal ascites.    < end of copied text >

## 2024-04-05 NOTE — PROGRESS NOTE ADULT - PROBLEM SELECTOR PLAN 3
- course complicated by abdominal distention 2/2 ileus (no obstruction)  - abdominal Xray 4/3- showing persistent bowel gas pattern   - s/p lactulose, neostigmine x1 in ICU   - rectal tube in place, consider - course complicated by abdominal distention 2/2 ileus (no obstruction)  - abdominal Xray 4/3- showing persistent bowel gas pattern   - s/p lactulose, neostigmine x1 in ICU   - rectal tube in place,  - out of bed to chair if tolerated

## 2024-04-05 NOTE — PROGRESS NOTE ADULT - PROBLEM SELECTOR PLAN 4
- on rifaximin  - s/p course of lactulose d/c'd 4/1 (ammonia level: 107 > 172 > 61 > 64) - on rifaximin  - s/p course of lactulose d/c'd 4/1 (ammonia level: 107 > 172 > 61 > 64)  - Lactulose restarted

## 2024-04-05 NOTE — PROGRESS NOTE ADULT - PROBLEM SELECTOR PLAN 1
- presented with acute metabolic encephalopathy in the setting of alcohol withdrawl  - s/p Ativan, phenobarb, ketamine drip, and precedex drip in the setting of mechanical ventilation and AMS  - now extubated, off all sedation, mental status improved, A&Ox2-3  - c/w rifaximin , PPI , thiamine, folic acid, simethicone   - s/p course of lactulose d/c'd 4/1 (ammonia level: 107 > 172 > 61 > 64)   - Maddrey score 36.7, will start prednisolone 40mg qd   - c/w albumin q6  - MELD score 22  - AST/ALT/Tbili - 303/137/14.6  - Abdominal ultrasound - Hepatosplenomegaly. No evidence for intrahepatic biliary duct dilatation. Increased caliber of the extrahepatic CBD measuring 8 mm. Gallbladder sludge. No ascites.   - Dr. Hernandez GI/Hepatology following   - f/u MRCP read p/w acute metabolic encephalopathy 2/2 alcohol withdrawal  - s/p Ativan, phenobarb, ketamine drip, and Precedex drip in the setting of mechanical ventilation and AMS  - now extubated, off all sedation, mental status improved, A&Ox2-3  - c/w rifaximin , PPI , thiamine, folic acid, simethicone   - c/w Lactulose BID   - Maddrey score 36.7  - c/w Prednisolone 40 mg daily [started on 4/4]   - c/w albumin q6  - MELD score 22  - ALP/AST/ALT/ Tbili: 178/139/50/16.5 [Tbili lateralized likely 2/2 prednisolone]   - Abdominal ultrasound - Hepatosplenomegaly. No evidence for intrahepatic biliary duct dilatation. Increased caliber of the extrahepatic CBD measuring 8 mm. Gallbladder sludge. No ascites.   - MRCP noted above  - Dr. Hernandez GI/Hepatology following

## 2024-04-05 NOTE — PROGRESS NOTE ADULT - ASSESSMENT
Patient is a 38M with a PMHx of alcohol use disorder (since 22 y/o, heavier last 2 weeks, last drink on the day of admission), withdrawal seizures, prior alcohol rehab, who presented to the ED 3/21 with AMS iso hyperammonemia (172), worsening abdominal distention and jaundice (TBili 6.6 -> 12.9). He was admitted to MICU for withdrawal, aspiration pneumonia, required intubation 3/22, +coronavirus (not Covid-19), and noted increased intraabdominal pressure, ileus, and surgery and GI has been following.   CT a/p showed marked hepatomegaly (29 cm) w/ severe hepatic steatosis. MDF was < 32.   Hepatology was consulted for his ALD, rising bilirubin.    #Alcoholic hepatitis w/ new onset jaundice, elevated transaminases (AST>ALT), and hepatomegaly w/ hepatic steatosis  # Elevated ALP  # Mild coagulopathy  # Hepatic encephalopathy  # Heterogenous foci in GB fossa and segment 6  # AUD w/ withdrawal  No biliary dilatation on CT and US abd, no signs of cholecystitis, however worsening bilirubin (almost mostly direct) and focal hepatic abnormalities on CT, consider adding MRCP to the MRI abd w/wo if no contraindication.   4/1: TBili 12.9, alk phos 299, , ALT 35.  4/2: TBili 13.3, DBili 10.9, alk phos 301, , ALT 42, ammonia 64, INR 1.58, WBC 21.77, Hgb 9.5, plt 332, downgrade to AI  medical floor. MELD 3.0: 25.   4/3: TBili 14.6, alk phos 303, , ALT 43, WBC 21.98, Hgb 9.0, plt 339, UA+ with small LE and positive nitrite, on meropenem  4/4: WBC 23.68, Hgb 8.7, .4, plt 355, INR 2.07, K 3.3, TBili 16.7, alk phos 299, , ALT 43. Discussed with ID, no contraindication to initiating glucocorticoid therapy, plan to start oral prednisolone today. MRCP report pending.   MELD 3.0: 28.   MDF: 63.2, poor prognosis, may benefit from steroids.   4/5: WBC 25.75, Hgb 9.3, .5, INR 2.02, K 3.3, TBili 16.5, alk phos 278, , ALT 50  MELD 3.0: 28.      MDF: 60.7, started on PO prednisolone (D1: 4/4)    - Completed Zosyn 3/30  - Started IV Meropenem (D1: 4/3)  - Jaundice, marked hepatomegaly, severe steatosis likely due to alcoholic hepatitis; 4/2 MDF 35.4, poor prognosis, would trial glucocorticoid therapy however persistent leukocytosis with no clear source. ID following.   - Ferritin 1105H, iron 58L, UIBC 45, TIBC 103L, %sat 56H, ammonia 61, ceruloplasmin 39. Elevated ceruloplasmin and ferritin likely due to acute phase reactants/inflammatory setting.   - EBV DNA PCR and CMV PCR detected: low suspicion of acute infection, likely old  - AMA neg  - HBcAb and HBsAb nonreactive  - Hep C RNA not detected  - Hep E IgM neg  - Hep E PCR neg    	- MRCP 4/3 neg for choledocholithiasis  	- Diet as tolerated  	- PO Prednisolone 40mg daily (D1: 4/4)  	- Calculate Lille score on day 4 (4/7), per research previously Day 7 however recent literature shows Lille score on day 4 is just as useful in predicting response to corticosteroid therapy (PMID:[id number])  	- Trend LFTs and PT/INR daily  	- Fractionated TBili with predominant direct hyperbilirubinemia (4/2)  	- Abd US: no ascites.  Hepatosplenomegaly, no intrahepatic biliary duct dilation, increased caliber of extrahepatic CBD 8mm, GB sludge.   	- Continue lactulose and rifaximin  	- ID following, appreciate recs  	- Please obtain: HSV/VZV PCRs  	- Please obtain: TRESA, SMA, LKM, IgG subclasses  	- Aspiration, seizure, fall precautions, avoid sedation, continue bowel regimen/lactulose  	- Folic/thiamine supplements  	- Will need alcohol rehab once acute medical issues resolved    This note and its recommendations herein are preliminary until such time as cosigned by an attending.  Hepatology Dr. Wiley to resume care on 4/8/24.

## 2024-04-05 NOTE — PROGRESS NOTE ADULT - SUBJECTIVE AND OBJECTIVE BOX
PGY-1 Progress Note discussed with attending    PAGER #: [[phone]] TILL 5:00 PM  PLEASE CONTACT ON CALL TEAM:  - On Call Team (Please refer to Perez) FROM 5:00 PM - 8:30PM  - Nightfloat Team FROM 8:30 -7:30 AM    OVERNIGHT EVENTS:   -     REVIEW OF SYSTEMS:  CONSTITUTIONAL: No fever, weight loss, or fatigue  RESPIRATORY: No cough, wheezing, chills or hemoptysis; No shortness of breath  CARDIOVASCULAR: No chest pain, palpitations, dizziness, or leg swelling  GASTROINTESTINAL: No abdominal pain. No nausea, vomiting, or hematemesis; No diarrhea or constipation. No melena or hematochezia.  GENITOURINARY: No dysuria or hematuria, urinary frequency  NEUROLOGICAL: No headaches, memory loss, loss of strength, numbness, or tremors  SKIN: No itching, burning, rashes, or lesions     MEDICATIONS  (STANDING):  folic acid 1 milliGRAM(s) Oral daily  heparin   Injectable 5000 Unit(s) SubCutaneous every 12 hours  influenza   Vaccine 0.5 milliLiter(s) IntraMuscular once  lactulose Syrup 10 Gram(s) Oral two times a day  meropenem  IVPB      meropenem  IVPB 1000 milliGRAM(s) IV Intermittent every 8 hours  multivitamin 1 Tablet(s) Oral daily  pantoprazole   Suspension 40 milliGRAM(s) Oral daily  potassium chloride    Tablet ER 40 milliEquivalent(s) Oral once  prednisoLONE    3 mG/mL Solution (ORAPRED) 40 milliGRAM(s) Oral daily  rifAXIMin 550 milliGRAM(s) Oral two times a day  simethicone 80 milliGRAM(s) Chew daily  thiamine 100 milliGRAM(s) Oral daily    MEDICATIONS  (PRN):  acetaminophen     Tablet .. 650 milliGRAM(s) Oral every 6 hours PRN Temp greater or equal to 38C (100.4F)      Vital Signs Last 24 Hrs  T(C): 37.1 (05 Apr 2024 05:00), Max: 37.1 (04 Apr 2024 13:47)  T(F): 98.7 (05 Apr 2024 05:00), Max: 98.8 (04 Apr 2024 13:47)  HR: 96 (05 Apr 2024 05:00) (96 - 116)  BP: 95/61 (05 Apr 2024 05:00) (95/61 - 118/76)  BP(mean): 77 (04 Apr 2024 17:34) (77 - 77)  RR: 18 (05 Apr 2024 05:00) (18 - 18)  SpO2: 92% (05 Apr 2024 05:00) (92% - 94%)    Parameters below as of 05 Apr 2024 05:00  Patient On (Oxygen Delivery Method): room air        PHYSICAL EXAMINATION:  GENERAL: NAD, AAOx  HEAD: AT/NC  EYES: conjunctiva and sclera clear  NECK: supple, No JVD noted, Normal thyroid  CHEST/LUNG: CTABL; no rales, rhonchi, wheezing, or rubs  HEART: regular rate and rhythm; no murmurs, rubs, or gallops  ABDOMEN: soft, nontender, nondistended; Bowel sounds present  EXTREMITIES:  2+ Peripheral Pulses, No clubbing, cyanosis, or edema  SKIN: warm dry                          9.3    x     )-----------( 367      ( 05 Apr 2024 07:40 )             30.4     04-05    143  |  114<H>  |  15  ----------------------------<  125<H>  3.3<L>   |  24  |  0.73    Ca    7.8<L>      05 Apr 2024 07:40  Phos  2.6     04-05  Mg     2.5     04-05    TPro  5.5<L>  /  Alb  2.1<L>  /  TBili  16.5<H>  /  DBili  x   /  AST  139<H>  /  ALT  50  /  AlkPhos  278<H>  04-05    LIVER FUNCTIONS - ( 05 Apr 2024 07:40 )  Alb: 2.1 g/dL / Pro: 5.5 g/dL / ALK PHOS: 278 U/L / ALT: 50 U/L DA / AST: 139 U/L / GGT: x               PT/INR - ( 05 Apr 2024 07:40 )   PT: 22.6 sec;   INR: 2.02 ratio         PTT - ( 05 Apr 2024 07:40 )  PTT:40.6 sec  SARS-CoV-2: NotDetec (21 Mar 2024 18:45)      CAPILLARY BLOOD GLUCOSE          RADIOLOGY & ADDITIONAL TESTS:                   PGY-1 Progress Note discussed with attending    PAGER #: [[phone]] TILL 5:00 PM  PLEASE CONTACT ON CALL TEAM:  - On Call Team (Please refer to Perez) FROM 5:00 PM - 8:30PM  - Nightfloat Team FROM 8:30 -7:30 AM    OVERNIGHT EVENTS:   - No acute overnight events. Pt seen and examined at bedside. reported of difficulty breathing due to abdominal distention, however denied fever, chills, cough or shortness of breath, chest pain, palpitation.     REVIEW OF SYSTEMS:  CONSTITUTIONAL: No fever, weight loss, or fatigue  RESPIRATORY: No cough, wheezing, chills or hemoptysis; No shortness of breath  CARDIOVASCULAR: No chest pain, palpitations, dizziness, or leg swelling  GASTROINTESTINAL: No abdominal pain.+ abdominal distention, No nausea, vomiting, or hematemesis; No diarrhea or constipation. No melena or hematochezia.  GENITOURINARY: No dysuria or hematuria, urinary frequency  NEUROLOGICAL: No headaches, memory loss, loss of strength, numbness, or tremors  SKIN: No itching, burning, rashes, or lesions     MEDICATIONS  (STANDING):  folic acid 1 milliGRAM(s) Oral daily  heparin   Injectable 5000 Unit(s) SubCutaneous every 12 hours  influenza   Vaccine 0.5 milliLiter(s) IntraMuscular once  lactulose Syrup 10 Gram(s) Oral two times a day  meropenem  IVPB      meropenem  IVPB 1000 milliGRAM(s) IV Intermittent every 8 hours  multivitamin 1 Tablet(s) Oral daily  pantoprazole   Suspension 40 milliGRAM(s) Oral daily  potassium chloride    Tablet ER 40 milliEquivalent(s) Oral once  prednisoLONE    3 mG/mL Solution (ORAPRED) 40 milliGRAM(s) Oral daily  rifAXIMin 550 milliGRAM(s) Oral two times a day  simethicone 80 milliGRAM(s) Chew daily  thiamine 100 milliGRAM(s) Oral daily    MEDICATIONS  (PRN):  acetaminophen     Tablet .. 650 milliGRAM(s) Oral every 6 hours PRN Temp greater or equal to 38C (100.4F)      Vital Signs Last 24 Hrs  T(C): 37.1 (05 Apr 2024 05:00), Max: 37.1 (04 Apr 2024 13:47)  T(F): 98.7 (05 Apr 2024 05:00), Max: 98.8 (04 Apr 2024 13:47)  HR: 96 (05 Apr 2024 05:00) (96 - 116)  BP: 95/61 (05 Apr 2024 05:00) (95/61 - 118/76)  BP(mean): 77 (04 Apr 2024 17:34) (77 - 77)  RR: 18 (05 Apr 2024 05:00) (18 - 18)  SpO2: 92% (05 Apr 2024 05:00) (92% - 94%)    Parameters below as of 05 Apr 2024 05:00  Patient On (Oxygen Delivery Method): room air        PHYSICAL EXAMINATION:  GENERAL: NAD, warm to the touch and diaphoretic   HEAD:  Atraumatic, Normocephalic  EYES:  (+) conjunctiva and sclera severely jaundiced   NECK: Supple, No JVD, Normal thyroid  CHEST/LUNG: Clear to percussion bilaterally; No rales, rhonchi, wheezing, or rubs, tachypnea noted on RA  HEART: Regular rate and rhythm; No murmurs, rubs, or gallops  ABDOMEN: (+) firm, distended, decreased bowel sounds   NERVOUS SYSTEM:  Alert & Oriented X2, not oriented to date, otherwise oriented to place, and name, no focal neuro deficits   EXTREMITIES:  2+ Peripheral Pulses, No clubbing, cyanosis, (+) pitting edema in b/l LE   SKIN: warm, rectal tube in place, condom cath in place                           9.3    x     )-----------( 367      ( 05 Apr 2024 07:40 )             30.4     04-05    143  |  114<H>  |  15  ----------------------------<  125<H>  3.3<L>   |  24  |  0.73    Ca    7.8<L>      05 Apr 2024 07:40  Phos  2.6     04-05  Mg     2.5     04-05    TPro  5.5<L>  /  Alb  2.1<L>  /  TBili  16.5<H>  /  DBili  x   /  AST  139<H>  /  ALT  50  /  AlkPhos  278<H>  04-05    LIVER FUNCTIONS - ( 05 Apr 2024 07:40 )  Alb: 2.1 g/dL / Pro: 5.5 g/dL / ALK PHOS: 278 U/L / ALT: 50 U/L DA / AST: 139 U/L / GGT: x               PT/INR - ( 05 Apr 2024 07:40 )   PT: 22.6 sec;   INR: 2.02 ratio         PTT - ( 05 Apr 2024 07:40 )  PTT:40.6 sec  SARS-CoV-2: NotDetec (21 Mar 2024 18:45)      CAPILLARY BLOOD GLUCOSE          RADIOLOGY & ADDITIONAL TESTS:      IMPRESSION:    ACC: [id number] EXAM:  MR ABDOMEN IC   ORDERED BY: JAIME CARTER     PROCEDURE DATE:  04/03/2024          INTERPRETATION:  CLINICAL INFORMATION: Alcoholic liver disease. Evaluate   bile duct enlargement. Liver lesion.    COMPARISON: CT abdomen/pelvis 3/21/2024 and 3/26/2018. Abdominal   ultrasound 4/2/2024.    CONTRAST/COMPLICATIONS:  IV Contrast: Gadavist  9 cc administered   1 cc discarded  Oral Contrast: NONE  Complications: None reported at time of study completion    PROCEDURE:  MRI of the abdomen was performed.  MRCP was performed.  The examination is degraded motion artifact. The examination was also   unable to be completed; delayed postcontrast imaging was not obtained.    Examination limited secondary to motion artifact.    Hepatomegaly with fatty infiltration of the liver.    Areas of increased signal loss on the out of phase imaging measuring 3.1   and 1.6 cm in the right hepatic lobe, likely areas of more focal fatty  infiltration with superimposed liver lesions, possibly hemangiomas; a   follow-up pre and post IV contrast MRI of the abdomen is recommended in 3   months to assess for stability.    Limited evaluation of the liver adjacent to the gallbladder; abnormality   on the prior CT is also likely areas of more focal fatty infiltration;   continued attention is recommended on the follow-up study.    Small amount of ascites.    Distended gallbladder with a small amount of pericholecystic fluid; fluid   may be related to the abdominal ascites.    --- End of Report ---            SALVADOR GARCIA MD; Attending Radiologist  This document has been electronically signed. Apr 4 2024  2:07PM

## 2024-04-05 NOTE — PROGRESS NOTE ADULT - PROBLEM SELECTOR PLAN 2
- pt found to be septic earlier in hospital course, thought to be secondary to coronavirus, no other specific source   - BCx from admission (3/21)- NGTD   - s/p zosyn course 3/23-/3/30  - Hossein repeat BCx 4/3 afternoon (pt still presenting septic (WC 20s, and tachycardic, tachypnea)  - WC uptrending today, and LFTs uptrending   - repeat U/A 4/3 negative   - CXR 4/3- showing increased infiltrates b/l   - pt started on meropenem 1g q8 yesterday ( 4/3-)  - obstructive pattern on LFTs, can be hepatocellular in nature due to the alcoholic hepatitis   - f/u MRCP read  - f/u ID Dr. Sosa - pt found to be septic earlier in hospital course, thought to be secondary to coronavirus, no other specific source   - BCx from admission (3/21)- NGTD   - s/p zosyn course 3/23-/3/30  - Hossein repeat BCx 4/3 afternoon (pt still presenting septic (WC 20s, and tachycardic, tachypnea)  - leukocytosis worsening [ likely multifactorial - infection, 2/2 steroid, alcoholic hepatitis]   - LFTs improving   - repeat U/A 4/3 negative   - CXR 4/3- showing increased infiltrates b/l   - c/w meropenem 1g q8 yesterday ( 4/3-)  - obstructive pattern on LFTs, can be hepatocellular in nature due to the alcoholic hepatitis   - ID Dr. Sosa

## 2024-04-06 LAB
ALBUMIN SERPL ELPH-MCNC: 2 G/DL — LOW (ref 3.5–5)
ALP SERPL-CCNC: 311 U/L — HIGH (ref 40–120)
ALT FLD-CCNC: 74 U/L DA — HIGH (ref 10–60)
AMMONIA BLD-MCNC: 59 UMOL/L — HIGH (ref 11–32)
ANION GAP SERPL CALC-SCNC: 6 MMOL/L — SIGNIFICANT CHANGE UP (ref 5–17)
ANISOCYTOSIS BLD QL: SLIGHT — SIGNIFICANT CHANGE UP
APTT BLD: 37.5 SEC — HIGH (ref 24.5–35.6)
AST SERPL-CCNC: 176 U/L — HIGH (ref 10–40)
BASOPHILS # BLD AUTO: 0 K/UL — SIGNIFICANT CHANGE UP (ref 0–0.2)
BASOPHILS NFR BLD AUTO: 0 % — SIGNIFICANT CHANGE UP (ref 0–2)
BILIRUB DIRECT SERPL-MCNC: 12.1 MG/DL — HIGH (ref 0–0.3)
BILIRUB SERPL-MCNC: 15.9 MG/DL — HIGH (ref 0.2–1.2)
BUN SERPL-MCNC: 13 MG/DL — SIGNIFICANT CHANGE UP (ref 7–18)
CALCIUM SERPL-MCNC: 7.8 MG/DL — LOW (ref 8.4–10.5)
CHLORIDE SERPL-SCNC: 114 MMOL/L — HIGH (ref 96–108)
CO2 SERPL-SCNC: 23 MMOL/L — SIGNIFICANT CHANGE UP (ref 22–31)
CREAT SERPL-MCNC: 0.66 MG/DL — SIGNIFICANT CHANGE UP (ref 0.5–1.3)
EGFR: 125 ML/MIN/1.73M2 — SIGNIFICANT CHANGE UP
EOSINOPHIL # BLD AUTO: 0 K/UL — SIGNIFICANT CHANGE UP (ref 0–0.5)
EOSINOPHIL NFR BLD AUTO: 0 % — SIGNIFICANT CHANGE UP (ref 0–6)
GLUCOSE SERPL-MCNC: 159 MG/DL — HIGH (ref 70–99)
HCT VFR BLD CALC: 31.2 % — LOW (ref 39–50)
HGB BLD-MCNC: 9.7 G/DL — LOW (ref 13–17)
HYPOCHROMIA BLD QL: SLIGHT — SIGNIFICANT CHANGE UP
HYPOSEGMENTATION: PRESENT — SIGNIFICANT CHANGE UP
INR BLD: 1.97 RATIO — HIGH (ref 0.85–1.18)
LG PLATELETS BLD QL AUTO: SLIGHT — SIGNIFICANT CHANGE UP
LYMPHOCYTES # BLD AUTO: 1.67 K/UL — SIGNIFICANT CHANGE UP (ref 1–3.3)
LYMPHOCYTES # BLD AUTO: 6 % — LOW (ref 13–44)
MACROCYTES BLD QL: SLIGHT — SIGNIFICANT CHANGE UP
MAGNESIUM SERPL-MCNC: 2.2 MG/DL — SIGNIFICANT CHANGE UP (ref 1.6–2.6)
MANUAL SMEAR VERIFICATION: SIGNIFICANT CHANGE UP
MCHC RBC-ENTMCNC: 31.1 GM/DL — LOW (ref 32–36)
MCHC RBC-ENTMCNC: 34.4 PG — HIGH (ref 27–34)
MCV RBC AUTO: 110.6 FL — HIGH (ref 80–100)
MICROCYTES BLD QL: SLIGHT — SIGNIFICANT CHANGE UP
MONOCYTES # BLD AUTO: 1.11 K/UL — HIGH (ref 0–0.9)
MONOCYTES NFR BLD AUTO: 4 % — SIGNIFICANT CHANGE UP (ref 2–14)
MYELOCYTES NFR BLD: 1 % — HIGH (ref 0–0)
NEUTROPHILS # BLD AUTO: 24.72 K/UL — HIGH (ref 1.8–7.4)
NEUTROPHILS NFR BLD AUTO: 86 % — HIGH (ref 43–77)
NEUTS BAND # BLD: 3 % — SIGNIFICANT CHANGE UP (ref 0–8)
NRBC # BLD: 0 /100 WBCS — SIGNIFICANT CHANGE UP (ref 0–0)
PHOSPHATE SERPL-MCNC: 2.2 MG/DL — LOW (ref 2.5–4.5)
PLAT MORPH BLD: NORMAL — SIGNIFICANT CHANGE UP
PLATELET # BLD AUTO: 370 K/UL — SIGNIFICANT CHANGE UP (ref 150–400)
PLATELET COUNT - ESTIMATE: NORMAL — SIGNIFICANT CHANGE UP
POIKILOCYTOSIS BLD QL AUTO: SLIGHT — SIGNIFICANT CHANGE UP
POLYCHROMASIA BLD QL SMEAR: SLIGHT — SIGNIFICANT CHANGE UP
POTASSIUM SERPL-MCNC: 3.5 MMOL/L — SIGNIFICANT CHANGE UP (ref 3.5–5.3)
POTASSIUM SERPL-SCNC: 3.5 MMOL/L — SIGNIFICANT CHANGE UP (ref 3.5–5.3)
PROT SERPL-MCNC: 5.3 G/DL — LOW (ref 6–8.3)
PROTHROM AB SERPL-ACNC: 22 SEC — HIGH (ref 9.5–13)
RBC # BLD: 2.82 M/UL — LOW (ref 4.2–5.8)
RBC # FLD: 22.7 % — HIGH (ref 10.3–14.5)
RBC BLD AUTO: ABNORMAL
SODIUM SERPL-SCNC: 143 MMOL/L — SIGNIFICANT CHANGE UP (ref 135–145)
STOMATOCYTES BLD QL SMEAR: SIGNIFICANT CHANGE UP
WBC # BLD: 27.78 K/UL — HIGH (ref 3.8–10.5)
WBC # FLD AUTO: 27.78 K/UL — HIGH (ref 3.8–10.5)

## 2024-04-06 PROCEDURE — 99232 SBSQ HOSP IP/OBS MODERATE 35: CPT

## 2024-04-06 PROCEDURE — 74018 RADEX ABDOMEN 1 VIEW: CPT | Mod: 26

## 2024-04-06 RX ADMIN — Medication 40 MILLIGRAM(S): at 05:19

## 2024-04-06 RX ADMIN — LACTULOSE 10 GRAM(S): 10 SOLUTION ORAL at 05:28

## 2024-04-06 RX ADMIN — HEPARIN SODIUM 5000 UNIT(S): 5000 INJECTION INTRAVENOUS; SUBCUTANEOUS at 17:18

## 2024-04-06 RX ADMIN — MEROPENEM 100 MILLIGRAM(S): 1 INJECTION INTRAVENOUS at 14:00

## 2024-04-06 RX ADMIN — MEROPENEM 100 MILLIGRAM(S): 1 INJECTION INTRAVENOUS at 05:20

## 2024-04-06 RX ADMIN — Medication 100 MILLIGRAM(S): at 11:16

## 2024-04-06 RX ADMIN — Medication 1 TABLET(S): at 11:16

## 2024-04-06 RX ADMIN — PANTOPRAZOLE SODIUM 40 MILLIGRAM(S): 20 TABLET, DELAYED RELEASE ORAL at 11:16

## 2024-04-06 RX ADMIN — HEPARIN SODIUM 5000 UNIT(S): 5000 INJECTION INTRAVENOUS; SUBCUTANEOUS at 05:20

## 2024-04-06 RX ADMIN — MEROPENEM 100 MILLIGRAM(S): 1 INJECTION INTRAVENOUS at 21:39

## 2024-04-06 RX ADMIN — Medication 1 MILLIGRAM(S): at 11:16

## 2024-04-06 RX ADMIN — SIMETHICONE 80 MILLIGRAM(S): 80 TABLET, CHEWABLE ORAL at 11:17

## 2024-04-06 NOTE — PROGRESS NOTE ADULT - ASSESSMENT
Patient is a 37 y/o M w/ PMHx of alcohol withdrawal seizure with alcohol use disorder, who p/w confusion starting on morning of admission. Patient was admitted to ICU for high risk alcohol withdrawal, did not respond to ativan alone and had to be transitioned to Precedex and later intubated for airway protection- extubated 3/26. His hospital stay was complicated by Septic shock  2/2 possible pneumonia, requiring pressor support, was treated w/ IV Zosyn and completed course. He also developed significant abdominal distention, felt to be 2/2 ileus- started on lactulose, rectal tube placed for decompression, abd distention improved but not resolved. Pt also has alcoholic hepatitis, continues to have elevated T.Bili. Now downgraded to [facility] for further care     Pt was evaluated this afternoon, he denies any new complaints. Abd is still dissented, his rectal tube was dislodged overnight. Mental status is at baseline.     Labs reviewed- cbc, bmp, LFTs, PT/INR      PE as above   b/l 3+ pitting edema     A/P:  #Acute alcoholic hepatitis w/ up-trending T.bili   #Sepsis 2/2 pneumonia   #Hospital acquired pneumonia   #Abdominal distention 2/2 ileus   #Mild coagulopathy   #Hepatic encephalopathy- waxes wanes   #ETOH withdrawal s/p Precedex/ propofol   #Sinus tachycardia   #Macrocytic Anemia  #Corona virus + (non Covid)    #EBV+  #CMV+  #DVT ppx     Plan:  -C/w IV meropenem- D4 for pneumonia. repeat blood cx, mycoplasma, strept negative.  No further fevers   -T.bili now improving since start of steroids.. C/w Prednisolone- D3  - leptospira Ab- testing    -hold albumin and Lasix diuresis   -Abd distention improved. Follow daily X-ray   -Mental status stable,  c/w Rifaximin, DC lactulose.   -Leukocytosis remains elevated- multifactorial from infection/ steroid use/and Alcoholic hepatitis. Monitor wbc daily.

## 2024-04-06 NOTE — PROGRESS NOTE ADULT - SUBJECTIVE AND OBJECTIVE BOX
Patient is a 38y old  Male who presents with a chief complaint of Alcohol withdrawal (05 Apr 2024 13:32)      INTERVAL HPI/OVERNIGHT EVENTS: no events noted overnight.    MEDICATIONS  (STANDING):  folic acid 1 milliGRAM(s) Oral daily  heparin   Injectable 5000 Unit(s) SubCutaneous every 12 hours  influenza   Vaccine 0.5 milliLiter(s) IntraMuscular once  meropenem  IVPB 1000 milliGRAM(s) IV Intermittent every 8 hours  meropenem  IVPB      multivitamin 1 Tablet(s) Oral daily  pantoprazole   Suspension 40 milliGRAM(s) Oral daily  prednisoLONE    3 mG/mL Solution (ORAPRED) 40 milliGRAM(s) Oral daily  rifAXIMin 550 milliGRAM(s) Oral two times a day  simethicone 80 milliGRAM(s) Chew daily  thiamine 100 milliGRAM(s) Oral daily    MEDICATIONS  (PRN):  acetaminophen     Tablet .. 650 milliGRAM(s) Oral every 6 hours PRN Temp greater or equal to 38C (100.4F)      __________________________________________________  REVIEW OF SYSTEMS:    CONSTITUTIONAL: No fever,   EYES: no acute visual disturbances  NECK: No pain or stiffness  RESPIRATORY: No cough; No shortness of breath  CARDIOVASCULAR: No chest pain, no palpitations  GASTROINTESTINAL: No pain. No nausea or vomiting; No diarrhea   NEUROLOGICAL: No headache or numbness, no tremors  MUSCULOSKELETAL: No joint pain, no muscle pain  GENITOURINARY: no dysuria, no frequency, no hesitancy  PSYCHIATRY: no depression , no anxiety  ALL OTHER  ROS negative        Vital Signs Last 24 Hrs  T(C): 36.4 (06 Apr 2024 14:46), Max: 37.1 (05 Apr 2024 21:30)  T(F): 97.6 (06 Apr 2024 14:46), Max: 98.7 (05 Apr 2024 21:30)  HR: 97 (06 Apr 2024 14:46) (97 - 100)  BP: 111/68 (06 Apr 2024 14:46) (100/62 - 111/68)  BP(mean): --  RR: 17 (06 Apr 2024 14:46) (17 - 18)  SpO2: 97% (06 Apr 2024 14:46) (95% - 97%)    Parameters below as of 06 Apr 2024 14:46  Patient On (Oxygen Delivery Method): room air        ________________________________________________  PHYSICAL EXAM:  GENERAL: NAD  HEENT: Normocephalic;  conjunctivae and sclerae clear; moist mucous membranes;   NECK : supple  CHEST/LUNG: Clear to auscultation bilaterally with good air entry   HEART: S1 S2  regular; no murmurs, gallops or rubs  ABDOMEN: Soft, Nontender, Nondistended; Bowel sounds present  EXTREMITIES: no cyanosis; no edema; no calf tenderness  SKIN: warm and dry; no rash  NERVOUS SYSTEM:  Awake and alert; Oriented  to place, person and time ; no new deficits    _________________________________________________  LABS:                        9.7    27.78 )-----------( 370      ( 06 Apr 2024 08:00 )             31.2     04-06    143  |  114<H>  |  13  ----------------------------<  159<H>  3.5   |  23  |  0.66    Ca    7.8<L>      06 Apr 2024 08:00  Phos  2.2     04-06  Mg     2.2     04-06    TPro  5.3<L>  /  Alb  2.0<L>  /  TBili  15.9<H>  /  DBili  12.1<H>  /  AST  176<H>  /  ALT  74<H>  /  AlkPhos  311<H>  04-06    PT/INR - ( 06 Apr 2024 08:00 )   PT: 22.0 sec;   INR: 1.97 ratio         PTT - ( 06 Apr 2024 08:00 )  PTT:37.5 sec  Urinalysis Basic - ( 06 Apr 2024 08:00 )    Color: x / Appearance: x / SG: x / pH: x  Gluc: 159 mg/dL / Ketone: x  / Bili: x / Urobili: x   Blood: x / Protein: x / Nitrite: x   Leuk Esterase: x / RBC: x / WBC x   Sq Epi: x / Non Sq Epi: x / Bacteria: x      CAPILLARY BLOOD GLUCOSE            RADIOLOGY & ADDITIONAL TESTS:    Imaging Personally Reviewed:  YES    Consultant(s) Notes Reviewed:   YES    Care Discussed with Consultants : YES     Plan of care was discussed with patient and /or primary care giver; all questions and concerns were addressed and care was aligned with patient's wishes.         Patient is a 38y old  Male who presents with a chief complaint of Alcohol withdrawal (05 Apr 2024 13:32)      INTERVAL HPI/OVERNIGHT EVENTS: no events noted overnight. Patient had over 10 BMs overnight and this morning. He denies any other complaints     MEDICATIONS  (STANDING):  folic acid 1 milliGRAM(s) Oral daily  heparin   Injectable 5000 Unit(s) SubCutaneous every 12 hours  influenza   Vaccine 0.5 milliLiter(s) IntraMuscular once  meropenem  IVPB 1000 milliGRAM(s) IV Intermittent every 8 hours  meropenem  IVPB      multivitamin 1 Tablet(s) Oral daily  pantoprazole   Suspension 40 milliGRAM(s) Oral daily  prednisoLONE    3 mG/mL Solution (ORAPRED) 40 milliGRAM(s) Oral daily  rifAXIMin 550 milliGRAM(s) Oral two times a day  simethicone 80 milliGRAM(s) Chew daily  thiamine 100 milliGRAM(s) Oral daily    MEDICATIONS  (PRN):  acetaminophen     Tablet .. 650 milliGRAM(s) Oral every 6 hours PRN Temp greater or equal to 38C (100.4F)      __________________________________________________  REVIEW OF SYSTEMS:    CONSTITUTIONAL: No fever,   EYES: no acute visual disturbances  NECK: No pain or stiffness  RESPIRATORY: No cough; No shortness of breath  CARDIOVASCULAR: No chest pain, no palpitations  GASTROINTESTINAL: No pain. No nausea or vomiting; No diarrhea   NEUROLOGICAL: No headache or numbness, no tremors  MUSCULOSKELETAL: No joint pain, no muscle pain  GENITOURINARY: no dysuria, no frequency, no hesitancy  PSYCHIATRY: no depression , no anxiety  ALL OTHER  ROS negative        Vital Signs Last 24 Hrs  T(C): 36.4 (06 Apr 2024 14:46), Max: 37.1 (05 Apr 2024 21:30)  T(F): 97.6 (06 Apr 2024 14:46), Max: 98.7 (05 Apr 2024 21:30)  HR: 97 (06 Apr 2024 14:46) (97 - 100)  BP: 111/68 (06 Apr 2024 14:46) (100/62 - 111/68)  BP(mean): --  RR: 17 (06 Apr 2024 14:46) (17 - 18)  SpO2: 97% (06 Apr 2024 14:46) (95% - 97%)    Parameters below as of 06 Apr 2024 14:46  Patient On (Oxygen Delivery Method): room air        ________________________________________________  PHYSICAL EXAM:  GENERAL: NAD,   HEENT: Normocephalic;  icteric,  moist mucous membranes;   NECK : supple  CHEST/LUNG: Clear to auscultation bilaterally with good air entry   HEART: S1 S2  regular; no murmurs, gallops or rubs  ABDOMEN: Soft, Nontender, distended but improved   EXTREMITIES: 1+ pitting edema   SKIN: warm and dry; no rash  NERVOUS SYSTEM:  Awake and alert; Oriented  to place, person and time ; no new deficits    _________________________________________________  LABS:                        9.7    27.78 )-----------( 370      ( 06 Apr 2024 08:00 )             31.2     04-06    143  |  114<H>  |  13  ----------------------------<  159<H>  3.5   |  23  |  0.66    Ca    7.8<L>      06 Apr 2024 08:00  Phos  2.2     04-06  Mg     2.2     04-06    TPro  5.3<L>  /  Alb  2.0<L>  /  TBili  15.9<H>  /  DBili  12.1<H>  /  AST  176<H>  /  ALT  74<H>  /  AlkPhos  311<H>  04-06    PT/INR - ( 06 Apr 2024 08:00 )   PT: 22.0 sec;   INR: 1.97 ratio         PTT - ( 06 Apr 2024 08:00 )  PTT:37.5 sec  Urinalysis Basic - ( 06 Apr 2024 08:00 )    Color: x / Appearance: x / SG: x / pH: x  Gluc: 159 mg/dL / Ketone: x  / Bili: x / Urobili: x   Blood: x / Protein: x / Nitrite: x   Leuk Esterase: x / RBC: x / WBC x   Sq Epi: x / Non Sq Epi: x / Bacteria: x      CAPILLARY BLOOD GLUCOSE            RADIOLOGY & ADDITIONAL TESTS:    Imaging Personally Reviewed:  YES    Consultant(s) Notes Reviewed:   YES    Care Discussed with Consultants : YES     Plan of care was discussed with patient and /or primary care giver; all questions and concerns were addressed and care was aligned with patient's wishes.

## 2024-04-07 LAB
ALBUMIN SERPL ELPH-MCNC: 1.9 G/DL — LOW (ref 3.5–5)
ALP SERPL-CCNC: 346 U/L — HIGH (ref 40–120)
ALT FLD-CCNC: 82 U/L DA — HIGH (ref 10–60)
ANION GAP SERPL CALC-SCNC: 10 MMOL/L — SIGNIFICANT CHANGE UP (ref 5–17)
ANISOCYTOSIS BLD QL: SLIGHT — SIGNIFICANT CHANGE UP
APTT BLD: 40.6 SEC — HIGH (ref 24.5–35.6)
AST SERPL-CCNC: 182 U/L — HIGH (ref 10–40)
BASOPHILS # BLD AUTO: 0 K/UL — SIGNIFICANT CHANGE UP (ref 0–0.2)
BASOPHILS NFR BLD AUTO: 0 % — SIGNIFICANT CHANGE UP (ref 0–2)
BILIRUB SERPL-MCNC: 15.8 MG/DL — HIGH (ref 0.2–1.2)
BUN SERPL-MCNC: 13 MG/DL — SIGNIFICANT CHANGE UP (ref 7–18)
CALCIUM SERPL-MCNC: 7.9 MG/DL — LOW (ref 8.4–10.5)
CHLORIDE SERPL-SCNC: 107 MMOL/L — SIGNIFICANT CHANGE UP (ref 96–108)
CO2 SERPL-SCNC: 21 MMOL/L — LOW (ref 22–31)
CREAT SERPL-MCNC: 0.66 MG/DL — SIGNIFICANT CHANGE UP (ref 0.5–1.3)
EGFR: 125 ML/MIN/1.73M2 — SIGNIFICANT CHANGE UP
EOSINOPHIL # BLD AUTO: 1.15 K/UL — HIGH (ref 0–0.5)
EOSINOPHIL NFR BLD AUTO: 4 % — SIGNIFICANT CHANGE UP (ref 0–6)
GLUCOSE SERPL-MCNC: 105 MG/DL — HIGH (ref 70–99)
HCT VFR BLD CALC: 29.4 % — LOW (ref 39–50)
HGB BLD-MCNC: 9.2 G/DL — LOW (ref 13–17)
HYPOCHROMIA BLD QL: SLIGHT — SIGNIFICANT CHANGE UP
HYPOSEGMENTATION: PRESENT — SIGNIFICANT CHANGE UP
INR BLD: 2.37 RATIO — HIGH (ref 0.85–1.18)
LYMPHOCYTES # BLD AUTO: 2.01 K/UL — SIGNIFICANT CHANGE UP (ref 1–3.3)
LYMPHOCYTES # BLD AUTO: 7 % — LOW (ref 13–44)
MACROCYTES BLD QL: SLIGHT — SIGNIFICANT CHANGE UP
MAGNESIUM SERPL-MCNC: 2.1 MG/DL — SIGNIFICANT CHANGE UP (ref 1.6–2.6)
MANUAL SMEAR VERIFICATION: SIGNIFICANT CHANGE UP
MCHC RBC-ENTMCNC: 31.3 GM/DL — LOW (ref 32–36)
MCHC RBC-ENTMCNC: 33.8 PG — SIGNIFICANT CHANGE UP (ref 27–34)
MCV RBC AUTO: 108.1 FL — HIGH (ref 80–100)
MICROCYTES BLD QL: SLIGHT — SIGNIFICANT CHANGE UP
MONOCYTES # BLD AUTO: 0.57 K/UL — SIGNIFICANT CHANGE UP (ref 0–0.9)
MONOCYTES NFR BLD AUTO: 2 % — SIGNIFICANT CHANGE UP (ref 2–14)
NEUTROPHILS # BLD AUTO: 24.95 K/UL — HIGH (ref 1.8–7.4)
NEUTROPHILS NFR BLD AUTO: 81 % — HIGH (ref 43–77)
NEUTS BAND # BLD: 6 % — SIGNIFICANT CHANGE UP (ref 0–8)
NRBC # BLD: 1 /100 WBCS — HIGH (ref 0–0)
PHOSPHATE SERPL-MCNC: 2.5 MG/DL — SIGNIFICANT CHANGE UP (ref 2.5–4.5)
PLAT MORPH BLD: NORMAL — SIGNIFICANT CHANGE UP
PLATELET # BLD AUTO: 332 K/UL — SIGNIFICANT CHANGE UP (ref 150–400)
PLATELET COUNT - ESTIMATE: NORMAL — SIGNIFICANT CHANGE UP
POIKILOCYTOSIS BLD QL AUTO: SLIGHT — SIGNIFICANT CHANGE UP
POLYCHROMASIA BLD QL SMEAR: SLIGHT — SIGNIFICANT CHANGE UP
POTASSIUM SERPL-MCNC: 3.5 MMOL/L — SIGNIFICANT CHANGE UP (ref 3.5–5.3)
POTASSIUM SERPL-SCNC: 3.5 MMOL/L — SIGNIFICANT CHANGE UP (ref 3.5–5.3)
PROT SERPL-MCNC: 5.1 G/DL — LOW (ref 6–8.3)
PROTHROM AB SERPL-ACNC: 26.4 SEC — HIGH (ref 9.5–13)
RBC # BLD: 2.72 M/UL — LOW (ref 4.2–5.8)
RBC # FLD: 22.5 % — HIGH (ref 10.3–14.5)
RBC BLD AUTO: ABNORMAL
SODIUM SERPL-SCNC: 138 MMOL/L — SIGNIFICANT CHANGE UP (ref 135–145)
STOMATOCYTES BLD QL SMEAR: SIGNIFICANT CHANGE UP
WBC # BLD: 28.68 K/UL — HIGH (ref 3.8–10.5)
WBC # FLD AUTO: 28.68 K/UL — HIGH (ref 3.8–10.5)

## 2024-04-07 PROCEDURE — 99233 SBSQ HOSP IP/OBS HIGH 50: CPT | Mod: GC

## 2024-04-07 PROCEDURE — 74018 RADEX ABDOMEN 1 VIEW: CPT | Mod: 26

## 2024-04-07 RX ADMIN — PANTOPRAZOLE SODIUM 40 MILLIGRAM(S): 20 TABLET, DELAYED RELEASE ORAL at 12:34

## 2024-04-07 RX ADMIN — HEPARIN SODIUM 5000 UNIT(S): 5000 INJECTION INTRAVENOUS; SUBCUTANEOUS at 05:36

## 2024-04-07 RX ADMIN — SIMETHICONE 80 MILLIGRAM(S): 80 TABLET, CHEWABLE ORAL at 12:34

## 2024-04-07 RX ADMIN — Medication 100 MILLIGRAM(S): at 12:34

## 2024-04-07 RX ADMIN — Medication 1 TABLET(S): at 12:34

## 2024-04-07 RX ADMIN — Medication 40 MILLIGRAM(S): at 06:39

## 2024-04-07 RX ADMIN — MEROPENEM 100 MILLIGRAM(S): 1 INJECTION INTRAVENOUS at 21:04

## 2024-04-07 RX ADMIN — HEPARIN SODIUM 5000 UNIT(S): 5000 INJECTION INTRAVENOUS; SUBCUTANEOUS at 17:24

## 2024-04-07 RX ADMIN — MEROPENEM 100 MILLIGRAM(S): 1 INJECTION INTRAVENOUS at 05:35

## 2024-04-07 RX ADMIN — MEROPENEM 100 MILLIGRAM(S): 1 INJECTION INTRAVENOUS at 14:24

## 2024-04-07 RX ADMIN — Medication 1 MILLIGRAM(S): at 12:34

## 2024-04-07 NOTE — PROGRESS NOTE ADULT - PROBLEM SELECTOR PLAN 2
- pt found to be septic earlier in hospital course, thought to be secondary to coronavirus, no other specific source   - BCx from admission (3/21)- NGTD   - s/p zosyn course 3/23-/3/30  - Hossein repeat BCx 4/3 afternoon (pt still presenting septic (WC 20s, and tachycardic, tachypnea)  - leukocytosis worsening [ likely multifactorial - infection, 2/2 steroid, alcoholic hepatitis]   - LFTs improving   - repeat U/A 4/3 negative   - CXR 4/3- showing increased infiltrates b/l   - c/w meropenem 1g q8 yesterday ( 4/3-)  - obstructive pattern on LFTs, can be hepatocellular in nature due to the alcoholic hepatitis   - ID Dr. Sosa - pt found to be septic earlier in hospital course, thought to be secondary to coronavirus, no other specific source   - BCx from admission (3/21)- NGTD   - s/p zosyn course 3/23-/3/30  - Hossein repeat BCx 4/3 - NGTD (pt still presenting septic (WC 20s, and tachycardic, tachypnea)  - leukocytosis worsening [ likely multifactorial - infection, 2/2 steroid, alcoholic hepatitis]   - LFTs improving   - repeat U/A 4/3 negative   - CXR 4/3- showing increased infiltrates b/l   - c/w meropenem 1g q8 ( 4/3-)  - obstructive pattern on LFTs, can be hepatocellular in nature due to the alcoholic hepatitis   - ID Dr. Sosa - pt found to be septic earlier in hospital course, thought to be secondary to coronavirus, no other specific source   - BCx from admission (3/21)- NGTD   - s/p zosyn course 3/23-/3/30  - Hossein repeat BCx 4/3 - NGTD (pt still presenting septic (WC 20s, and tachycardic, tachypnea)  - leukocytosis worsening [ likely multifactorial - infection, 2/2 steroid, alcoholic hepatitis]   - LFTs improving   - repeat U/A 4/3 negative   - CXR 4/3- showing increased infiltrates b/l   - c/w meropenem 1g q8 ( 4/3-4/10), 7 day course total  - obstructive pattern on LFTs, can be hepatocellular in nature due to the alcoholic hepatitis   - ID Dr. Sosa

## 2024-04-07 NOTE — PROGRESS NOTE ADULT - SUBJECTIVE AND OBJECTIVE BOX
PGY-1 Progress Note discussed with attending    PAGER #: [[phone]] TILL 5:00 PM  PLEASE CONTACT ON CALL TEAM:  - On Call Team (Please refer to Perez) FROM 5:00 PM - 8:30PM  - Nightfloat Team FROM 8:30 -7:30 AM    CHIEF COMPLAINT & BRIEF HOSPITAL COURSE:    INTERVAL HPI/OVERNIGHT EVENTS:   MEDICATIONS  (STANDING):  folic acid 1 milliGRAM(s) Oral daily  heparin   Injectable 5000 Unit(s) SubCutaneous every 12 hours  influenza   Vaccine 0.5 milliLiter(s) IntraMuscular once  meropenem  IVPB      meropenem  IVPB 1000 milliGRAM(s) IV Intermittent every 8 hours  multivitamin 1 Tablet(s) Oral daily  pantoprazole   Suspension 40 milliGRAM(s) Oral daily  prednisoLONE    3 mG/mL Solution (ORAPRED) 40 milliGRAM(s) Oral daily  rifAXIMin 550 milliGRAM(s) Oral two times a day  simethicone 80 milliGRAM(s) Chew daily  thiamine 100 milliGRAM(s) Oral daily    MEDICATIONS  (PRN):  acetaminophen     Tablet .. 650 milliGRAM(s) Oral every 6 hours PRN Temp greater or equal to 38C (100.4F)      REVIEW OF SYSTEMS:  CONSTITUTIONAL: No fever, weight loss, or fatigue  RESPIRATORY: No cough, wheezing, chills or hemoptysis; No shortness of breath  CARDIOVASCULAR: No chest pain, palpitations, dizziness, or leg swelling  GASTROINTESTINAL: No abdominal pain. No nausea, vomiting, or hematemesis; No diarrhea or constipation. No melena or hematochezia.  GENITOURINARY: No dysuria or hematuria, urinary frequency  NEUROLOGICAL: No headaches, memory loss, loss of strength, numbness, or tremors  SKIN: No itching, burning, rashes, or lesions     Vital Signs Last 24 Hrs  T(C): 37.2 (07 Apr 2024 05:00), Max: 37.3 (06 Apr 2024 20:42)  T(F): 98.9 (07 Apr 2024 05:00), Max: 99.2 (06 Apr 2024 20:42)  HR: 106 (07 Apr 2024 05:00) (97 - 106)  BP: 95/61 (07 Apr 2024 05:00) (95/61 - 111/68)  BP(mean): --  RR: 18 (07 Apr 2024 05:00) (17 - 18)  SpO2: 96% (07 Apr 2024 05:00) (94% - 97%)    Parameters below as of 07 Apr 2024 05:00  Patient On (Oxygen Delivery Method): nasal cannula  O2 Flow (L/min): 2      PHYSICAL EXAMINATION:  GENERAL: NAD, well built  HEAD:  Atraumatic, Normocephalic  EYES:  conjunctiva and sclera clear  NECK: Supple, No JVD, Normal thyroid  CHEST/LUNG: Clear to auscultation. Clear to percussion bilaterally; No rales, rhonchi, wheezing, or rubs  HEART: Regular rate and rhythm; No murmurs, rubs, or gallops  ABDOMEN: Soft, Nontender, Nondistended; Bowel sounds present  NERVOUS SYSTEM:  Alert & Oriented X3,    EXTREMITIES:  2+ Peripheral Pulses, No clubbing, cyanosis, or edema  SKIN: warm dry                          9.2    28.68 )-----------( 332      ( 07 Apr 2024 07:14 )             29.4     04-07    138  |  107  |  13  ----------------------------<  105<H>  3.5   |  21<L>  |  0.66    Ca    7.9<L>      07 Apr 2024 07:14  Phos  2.5     04-07  Mg     2.1     04-07    TPro  5.1<L>  /  Alb  1.9<L>  /  TBili  15.8<H>  /  DBili  x   /  AST  182<H>  /  ALT  82<H>  /  AlkPhos  346<H>  04-07    LIVER FUNCTIONS - ( 07 Apr 2024 07:14 )  Alb: 1.9 g/dL / Pro: 5.1 g/dL / ALK PHOS: 346 U/L / ALT: 82 U/L DA / AST: 182 U/L / GGT: x               PT/INR - ( 07 Apr 2024 07:14 )   PT: 26.4 sec;   INR: 2.37 ratio         PTT - ( 07 Apr 2024 07:14 )  PTT:40.6 sec    CAPILLARY BLOOD GLUCOSE      RADIOLOGY & ADDITIONAL TESTS:                   PGY-1 Progress Note discussed with attending    PLEASE CONTACT ON CALL TEAM:  - On Call Team (Please refer to Perez) FROM 5:00 PM - 8:30PM  - Nightfloat Team FROM 8:30 -7:30 AM    INTERVAL HPI/OVERNIGHT EVENTS: No acute events overnight. Pt stated that he self-dislodged his rectal tube. Having intermittent bowel movements, unable to quantify.      MEDICATIONS  (STANDING):  folic acid 1 milliGRAM(s) Oral daily  heparin   Injectable 5000 Unit(s) SubCutaneous every 12 hours  influenza   Vaccine 0.5 milliLiter(s) IntraMuscular once  meropenem  IVPB      meropenem  IVPB 1000 milliGRAM(s) IV Intermittent every 8 hours  multivitamin 1 Tablet(s) Oral daily  pantoprazole   Suspension 40 milliGRAM(s) Oral daily  prednisoLONE    3 mG/mL Solution (ORAPRED) 40 milliGRAM(s) Oral daily  rifAXIMin 550 milliGRAM(s) Oral two times a day  simethicone 80 milliGRAM(s) Chew daily  thiamine 100 milliGRAM(s) Oral daily    MEDICATIONS  (PRN):  acetaminophen     Tablet .. 650 milliGRAM(s) Oral every 6 hours PRN Temp greater or equal to 38C (100.4F)      REVIEW OF SYSTEMS:  CONSTITUTIONAL: No fever, weight loss, or fatigue  RESPIRATORY: No cough, wheezing, chills or hemoptysis; No shortness of breath  CARDIOVASCULAR: No chest pain, palpitations, dizziness, or leg swelling  GASTROINTESTINAL: No abdominal pain. No nausea, vomiting, or hematemesis; No diarrhea or constipation. No melena or hematochezia.  GENITOURINARY: No dysuria or hematuria, urinary frequency  NEUROLOGICAL: No headaches, memory loss, loss of strength, numbness, or tremors  SKIN: No itching, burning, rashes, or lesions     Vital Signs Last 24 Hrs  T(C): 37.2 (07 Apr 2024 05:00), Max: 37.3 (06 Apr 2024 20:42)  T(F): 98.9 (07 Apr 2024 05:00), Max: 99.2 (06 Apr 2024 20:42)  HR: 106 (07 Apr 2024 05:00) (97 - 106)  BP: 95/61 (07 Apr 2024 05:00) (95/61 - 111/68)  BP(mean): --  RR: 18 (07 Apr 2024 05:00) (17 - 18)  SpO2: 96% (07 Apr 2024 05:00) (94% - 97%)    Parameters below as of 07 Apr 2024 05:00  Patient On (Oxygen Delivery Method): nasal cannula  O2 Flow (L/min): 2      PHYSICAL EXAMINATION:  GENERAL: NAD  HEAD:  Atraumatic, Normocephalic  EYES:  (+) conjunctiva and sclera severely jaundiced   NECK: Supple, No JVD, Normal thyroid  CHEST/LUNG: Clear to percussion bilaterally; No rales, rhonchi, wheezing, or rubs, tachypnea noted on RA  HEART: Regular rate and rhythm; No murmurs, rubs, or gallops  ABDOMEN: (+) softer than prior exam, distended, decreased bowel sounds   NERVOUS SYSTEM:  Alert & Oriented X3, no focal neuro deficits   EXTREMITIES:  2+ Peripheral Pulses, No clubbing, cyanosis, (+) +1 pitting edema in b/l LE   SKIN: no rashes, lesions, dry                             9.2    28.68 )-----------( 332      ( 07 Apr 2024 07:14 )             29.4     04-07    138  |  107  |  13  ----------------------------<  105<H>  3.5   |  21<L>  |  0.66    Ca    7.9<L>      07 Apr 2024 07:14  Phos  2.5     04-07  Mg     2.1     04-07    TPro  5.1<L>  /  Alb  1.9<L>  /  TBili  15.8<H>  /  DBili  x   /  AST  182<H>  /  ALT  82<H>  /  AlkPhos  346<H>  04-07    LIVER FUNCTIONS - ( 07 Apr 2024 07:14 )  Alb: 1.9 g/dL / Pro: 5.1 g/dL / ALK PHOS: 346 U/L / ALT: 82 U/L DA / AST: 182 U/L / GGT: x               PT/INR - ( 07 Apr 2024 07:14 )   PT: 26.4 sec;   INR: 2.37 ratio         PTT - ( 07 Apr 2024 07:14 )  PTT:40.6 sec    CAPILLARY BLOOD GLUCOSE      RADIOLOGY & ADDITIONAL TESTS:

## 2024-04-07 NOTE — PROGRESS NOTE ADULT - PROBLEM SELECTOR PLAN 1
p/w acute metabolic encephalopathy 2/2 alcohol withdrawal  - s/p Ativan, phenobarb, ketamine drip, and Precedex drip in the setting of mechanical ventilation and AMS  - now extubated, off all sedation, mental status improved, A&Ox2-3  - c/w rifaximin , PPI , thiamine, folic acid, simethicone   - c/w Lactulose BID   - Maddrey score 36.7  - c/w Prednisolone 40 mg daily [started on 4/4]   - c/w albumin q6  - MELD score 22  - ALP/AST/ALT/ Tbili: 178/139/50/16.5 [Tbili lateralized likely 2/2 prednisolone]   - Abdominal ultrasound - Hepatosplenomegaly. No evidence for intrahepatic biliary duct dilatation. Increased caliber of the extrahepatic CBD measuring 8 mm. Gallbladder sludge. No ascites.   - MRCP noted above  - Dr. Hernandez GI/Hepatology following p/w acute metabolic encephalopathy 2/2 alcohol withdrawal  - s/p Ativan, phenobarb, ketamine drip, and Precedex drip in the setting of mechanical ventilation and AMS  - now extubated, off all sedation, mental status improved, A&Ox2-3  - c/w rifaximin , PPI , thiamine, folic acid, simethicone   - c/w Lactulose BID   - Maddrey score 36.7  - c/w Prednisolone 40 mg daily [started on 4/4]   - c/w albumin q6  - MELD score 27  - ALP/AST/ALT/ Tbili: 346/182/82/15.8 [Tbili lateralized likely 2/2 prednisolone]   - Abdominal ultrasound - Hepatosplenomegaly. No evidence for intrahepatic biliary duct dilatation. Increased caliber of the extrahepatic CBD measuring 8 mm. Gallbladder sludge. No ascites.   - MRCP- Enlarged with fatty infiltration. Possible hemangiomas. Common bile duct measures 3 mm.   Small amount of ascites. Distended gallbladder with a small amount of pericholecystic fluid; fluid   may be related to the abdominal ascites.  - Dr. Hernandez GI/Hepatology following

## 2024-04-07 NOTE — PROGRESS NOTE ADULT - PROBLEM SELECTOR PLAN 3
- course complicated by abdominal distention 2/2 ileus (no obstruction)  - abdominal Xray 4/3- showing persistent bowel gas pattern   - s/p lactulose, neostigmine x1 in ICU   - rectal tube in place,  - out of bed to chair if tolerated

## 2024-04-07 NOTE — PROGRESS NOTE ADULT - PROBLEM SELECTOR PLAN 4
- on rifaximin  - s/p course of lactulose d/c'd 4/1 (ammonia level: 107 > 172 > 61 > 64)  - Lactulose restarted

## 2024-04-08 LAB
ALBUMIN SERPL ELPH-MCNC: 1.7 G/DL — LOW (ref 3.5–5)
ALP SERPL-CCNC: 414 U/L — HIGH (ref 40–120)
ALT FLD-CCNC: 83 U/L DA — HIGH (ref 10–60)
ANION GAP SERPL CALC-SCNC: 6 MMOL/L — SIGNIFICANT CHANGE UP (ref 5–17)
APTT BLD: 41.7 SEC — HIGH (ref 24.5–35.6)
AST SERPL-CCNC: 160 U/L — HIGH (ref 10–40)
BASOPHILS # BLD AUTO: 0.11 K/UL — SIGNIFICANT CHANGE UP (ref 0–0.2)
BASOPHILS NFR BLD AUTO: 0.4 % — SIGNIFICANT CHANGE UP (ref 0–2)
BILIRUB DIRECT SERPL-MCNC: 12.9 MG/DL — HIGH (ref 0–0.3)
BILIRUB SERPL-MCNC: 16.7 MG/DL — HIGH (ref 0.2–1.2)
BUN SERPL-MCNC: 11 MG/DL — SIGNIFICANT CHANGE UP (ref 7–18)
CALCIUM SERPL-MCNC: 7.8 MG/DL — LOW (ref 8.4–10.5)
CHLORIDE SERPL-SCNC: 108 MMOL/L — SIGNIFICANT CHANGE UP (ref 96–108)
CO2 SERPL-SCNC: 23 MMOL/L — SIGNIFICANT CHANGE UP (ref 22–31)
CREAT SERPL-MCNC: 0.56 MG/DL — SIGNIFICANT CHANGE UP (ref 0.5–1.3)
CULTURE RESULTS: SIGNIFICANT CHANGE UP
CULTURE RESULTS: SIGNIFICANT CHANGE UP
EGFR: 131 ML/MIN/1.73M2 — SIGNIFICANT CHANGE UP
EOSINOPHIL # BLD AUTO: 0.89 K/UL — HIGH (ref 0–0.5)
EOSINOPHIL NFR BLD AUTO: 2.9 % — SIGNIFICANT CHANGE UP (ref 0–6)
GLUCOSE SERPL-MCNC: 135 MG/DL — HIGH (ref 70–99)
HCT VFR BLD CALC: 30.6 % — LOW (ref 39–50)
HGB BLD-MCNC: 9.8 G/DL — LOW (ref 13–17)
IMM GRANULOCYTES NFR BLD AUTO: 4 % — HIGH (ref 0–0.9)
INR BLD: 2.38 RATIO — HIGH (ref 0.85–1.18)
LEPTOSPIRA AB TITR SER: NEGATIVE — SIGNIFICANT CHANGE UP
LEPTOSPIRA AB TITR SER: NEGATIVE — SIGNIFICANT CHANGE UP
LYMPHOCYTES # BLD AUTO: 1.62 K/UL — SIGNIFICANT CHANGE UP (ref 1–3.3)
LYMPHOCYTES # BLD AUTO: 5.2 % — LOW (ref 13–44)
MAGNESIUM SERPL-MCNC: 2.1 MG/DL — SIGNIFICANT CHANGE UP (ref 1.6–2.6)
MCHC RBC-ENTMCNC: 32 GM/DL — SIGNIFICANT CHANGE UP (ref 32–36)
MCHC RBC-ENTMCNC: 34.8 PG — HIGH (ref 27–34)
MCV RBC AUTO: 108.5 FL — HIGH (ref 80–100)
MONOCYTES # BLD AUTO: 1.09 K/UL — HIGH (ref 0–0.9)
MONOCYTES NFR BLD AUTO: 3.5 % — SIGNIFICANT CHANGE UP (ref 2–14)
NEUTROPHILS # BLD AUTO: 26.15 K/UL — HIGH (ref 1.8–7.4)
NEUTROPHILS NFR BLD AUTO: 84 % — HIGH (ref 43–77)
NRBC # BLD: 0 /100 WBCS — SIGNIFICANT CHANGE UP (ref 0–0)
PHOSPHATE SERPL-MCNC: 2 MG/DL — LOW (ref 2.5–4.5)
PLATELET # BLD AUTO: 360 K/UL — SIGNIFICANT CHANGE UP (ref 150–400)
POTASSIUM SERPL-MCNC: 3.6 MMOL/L — SIGNIFICANT CHANGE UP (ref 3.5–5.3)
POTASSIUM SERPL-SCNC: 3.6 MMOL/L — SIGNIFICANT CHANGE UP (ref 3.5–5.3)
PROT SERPL-MCNC: 5 G/DL — LOW (ref 6–8.3)
PROTHROM AB SERPL-ACNC: 26.5 SEC — HIGH (ref 9.5–13)
RAPID RVP RESULT: SIGNIFICANT CHANGE UP
RBC # BLD: 2.82 M/UL — LOW (ref 4.2–5.8)
RBC # FLD: 22.3 % — HIGH (ref 10.3–14.5)
SARS-COV-2 RNA SPEC QL NAA+PROBE: SIGNIFICANT CHANGE UP
SARS-COV-2 RNA SPEC QL NAA+PROBE: SIGNIFICANT CHANGE UP
SODIUM SERPL-SCNC: 137 MMOL/L — SIGNIFICANT CHANGE UP (ref 135–145)
SPECIMEN SOURCE: SIGNIFICANT CHANGE UP
SPECIMEN SOURCE: SIGNIFICANT CHANGE UP
WBC # BLD: 31.09 K/UL — HIGH (ref 3.8–10.5)
WBC # FLD AUTO: 31.09 K/UL — HIGH (ref 3.8–10.5)

## 2024-04-08 PROCEDURE — 99233 SBSQ HOSP IP/OBS HIGH 50: CPT

## 2024-04-08 PROCEDURE — 71260 CT THORAX DX C+: CPT | Mod: 26

## 2024-04-08 PROCEDURE — 99233 SBSQ HOSP IP/OBS HIGH 50: CPT | Mod: GC

## 2024-04-08 PROCEDURE — 74018 RADEX ABDOMEN 1 VIEW: CPT | Mod: 26

## 2024-04-08 PROCEDURE — 74174 CTA ABD&PLVS W/CONTRAST: CPT | Mod: 26

## 2024-04-08 RX ADMIN — MEROPENEM 100 MILLIGRAM(S): 1 INJECTION INTRAVENOUS at 22:29

## 2024-04-08 RX ADMIN — HEPARIN SODIUM 5000 UNIT(S): 5000 INJECTION INTRAVENOUS; SUBCUTANEOUS at 18:15

## 2024-04-08 RX ADMIN — Medication 1 TABLET(S): at 11:13

## 2024-04-08 RX ADMIN — Medication 40 MILLIGRAM(S): at 05:54

## 2024-04-08 RX ADMIN — PANTOPRAZOLE SODIUM 40 MILLIGRAM(S): 20 TABLET, DELAYED RELEASE ORAL at 11:12

## 2024-04-08 RX ADMIN — MEROPENEM 100 MILLIGRAM(S): 1 INJECTION INTRAVENOUS at 05:53

## 2024-04-08 RX ADMIN — Medication 1 MILLIGRAM(S): at 11:13

## 2024-04-08 RX ADMIN — HEPARIN SODIUM 5000 UNIT(S): 5000 INJECTION INTRAVENOUS; SUBCUTANEOUS at 05:53

## 2024-04-08 RX ADMIN — MEROPENEM 100 MILLIGRAM(S): 1 INJECTION INTRAVENOUS at 13:35

## 2024-04-08 RX ADMIN — Medication 100 MILLIGRAM(S): at 11:13

## 2024-04-08 RX ADMIN — SIMETHICONE 80 MILLIGRAM(S): 80 TABLET, CHEWABLE ORAL at 11:12

## 2024-04-08 NOTE — PROGRESS NOTE ADULT - TIME BILLING
history, physical, discussion w/ consultants. Reviewed MR abd
chart review, coordinating care w/
chart review, labs reviewed, d/w patient. Advised RN to encourage mobility, needs OOBC
chart review, review of extensive hospitalization, d./w consultants, imagining reviewed and formulation of plan
chart review, review- cbc, bmp, LFTs. discussion w/ hepatology service.

## 2024-04-08 NOTE — PROGRESS NOTE ADULT - PROBLEM SELECTOR PLAN 1
p/w acute metabolic encephalopathy 2/2 alcohol withdrawal  - s/p Ativan, phenobarb, ketamine drip, and Precedex drip in the setting of mechanical ventilation and AMS  - now extubated, off all sedation, mental status improved, A&Ox2-3  - c/w rifaximin , PPI , thiamine, folic acid, simethicone   - c/w Lactulose BID   - Maddrey score 36.7  - c/w Prednisolone 40 mg daily [started on 4/4]   - c/w albumin q6  - MELD score 27  - ALP/AST/ALT/ Tbili: 346/182/82/15.8 [Tbili lateralized likely 2/2 prednisolone]   - Abdominal ultrasound - Hepatosplenomegaly. No evidence for intrahepatic biliary duct dilatation. Increased caliber of the extrahepatic CBD measuring 8 mm. Gallbladder sludge. No ascites.   - MRCP- Enlarged with fatty infiltration. Possible hemangiomas. Common bile duct measures 3 mm.   Small amount of ascites. Distended gallbladder with a small amount of pericholecystic fluid; fluid   may be related to the abdominal ascites.  - Dr. Hernandez GI/Hepatology following p/w acute metabolic encephalopathy 2/2 alcohol withdrawal  - s/p Ativan, phenobarb, ketamine drip, and Precedex drip in the setting of mechanical ventilation and AMS  - now extubated, off all sedation, mental status improved, A&Ox2-3  - c/w rifaximin , PPI , thiamine, folic acid, simethicone   - s/p lactulose   - Maddrey score 36.7  - c/w Prednisolone 40 mg daily [started on 4/4]   - c/w albumin q6  - MELD score 27  - ALP/AST/ALT/ Tbili: 346/182/82/15.8 [Tbili lateralized likely 2/2 prednisolone]   - Abdominal ultrasound - Hepatosplenomegaly. No evidence for intrahepatic biliary duct dilatation. Increased caliber of the extrahepatic CBD measuring 8 mm. Gallbladder sludge. No ascites.   - MRCP- Enlarged with fatty infiltration. Possible hemangiomas. Common bile duct measures 3 mm.   Small amount of ascites. Distended gallbladder with a small amount of pericholecystic fluid; fluid   may be related to the abdominal ascites.  - Dr. Hernandez GI/Hepatology following

## 2024-04-08 NOTE — PROGRESS NOTE ADULT - PROBLEM SELECTOR PLAN 2
- pt found to be septic earlier in hospital course, thought to be secondary to coronavirus, no other specific source   - BCx from admission (3/21)- NGTD   - s/p zosyn course 3/23-/3/30  - Hossein repeat BCx 4/3 - NGTD (pt still presenting septic (WC 20s, and tachycardic, tachypnea)  - leukocytosis worsening [ likely multifactorial - infection, 2/2 steroid, alcoholic hepatitis]   - LFTs improving   - repeat U/A 4/3 negative   - CXR 4/3- showing increased infiltrates b/l   - c/w meropenem 1g q8 ( 4/3-)  - obstructive pattern on LFTs, can be hepatocellular in nature due to the alcoholic hepatitis   - ID Dr. Sosa

## 2024-04-08 NOTE — PROGRESS NOTE ADULT - ASSESSMENT
38y obese (BMI 30) Male w/ Hx of AUD (since age 21, heavier last 2 weeks, last drink on the day of admission), Hx of withdrawal seizures, prior alcohol rehab, presented to [facility] ER 3/21/24 w/ AMS in setting of hyperammonemia (172), worsening abdominal distention and jaundice (serum bilirubin 6.6->10.8) and was admitted to MICU for withdrawal, aspirational pneumonia, required intubation 3/22/24, found to be positive for coronavirus (non COVID-19), and noted increased intraabdominal pressure, ileus, for what surgery and GI has been following.   CT a/p showed marked hepatomegaly (29 cm) w/ severe hepatic steatosis. MDF was < 32 on admission.   Hepatology was consulted 3/28/24 for his ALD, rising bilirubin. Hepatology was covered by GI service 4/1-4/7, appreciated.     # Alcoholic hepatitis w/ new onset jaundice, elevated transaminases (AST>ALT), and hepatomegaly w/ hepatic steatosis  # Elevated ALP  # Mild coagulopathy  # Hepatic encephalopathy  # Heterogenous foci in GB fossa and segment 6  # AUD w/ withdrawal  - MELD 3.0 22->30  - Jaundice, marked hepatomegaly, severe steatosis likely due to alcoholic hepatitis; MDF was < 32, but during hospitalization worsened  - No biliary dilation on CT 3/21/24 and US abd 4/2/24, no signs of cholecystitis either on above.   - MRI abd w/wo and MRCP 4/3/24 showed normal bile ducts, distended GB w/ small layering sludge or contracted bile, small pericholecystic fluid. Liver appeared enlarged w/ fatty infiltration, regions of R hepatic lobe likely focal fatty infiltrations w/ associated liver lesions, possibly hemangiomas per report. It was limited b/o motion artifact. Noted small amount of ascites.   - Liver workup so far: Hep A IgM neg, HBsAg neg, HBcAb IgM neg, HBcAb neg, HBsAb neg, HCV ab/RNA neg, Hep E IgM/PCR neg. CMV and EBV PCRs detectable but the actual value below detection limit. TRESA and AMA neg. Ceruloplasmin 39. Ferritin 1105.   - Infectious workup so far: BCx 2x 3/21 and 4/3 negative, UCx 4/3 negative. Ascites was not sufficient for Dx paracentesis.   - Lipase was 479->86.   - He was started on prednisolone 40 mg 4/4/24 after ID clearance. Bilirubin unchanged as per day #4, although Lille score reports good prognosis b/o young age.  ALP rising 299->414, ALT slightly rising.   - Last fever 4/3 100.4, T max as per 4/8 99.8F. Been on Meropenem since 4/3/24, noted infiltrates on CXR 4/3/24.       - Please, still send HSV, VZV PCRs, leptospirosis serology, LKM, SMA, Ig panel, repeat ferritin /iron /TIBC, Strongyloides serology.   - Repeat EBV/CMV serologies, PCR.   - Aspiration, seizure, fall precautions; Avoid sedation; C/w bowel regimen / lactulose.   - Will need short term abdominal imaging for the MRI findings. Since MRI was limited due to motion artifact, can consider multiphase CT a/p w/wo contrast now.   - Repeat lipase.   - C/w folic, thiamine  - Bilirubin has not improved, 16.7->16.7, INR rising, MELD rising as per day#4, thus role of steroid is limited.   - Repeat CXR or if goes for multiphase CT a/p, obtain CT chest as well.   - Consider HIDA.  - C/w monitoring LFTs, including INR, MELD labs.  - C/w empiric antibiotic coverage and f/u final cultures  - Will need alcohol rehab once acute medical issues resolved  - Given his high MELD, would need to be in tertiary transplant center. Per chart, he has [facility], that currently not in network w/ [facility], thus will reach out to [facility] if patient and family agreeable, after d/w primary team.    Thank you for consult  Will monitor  D/w primary team   38y obese (BMI 30) Male w/ Hx of AUD (since age 21, heavier last 2 weeks, last drink on the day of admission), Hx of withdrawal seizures, prior alcohol rehab, presented to [facility] ER 3/21/24 w/ AMS in setting of hyperammonemia (172), worsening abdominal distention and jaundice (serum bilirubin 6.6->10.8) and was admitted to MICU for withdrawal, aspirational pneumonia, required intubation 3/22/24, found to be positive for coronavirus (non COVID-19), and noted increased intraabdominal pressure, ileus, for what surgery and GI has been following.   CT a/p showed marked hepatomegaly (29 cm) w/ severe hepatic steatosis. MDF was < 32 on admission.   Hepatology was consulted 3/28/24 for his ALD, rising bilirubin. Hepatology was covered by GI service 4/1-4/7, appreciated.     # Alcoholic hepatitis w/ new onset jaundice, elevated transaminases (AST>ALT), and hepatomegaly w/ hepatic steatosis  # Elevated ALP  # Mild coagulopathy  # Hepatic encephalopathy  # Heterogenous foci in GB fossa and segment 6  # AUD w/ withdrawal  - MELD 3.0 22->30  - Jaundice, marked hepatomegaly, severe steatosis likely due to alcoholic hepatitis; MDF was < 32, but during hospitalization worsened  - No biliary dilation on CT 3/21/24 and US abd 4/2/24, no signs of cholecystitis either on above.   - MRI abd w/wo and MRCP 4/3/24 showed normal bile ducts, distended GB w/ small layering sludge or contracted bile, small pericholecystic fluid. Liver appeared enlarged w/ fatty infiltration, regions of R hepatic lobe likely focal fatty infiltrations w/ associated liver lesions, possibly hemangiomas per report. It was limited b/o motion artifact. Noted small amount of ascites.   - Liver workup so far: Hep A IgM neg, HBsAg neg, HBcAb IgM neg, HBcAb neg, HBsAb neg, HCV ab/RNA neg, Hep E IgM/PCR neg. CMV and EBV PCRs detectable but the actual value below detection limit. TRESA and AMA neg. Ceruloplasmin 39. Ferritin 1105.   - Infectious workup so far: BCx 2x 3/21 and 4/3 negative, UCx 4/3 negative. Ascites was not sufficient for Dx paracentesis.   - Lipase was 479->86.   - He was started on prednisolone 40 mg 4/4/24 after ID clearance. Bilirubin unchanged as per day #4, although Lille score reports good prognosis b/o young age.  ALP rising 299->414, ALT slightly rising, INR worsening. Can consider adding liver failure protocol NAC.   - Last fever 4/3 100.4, T max as per 4/8 99.8F. Been on Meropenem since 4/3/24, noted infiltrates on CXR 4/3/24.       - Please, still send HSV, VZV PCRs, leptospirosis serology, LKM, SMA, Ig panel, repeat ferritin /iron /TIBC, Strongyloides serology.   - Repeat EBV/CMV serologies, PCR.   - Aspiration, seizure, fall precautions; Avoid sedation; C/w bowel regimen / lactulose.   - Will need short term abdominal imaging for the MRI findings. Since MRI was limited due to motion artifact, can consider multiphase CT a/p w/wo contrast now.   - Repeat lipase.   - C/w folic, thiamine  - Bilirubin has not improved, 16.7->16.7, INR rising, MELD rising as per day#4, thus role of steroid is limited.   - Repeat CXR or if goes for multiphase CT a/p, obtain CT chest as well.   - Consider HIDA.  - C/w monitoring LFTs, including INR, MELD labs.  - C/w empiric antibiotic coverage and f/u final cultures  - Will need alcohol rehab once acute medical issues resolved  - Given his high MELD, would need to be in tertiary transplant center. Per chart, he has [facility], that currently not in network w/ [facility], thus will reach out to [facility] if patient and family agreeable, after d/w primary team.    Thank you for consult  Will monitor  D/w primary team   38y obese (BMI 30) Male w/ Hx of AUD (since age 21, heavier last 2 weeks, last drink on the day of admission), Hx of withdrawal seizures, prior alcohol rehab, presented to [facility] ER 3/21/24 w/ AMS in setting of hyperammonemia (172), worsening abdominal distention and jaundice (serum bilirubin 6.6->10.8) and was admitted to MICU for withdrawal, aspirational pneumonia, required intubation (3/22- 3/26/24), found to be positive for coronavirus (non COVID-19), and noted increased intraabdominal pressure, ileus, for what surgery and GI has been following.   CT a/p 3/21/24 showed marked hepatomegaly (29 cm) w/ severe hepatic steatosis. MDF was < 32 on admission. Notably, his lipase was 479 on admission.   Hepatology was consulted 3/28/24 for his ALD, rising bilirubin. Hepatology was covered by GI service 4/1-4/7, appreciated.   Hospital course was c/b development of sepsis w/ pos UA, infiltrates on CXR, +coronavirus and distended abdomen, subsequent septic shock, requiring pressor support (3/23-3/27). was on Zosyn 3/23-3/30/24, and before that on ceftriaxone 3/21-3/23/24 w/ metronidazole 3/22/24. BCx 2 sets was negative from 3/21 and 4/3 and UCx was neg from 4/3. He has been afebrile since 4/3. He has been on meropenem since 4/3/24. After d/w ID, he was started on prednisolone for alcoholic hepatitis 4/4/24 b/o worsening MELD labs.     # Alcoholic hepatitis w/ new onset jaundice, elevated transaminases (AST>ALT), and hepatomegaly w/ hepatic steatosis  # Elevated ALP  # Mild coagulopathy  # Hepatic encephalopathy  # Heterogenous foci in GB fossa and segment 6  # AUD w/ withdrawal  # Pancreatitis  # Sepsis, septic shock  - resolved  # PNA  - MELD 3.0 22->30  - Jaundice, marked hepatomegaly, severe steatosis likely due to alcoholic hepatitis; MDF was < 32, but during hospitalization worsened  - No biliary dilation on CT 3/21/24 and US abd 4/2/24, no signs of cholecystitis either on above.   - MRI abd w/wo and MRCP 4/3/24 showed normal bile ducts, distended GB w/ small layering sludge or contracted bile, small pericholecystic fluid. Liver appeared enlarged w/ fatty infiltration, regions of R hepatic lobe likely focal fatty infiltrations w/ associated liver lesions, possibly hemangiomas per report. It was limited b/o motion artifact. Noted small amount of ascites.   - Liver workup so far: Hep A IgM neg, HBsAg neg, HBcAb IgM neg, HBcAb neg, HBsAb neg, HCV ab/RNA neg, Hep E IgM/PCR neg. CMV and EBV PCRs detectable but the actual value below detection limit. TRESA and AMA neg. Ceruloplasmin 39. Ferritin 1105.   - Infectious workup so far: BCx 2x 3/21 and 4/3 negative, UCx 4/3 negative. Ascites was not sufficient for Dx paracentesis.   - Lipase was 479->86.   - He was started on prednisolone 40 mg 4/4/24 after ID clearance. Bilirubin unchanged as per day #4, although Lille score reports good prognosis b/o young age.  ALP rising 299->414, ALT slightly rising, INR worsening. Can consider adding liver failure protocol NAC.   - Last fever 4/3 100.4, T max as per 4/8 99.8F. Been on Meropenem since 4/3/24, noted infiltrates on CXR 4/3/24.       - Please, still send HSV, VZV PCRs, leptospirosis serology, LKM, SMA, Ig panel, repeat ferritin /iron /TIBC, Strongyloides serology.   - Repeat EBV/CMV serologies, PCR.   - Aspiration, seizure, fall precautions; Avoid sedation; C/w bowel regimen / lactulose and rifaximin.   - Will need short term abdominal imaging for the MRI findings. Since MRI was limited due to motion artifact, can consider multiphase CT a/p w/wo contrast now. Especially if LT evaluation is in question, the nature of the liver lesions would need to be clarified.  - Repeat lipase.   - C/w folic, thiamine  - Bilirubin has not improved, 16.7->16.7, INR rising, MELD rising as per day#4, thus role of steroid is limited. Day # 7 will be 4/11.  - Repeat CXR or if goes for multiphase CT a/p, obtain CT chest as well.   - Consider HIDA.  - C/w monitoring LFTs, including INR, MELD labs.  - C/w empiric antibiotic coverage and f/u final cultures  - Will need alcohol rehab once acute medical issues resolved  - Given his high MELD, would need to be in tertiary transplant center. Per chart, he has [facility], that currently not in network w/ [facility], thus will reach out to [facility] if patient and family agreeable, after d/w primary team. Awaiting call back from [facility].     Thank you for consult  Will monitor  D/w primary team   38y obese (BMI 30) Male w/ Hx of AUD (since age 21, heavier last 2 weeks, last drink on the day of admission), Hx of withdrawal seizures, prior alcohol rehab, presented to [facility] ER 3/21/24 w/ AMS in setting of hyperammonemia (172), worsening abdominal distention and jaundice (serum bilirubin 6.6->10.8) and was admitted to MICU for withdrawal, aspirational pneumonia, required intubation (3/22- 3/26/24), found to be positive for coronavirus (non COVID-19), and noted increased intraabdominal pressure, ileus, for what surgery and GI has been following.   CT a/p 3/21/24 showed marked hepatomegaly (29 cm) w/ severe hepatic steatosis. MDF was < 32 on admission. Notably, his lipase was 479 on admission.   Hepatology was consulted 3/28/24 for his ALD, rising bilirubin. Hepatology was covered by GI service 4/1-4/7, appreciated.   Hospital course was c/b development of sepsis w/ pos UA, infiltrates on CXR, +coronavirus and distended abdomen, subsequent septic shock, requiring pressor support (3/23-3/27). was on Zosyn 3/23-3/30/24, and before that on ceftriaxone 3/21-3/23/24 w/ metronidazole 3/22/24. BCx 2 sets was negative from 3/21 and 4/3 and UCx was neg from 4/3. He has been afebrile since 4/3. He has been on meropenem since 4/3/24. After d/w ID, he was started on prednisolone for alcoholic hepatitis 4/4/24 b/o worsening MELD labs.     # Alcoholic hepatitis w/ new onset jaundice, elevated transaminases (AST>ALT), and hepatomegaly w/ hepatic steatosis  # Elevated ALP  # Mild coagulopathy  # Hepatic encephalopathy  # Heterogenous foci in GB fossa and segment 6  # AUD w/ withdrawal  # Pancreatitis  # Sepsis, septic shock  - resolved  # PNA  - MELD 3.0 22->30  - Jaundice, marked hepatomegaly, severe steatosis likely due to alcoholic hepatitis; MDF was < 32, but during hospitalization worsened  - No biliary dilation on CT 3/21/24 and US abd 4/2/24, no signs of cholecystitis either on above.   - MRI abd w/wo and MRCP 4/3/24 showed normal bile ducts, distended GB w/ small layering sludge or contracted bile, small pericholecystic fluid. Liver appeared enlarged w/ fatty infiltration, regions of R hepatic lobe likely focal fatty infiltrations w/ associated liver lesions, possibly hemangiomas per report. It was limited b/o motion artifact. Noted small amount of ascites.   - Liver workup so far: Hep A IgM neg, HBsAg neg, HBcAb IgM neg, HBcAb neg, HBsAb neg, HCV ab/RNA neg, Hep E IgM/PCR neg. CMV and EBV PCRs detectable but the actual value below detection limit. TRESA and AMA neg. Ceruloplasmin 39. Ferritin 1105.   - Infectious workup so far: BCx 2x 3/21 and 4/3 negative, UCx 4/3 negative. Ascites was not sufficient for Dx paracentesis.   - Lipase was 479->86.   - He was started on prednisolone 40 mg 4/4/24 after ID clearance. Bilirubin unchanged as per day #4, although Lille score reports good prognosis b/o young age.  ALP rising 299->414, ALT slightly rising, INR worsening.   - Last fever 4/3 100.4, T max as per 4/8 99.8F. Been on Meropenem since 4/3/24, noted infiltrates on CXR 4/3/24.       - Please, still send HSV, VZV PCRs, leptospirosis serology, LKM, SMA, Ig panel, repeat ferritin /iron /TIBC, Strongyloides serology, Fungitell.   - Repeat EBV/CMV serologies, PCR.   - Aspiration, seizure, fall precautions; Avoid sedation; C/w bowel regimen / lactulose and rifaximin.   - Will need short term abdominal imaging for the MRI findings. Since MRI was limited due to motion artifact, can consider multiphase CT a/p w/wo contrast now. Especially if LT evaluation is in question, the nature of the liver lesions would need to be clarified.  - Repeat lipase.   - C/w folic, thiamine  - Bilirubin has not improved, 16.7->16.7, INR rising, MELD rising as per day#4, thus role of steroid is limited, and risk probably overweighs benefit. Can consider adding liver failure protocol NAC.   - Repeat CXR or if goes for multiphase CT a/p, obtain CT chest as well.   - Consider HIDA.  - C/w monitoring LFTs, including INR, MELD labs.  - C/w empiric antibiotic coverage and f/u final cultures  - Will need alcohol rehab once acute medical issues resolved  - Given his high MELD, would need to be in tertiary transplant center. Per chart, he has [facility], that currently not in network w/ [facility], thus will reach out to [facility] if patient and family agreeable, after d/w primary team. Awaiting call back from [facility].     Thank you for consult  Will monitor  D/w primary team    Addendum: Spoke to [facility] transplant hepatologist, Dr. Hutchison, and they will initiate transfer process, appreciated. Was discussed earlier today w/ patient who was agreable.    Brief hospital course:   38y obese (BMI 30) Male w/ Hx of AUD (since age 21, heavier last 2 weeks, last drink on the day of admission), Hx of withdrawal seizures, prior alcohol rehab, presented to [facility] ER 3/21/24 w/ AMS in setting of hyperammonemia (172), worsening abdominal distention and jaundice (serum bilirubin 6.6) and was admitted to MICU for withdrawal, aspirational pneumonia, required intubation (3/22- 3/26/24), found to be positive for coronavirus (non COVID-19), and noted increased intraabdominal pressure, ileus, for what surgery and GI has been following.     CT a/p 3/21/24 showed marked hepatomegaly (29 cm) w/ severe hepatic steatosis. MDF was < 32 on admission. There was no biliary dilation on CT 3/21/24 and US abd 4/2/24, and there was no signs of cholecystitis either on above. Notably, his lipase was 479 on admission.   Hospital course was c/b sepsis, septic shock, requiring pressor support (3/23-3/27), in setting of pos UA, infiltrates on CXR, +coronavirus and distended abdomen.  He was on Zosyn 3/23-3/30/24, and before that on ceftriaxone 3/21-3/23/24 w/ metronidazole 3/22/24. BCx 2 sets was negative from 3/21 and 4/3 and UCx was neg from 4/3.  MRI abd w/wo and MRCP 4/3/24 showed normal bile ducts, distended GB w/ small layering sludge, small pericholecystic fluid. Liver appeared enlarged w/ fatty infiltration, some regions in R hepatic lobe likely representing focal fatty infiltrations w/ associated liver lesions, possibly hemangiomas per report. It was limited b/o motion artifact. Noted small amount of ascites, not sufficient for diagnostic paracentesis.    He was transferred to medicine on 4/2/24 and has been afebrile since 4/3/24. His mental status has improved, and now AAOx3. His abdominal distention reportedly improved, off lactulose, AXR 4/8/24 still reports mid abdominal small and large bowel air filled bowel ileus. Notably, on MRI 4/3/24 bowels were unremarkable.   He has been on meropenem since 4/3/24 and after d/w ID, he was started on prednisolone for alcoholic hepatitis on 4/4/24 (when bilirubin was already 16.7), as infection appeared to be controlled.   Bilirubin remained unchanged on steroid as per day #4, although Lille score reports good prognosis b/o young age.  ALP has been rising 299->414, ALT has been slightly rising, INR has been worsening.  Current MELD 3.0 30.   He was exposed to COVID-19 on 4/8, but his test is negative.       # Alcoholic hepatitis w/ new onset jaundice, elevated transaminases (AST>ALT), and hepatomegaly w/ hepatic steatosis  # Elevated ALP  # Mild coagulopathy  # Hepatic encephalopathy - imoroved  # Heterogenous foci in GB fossa and segment 6  # AUD w/ withdrawal  # Pancreatitis  # Sepsis, septic shock  - resolved  # PNA  - MELD 3.0 22->30  - Jaundice, marked hepatomegaly, severe steatosis likely due to alcoholic hepatitis; MDF was < 32 on admission, but during hospitalization worsened  - No signs of extrahepatic biliary obstruction (normal bile ducts on CT 3/21/24, US abd 4/2/24, and MRCP 4/3/24).   - No signs of cholecystitis either on CT and US. MRCP 4/3/24 showed distended GB w/ small layering sludge or contracted bile, small pericholecystic fluid. No abdominal tenderness, Harrington neg on exam.  - Liver appeared enlarged w/ fatty infiltration, regions of R hepatic lobe likely focal fatty infiltrations w/ associated liver lesions, possibly hemangiomas per report. It was limited b/o motion artifact. Noted small amount of ascites.   - Liver workup so far: Hep A IgM neg, HBsAg neg, HBcAb IgM neg, HBcAb neg, HBsAb neg, HCV ab/RNA neg, Hep E IgM/PCR neg. CMV and EBV PCRs detectable but the actual value below detection limit. TRESA and AMA neg. Ceruloplasmin 39. Ferritin 1105.   - Infectious workup so far: BCx 2x 3/21 and 4/3 negative, UCx 4/3 negative. Ascites was not sufficient for Dx paracentesis.   - Lipase was 479->86.   - He was started on prednisolone 40 mg 4/4/24 after ID clearance. Bilirubin unchanged as per day #4, although Lille score reports good prognosis b/o young age.  ALP rising 299->414, ALT slightly rising, INR worsening.   - Last fever 4/3 100.4, T max as per 4/8 99.8F. Been on Meropenem since 4/3/24, noted infiltrates on CXR 4/3/24 and still reported ileus picture on AXR 4/8/24.       - Please, send Fungitell.  - Please, still send HSV, VZV PCRs, leptospirosis serology, LKM, SMA, Ig panel, repeat ferritin /iron /TIBC, Strongyloides serology.  - Repeat EBV/CMV serologies, PCR.   - Aspiration, seizure, fall precautions; Avoid sedation; C/w folic, thiamine. C/w rifaximin. Lactulose been held in setting of abdominal distention, concern for ileus on AXR (4/8/24).   - Will need short term abdominal imaging for the MRI findings. Since MRI was limited due to motion artifact, consider multiphase CT a/p w/wo contrast now. Especially if LT evaluation is in question, the nature of the liver lesions would need to be clarified.  - Please, repeat lipase.   - Bilirubin has not improved, 16.7->16.7, INR rising, MELD rising, as per day#4, thus role of steroid is limited, and risk probably overweighs benefit. Will hold for now.   - Can consider adding liver failure protocol NAC.   - Repeat CXR or if goes for multiphase CT a/p, obtain CT chest as well.   - Consider HIDA.  - C/w monitoring LFTs, including INR, MELD labs.  - C/w empiric antibiotic coverage and f/u final cultures.   - Will need alcohol rehab once acute medical issues resolved  - Given his high MELD, would need to be in tertiary transplant center. Per chart, he has [facility], that currently not in network w/ [facility], thus will reach out to [facility] if patient and family agreeable, after d/w primary team. Awaiting call back from [facility].     Thank you for consult  Will monitor  D/w primary team    Addendum: Spoke to [facility] transplant hepatologist, Dr. Hutchison, and they will initiate transfer process, appreciated. Was discussed earlier today w/ patient who was agreeable. Discussed w/ primary attending.

## 2024-04-08 NOTE — PROGRESS NOTE ADULT - SUBJECTIVE AND OBJECTIVE BOX
PGY-1 Progress Note discussed with attending    PAGER #: [[phone]] TILL 5:00 PM  PLEASE CONTACT ON CALL TEAM:  - On Call Team (Please refer to Perez) FROM 5:00 PM - 8:30PM  - Nightfloat Team FROM 8:30 -7:30 AM    CHIEF COMPLAINT & BRIEF HOSPITAL COURSE:    INTERVAL HPI/OVERNIGHT EVENTS:   MEDICATIONS  (STANDING):  folic acid 1 milliGRAM(s) Oral daily  heparin   Injectable 5000 Unit(s) SubCutaneous every 12 hours  influenza   Vaccine 0.5 milliLiter(s) IntraMuscular once  meropenem  IVPB 1000 milliGRAM(s) IV Intermittent every 8 hours  meropenem  IVPB      multivitamin 1 Tablet(s) Oral daily  pantoprazole   Suspension 40 milliGRAM(s) Oral daily  prednisoLONE    3 mG/mL Solution (ORAPRED) 40 milliGRAM(s) Oral daily  rifAXIMin 550 milliGRAM(s) Oral two times a day  simethicone 80 milliGRAM(s) Chew daily  thiamine 100 milliGRAM(s) Oral daily    MEDICATIONS  (PRN):  acetaminophen     Tablet .. 650 milliGRAM(s) Oral every 6 hours PRN Temp greater or equal to 38C (100.4F)      REVIEW OF SYSTEMS:  CONSTITUTIONAL: No fever, weight loss, or fatigue  RESPIRATORY: No cough, wheezing, chills or hemoptysis; No shortness of breath  CARDIOVASCULAR: No chest pain, palpitations, dizziness, or leg swelling  GASTROINTESTINAL: No abdominal pain. No nausea, vomiting, or hematemesis; No diarrhea or constipation. No melena or hematochezia.  GENITOURINARY: No dysuria or hematuria, urinary frequency  NEUROLOGICAL: No headaches, memory loss, loss of strength, numbness, or tremors  SKIN: No itching, burning, rashes, or lesions     Vital Signs Last 24 Hrs  T(C): 37.7 (08 Apr 2024 05:16), Max: 37.7 (08 Apr 2024 05:16)  T(F): 99.8 (08 Apr 2024 05:16), Max: 99.8 (08 Apr 2024 05:16)  HR: 103 (08 Apr 2024 05:16) (93 - 103)  BP: 105/65 (08 Apr 2024 05:16) (104/66 - 109/73)  BP(mean): 85 (07 Apr 2024 13:13) (85 - 85)  RR: 19 (08 Apr 2024 05:16) (18 - 19)  SpO2: 96% (08 Apr 2024 05:16) (94% - 96%)    Parameters below as of 08 Apr 2024 05:16  Patient On (Oxygen Delivery Method): room air        PHYSICAL EXAMINATION:  GENERAL: NAD, well built  HEAD:  Atraumatic, Normocephalic  EYES:  conjunctiva and sclera clear  NECK: Supple, No JVD, Normal thyroid  CHEST/LUNG: Clear to auscultation. Clear to percussion bilaterally; No rales, rhonchi, wheezing, or rubs  HEART: Regular rate and rhythm; No murmurs, rubs, or gallops  ABDOMEN: Soft, Nontender, Nondistended; Bowel sounds present  NERVOUS SYSTEM:  Alert & Oriented X3,    EXTREMITIES:  2+ Peripheral Pulses, No clubbing, cyanosis, or edema  SKIN: warm dry                          9.8    31.09 )-----------( 360      ( 08 Apr 2024 07:40 )             30.6     04-08    137  |  108  |  11  ----------------------------<  135<H>  3.6   |  23  |  0.56    Ca    7.8<L>      08 Apr 2024 07:40  Phos  2.0     04-08  Mg     2.1     04-08    TPro  5.0<L>  /  Alb  1.7<L>  /  TBili  16.7<H>  /  DBili  12.9<H>  /  AST  160<H>  /  ALT  83<H>  /  AlkPhos  414<H>  04-08    LIVER FUNCTIONS - ( 08 Apr 2024 07:40 )  Alb: 1.7 g/dL / Pro: 5.0 g/dL / ALK PHOS: 414 U/L / ALT: 83 U/L DA / AST: 160 U/L / GGT: x               PT/INR - ( 08 Apr 2024 07:40 )   PT: 26.5 sec;   INR: 2.38 ratio         PTT - ( 08 Apr 2024 07:40 )  PTT:41.7 sec    CAPILLARY BLOOD GLUCOSE      RADIOLOGY & ADDITIONAL TESTS:                   PGY-1 Progress Note discussed with attending    PLEASE CONTACT ON CALL TEAM:  - On Call Team (Please refer to Perez) FROM 5:00 PM - 8:30PM  - Nightfloat Team FROM 8:30 -7:30 AM      INTERVAL HPI/OVERNIGHT EVENTS: Pt had no acute events overnight. Pending RVP due to exposure from roommate COVID positive. Denies fevers, chills, abdominal pain, tremors.     MEDICATIONS  (STANDING):  folic acid 1 milliGRAM(s) Oral daily  heparin   Injectable 5000 Unit(s) SubCutaneous every 12 hours  influenza   Vaccine 0.5 milliLiter(s) IntraMuscular once  meropenem  IVPB 1000 milliGRAM(s) IV Intermittent every 8 hours  meropenem  IVPB      multivitamin 1 Tablet(s) Oral daily  pantoprazole   Suspension 40 milliGRAM(s) Oral daily  prednisoLONE    3 mG/mL Solution (ORAPRED) 40 milliGRAM(s) Oral daily  rifAXIMin 550 milliGRAM(s) Oral two times a day  simethicone 80 milliGRAM(s) Chew daily  thiamine 100 milliGRAM(s) Oral daily    MEDICATIONS  (PRN):  acetaminophen     Tablet .. 650 milliGRAM(s) Oral every 6 hours PRN Temp greater or equal to 38C (100.4F)      REVIEW OF SYSTEMS:  CONSTITUTIONAL: No fever, weight loss, or fatigue  RESPIRATORY: No cough, wheezing, chills or hemoptysis; No shortness of breath  CARDIOVASCULAR: No chest pain, palpitations, dizziness, or leg swelling  GASTROINTESTINAL: No abdominal pain. No nausea, vomiting, or hematemesis; No diarrhea or constipation. No melena or hematochezia.  GENITOURINARY: No dysuria or hematuria, urinary frequency  NEUROLOGICAL: No headaches, memory loss, loss of strength, numbness, or tremors  SKIN: No itching, burning, rashes, or lesions     Vital Signs Last 24 Hrs  T(C): 37.7 (08 Apr 2024 05:16), Max: 37.7 (08 Apr 2024 05:16)  T(F): 99.8 (08 Apr 2024 05:16), Max: 99.8 (08 Apr 2024 05:16)  HR: 103 (08 Apr 2024 05:16) (93 - 103)  BP: 105/65 (08 Apr 2024 05:16) (104/66 - 109/73)  BP(mean): 85 (07 Apr 2024 13:13) (85 - 85)  RR: 19 (08 Apr 2024 05:16) (18 - 19)  SpO2: 96% (08 Apr 2024 05:16) (94% - 96%)    Parameters below as of 08 Apr 2024 05:16  Patient On (Oxygen Delivery Method): room air        PHYSICAL EXAMINATION:  GENERAL: NAD  HEAD:  Atraumatic, Normocephalic  EYES:  (+) conjunctiva and sclera severely jaundiced   NECK: Supple, No JVD, Normal thyroid  CHEST/LUNG: Clear to percussion bilaterally; No rales, rhonchi, wheezing, or rubs, tachypnea noted on RA  HEART: Regular rate and rhythm; No murmurs, rubs, or gallops  ABDOMEN: (+) softer than prior exam, distended, decreased bowel sounds   NERVOUS SYSTEM:  Alert & Oriented X3, no focal neuro deficits   EXTREMITIES:  2+ Peripheral Pulses, No clubbing, cyanosis, (+) +1 pitting edema in b/l LE   SKIN: no rashes, lesions, dry                           9.8    31.09 )-----------( 360      ( 08 Apr 2024 07:40 )             30.6     04-08    137  |  108  |  11  ----------------------------<  135<H>  3.6   |  23  |  0.56    Ca    7.8<L>      08 Apr 2024 07:40  Phos  2.0     04-08  Mg     2.1     04-08    TPro  5.0<L>  /  Alb  1.7<L>  /  TBili  16.7<H>  /  DBili  12.9<H>  /  AST  160<H>  /  ALT  83<H>  /  AlkPhos  414<H>  04-08    LIVER FUNCTIONS - ( 08 Apr 2024 07:40 )  Alb: 1.7 g/dL / Pro: 5.0 g/dL / ALK PHOS: 414 U/L / ALT: 83 U/L DA / AST: 160 U/L / GGT: x               PT/INR - ( 08 Apr 2024 07:40 )   PT: 26.5 sec;   INR: 2.38 ratio         PTT - ( 08 Apr 2024 07:40 )  PTT:41.7 sec    CAPILLARY BLOOD GLUCOSE      RADIOLOGY & ADDITIONAL TESTS:

## 2024-04-08 NOTE — PROGRESS NOTE ADULT - SUBJECTIVE AND OBJECTIVE BOX
Chief Complaint:  Patient is a 38y old  Male who presents with a chief complaint of Alcohol withdrawal (08 Apr 2024 09:55)      HPI:ARIANA JOHANSEN is a 38y Male    PMHX/PSHX:  No pertinent past medical history    No pertinent past medical history    History of seizure due to alcohol withdrawal    Alcohol use    History of alcohol use disorder    No significant past surgical history    No significant past surgical history      Allergies:  No Known Allergies      Home Medications: reviewed  Hospital Medications:  acetaminophen     Tablet .. 650 milliGRAM(s) Oral every 6 hours PRN  folic acid 1 milliGRAM(s) Oral daily  heparin   Injectable 5000 Unit(s) SubCutaneous every 12 hours  influenza   Vaccine 0.5 milliLiter(s) IntraMuscular once  meropenem  IVPB 1000 milliGRAM(s) IV Intermittent every 8 hours  meropenem  IVPB      multivitamin 1 Tablet(s) Oral daily  pantoprazole   Suspension 40 milliGRAM(s) Oral daily  prednisoLONE    3 mG/mL Solution (ORAPRED) 40 milliGRAM(s) Oral daily  rifAXIMin 550 milliGRAM(s) Oral two times a day  simethicone 80 milliGRAM(s) Chew daily  thiamine 100 milliGRAM(s) Oral daily      Social History:   Tob: Denies  EtOH: Denies  Illicit Drugs: Denies    Family history:  No pertinent family history in first degree relatives    No pertinent family history in first degree relatives      Denies family history of colon cancer/polyps, stomach cancer/polyps, pancreatic cancer/masses, liver cancer/disease, ovarian cancer and endometrial cancer.    ROS:   General:  No  fevers, chills, night sweats, fatigue  Eyes:  Good vision, no reported pain  ENT:  No sore throat, pain, runny nose  CV:  No pain, palpitations  Pulm:  No dyspnea, cough  GI:  See HPI, otherwise negative  :  No  incontinence, nocturia  Muscle:  No pain, weakness  Neuro:  No memory problems  Psych:  No insomnia, mood problems, depression  Endocrine:  No polyuria, polydipsia, cold/heat intolerance  Heme:  No petechiae, ecchymosis, easy bruisability  Skin:  No rash    PHYSICAL EXAM:   Vital Signs:  Vital Signs Last 24 Hrs  T(C): 37.7 (08 Apr 2024 05:16), Max: 37.7 (08 Apr 2024 05:16)  T(F): 99.8 (08 Apr 2024 05:16), Max: 99.8 (08 Apr 2024 05:16)  HR: 103 (08 Apr 2024 05:16) (93 - 103)  BP: 105/65 (08 Apr 2024 05:16) (104/66 - 109/73)  BP(mean): 85 (07 Apr 2024 13:13) (85 - 85)  RR: 19 (08 Apr 2024 05:16) (18 - 19)  SpO2: 96% (08 Apr 2024 05:16) (94% - 96%)    Parameters below as of 08 Apr 2024 05:16  Patient On (Oxygen Delivery Method): room air      Daily     Daily     GENERAL: no acute distress  NEURO: alert, no asterixis  HEENT: anicteric sclera, no conjunctival pallor appreciated  CHEST: no respiratory distress, no accessory muscle use  CARDIAC: regular rate, rhythm  ABDOMEN: soft, non-tender, non-distended, no rebound or guarding  EXTREMITIES: warm, well perfused, no edema  SKIN: no lesions noted    LABS: reviewed                        9.8    31.09 )-----------( 360      ( 08 Apr 2024 07:40 )             30.6     04-08    137  |  108  |  11  ----------------------------<  135<H>  3.6   |  23  |  0.56    Ca    7.8<L>      08 Apr 2024 07:40  Phos  2.0     04-08  Mg     2.1     04-08    TPro  5.0<L>  /  Alb  1.7<L>  /  TBili  16.7<H>  /  DBili  12.9<H>  /  AST  160<H>  /  ALT  83<H>  /  AlkPhos  414<H>  04-08    LIVER FUNCTIONS - ( 08 Apr 2024 07:40 )  Alb: 1.7 g/dL / Pro: 5.0 g/dL / ALK PHOS: 414 U/L / ALT: 83 U/L DA / AST: 160 U/L / GGT: x               Diagnostic Studies: see sunrise for full report         Chief Complaint:  Patient is a 38y old  Male who presents with a chief complaint of Alcohol withdrawal (08 Apr 2024 09:55)      HPI:  ARIANA JOHANSEN is a 38y Male    PMHX/PSHX:    History of seizure due to alcohol withdrawal  Alcohol use        Allergies:  No Known Allergies      Home Medications: reviewed  Hospital Medications:  acetaminophen     Tablet .. 650 milliGRAM(s) Oral every 6 hours PRN  folic acid 1 milliGRAM(s) Oral daily  heparin   Injectable 5000 Unit(s) SubCutaneous every 12 hours  influenza   Vaccine 0.5 milliLiter(s) IntraMuscular once  meropenem  IVPB 1000 milliGRAM(s) IV Intermittent every 8 hours  meropenem  IVPB      multivitamin 1 Tablet(s) Oral daily  pantoprazole   Suspension 40 milliGRAM(s) Oral daily  prednisoLONE    3 mG/mL Solution (ORAPRED) 40 milliGRAM(s) Oral daily  rifAXIMin 550 milliGRAM(s) Oral two times a day  simethicone 80 milliGRAM(s) Chew daily  thiamine 100 milliGRAM(s) Oral daily      Social History:   Tob: Denies  EtOH: Denies  Illicit Drugs: Denies    Family history:  No pertinent family history in first degree relatives    No pertinent family history in first degree relatives      Denies family history of colon cancer/polyps, stomach cancer/polyps, pancreatic cancer/masses, liver cancer/disease, ovarian cancer and endometrial cancer.    ROS:   General:  No  fevers, chills, night sweats, fatigue  Eyes:  Good vision, no reported pain  ENT:  No sore throat, pain, runny nose  CV:  No pain, palpitations  Pulm:  No dyspnea, cough  GI:  See HPI, otherwise negative  :  No  incontinence, nocturia  Muscle:  No pain, weakness  Neuro:  No memory problems  Psych:  No insomnia, mood problems, depression  Endocrine:  No polyuria, polydipsia, cold/heat intolerance  Heme:  No petechiae, ecchymosis, easy bruisability  Skin:  No rash    PHYSICAL EXAM:   Vital Signs:  Vital Signs Last 24 Hrs  T(C): 37.7 (08 Apr 2024 05:16), Max: 37.7 (08 Apr 2024 05:16)  T(F): 99.8 (08 Apr 2024 05:16), Max: 99.8 (08 Apr 2024 05:16)  HR: 103 (08 Apr 2024 05:16) (93 - 103)  BP: 105/65 (08 Apr 2024 05:16) (104/66 - 109/73)  BP(mean): 85 (07 Apr 2024 13:13) (85 - 85)  RR: 19 (08 Apr 2024 05:16) (18 - 19)  SpO2: 96% (08 Apr 2024 05:16) (94% - 96%)    Parameters below as of 08 Apr 2024 05:16  Patient On (Oxygen Delivery Method): room air      Daily     Daily     GENERAL: no acute distress  NEURO: alert, no asterixis  HEENT: anicteric sclera, no conjunctival pallor appreciated  CHEST: no respiratory distress, no accessory muscle use  CARDIAC: regular rate, rhythm  ABDOMEN: soft, non-tender, non-distended, no rebound or guarding  EXTREMITIES: warm, well perfused, no edema  SKIN: no lesions noted    LABS: reviewed                        9.8    31.09 )-----------( 360      ( 08 Apr 2024 07:40 )             30.6     04-08    137  |  108  |  11  ----------------------------<  135<H>  3.6   |  23  |  0.56    Ca    7.8<L>      08 Apr 2024 07:40  Phos  2.0     04-08  Mg     2.1     04-08    TPro  5.0<L>  /  Alb  1.7<L>  /  TBili  16.7<H>  /  DBili  12.9<H>  /  AST  160<H>  /  ALT  83<H>  /  AlkPhos  414<H>  04-08    LIVER FUNCTIONS - ( 08 Apr 2024 07:40 )  Alb: 1.7 g/dL / Pro: 5.0 g/dL / ALK PHOS: 414 U/L / ALT: 83 U/L DA / AST: 160 U/L / GGT: x               Diagnostic Studies: see sunrise for full report         Chief Complaint:  Patient is a 38y old  Male who presents with a chief complaint of Alcohol withdrawal (08 Apr 2024 10:29)      Reason for consult: ALD    Interval Events: Patient was seen and examined at bedside. More awake, alert, afebrile since 4/3, hemodynamically stable. However, MELD worsening, INR rising, bilirubin without improvement yet.     Hospital Medications:  acetaminophen     Tablet .. 650 milliGRAM(s) Oral every 6 hours PRN  folic acid 1 milliGRAM(s) Oral daily  heparin   Injectable 5000 Unit(s) SubCutaneous every 12 hours  influenza   Vaccine 0.5 milliLiter(s) IntraMuscular once  meropenem  IVPB      meropenem  IVPB 1000 milliGRAM(s) IV Intermittent every 8 hours  multivitamin 1 Tablet(s) Oral daily  pantoprazole   Suspension 40 milliGRAM(s) Oral daily  prednisoLONE    3 mG/mL Solution (ORAPRED) 40 milliGRAM(s) Oral daily  rifAXIMin 550 milliGRAM(s) Oral two times a day  simethicone 80 milliGRAM(s) Chew daily  thiamine 100 milliGRAM(s) Oral daily      ROS:   General:  No  fevers, chills  Eyes:  Good vision, no reported pain  ENT:  No sore throat, pain, runny nose  CV:  No pain, palpitations  Pulm:  No dyspnea, cough  GI:  Still reports distention, no pain, no N/V, had BM, no reported bleeding  :  No  dysuria  Muscle:  No pain, but generalized weakness  Neuro:  No memory problems  Skin: Jaundice    PHYSICAL EXAM:   Vital Signs:  Vital Signs Last 24 Hrs  T(C): 37.7 (08 Apr 2024 05:16), Max: 37.7 (08 Apr 2024 05:16)  T(F): 99.8 (08 Apr 2024 05:16), Max: 99.8 (08 Apr 2024 05:16)  HR: 97 (08 Apr 2024 14:47) (91 - 103)  BP: 107/70 (08 Apr 2024 14:47) (102/64 - 107/70)  BP(mean): --  RR: 19 (08 Apr 2024 05:16) (18 - 19)  SpO2: 94% (08 Apr 2024 14:47) (94% - 96%)    Parameters below as of 08 Apr 2024 14:47  Patient On (Oxygen Delivery Method): room air      Daily     Daily     GENERAL: no acute distress  NEURO: AAOX3, no asterixis  HEENT: icteric sclera  CHEST: no respiratory distress, no accessory muscle use, decreased breath sound on R base  CARDIAC: regular rate, rhythm  ABDOMEN: soft, non-tender, distended, no rebound or guarding, BS+  EXTREMITIES: warm, well perfused, no edema  SKIN: jaundice    LABS: reviewed                        9.8    31.09 )-----------( 360      ( 08 Apr 2024 07:40 )             30.6     04-08    137  |  108  |  11  ----------------------------<  135<H>  3.6   |  23  |  0.56    Ca    7.8<L>      08 Apr 2024 07:40  Phos  2.0     04-08  Mg     2.1     04-08    TPro  5.0<L>  /  Alb  1.7<L>  /  TBili  16.7<H>  /  DBili  12.9<H>  /  AST  160<H>  /  ALT  83<H>  /  AlkPhos  414<H>  04-08    LIVER FUNCTIONS - ( 08 Apr 2024 07:40 )  Alb: 1.7 g/dL / Pro: 5.0 g/dL / ALK PHOS: 414 U/L / ALT: 83 U/L DA / AST: 160 U/L / GGT: x             Interval Diagnostic Studies: see sunrise for full report

## 2024-04-08 NOTE — PROGRESS NOTE ADULT - PROBLEM SELECTOR PLAN 4
- on rifaximin  - s/p course of lactulose d/c'd 4/1 (ammonia level: 107 > 172 > 61 > 64)  - Lactulose restarted - on rifaximin  - s/p course of lactulose d/c'd 4/1 (ammonia level: 107 > 172 > 61 > 64)  - Lactulose re-started and then discontinued when pt started having diarrhea >5 BMs daily

## 2024-04-09 LAB
ALBUMIN SERPL ELPH-MCNC: 1.8 G/DL — LOW (ref 3.5–5)
ALP SERPL-CCNC: 486 U/L — HIGH (ref 40–120)
ALT FLD-CCNC: 105 U/L DA — HIGH (ref 10–60)
ANION GAP SERPL CALC-SCNC: 8 MMOL/L — SIGNIFICANT CHANGE UP (ref 5–17)
APTT BLD: 40.3 SEC — HIGH (ref 24.5–35.6)
AST SERPL-CCNC: 159 U/L — HIGH (ref 10–40)
BASOPHILS # BLD AUTO: 0.11 K/UL — SIGNIFICANT CHANGE UP (ref 0–0.2)
BASOPHILS NFR BLD AUTO: 0.3 % — SIGNIFICANT CHANGE UP (ref 0–2)
BILIRUB DIRECT SERPL-MCNC: 13.5 MG/DL — HIGH (ref 0–0.3)
BILIRUB SERPL-MCNC: 17.3 MG/DL — HIGH (ref 0.2–1.2)
BUN SERPL-MCNC: 14 MG/DL — SIGNIFICANT CHANGE UP (ref 7–18)
CALCIUM SERPL-MCNC: 8 MG/DL — LOW (ref 8.4–10.5)
CHLORIDE SERPL-SCNC: 108 MMOL/L — SIGNIFICANT CHANGE UP (ref 96–108)
CO2 SERPL-SCNC: 24 MMOL/L — SIGNIFICANT CHANGE UP (ref 22–31)
CREAT SERPL-MCNC: 0.62 MG/DL — SIGNIFICANT CHANGE UP (ref 0.5–1.3)
EGFR: 127 ML/MIN/1.73M2 — SIGNIFICANT CHANGE UP
EOSINOPHIL # BLD AUTO: 0.92 K/UL — HIGH (ref 0–0.5)
EOSINOPHIL NFR BLD AUTO: 2.7 % — SIGNIFICANT CHANGE UP (ref 0–6)
GLUCOSE SERPL-MCNC: 120 MG/DL — HIGH (ref 70–99)
HCT VFR BLD CALC: 34.7 % — LOW (ref 39–50)
HGB BLD-MCNC: 10.8 G/DL — LOW (ref 13–17)
IMM GRANULOCYTES NFR BLD AUTO: 4 % — HIGH (ref 0–0.9)
INR BLD: 2.3 RATIO — HIGH (ref 0.85–1.18)
LYMPHOCYTES # BLD AUTO: 1.88 K/UL — SIGNIFICANT CHANGE UP (ref 1–3.3)
LYMPHOCYTES # BLD AUTO: 5.6 % — LOW (ref 13–44)
MAGNESIUM SERPL-MCNC: 2.2 MG/DL — SIGNIFICANT CHANGE UP (ref 1.6–2.6)
MCHC RBC-ENTMCNC: 31.1 GM/DL — LOW (ref 32–36)
MCHC RBC-ENTMCNC: 34.2 PG — HIGH (ref 27–34)
MCV RBC AUTO: 109.8 FL — HIGH (ref 80–100)
MONOCYTES # BLD AUTO: 1.19 K/UL — HIGH (ref 0–0.9)
MONOCYTES NFR BLD AUTO: 3.5 % — SIGNIFICANT CHANGE UP (ref 2–14)
NEUTROPHILS # BLD AUTO: 28.42 K/UL — HIGH (ref 1.8–7.4)
NEUTROPHILS NFR BLD AUTO: 83.9 % — HIGH (ref 43–77)
NRBC # BLD: 0 /100 WBCS — SIGNIFICANT CHANGE UP (ref 0–0)
PHOSPHATE SERPL-MCNC: 2 MG/DL — LOW (ref 2.5–4.5)
PLATELET # BLD AUTO: 383 K/UL — SIGNIFICANT CHANGE UP (ref 150–400)
POTASSIUM SERPL-MCNC: 4.2 MMOL/L — SIGNIFICANT CHANGE UP (ref 3.5–5.3)
POTASSIUM SERPL-SCNC: 4.2 MMOL/L — SIGNIFICANT CHANGE UP (ref 3.5–5.3)
PROT SERPL-MCNC: 5.3 G/DL — LOW (ref 6–8.3)
PROTHROM AB SERPL-ACNC: 25.6 SEC — HIGH (ref 9.5–13)
RBC # BLD: 3.16 M/UL — LOW (ref 4.2–5.8)
RBC # FLD: 22.5 % — HIGH (ref 10.3–14.5)
SODIUM SERPL-SCNC: 140 MMOL/L — SIGNIFICANT CHANGE UP (ref 135–145)
WBC # BLD: 33.86 K/UL — HIGH (ref 3.8–10.5)
WBC # FLD AUTO: 33.86 K/UL — HIGH (ref 3.8–10.5)

## 2024-04-09 PROCEDURE — 99233 SBSQ HOSP IP/OBS HIGH 50: CPT

## 2024-04-09 RX ORDER — PANTOPRAZOLE SODIUM 20 MG/1
1 TABLET, DELAYED RELEASE ORAL
Qty: 0 | Refills: 0 | DISCHARGE
Start: 2024-04-09

## 2024-04-09 RX ORDER — THIAMINE MONONITRATE (VIT B1) 100 MG
1 TABLET ORAL
Qty: 0 | Refills: 0 | DISCHARGE
Start: 2024-04-09

## 2024-04-09 RX ORDER — HEPARIN SODIUM 5000 [USP'U]/ML
5000 INJECTION INTRAVENOUS; SUBCUTANEOUS
Qty: 0 | Refills: 0 | DISCHARGE
Start: 2024-04-09

## 2024-04-09 RX ORDER — FOLIC ACID 0.8 MG
1 TABLET ORAL
Qty: 0 | Refills: 0 | DISCHARGE
Start: 2024-04-09

## 2024-04-09 RX ORDER — MEROPENEM 1 G/30ML
1000 INJECTION INTRAVENOUS
Qty: 0 | Refills: 0 | DISCHARGE
Start: 2024-04-09

## 2024-04-09 RX ORDER — SIMETHICONE 80 MG/1
1 TABLET, CHEWABLE ORAL
Qty: 0 | Refills: 0 | DISCHARGE
Start: 2024-04-09

## 2024-04-09 RX ORDER — RIFAXIMIN 200 MG/1
1 TABLET ORAL
Qty: 0 | Refills: 0 | DISCHARGE
Start: 2024-04-09

## 2024-04-09 RX ORDER — ACETAMINOPHEN 500 MG
2 TABLET ORAL
Qty: 0 | Refills: 0 | DISCHARGE
Start: 2024-04-09

## 2024-04-09 RX ORDER — MEROPENEM 1 G/30ML
1000 INJECTION INTRAVENOUS EVERY 8 HOURS
Refills: 0 | Status: COMPLETED | OUTPATIENT
Start: 2024-04-09 | End: 2024-04-09

## 2024-04-09 RX ADMIN — MEROPENEM 100 MILLIGRAM(S): 1 INJECTION INTRAVENOUS at 06:58

## 2024-04-09 RX ADMIN — Medication 1 TABLET(S): at 12:32

## 2024-04-09 RX ADMIN — HEPARIN SODIUM 5000 UNIT(S): 5000 INJECTION INTRAVENOUS; SUBCUTANEOUS at 17:38

## 2024-04-09 RX ADMIN — MEROPENEM 100 MILLIGRAM(S): 1 INJECTION INTRAVENOUS at 21:35

## 2024-04-09 RX ADMIN — PANTOPRAZOLE SODIUM 40 MILLIGRAM(S): 20 TABLET, DELAYED RELEASE ORAL at 12:32

## 2024-04-09 RX ADMIN — SIMETHICONE 80 MILLIGRAM(S): 80 TABLET, CHEWABLE ORAL at 12:32

## 2024-04-09 RX ADMIN — Medication 1 MILLIGRAM(S): at 12:32

## 2024-04-09 RX ADMIN — Medication 100 MILLIGRAM(S): at 12:32

## 2024-04-09 RX ADMIN — MEROPENEM 100 MILLIGRAM(S): 1 INJECTION INTRAVENOUS at 13:52

## 2024-04-09 RX ADMIN — HEPARIN SODIUM 5000 UNIT(S): 5000 INJECTION INTRAVENOUS; SUBCUTANEOUS at 06:59

## 2024-04-09 NOTE — DISCHARGE NOTE PROVIDER - ATTENDING DISCHARGE PHYSICAL EXAMINATION:
I, Shahzad Hernandez, am completing and signing this document after my resident or NP has started the document. I have personally seen and examined the patient. I have collaborated with and supervised the midlevel practitioner on the discharge service for the patient. I have reviewed and made amendments to the documentation where necessary.    My physical exam on day of discharge:  Alert, answering qs appropriately, following commands  no murmurs heard  breathing comfortably  scleral icterus and jaundiced  distended abdomen, non-tender, BS difficult to hear

## 2024-04-09 NOTE — PHARMACOTHERAPY INTERVENTION NOTE - COMMENTS
Today is day 7 of meropenem for treatment of pneumonia.    7-day stop date added for this evening to complete course.

## 2024-04-09 NOTE — PROGRESS NOTE ADULT - ASSESSMENT
Pt. is a 38 yr old man with  alcohol withdrawal seizure with alcohol use disorder, who p/w confusion starting this morning. Admitted for high risk alcohol withdrawal, intubated 3/22-26. Course complicated by ileus, sepsis (s/p zosyn and then meropenem 7 day courses) likely 2/2 multifocal pneumonia, and worsening alcoholic hepatitis, not responding with prednisolone, with possible need for a liver transplant assessment.

## 2024-04-09 NOTE — DISCHARGE NOTE PROVIDER - CARE PROVIDERS DIRECT ADDRESSES
[email or web address]

## 2024-04-09 NOTE — PROGRESS NOTE ADULT - PROBLEM SELECTOR PLAN 3
- course complicated by abdominal distention 2/2 ileus (no obstruction)  - abdominal Xray 4/3- showing persistent bowel gas pattern   - s/p lactulose, neostigmine x1 in ICU   - rectal tube displaced, pt currently having bowel movements   - out of bed to chair if tolerated

## 2024-04-09 NOTE — PROGRESS NOTE ADULT - SUBJECTIVE AND OBJECTIVE BOX
PGY-1 Progress Note discussed with attending    PLEASE CONTACT ON CALL TEAM:  - On Call Team (Please refer to Perez) FROM 5:00 PM - 8:30PM  - Nightfloat Team FROM 8:30 -7:30 AM    INTERVAL HPI/OVERNIGHT EVENTS:   No acute events overnight, pt sitting up from bed to chair. Denies abdominal pain, fevers, chills, n/v/diarrhea. He his having 3-5 bowel movements a day.     MEDICATIONS  (STANDING):  folic acid 1 milliGRAM(s) Oral daily  heparin   Injectable 5000 Unit(s) SubCutaneous every 12 hours  influenza   Vaccine 0.5 milliLiter(s) IntraMuscular once  meropenem  IVPB      meropenem  IVPB 1000 milliGRAM(s) IV Intermittent every 8 hours  multivitamin 1 Tablet(s) Oral daily  pantoprazole   Suspension 40 milliGRAM(s) Oral daily  rifAXIMin 550 milliGRAM(s) Oral two times a day  simethicone 80 milliGRAM(s) Chew daily  thiamine 100 milliGRAM(s) Oral daily    MEDICATIONS  (PRN):  acetaminophen     Tablet .. 650 milliGRAM(s) Oral every 6 hours PRN Temp greater or equal to 38C (100.4F)      REVIEW OF SYSTEMS:  CONSTITUTIONAL: No fever, weight loss, or fatigue  RESPIRATORY: No cough, wheezing, chills or hemoptysis; No shortness of breath  CARDIOVASCULAR: No chest pain, palpitations, dizziness, or leg swelling  GASTROINTESTINAL: No abdominal pain. No nausea, vomiting, or hematemesis; No diarrhea or constipation. No melena or hematochezia.  GENITOURINARY: No dysuria or hematuria, urinary frequency  NEUROLOGICAL: No headaches, memory loss, loss of strength, numbness, or tremors  SKIN: No itching, burning, rashes, or lesions     Vital Signs Last 24 Hrs  T(C): 36.2 (09 Apr 2024 13:35), Max: 37.4 (09 Apr 2024 05:00)  T(F): 97.1 (09 Apr 2024 13:35), Max: 99.3 (09 Apr 2024 05:00)  HR: 102 (09 Apr 2024 13:35) (91 - 102)  BP: 94/61 (09 Apr 2024 13:35) (94/61 - 107/70)  BP(mean): --  RR: 18 (09 Apr 2024 13:35) (17 - 18)  SpO2: 97% (09 Apr 2024 13:35) (94% - 97%)    Parameters below as of 09 Apr 2024 13:35  Patient On (Oxygen Delivery Method): room air        PHYSICAL EXAMINATION:  GENERAL: NAD  HEAD:  Atraumatic, Normocephalic  EYES:  (+) conjunctiva and sclera severely jaundiced   NECK: Supple, No JVD, Normal thyroid  CHEST/LUNG: Clear to percussion bilaterally; No rales, rhonchi, wheezing, or rubs, tachypnea noted on RA  HEART: Regular rate and rhythm; No murmurs, rubs, or gallops  ABDOMEN: (+) softer than prior exam, distended, decreased bowel sounds   NERVOUS SYSTEM:  Alert & Oriented X3, no focal neuro deficits   EXTREMITIES:  2+ Peripheral Pulses, No clubbing, cyanosis, (+) +1 pitting edema in b/l LE   SKIN: no rashes, lesions, dry                           10.8   33.86 )-----------( 383      ( 09 Apr 2024 07:05 )             34.7     04-09    140  |  108  |  14  ----------------------------<  120<H>  4.2   |  24  |  0.62    Ca    8.0<L>      09 Apr 2024 07:05  Phos  2.0     04-09  Mg     2.2     04-09    TPro  5.3<L>  /  Alb  1.8<L>  /  TBili  17.3<H>  /  DBili  13.5<H>  /  AST  159<H>  /  ALT  105<H>  /  AlkPhos  486<H>  04-09    LIVER FUNCTIONS - ( 09 Apr 2024 07:05 )  Alb: 1.8 g/dL / Pro: 5.3 g/dL / ALK PHOS: 486 U/L / ALT: 105 U/L DA / AST: 159 U/L / GGT: x               PT/INR - ( 09 Apr 2024 07:05 )   PT: 25.6 sec;   INR: 2.30 ratio         PTT - ( 09 Apr 2024 07:05 )  PTT:40.3 sec    CAPILLARY BLOOD GLUCOSE      RADIOLOGY & ADDITIONAL TESTS:

## 2024-04-09 NOTE — DISCHARGE NOTE PROVIDER - HOSPITAL COURSE
Patient is a 37 y/o M w/ PMHx of alcohol withdrawal seizure with alcohol use disorder, cirrhosis, who p/w confusion starting on morning of admission 3/21/24. Patient was admitted to ICU for high risk alcohol withdrawal with persistent sinus tachycardia on 3/21.    Patient was started on CIWA protocol and was initially started on ativan which did not control his withdrawal symptoms and then patient was started on precedex drip. Patient was intubated on 3/22 for airway protection. Patient was also tried on phenobarbital PRN and ketamine drip to treat his withdrawal. Patient was extubated 3/26 and given ativan which was stopped after 1 day as patient was no longer withdrawing from alcohol. All sedating medications were stopped as patient was initially lethargic after extubation but eventually his mental status improved and patient is now AAOx3.     Patient met sepsis criteria with a positive UA, coronavirus +ve, CXR - multifocal bilateral peripheral opacities and distended abdomen. Patient developed septic shock. A cental line was placed and he was started on vasopressors. Pneumonia workup was negative. Patient completed a course of Zosyn.  Original BCX showed NG. A few days after completing his zosyn course, back on the medicine floors, he started to meet sepsis criteria once again (increasing WBC, tachycardia, fevers). Repeat BCx NG, U/A negative, worsening b/l infiltrates on the CXR. Pt was started on meropenem 4/3 to complete a 7 day course on 4/10.     There was a noticeable trend up in liver enzymes, specifically Total and Direct Bilirubin. MELD 22->30. CT a/p 3/21/24 showed marked hepatomegaly (29 cm) w/ severe hepatic steatosis. MDF was < 32 on admission. There was no biliary dilation on CT 3/21/24 and US abd 4/2/24, and there was no signs of cholecystitis either on above. Notably, his lipase was 479 on admission. MRI abd w/wo and MRCP 4/3/24 showed normal bile ducts, distended GB w/ small layering sludge, small pericholecystic fluid. Liver appeared enlarged w/ fatty infiltration, some regions in R hepatic lobe likely representing focal fatty infiltrations w/ associated liver lesions, possibly hemangiomas per report. It was limited b/o motion artifact. Noted small amount of ascites, not sufficient for diagnostic paracentesis.    After discussion with ID, he was started on prednisolone for alcoholic hepatitis on 4/4/24 (when bilirubin was already 16.7), as infection appeared to be controlled.   Bilirubin remained unchanged on steroid as per day #4, although Lille score reports good prognosis b/o young age.  ALP has been rising 299->414, ALT has been slightly rising, INR has been worsening.    Patient also had distended abdomen. CT abdomen showed distension but no bowel obstruction. NG tube was placed to suction. Patient diagnosed with ileus. Abdominal pressure was as high as 25 but it trended down. Surgery was consulted, and did not plan any acute intervention. Pt was started on lactulose and given one dose of neostigmine, which resulted in bradycardia and required atropine to reverse the cholinergic effects. Pt has since has serial abdominal Xrays, and ileus has improved, and still continues to have bowel movements.     Mental status has improved but abdomen still remains distended. He had two courses of lactulose, and Rifaximin was started on 4/1 to treat suspected underlying hepatic encephalopathy. Patient is a 39 y/o M w/ PMHx of alcohol withdrawal seizure with alcohol use disorder, cirrhosis, who p/w confusion starting on morning of admission 3/21/24. Patient was admitted to ICU for high risk alcohol withdrawal with persistent sinus tachycardia on 3/21.    Patient was started on CIWA protocol and was initially started on ativan which did not control his withdrawal symptoms and then patient was started on precedex drip. Patient was intubated on 3/22 for airway protection. Patient was also tried on phenobarbital PRN and ketamine drip to treat his withdrawal. Patient was extubated 3/26 and given ativan which was stopped after 1 day as patient was no longer withdrawing from alcohol. All sedating medications were stopped as patient was initially lethargic after extubation but eventually his mental status improved and patient is now AAOx3.     Patient met sepsis criteria with a positive UA, coronavirus +ve, CXR - multifocal bilateral peripheral opacities and distended abdomen. Patient developed septic shock. A cental line was placed and he was started on vasopressors. Pneumonia workup was negative. Patient completed a course of 7 day course of Zosyn.  Original BCX showed NG. A few days after completing his zosyn course, back on the medicine floors, he started to meet sepsis criteria once again (increasing WBC, tachycardia, fevers). Repeat BCx NG, U/A negative, worsening b/l infiltrates on the CXR. Pt was started on meropenem 4/3 to complete a 7 day course on 4/9.     There was a noticeable trend up in liver enzymes, specifically Total and Direct Bilirubin throughout the hospital stay. MELD 22->30 -> 31. CT a/p 3/21/24 showed marked hepatomegaly (29 cm) w/ severe hepatic steatosis. MDF was < 32 on admission, but then increased to 37. There was no biliary dilation on CT 3/21/24 and US abd 4/2/24, and there was no signs of cholecystitis either on above. Notably, his lipase was 479 on admission. MRI abd w/wo and MRCP 4/3/24 showed normal bile ducts, distended GB w/ small layering sludge, small pericholecystic fluid. Liver appeared enlarged w/ fatty infiltration, some regions in R hepatic lobe likely representing focal fatty infiltrations w/ associated liver lesions, possibly hemangiomas per report. It was limited b/o motion artifact. Noted small amount of ascites, not sufficient for diagnostic paracentesis.    After discussion with ID, he was started on prednisolone for alcoholic hepatitis on 4/4/24 (when bilirubin was already 16.7), as infection appeared to be controlled.   Bilirubin remained unchanged on steroid as per day #4, although Lille score reports good prognosis b/o young age.  ALP has been rising 299->414, ALT has been slightly rising, INR has been worsening.    Patient also had distended abdomen during this admission, pt diagnosed with Ileus. CT abdomen showed distension but no bowel obstruction. NG tube was initially placed to suction. Abdominal pressure was as high as 25 but it trended down. Surgery was consulted, and did not plan any acute intervention. Pt was started on lactulose and given one dose of neostigmine, which resulted in bradycardia and required atropine to reverse the cholinergic effects. Pt has since has serial abdominal Xrays, and ileus has improved, and still continues to have bowel movements.     Mental status has improved but abdomen still remains distended. He had two courses of lactulose, and Rifaximin was started on 4/1 to treat suspected underlying hepatic encephalopathy.    On 4/11/24- Pt underwent HIDA scan which showed findings that are suggestive of acute cholecystitis. Pt was kept NPO, re-started on zosyn. Surgery was re-consulted in efforts to evaluate for lap elda.    Received a call 4/11/24 in the evening that pt's transfer to [facility] got accepted.

## 2024-04-09 NOTE — DISCHARGE NOTE PROVIDER - NSDCCPCAREPLAN_GEN_ALL_CORE_FT
PRINCIPAL DISCHARGE DIAGNOSIS  Diagnosis: Alcoholic hepatitis  Assessment and Plan of Treatment: You initially presented to the hospital with alcohol intoxication, and acute mental status changes that required sedation, and subsequent intubation and ICU stay. You have been diagnosed with alcoholic hepatitis, that has worsened throughout your hospital stay. Your liver enzymes has been worsening, and due to your liver decompensation despite trial of prednisolone treatment for four days, there is thought that you could benefit from a transplant evaluation at [facility].      SECONDARY DISCHARGE DIAGNOSES  Diagnosis: Sepsis  Assessment and Plan of Treatment: You were diagnosed with Sepsis which is a very serious condition resulting from the presence of harmful micro-organisms in the blood or other tissues and the body's response to their presence, potentially leading to the malfunctioning of various organs, shock and death.   You had Sepsis likely secondary to multifocal pneumonia. You were found to have elevated white blood cell count, fever, blood cultures negative twice.   You were treated with IV antibiotics with zosyn for 7 days, and then meropenem for 7 days. You were seen by Infectious disease doctor and recommendations were followed.    Diagnosis: Ileus  Assessment and Plan of Treatment: You course was complicated by an ileus during this admission. An Ileus (say "ILL-ee-us") is a blockage of the bowel (intestines) that happens when the intestines don't work as they should. Normally, muscles in the intestines squeeze to push food and waste along. When this process stops, the intestines stop digesting food and moving waste out of the body. This may also be called paralytic ileus.  Ileus is not the same as a mechanical bowel obstruction. In a mechanical bowel obstruction, something is actually blocking the intestine, like scar tissue or a tumor.  Your ileus was treated with an aggressive bowel regimen. You initially had a rectal tube which was dislodged. You have been having normal bowel movements since then.    Diagnosis: Hepatic encephalopathy  Assessment and Plan of Treatment: You were initially diagnosed with hepatic encephalopathy. Your ammonia levels were elevated, and you were acutely encephalopathic. This is becuase of your liver disease secondary to alcohol use. You were treated with both lactulose and rifaxamin.     PRINCIPAL DISCHARGE DIAGNOSIS  Diagnosis: Alcoholic hepatitis  Assessment and Plan of Treatment: You initially presented to the hospital with alcohol intoxication, and acute mental status changes that required sedation, and subsequent intubation and ICU stay. You have been diagnosed with alcoholic hepatitis, that has worsened throughout your hospital stay. Your liver enzymes has been worsening, and due to your liver decompensation despite trial of prednisolone treatment for four days, there is thought that you could benefit from a transplant evaluation at [facility].      SECONDARY DISCHARGE DIAGNOSES  Diagnosis: Sepsis  Assessment and Plan of Treatment: You were diagnosed with Sepsis which is a very serious condition resulting from the presence of harmful micro-organisms in the blood or other tissues and the body's response to their presence, potentially leading to the malfunctioning of various organs, shock and death.   You had Sepsis likely secondary to multifocal pneumonia. You were found to have elevated white blood cell count, fever, blood cultures negative twice.   You were treated with IV antibiotics with zosyn for 7 days, and then meropenem for 7 days. You were seen by Infectious disease doctor and recommendations were followed.    Diagnosis: Ileus  Assessment and Plan of Treatment: You course was complicated by an ileus during this admission. An Ileus (say "ILL-ee-us") is a blockage of the bowel (intestines) that happens when the intestines don't work as they should. Normally, muscles in the intestines squeeze to push food and waste along. When this process stops, the intestines stop digesting food and moving waste out of the body. This may also be called paralytic ileus.  Ileus is not the same as a mechanical bowel obstruction. In a mechanical bowel obstruction, something is actually blocking the intestine, like scar tissue or a tumor.  Your ileus was treated with an aggressive bowel regimen. You initially had a rectal tube which was dislodged. You have been having normal bowel movements since then.    Diagnosis: Hepatic encephalopathy  Assessment and Plan of Treatment: You were initially diagnosed with hepatic encephalopathy. Your ammonia levels were elevated, and you were acutely encephalopathic. This is becuase of your liver disease secondary to alcohol use. You were treated with both lactulose and rifaxamin.    Diagnosis: Acute cholecystitis  Assessment and Plan of Treatment: On HIDA scan on 4/11/24 you were found to have acute cholecystitis, inflammation of your gall bladder. We consulted the surgery team, and started you back on zosyn (an IV antibiotic). We received a call that you will be transferred to [facility] today, who will continue your care.

## 2024-04-09 NOTE — PROGRESS NOTE ADULT - PROBLEM SELECTOR PLAN 1
p/w acute metabolic encephalopathy 2/2 alcohol withdrawal  - s/p Ativan, phenobarb, ketamine drip, and Precedex drip in the setting of mechanical ventilation and AMS  - now extubated, off all sedation, mental status improved, A&Ox2-3  - c/w rifaximin , PPI , thiamine, folic acid, simethicone   - s/p lactulose   - Maddrey score 36.7  - s/p Prednisolone 40 mg daily [started on 4/4-4/8]   - s/p albumin q6  - MELD score 30 today  - ALP/AST/ALT/ Tbili: 486/159/105/17.3 [worsening despite 4 day prednisone course]   - Abdominal ultrasound - Hepatosplenomegaly. No evidence for intrahepatic biliary duct dilatation. Increased caliber of the extrahepatic CBD measuring 8 mm. Gallbladder sludge. No ascites.   - MRCP- Enlarged with fatty infiltration. Possible hemangiomas. Common bile duct measures 3 mm. Small amount of ascites. Distended gallbladder with a small amount of pericholecystic fluid; fluid may be related to the abdominal ascites.  - Hepatology following  - pt is a possible liver transplant candidate

## 2024-04-09 NOTE — DISCHARGE NOTE PROVIDER - NSDCMRMEDTOKEN_GEN_ALL_CORE_FT
acetaminophen 325 mg oral tablet: 2 tab(s) orally every 6 hours As needed Temp greater or equal to 38C (100.4F)  folic acid 1 mg oral tablet: 1 tab(s) orally once a day  heparin: 5,000 unit(s) subcutaneous 2 times a day  meropenem 1000 mg intravenous injection: 1000 milligram(s) intravenous every 8 hours  Multiple Vitamins oral tablet: 1 tab(s) orally once a day  pantoprazole 40 mg oral granule, delayed release: 1 tab(s) orally once a day  rifAXIMin 550 mg oral tablet: 1 tab(s) orally 2 times a day  simethicone 80 mg oral tablet, chewable: 1 tab(s) orally once a day  thiamine 100 mg oral tablet: 1 tab(s) orally once a day   acetaminophen 325 mg oral tablet: 2 tab(s) orally every 6 hours As needed Temp greater or equal to 38C (100.4F)  folic acid 1 mg oral tablet: 1 tab(s) orally once a day  heparin: 5,000 unit(s) subcutaneous 2 times a day  Multiple Vitamins oral tablet: 1 tab(s) orally once a day  pantoprazole 40 mg oral granule, delayed release: 1 tab(s) orally once a day  piperacillin-tazobactam: 3.375 gram(s) intravenous every 8 hours  rifAXIMin 550 mg oral tablet: 1 tab(s) orally 2 times a day  simethicone 80 mg oral tablet, chewable: 1 tab(s) orally once a day  thiamine 100 mg oral tablet: 1 tab(s) orally once a day

## 2024-04-09 NOTE — PROGRESS NOTE ADULT - PROBLEM SELECTOR PLAN 2
- pt found to be septic earlier in hospital course, thought to be secondary to coronavirus, no other specific source   - BCx from admission (3/21)- NGTD   - s/p zosyn course 3/23-/3/30  - Hossein repeat BCx 4/3 - NGTD (pt still presenting septic (WC 20s, and tachycardic, tachypnea)  - leukocytosis worsening [ likely multifactorial - infection, 2/2 steroid, alcoholic hepatitis]   - LFTs improving   - repeat U/A 4/3 negative   - CXR 4/3- showing increased infiltrates b/l   - c/w meropenem 1g q8 ( 4/3-4/10)  - obstructive pattern on LFTs, can be hepatocellular in nature due to the alcoholic hepatitis   - ID Dr. Sosa

## 2024-04-09 NOTE — DISCHARGE NOTE PROVIDER - DETAILS OF MALNUTRITION DIAGNOSIS/DIAGNOSES
This patient has been assessed with a concern for Malnutrition and was treated during this hospitalization for the following Nutrition diagnosis/diagnoses:     -  03/26/2024: Moderate protein-calorie malnutrition   -  03/23/2024: Mild protein-calorie malnutrition

## 2024-04-09 NOTE — DISCHARGE NOTE PROVIDER - CARE PROVIDER_API CALL
[facility]  Internal Medicine  [address] [place] [id number]  Phone: [phone]  Fax: [phone]  Follow Up Time:

## 2024-04-09 NOTE — PROGRESS NOTE ADULT - PROBLEM SELECTOR PLAN 4
- on rifaximin  - s/p course of lactulose d/c'd 4/1 (ammonia level: 107 > 172 > 61 > 64)  - Lactulose re-started and then discontinued when pt started having diarrhea >5 BMs daily

## 2024-04-09 NOTE — PROGRESS NOTE ADULT - SUBJECTIVE AND OBJECTIVE BOX
Chief Complaint:  Patient is a 38y old  Male who presents with a chief complaint of Alcohol withdrawal (09 Apr 2024 14:08)      Reason for consult:    Interval Events:     Hospital Medications:  acetaminophen     Tablet .. 650 milliGRAM(s) Oral every 6 hours PRN  folic acid 1 milliGRAM(s) Oral daily  heparin   Injectable 5000 Unit(s) SubCutaneous every 12 hours  influenza   Vaccine 0.5 milliLiter(s) IntraMuscular once  meropenem  IVPB 1000 milliGRAM(s) IV Intermittent every 8 hours  multivitamin 1 Tablet(s) Oral daily  pantoprazole   Suspension 40 milliGRAM(s) Oral daily  rifAXIMin 550 milliGRAM(s) Oral two times a day  simethicone 80 milliGRAM(s) Chew daily  thiamine 100 milliGRAM(s) Oral daily      ROS:   General:  No  fevers, chills, night sweats, fatigue  Eyes:  Good vision, no reported pain  ENT:  No sore throat, pain, runny nose  CV:  No pain, palpitations  Pulm:  No dyspnea, cough  GI:  See HPI, otherwise negative  :  No  incontinence, nocturia  Muscle:  No pain, weakness  Neuro:  No memory problems  Psych:  No insomnia, mood problems, depression  Endocrine:  No polyuria, polydipsia, cold/heat intolerance  Heme:  No petechiae, ecchymosis, easy bruisability  Skin:  No rash    PHYSICAL EXAM:   Vital Signs:  Vital Signs Last 24 Hrs  T(C): 36.2 (09 Apr 2024 13:35), Max: 37.4 (09 Apr 2024 05:00)  T(F): 97.1 (09 Apr 2024 13:35), Max: 99.3 (09 Apr 2024 05:00)  HR: 102 (09 Apr 2024 13:35) (93 - 102)  BP: 94/61 (09 Apr 2024 13:35) (94/61 - 107/67)  BP(mean): --  RR: 18 (09 Apr 2024 13:35) (17 - 18)  SpO2: 97% (09 Apr 2024 13:35) (94% - 97%)    Parameters below as of 09 Apr 2024 13:35  Patient On (Oxygen Delivery Method): room air      Daily     Daily     GENERAL: no acute distress  NEURO: alert, no asterixis  HEENT: anicteric sclera, no conjunctival pallor appreciated  CHEST: no respiratory distress, no accessory muscle use  CARDIAC: regular rate, rhythm  ABDOMEN: soft, non-tender, non-distended, no rebound or guarding  EXTREMITIES: warm, well perfused, no edema  SKIN: no lesions noted    LABS: reviewed                        10.8   33.86 )-----------( 383      ( 09 Apr 2024 07:05 )             34.7     04-09    140  |  108  |  14  ----------------------------<  120<H>  4.2   |  24  |  0.62    Ca    8.0<L>      09 Apr 2024 07:05  Phos  2.0     04-09  Mg     2.2     04-09    TPro  5.3<L>  /  Alb  1.8<L>  /  TBili  17.3<H>  /  DBili  13.5<H>  /  AST  159<H>  /  ALT  105<H>  /  AlkPhos  486<H>  04-09    LIVER FUNCTIONS - ( 09 Apr 2024 07:05 )  Alb: 1.8 g/dL / Pro: 5.3 g/dL / ALK PHOS: 486 U/L / ALT: 105 U/L DA / AST: 159 U/L / GGT: x             Interval Diagnostic Studies: see sunrise for full report   Chief Complaint:  Patient is a 38y old  Male who presents with a chief complaint of Alcohol withdrawal (09 Apr 2024 14:08)      Reason for consult: ALD    Interval Events: Patient was seen and examined at bedside. Remains AAOX3. Reports that had 2 BM, no reported bleeding. He feels bloated, and c/o early satiety. No N/V. Bilirubin is still rising, MELD 3.0 remained 30. He has been accepted to [facility], awaiting transfer.     Hospital Medications:  acetaminophen     Tablet .. 650 milliGRAM(s) Oral every 6 hours PRN  folic acid 1 milliGRAM(s) Oral daily  heparin   Injectable 5000 Unit(s) SubCutaneous every 12 hours  influenza   Vaccine 0.5 milliLiter(s) IntraMuscular once  meropenem  IVPB 1000 milliGRAM(s) IV Intermittent every 8 hours  multivitamin 1 Tablet(s) Oral daily  pantoprazole   Suspension 40 milliGRAM(s) Oral daily  rifAXIMin 550 milliGRAM(s) Oral two times a day  simethicone 80 milliGRAM(s) Chew daily  thiamine 100 milliGRAM(s) Oral daily      ROS:   General:  No  fevers, chills  Eyes:  Good vision, no reported pain  ENT:  No sore throat, pain, runny nose  CV:  No pain, palpitations  Pulm:  No dyspnea, cough  GI:  Still reports distention, no pain, no N/V, had 2 BM, no reported bleeding  :  No  dysuria  Muscle:  No pain, but generalized weakness  Neuro:  No memory problems  Skin: Jaundice        PHYSICAL EXAM:   Vital Signs:  Vital Signs Last 24 Hrs  T(C): 36.2 (09 Apr 2024 13:35), Max: 37.4 (09 Apr 2024 05:00)  T(F): 97.1 (09 Apr 2024 13:35), Max: 99.3 (09 Apr 2024 05:00)  HR: 102 (09 Apr 2024 13:35) (93 - 102)  BP: 94/61 (09 Apr 2024 13:35) (94/61 - 107/67)  BP(mean): --  RR: 18 (09 Apr 2024 13:35) (17 - 18)  SpO2: 97% (09 Apr 2024 13:35) (94% - 97%)    Parameters below as of 09 Apr 2024 13:35  Patient On (Oxygen Delivery Method): room air      Daily     Daily     GENERAL: no acute distress  NEURO: AAOX3, no asterixis  HEENT: icteric sclera  CHEST: no respiratory distress, no accessory muscle use, decreased breath sound on R base  CARDIAC: regular rate, rhythm  ABDOMEN: soft, non-tender, distended, no rebound or guarding, BS+  EXTREMITIES: b/l LE edema, symmetric, non-tender  SKIN: jaundice    LABS: reviewed                        10.8   33.86 )-----------( 383      ( 09 Apr 2024 07:05 )             34.7     04-09    140  |  108  |  14  ----------------------------<  120<H>  4.2   |  24  |  0.62    Ca    8.0<L>      09 Apr 2024 07:05  Phos  2.0     04-09  Mg     2.2     04-09    TPro  5.3<L>  /  Alb  1.8<L>  /  TBili  17.3<H>  /  DBili  13.5<H>  /  AST  159<H>  /  ALT  105<H>  /  AlkPhos  486<H>  04-09    LIVER FUNCTIONS - ( 09 Apr 2024 07:05 )  Alb: 1.8 g/dL / Pro: 5.3 g/dL / ALK PHOS: 486 U/L / ALT: 105 U/L DA / AST: 159 U/L / GGT: x             Interval Diagnostic Studies: see sunrise for full report

## 2024-04-09 NOTE — PROGRESS NOTE ADULT - ASSESSMENT
Brief hospital course:   38y obese (BMI 30) Male w/ Hx of AUD (since age 21, heavier last 2 weeks, last drink on the day of admission), Hx of withdrawal seizures, prior alcohol rehab, presented to [facility] 3/21/24 w/ AMS in setting of hyperammonemia (172), worsening abdominal distention and jaundice (serum bilirubin 6.6) and was admitted to MICU for withdrawal, aspirational pneumonia, required intubation (3/22- 3/26/24), found to be positive for coronavirus (non COVID-19), and noted increased intraabdominal pressure, ileus, for what surgery and GI has been following.     CT a/p 3/21/24 showed marked hepatomegaly (29 cm) w/ severe hepatic steatosis. MDF was < 32 on admission. There was no biliary dilation on CT 3/21/24 and US abd 4/2/24, and there was no signs of cholecystitis either on above. Notably, his lipase was 479 on admission.   Hospital course was c/b sepsis, septic shock, requiring pressor support (3/23-3/27), in setting of pos UA, infiltrates on CXR, +coronavirus and distended abdomen.  He was on Zosyn 3/23-3/30/24, and before that on ceftriaxone 3/21-3/23/24 w/ metronidazole 3/22/24. BCx 2 sets was negative from 3/21 and 4/3 and UCx was neg from 4/3.  MRI abd w/wo and MRCP 4/3/24 showed normal bile ducts, distended GB w/ small layering sludge, small pericholecystic fluid. Liver appeared enlarged w/ fatty infiltration, some regions in R hepatic lobe likely representing focal fatty infiltrations w/ associated liver lesions, possibly hemangiomas per report. It was limited b/o motion artifact. Noted small amount of ascites, not sufficient for diagnostic paracentesis.    He was transferred to medicine on 4/2/24 and has been afebrile since 4/3/24. His mental status has improved, and now AAOx3. His abdominal distention reportedly improved, off lactulose, but AXR 4/8/24 still reported mid abdominal small and large bowel air filled bowel ileus. Notably, on MRI 4/3/24 bowels were unremarkable.   He has been on meropenem since 4/3/24 and after d/w ID, he was started on prednisolone for alcoholic hepatitis on 4/4/24 (when bilirubin was already 16.7), as infection appeared to be controlled.   Bilirubin remained unchanged on steroid as per day #4, although Lille score reports good prognosis b/o young age.  ALP has been rising 299->414, ALT has been slightly rising, INR has been worsening.  Current MELD 3.0 30.   He was exposed to COVID-19 on 4/8, but his tested negative.   He was accepted for transfer to [facility] transplant hepatology.   CTA w/ iv was done instead of multiphase CT a/p, and showed enlarged fatty liver w/ scattered lesions, remaining indeterminate, mildly increased ascites, mildly distended GB w/ perihepatic / pericholecystic fluid, and b/l perinephric stranding.       # Alcoholic hepatitis w/ new onset jaundice, elevated transaminases (AST>ALT), and hepatomegaly w/ hepatic steatosis  # Elevated ALP  # Mild coagulopathy  # Hepatic encephalopathy - improved  # Heterogenous foci in GB fossa and segment 6 / indeterminate liver lesions (reported that possible focal fatty infiltration around hemangiomas)  # AUD w/ withdrawal  # Pancreatitis  # Sepsis, septic shock  - resolved  # PNA  - MELD 3.0 22->30  - Jaundice, marked hepatomegaly, severe steatosis likely due to alcoholic hepatitis; MDF was < 32 on admission, but during hospitalization worsened  - No signs of extrahepatic biliary obstruction (normal bile ducts on CT 3/21/24, US abd 4/2/24, and MRCP 4/3/24).   - No signs of cholecystitis either on CT and US. MRCP 4/3/24 showed distended GB w/ small layering sludge or contracted bile, small pericholecystic fluid. No abdominal tenderness, Harrington neg on exam.  - Liver appeared enlarged w/ fatty infiltration, regions of R hepatic lobe likely focal fatty infiltrations w/ associated liver lesions, possibly hemangiomas per report. It was limited b/o motion artifact. Noted small amount of ascites.   - Liver workup so far: Hep A IgM neg, HBsAg neg, HBcAb IgM neg, HBcAb neg, HBsAb neg, HCV ab/RNA neg, Hep E IgM/PCR neg. CMV and EBV PCRs detectable but the actual value below detection limit. Leptospirosis serology neg.  TRESA and AMA neg. Ceruloplasmin 39. Ferritin 1105.   - Infectious workup so far: BCx 2x 3/21 and 4/3 negative, UCx 4/3 negative. Ascites was not sufficient for Dx paracentesis.   - Lipase was 479->86.   - He was started on prednisolone 40 mg 4/4/24 after ID clearance. Bilirubin unchanged as per day #4, although Lille score reported good prognosis b/o young age.  ALP was rising 299->414, ALT slightly rising, INR worsening. Thus steroid was stopped 4/8/24.  - Last fever 4/3 100.4, T max as per 4/8 99.8F. Been on Meropenem since 4/3/24, noted infiltrates on CXR 4/3/24 and still reported ileus picture on AXR 4/8/24.       - Please, follow up Fungitell.  - Please, still send HSV, VZV PCRs, LKM, SMA, Ig panel, repeat ferritin /iron /TIBC, Strongyloides serology.  - Repeat EBV/CMV serologies, PCR.   - Aspiration, seizure, fall precautions; Avoid sedation; C/w folic, thiamine. C/w rifaximin. Lactulose been held in setting of abdominal distention, concern for ileus on AXR (4/8/24), no obstruction on CT a/p (4/8/24).   - Will need short term MRI f/u for liver lesions.  - Please, repeat lipase.   - Can consider adding liver failure protocol NAC.   - Consider HIDA.  - C/w monitoring LFTs, including INR, MELD labs.  - C/w empiric antibiotic coverage and f/u final cultures. If no improvement, discuss empiric antifungal coverage w/ ID.   - F/u w/ [facility] transplant hepatology regarding transfer  - Will need alcohol rehab once acute medical issues resolved    Thank you for consult  Will monitor  D/w primary team

## 2024-04-10 LAB
ALBUMIN SERPL ELPH-MCNC: 1.7 G/DL — LOW (ref 3.5–5)
ALP SERPL-CCNC: 494 U/L — HIGH (ref 40–120)
ALT FLD-CCNC: 93 U/L DA — HIGH (ref 10–60)
ANION GAP SERPL CALC-SCNC: 10 MMOL/L — SIGNIFICANT CHANGE UP (ref 5–17)
ANISOCYTOSIS BLD QL: SIGNIFICANT CHANGE UP
APTT BLD: 43.8 SEC — HIGH (ref 24.5–35.6)
AST SERPL-CCNC: 149 U/L — HIGH (ref 10–40)
BASOPHILS # BLD AUTO: 0 K/UL — SIGNIFICANT CHANGE UP (ref 0–0.2)
BASOPHILS NFR BLD AUTO: 0 % — SIGNIFICANT CHANGE UP (ref 0–2)
BILIRUB SERPL-MCNC: 18.6 MG/DL — HIGH (ref 0.2–1.2)
BUN SERPL-MCNC: 11 MG/DL — SIGNIFICANT CHANGE UP (ref 7–18)
CALCIUM SERPL-MCNC: 7.8 MG/DL — LOW (ref 8.4–10.5)
CHLORIDE SERPL-SCNC: 106 MMOL/L — SIGNIFICANT CHANGE UP (ref 96–108)
CO2 SERPL-SCNC: 21 MMOL/L — LOW (ref 22–31)
CREAT SERPL-MCNC: 0.5 MG/DL — SIGNIFICANT CHANGE UP (ref 0.5–1.3)
EGFR: 136 ML/MIN/1.73M2 — SIGNIFICANT CHANGE UP
EOSINOPHIL # BLD AUTO: 1.5 K/UL — HIGH (ref 0–0.5)
EOSINOPHIL NFR BLD AUTO: 4 % — SIGNIFICANT CHANGE UP (ref 0–6)
FERRITIN SERPL-MCNC: 667 NG/ML — HIGH (ref 30–400)
GLUCOSE SERPL-MCNC: 86 MG/DL — SIGNIFICANT CHANGE UP (ref 70–99)
HCT VFR BLD CALC: 31.4 % — LOW (ref 39–50)
HERPES SIMPLEX VIRUS 1/2 SURVEILLANCE PCR RESULT: SIGNIFICANT CHANGE UP
HERPES SIMPLEX VIRUS 1/2 SURVEILLANCE PCR SOURCE: SIGNIFICANT CHANGE UP
HGB BLD-MCNC: 10 G/DL — LOW (ref 13–17)
HSV1+2 DNA SPEC QL NAA+PROBE: SIGNIFICANT CHANGE UP
HYPOCHROMIA BLD QL: SLIGHT — SIGNIFICANT CHANGE UP
HYPOSEGMENTATION: PRESENT — SIGNIFICANT CHANGE UP
INR BLD: 2.39 RATIO — HIGH (ref 0.85–1.18)
IRON SATN MFR SERPL: 37 UG/DL — LOW (ref 65–170)
IRON SATN MFR SERPL: 54 % — SIGNIFICANT CHANGE UP (ref 20–55)
LIDOCAIN IGE QN: 118 U/L — HIGH (ref 13–75)
LYMPHOCYTES # BLD AUTO: 1.5 K/UL — SIGNIFICANT CHANGE UP (ref 1–3.3)
LYMPHOCYTES # BLD AUTO: 4 % — LOW (ref 13–44)
MACROCYTES BLD QL: SIGNIFICANT CHANGE UP
MAGNESIUM SERPL-MCNC: 2 MG/DL — SIGNIFICANT CHANGE UP (ref 1.6–2.6)
MANUAL SMEAR VERIFICATION: SIGNIFICANT CHANGE UP
MCHC RBC-ENTMCNC: 31.8 GM/DL — LOW (ref 32–36)
MCHC RBC-ENTMCNC: 34.7 PG — HIGH (ref 27–34)
MCV RBC AUTO: 109 FL — HIGH (ref 80–100)
METAMYELOCYTES # FLD: 2 % — HIGH (ref 0–0)
MICROCYTES BLD QL: SLIGHT — SIGNIFICANT CHANGE UP
MONOCYTES # BLD AUTO: 1.5 K/UL — HIGH (ref 0–0.9)
MONOCYTES NFR BLD AUTO: 4 % — SIGNIFICANT CHANGE UP (ref 2–14)
MYELOCYTES NFR BLD: 4 % — HIGH (ref 0–0)
NEUTROPHILS # BLD AUTO: 29.98 K/UL — HIGH (ref 1.8–7.4)
NEUTROPHILS NFR BLD AUTO: 74 % — SIGNIFICANT CHANGE UP (ref 43–77)
NEUTS BAND # BLD: 6 % — SIGNIFICANT CHANGE UP (ref 0–8)
NRBC # BLD: 0 /100 WBCS — SIGNIFICANT CHANGE UP (ref 0–0)
OVALOCYTES BLD QL SMEAR: SLIGHT — SIGNIFICANT CHANGE UP
PHOSPHATE SERPL-MCNC: 2.5 MG/DL — SIGNIFICANT CHANGE UP (ref 2.5–4.5)
PLAT MORPH BLD: NORMAL — SIGNIFICANT CHANGE UP
PLATELET # BLD AUTO: 342 K/UL — SIGNIFICANT CHANGE UP (ref 150–400)
PLATELET COUNT - ESTIMATE: NORMAL — SIGNIFICANT CHANGE UP
POIKILOCYTOSIS BLD QL AUTO: SIGNIFICANT CHANGE UP
POLYCHROMASIA BLD QL SMEAR: SLIGHT — SIGNIFICANT CHANGE UP
POTASSIUM SERPL-MCNC: 3.3 MMOL/L — LOW (ref 3.5–5.3)
POTASSIUM SERPL-SCNC: 3.3 MMOL/L — LOW (ref 3.5–5.3)
PROT SERPL-MCNC: 5.1 G/DL — LOW (ref 6–8.3)
PROTHROM AB SERPL-ACNC: 26.6 SEC — HIGH (ref 9.5–13)
RBC # BLD: 2.88 M/UL — LOW (ref 4.2–5.8)
RBC # FLD: 21.8 % — HIGH (ref 10.3–14.5)
RBC BLD AUTO: ABNORMAL
SODIUM SERPL-SCNC: 137 MMOL/L — SIGNIFICANT CHANGE UP (ref 135–145)
STOMATOCYTES BLD QL SMEAR: SIGNIFICANT CHANGE UP
TIBC SERPL-MCNC: 68 UG/DL — LOW (ref 250–450)
UIBC SERPL-MCNC: 31 UG/DL — LOW (ref 110–370)
VARIANT LYMPHS # BLD: 2 % — SIGNIFICANT CHANGE UP (ref 0–6)
WBC # BLD: 37.48 K/UL — HIGH (ref 3.8–10.5)
WBC # FLD AUTO: 37.48 K/UL — HIGH (ref 3.8–10.5)

## 2024-04-10 PROCEDURE — 99233 SBSQ HOSP IP/OBS HIGH 50: CPT

## 2024-04-10 RX ORDER — ACETYLCYSTEINE 200 MG/ML
9 VIAL (ML) MISCELLANEOUS ONCE
Refills: 0 | Status: COMPLETED | OUTPATIENT
Start: 2024-04-10 | End: 2024-04-10

## 2024-04-10 RX ORDER — ACETYLCYSTEINE 200 MG/ML
4.3 VIAL (ML) MISCELLANEOUS ONCE
Refills: 0 | Status: COMPLETED | OUTPATIENT
Start: 2024-04-10 | End: 2024-04-10

## 2024-04-10 RX ORDER — ACETYLCYSTEINE 200 MG/ML
13 VIAL (ML) MISCELLANEOUS ONCE
Refills: 0 | Status: COMPLETED | OUTPATIENT
Start: 2024-04-10 | End: 2024-04-10

## 2024-04-10 RX ADMIN — HEPARIN SODIUM 5000 UNIT(S): 5000 INJECTION INTRAVENOUS; SUBCUTANEOUS at 17:06

## 2024-04-10 RX ADMIN — PANTOPRAZOLE SODIUM 40 MILLIGRAM(S): 20 TABLET, DELAYED RELEASE ORAL at 11:12

## 2024-04-10 RX ADMIN — SIMETHICONE 80 MILLIGRAM(S): 80 TABLET, CHEWABLE ORAL at 11:12

## 2024-04-10 RX ADMIN — Medication 130.38 GRAM(S): at 12:36

## 2024-04-10 RX ADMIN — Medication 315 GRAM(S): at 10:50

## 2024-04-10 RX ADMIN — HEPARIN SODIUM 5000 UNIT(S): 5000 INJECTION INTRAVENOUS; SUBCUTANEOUS at 05:35

## 2024-04-10 RX ADMIN — Medication 100 MILLIGRAM(S): at 11:12

## 2024-04-10 RX ADMIN — Medication 65.31 GRAM(S): at 17:05

## 2024-04-10 RX ADMIN — Medication 1 MILLIGRAM(S): at 11:12

## 2024-04-10 RX ADMIN — Medication 1 TABLET(S): at 11:12

## 2024-04-10 NOTE — PROGRESS NOTE ADULT - NUTRITIONAL ASSESSMENT
This patient has been assessed with a concern for Malnutrition and has been determined to have a diagnosis/diagnoses of Moderate protein-calorie malnutrition.    This patient is being managed with:   Diet NPO after Midnight-     NPO Start Date: 10-Apr-2024   NPO Start Time: 23:59  Entered: Apr 10 2024  9:44AM    Diet Regular-  Entered: Apr 2 2024  9:12AM

## 2024-04-10 NOTE — PROGRESS NOTE ADULT - ATTENDING SUPERVISION STATEMENT
Resident
Resident/Fellow
Resident

## 2024-04-10 NOTE — PROGRESS NOTE ADULT - PROBLEM SELECTOR PLAN 1
p/w acute metabolic encephalopathy 2/2 alcohol withdrawal  - s/p Ativan, phenobarb, ketamine drip, and Precedex drip in the setting of mechanical ventilation and AMS  - now extubated, off all sedation, mental status improved, A&Ox2-3  - c/w rifaximin , PPI , thiamine, folic acid, simethicone   - s/p lactulose   - Maddrey score 36.7  - s/p Prednisolone 40 mg daily [started on 4/4-4/8]   - s/p albumin q6  - MELD score 31 today  - ALP/AST/ALT/ Tbili: 486>494/159>146/105L>93/17.3>18.6 [worsening despite 4 day prednisone course]   - Abdominal ultrasound - Hepatosplenomegaly. No evidence for intrahepatic biliary duct dilatation. Increased caliber of the extrahepatic CBD measuring 8 mm. Gallbladder sludge. No ascites.   - MRCP- Enlarged with fatty infiltration. Possible hemangiomas. Common bile duct measures 3 mm. Small amount of ascites. Distended gallbladder with a small amount of pericholecystic fluid; fluid may be related to the abdominal ascites.  - Hepatology following  - pt is a possible liver transplant candidate

## 2024-04-10 NOTE — PROGRESS NOTE ADULT - ASSESSMENT
Brief hospital course:   38y obese (BMI 30) Male w/ Hx of AUD (since age 21, heavier last 2 weeks, last drink on the day of admission), Hx of withdrawal seizures, prior alcohol rehab, presented to [facility] ER 3/21/24 w/ AMS in setting of hyperammonemia (172), worsening abdominal distention and jaundice (serum bilirubin 6.6) and was admitted to MICU for withdrawal, aspirational pneumonia, required intubation (3/22- 3/26/24), found to be positive for coronavirus (non COVID-19), and noted increased intraabdominal pressure, ileus, for what surgery and GI has been following.     CT a/p 3/21/24 showed marked hepatomegaly (29 cm) w/ severe hepatic steatosis. MDF was < 32 on admission. There was no biliary dilation on CT 3/21/24 and US abd 4/2/24, and there was no signs of cholecystitis either on above. Notably, his lipase was 479 on admission.   Hospital course was c/b sepsis, septic shock, requiring pressor support (3/23-3/27), in setting of pos UA, infiltrates on CXR, +coronavirus and distended abdomen.  He was on Zosyn 3/23-3/30/24, and before that on ceftriaxone 3/21-3/23/24 w/ metronidazole 3/22/24. BCx 2 sets was negative from 3/21 and 4/3 and UCx was neg from 4/3.  MRI abd w/wo and MRCP 4/3/24 showed normal bile ducts, distended GB w/ small layering sludge, small pericholecystic fluid. Liver appeared enlarged w/ fatty infiltration, some regions in R hepatic lobe likely representing focal fatty infiltrations w/ associated liver lesions, possibly hemangiomas per report. It was limited b/o motion artifact. Noted small amount of ascites, not sufficient for diagnostic paracentesis.    He was transferred to medicine on 4/2/24 and has been afebrile since 4/3/24. His mental status has improved, and now AAOx3. His abdominal distention reportedly improved, off lactulose, but AXR 4/8/24 still reported mid abdominal small and large bowel air filled bowel ileus. Notably, on MRI 4/3/24 bowels were unremarkable.   He has been on meropenem since 4/3/24 and after d/w ID, he was started on prednisolone for alcoholic hepatitis on 4/4/24 (when bilirubin was already 16.7), as infection appeared to be controlled.   Bilirubin remained unchanged on steroid as per day #4, although Lille score reports good prognosis b/o young age.  ALP has been rising 299->414, ALT has been slightly rising, INR has been worsening.  Current MELD 3.0 30.   He was exposed to COVID-19 on 4/8, but his tested negative.   He was accepted for transfer to [facility] transplant hepatology.   CTA w/ iv was done instead of multiphase CT a/p, and showed enlarged fatty liver w/ scattered lesions, remaining indeterminate, mildly increased ascites, mildly distended GB w/ perihepatic / pericholecystic fluid, and b/l perinephric stranding.       # Alcoholic hepatitis w/ new onset jaundice, elevated transaminases (AST>ALT), and hepatomegaly w/ hepatic steatosis  # Elevated ALP  # Mild coagulopathy  # Hepatic encephalopathy - improved  # Heterogenous foci in GB fossa and segment 6 / indeterminate liver lesions (reported that possible focal fatty infiltration around hemangiomas)  # AUD w/ withdrawal  # Pancreatitis  # Sepsis, septic shock  - resolved  # PNA  - MELD 3.0 22->30  - Jaundice, marked hepatomegaly, severe steatosis likely due to alcoholic hepatitis; MDF was < 32 on admission, but during hospitalization worsened  - No signs of extrahepatic biliary obstruction (normal bile ducts on CT 3/21/24, US abd 4/2/24, and MRCP 4/3/24).   - No signs of cholecystitis either on CT and US. MRCP 4/3/24 showed distended GB w/ small layering sludge or contracted bile, small pericholecystic fluid. No abdominal tenderness, Harrington neg on exam.  - Liver appeared enlarged w/ fatty infiltration, regions of R hepatic lobe likely focal fatty infiltrations w/ associated liver lesions, possibly hemangiomas per report. It was limited b/o motion artifact. Noted small amount of ascites.   - Liver workup so far: Hep A IgM neg, HBsAg neg, HBcAb IgM neg, HBcAb neg, HBsAb neg, HCV ab/RNA neg, Hep E IgM/PCR neg. CMV and EBV PCRs detectable but the actual value below detection limit. Leptospirosis serology neg.  TRESA and AMA neg. Ceruloplasmin 39. Ferritin 1105.   - Infectious workup so far: BCx 2x 3/21 and 4/3 negative, UCx 4/3 negative. Ascites was not sufficient for Dx paracentesis.   - Lipase was 479->86.   - He was started on prednisolone 40 mg 4/4/24 after ID clearance. Bilirubin unchanged as per day #4, although Lille score reported good prognosis b/o young age.  ALP was rising 299->414, ALT slightly rising, INR worsening. Thus steroid was stopped 4/8/24.  - Last fever 4/3 100.4, T max as per 4/8 99.8F. Been on Meropenem since 4/3/24, noted infiltrates on CXR 4/3/24 and still reported ileus picture on AXR 4/8/24.       - Please, follow up Fungitell.  - Please, still send HSV, VZV PCRs, LKM, SMA, Ig panel, repeat ferritin /iron /TIBC, Strongyloides serology.  - Repeat EBV/CMV serologies, PCR.   - Aspiration, seizure, fall precautions; Avoid sedation; C/w folic, thiamine. C/w rifaximin. Lactulose been held in setting of abdominal distention, concern for ileus on AXR (4/8/24), no obstruction on CT a/p (4/8/24).   - Will need short term MRI f/u for liver lesions.  - Please, repeat lipase.   - Can consider adding liver failure protocol NAC.   - Consider HIDA.  - C/w monitoring LFTs, including INR, MELD labs.  - C/w empiric antibiotic coverage and f/u final cultures. If no improvement, discuss empiric antifungal coverage w/ ID.   - F/u w/ [facility] transplant hepatology regarding transfer  - Will need alcohol rehab once acute medical issues resolved    Thank you for consult  Will monitor  D/w primary team     Brief hospital course:   38y obese (BMI 30) Male w/ Hx of AUD (since age 21, heavier last 2 weeks, last drink on the day of admission), Hx of withdrawal seizures, prior alcohol rehab, presented to [facility] ER 3/21/24 w/ AMS in setting of hyperammonemia (172), worsening abdominal distention and jaundice (serum bilirubin 6.6) and was admitted to MICU for withdrawal, aspirational pneumonia, required intubation (3/22- 3/26/24), found to be positive for coronavirus (non COVID-19), and noted increased intraabdominal pressure, ileus, for what surgery and GI has been following.     CT a/p 3/21/24 showed marked hepatomegaly (29 cm) w/ severe hepatic steatosis. MDF was < 32 on admission. There was no biliary dilation on CT 3/21/24 and US abd 4/2/24, and there was no signs of cholecystitis either on above. Notably, his lipase was 479 on admission.   Hospital course was c/b sepsis, septic shock, requiring pressor support (3/23-3/27), in setting of pos UA, infiltrates on CXR, +coronavirus and distended abdomen.  He was on Zosyn 3/23-3/30/24, and before that on ceftriaxone 3/21-3/23/24 w/ metronidazole 3/22/24. BCx 2 sets was negative from 3/21 and 4/3 and UCx was neg from 4/3.  MRI abd w/wo and MRCP 4/3/24 showed normal bile ducts, distended GB w/ small layering sludge, small pericholecystic fluid. Liver appeared enlarged w/ fatty infiltration, some regions in R hepatic lobe likely representing focal fatty infiltrations w/ associated liver lesions, possibly hemangiomas per report. It was limited b/o motion artifact. Noted small amount of ascites, not sufficient for diagnostic paracentesis.    He was transferred to medicine on 4/2/24 and has been afebrile since 4/3/24. His mental status has improved, and now AAOx3. His abdominal distention reportedly improved, off lactulose, but AXR 4/8/24 still reported mid abdominal small and large bowel air filled bowel ileus. Notably, on MRI 4/3/24 bowels were unremarkable.   He has been on meropenem since 4/3/24 and after d/w ID, he was started on prednisolone for alcoholic hepatitis on 4/4/24 (when bilirubin was already 16.7), as infection appeared to be controlled.   Bilirubin remained unchanged on steroid as per day #4, although Lille score reports good prognosis b/o young age.  ALP has been rising 299->414, ALT has been slightly rising, INR has been worsening.  Current MELD 3.0 30.   He was exposed to COVID-19 on 4/8, but his tested negative.   He was accepted for transfer to [facility] transplant hepatology.   CTA w/ iv was done instead of multiphase CT a/p, and showed enlarged fatty liver w/ scattered lesions, remaining indeterminate, mildly increased ascites, mildly distended GB w/ perihepatic / pericholecystic fluid, and b/l perinephric stranding.       # Alcoholic hepatitis w/ new onset jaundice, elevated transaminases (AST>ALT), and hepatomegaly w/ hepatic steatosis  # Elevated ALP  # Mild coagulopathy  # Hepatic encephalopathy - improved  # Heterogenous foci in GB fossa and segment 6 / indeterminate liver lesions (reported that possible focal fatty infiltration around hemangiomas)  # AUD w/ withdrawal  # Pancreatitis  # Sepsis, septic shock  - resolved  # PNA  - MELD 3.0 22->30  - Jaundice, marked hepatomegaly, severe steatosis likely due to alcoholic hepatitis; MDF was < 32 on admission, but during hospitalization worsened  - No signs of extrahepatic biliary obstruction (normal bile ducts on CT 3/21/24, US abd 4/2/24, and MRCP 4/3/24).   - No signs of cholecystitis either on CT and US. MRCP 4/3/24 showed distended GB w/ small layering sludge or contracted bile, small pericholecystic fluid. No abdominal tenderness, Harrington neg on exam.  - Liver appeared enlarged w/ fatty infiltration, regions of R hepatic lobe likely focal fatty infiltrations w/ associated liver lesions, possibly hemangiomas per report. It was limited b/o motion artifact. Noted small amount of ascites.   - Liver workup so far: Hep A IgM neg, HBsAg neg, HBcAb IgM neg, HBcAb neg, HBsAb neg, HCV ab/RNA neg, Hep E IgM/PCR neg. CMV and EBV PCRs detectable but the actual value below detection limit. Leptospirosis serology neg.  TRESA and AMA neg. Ceruloplasmin 39. Ferritin 1105.   - Infectious workup so far: BCx 2x 3/21 and 4/3 negative, UCx 4/3 negative. Ascites was not sufficient for Dx paracentesis.   - Lipase was 479->86.   - He was started on prednisolone 40 mg 4/4/24 after ID clearance. Bilirubin unchanged as per day #4, although Lille score reported good prognosis b/o young age.  ALP was rising 299->414, ALT slightly rising, INR worsening. Thus steroid was stopped 4/8/24.  - Last fever 4/3 100.4, T max as per 4/8 99.8F. Been on Meropenem since 4/3/24, noted infiltrates on CXR 4/3/24 and still reported ileus picture on AXR 4/8/24.       - Please, follow up Fungitell.  - Please, still send HSV, VZV PCRs, LKM, SMA, Ig panel, repeat ferritin /iron /TIBC, Strongyloides serology.  - Repeat EBV/CMV serologies, PCR.   - Aspiration, seizure, fall precautions; Avoid sedation; C/w folic, thiamine. C/w rifaximin. Lactulose been held in setting of abdominal distention, concern for ileus on AXR (4/8/24), no obstruction on CT a/p (4/8/24).   - Will need short term MRI f/u for liver lesions.  - Please, repeat lipase.   - Can consider adding liver failure protocol NAC.   - Consider HIDA.  - C/w monitoring LFTs, including INR, MELD labs.  - C/w empiric antibiotic coverage and f/u final cultures. If no improvement, discuss empiric antifungal coverage w/ ID.   - Check if ascites pocket for Dx paracentesis.  - F/u w/ [facility] transplant hepatology regarding transfer  - Will need alcohol rehab once acute medical issues resolved    Thank you for consult  Will monitor  D/w primary team

## 2024-04-10 NOTE — PROGRESS NOTE ADULT - SUBJECTIVE AND OBJECTIVE BOX
Chief Complaint:  Patient is a 38y old  Male who presents with a chief complaint of Alcohol withdrawal (10 Apr 2024 14:06)      Reason for consult:    Interval Events:     Hospital Medications:  acetaminophen     Tablet .. 650 milliGRAM(s) Oral every 6 hours PRN  acetylcysteine IVPB 9 Gram(s) IV Intermittent once  folic acid 1 milliGRAM(s) Oral daily  heparin   Injectable 5000 Unit(s) SubCutaneous every 12 hours  influenza   Vaccine 0.5 milliLiter(s) IntraMuscular once  multivitamin 1 Tablet(s) Oral daily  pantoprazole   Suspension 40 milliGRAM(s) Oral daily  rifAXIMin 550 milliGRAM(s) Oral two times a day  simethicone 80 milliGRAM(s) Chew daily  thiamine 100 milliGRAM(s) Oral daily      ROS:   General:  No  fevers, chills, night sweats, fatigue  Eyes:  Good vision, no reported pain  ENT:  No sore throat, pain, runny nose  CV:  No pain, palpitations  Pulm:  No dyspnea, cough  GI:  See HPI, otherwise negative  :  No  incontinence, nocturia  Muscle:  No pain, weakness  Neuro:  No memory problems  Psych:  No insomnia, mood problems, depression  Endocrine:  No polyuria, polydipsia, cold/heat intolerance  Heme:  No petechiae, ecchymosis, easy bruisability  Skin:  No rash    PHYSICAL EXAM:   Vital Signs:  Vital Signs Last 24 Hrs  T(C): 37.3 (10 Apr 2024 12:16), Max: 37.3 (10 Apr 2024 12:16)  T(F): 99.1 (10 Apr 2024 12:16), Max: 99.1 (10 Apr 2024 12:16)  HR: 72 (10 Apr 2024 12:16) (72 - 102)  BP: 111/67 (10 Apr 2024 12:16) (107/69 - 111/69)  BP(mean): --  RR: 18 (10 Apr 2024 12:16) (17 - 18)  SpO2: 98% (10 Apr 2024 12:16) (94% - 98%)    Parameters below as of 10 Apr 2024 12:16  Patient On (Oxygen Delivery Method): room air      Daily     Daily     GENERAL: no acute distress  NEURO: alert, no asterixis  HEENT: anicteric sclera, no conjunctival pallor appreciated  CHEST: no respiratory distress, no accessory muscle use  CARDIAC: regular rate, rhythm  ABDOMEN: soft, non-tender, non-distended, no rebound or guarding  EXTREMITIES: warm, well perfused, no edema  SKIN: no lesions noted    LABS: reviewed                        10.0   37.48 )-----------( 342      ( 10 Apr 2024 07:35 )             31.4     04-10    137  |  106  |  11  ----------------------------<  86  3.3<L>   |  21<L>  |  0.50    Ca    7.8<L>      10 Apr 2024 07:35  Phos  2.5     04-10  Mg     2.0     04-10    TPro  5.1<L>  /  Alb  1.7<L>  /  TBili  18.6<H>  /  DBili  x   /  AST  149<H>  /  ALT  93<H>  /  AlkPhos  494<H>  04-10    LIVER FUNCTIONS - ( 10 Apr 2024 07:35 )  Alb: 1.7 g/dL / Pro: 5.1 g/dL / ALK PHOS: 494 U/L / ALT: 93 U/L DA / AST: 149 U/L / GGT: x             Interval Diagnostic Studies: see sunrise for full report   Chief Complaint:  Patient is a 38y old  Male who presents with a chief complaint of Alcohol withdrawal (10 Apr 2024 14:06)      Reason for consult: ALD    Interval Events: Patient was seen and examined at bedside. Fluid leaking from abdominal wall. MELD 3.0 31.     Hospital Medications:  acetaminophen     Tablet .. 650 milliGRAM(s) Oral every 6 hours PRN  acetylcysteine IVPB 9 Gram(s) IV Intermittent once  folic acid 1 milliGRAM(s) Oral daily  heparin   Injectable 5000 Unit(s) SubCutaneous every 12 hours  influenza   Vaccine 0.5 milliLiter(s) IntraMuscular once  multivitamin 1 Tablet(s) Oral daily  pantoprazole   Suspension 40 milliGRAM(s) Oral daily  rifAXIMin 550 milliGRAM(s) Oral two times a day  simethicone 80 milliGRAM(s) Chew daily  thiamine 100 milliGRAM(s) Oral daily      ROS:   General:  No  fevers, chills  Eyes:  Good vision, no reported pain  ENT:  No sore throat, pain, runny nose  CV:  No pain, palpitations  Pulm:  No dyspnea, cough  GI:  Still reports distention, no pain, no N/V, had 2 BM, no reported bleeding  :  No  dysuria  Muscle:  No pain, but generalized weakness  Neuro:  No memory problems  Skin: Jaundice      PHYSICAL EXAM:   Vital Signs:  Vital Signs Last 24 Hrs  T(C): 37.3 (10 Apr 2024 12:16), Max: 37.3 (10 Apr 2024 12:16)  T(F): 99.1 (10 Apr 2024 12:16), Max: 99.1 (10 Apr 2024 12:16)  HR: 72 (10 Apr 2024 12:16) (72 - 102)  BP: 111/67 (10 Apr 2024 12:16) (107/69 - 111/69)  BP(mean): --  RR: 18 (10 Apr 2024 12:16) (17 - 18)  SpO2: 98% (10 Apr 2024 12:16) (94% - 98%)    Parameters below as of 10 Apr 2024 12:16  Patient On (Oxygen Delivery Method): room air      Daily     Daily     GENERAL: no acute distress  NEURO: AAOX3, no asterixis  HEENT: icteric sclera  CHEST: no respiratory distress, no accessory muscle use, decreased breath sound on R base  CARDIAC: regular rate, rhythm  ABDOMEN: soft, non-tender, distended, no rebound or guarding, BS+  EXTREMITIES: b/l LE edema, symmetric, non-tender  SKIN: jaundice    LABS: reviewed                        10.0   37.48 )-----------( 342      ( 10 Apr 2024 07:35 )             31.4     04-10    137  |  106  |  11  ----------------------------<  86  3.3<L>   |  21<L>  |  0.50    Ca    7.8<L>      10 Apr 2024 07:35  Phos  2.5     04-10  Mg     2.0     04-10    TPro  5.1<L>  /  Alb  1.7<L>  /  TBili  18.6<H>  /  DBili  x   /  AST  149<H>  /  ALT  93<H>  /  AlkPhos  494<H>  04-10    LIVER FUNCTIONS - ( 10 Apr 2024 07:35 )  Alb: 1.7 g/dL / Pro: 5.1 g/dL / ALK PHOS: 494 U/L / ALT: 93 U/L DA / AST: 149 U/L / GGT: x             Interval Diagnostic Studies: see sunrise for full report

## 2024-04-10 NOTE — PROGRESS NOTE ADULT - PROBLEM SELECTOR PLAN 2
- pt found to be septic earlier in hospital course, thought to be secondary to coronavirus, no other specific source   - BCx from admission (3/21)- NGTD   - s/p zosyn course 3/23-/3/30  - Hossein repeat BCx 4/3 - NGTD (pt still presenting septic (WC 20s, and tachycardic, tachypnea)  - leukocytosis worsening [ likely multifactorial - infection, 2/2 steroid, alcoholic hepatitis]   - LFTs improving   - repeat U/A 4/3 negative   - CXR 4/3- showing increased infiltrates b/l   - s/p meropenem 1g q8 ( 4/3-4/10)  - obstructive pattern on LFTs, can be hepatocellular in nature due to the alcoholic hepatitis   - ID Dr. Sosa  - will do bedside POCUS in the afternoon to see if ascites worsened, possible paracentesis

## 2024-04-10 NOTE — PROGRESS NOTE ADULT - ATTENDING COMMENTS
38M, PMH of alcohol withdrawal seizure with alcohol use disorder, who p/w confusion starting this morning. Admitted for high risk alcohol withdrawal with persistent sinus tachycardia.    Assessment:   # Alcohol use disorder  # Alcohol withdrawal  # Acute metabolic encephalopathy  # Aspiration pneumonia  # Hx of alcohol seizures  # Liver steatosis  # Alcoholic hepatitis  # Ileus  # Sinus tachycardia  # SIRS  # ARABELLA    Plan:  Extubated 3/26   Monitor respiratory status  DF<30, no steroids  Lactate resolved, wbc coming down  Ileus improving on imaging and on exam  Repeat AXR today  Cont lactulose  Hepatology consult  Serial abd exams  Diet per GI recs  Close hemodynamic monitoring  Monitor for signs of withdrawal   Suspect delirium due to hepatic encephalopathy  Will hold off phenobarbital for now  Avoid opioids   Monitor UOP  GI/DVT prophy  Cont. ICU care
38M, PMH of alcohol withdrawal seizure with alcohol use disorder, who p/w confusion starting this morning. Admitted for high risk alcohol withdrawal with persistent sinus tachycardia.    Assessment:   # Alcohol use disorder  # Alcohol withdrawal  # Acute metabolic encephalopathy  # Aspiration pneumonia  # Hx of alcohol seizures  # Liver steatosis  # Alcoholic hepatitis  # Ileus  # Sinus tachycardia  # SIRS  # ARABELLA    Plan:  Mentation is slowly improving   Extubated 3/26   DF<30, no steroids  Noted with mild abdominal distension this morning   Repeat AXR daily  Cont lactulose, decrease dose  R/c rectal tube    Hepatology consult appreciated   Serial abd exams  Diet per GI recs, advance to regular diet  Close hemodynamic monitoring  Suspect delirium due to hepatic encephalopathy  Avoid opioids   Cont. albumin for hypoalbuminemia   IV fluid hydration given on going GI lossess  Monitor UOP  OOB to chair with jd lifer  GI/DVT prophy  Cont. ICU care
38M, PMH of alcohol withdrawal seizure with alcohol use disorder, who p/w confusion starting this morning. Admitted for high risk alcohol withdrawal with persistent sinus tachycardia.    Assessment:   # Alcohol use disorder  # Alcohol withdrawal  # Acute metabolic encephalopathy  # Aspiration pneumonia  # Hx of alcohol seizures  # Liver steatosis  # Alcoholic hepatitis  # Ileus  # Sinus tachycardia  # SIRS  # ARABELLA    Plan:  Extubated 3/26   Monitor respiratory status  DF<30, no steroids  Lactate resolved, wbc coming down  Ileus improving on imaging and on exam  Repeat AXR today  Cont lactulose  Hepatology consult  Serial abd exams  Diet per GI recs  IV fluid hydration   Close hemodynamic monitoring  Monitor for signs of withdrawal   Phenobarbital for CIWA > 8  Avoid opioids   Monitor UOP  GI/DVT prophy  Cont. ICU care
38M, PMH of alcohol withdrawal seizure with alcohol use disorder, who p/w confusion starting this morning. Admitted for high risk alcohol withdrawal with persistent sinus tachycardia.    Assessment:   # Alcohol use disorder  # Alcohol withdrawal  # Acute metabolic encephalopathy  # Aspiration pneumonia  # Hx of alcohol seizures  # Liver steatosis  # Alcoholic hepatitis  # Ileus  # Sinus tachycardia  # SIRS  # ARABELLA    Plan:  Mentation is slowly improving   Extubated 3/26   DF<30, no steroids  Lactate resolved, wbc coming down  Ileus improving on imaging and on exam  Repeat AXR daily  Cont lactulose, decrease dose   Hepatology consult appreciated   Serial abd exams  Diet per GI recs, advance to regular diet  Close hemodynamic monitoring  Suspect delirium due to hepatic encephalopathy  Avoid opioids   Add albumin for hypoalbuminemia   IV fluid hydration given on going GI lossess  Monitor UOP  OOB to chair with jd lifer  GI/DVT prophy  Cont. ICU care
38M, PMH of alcohol withdrawal seizure with alcohol use disorder, who p/w confusion starting this morning. Admitted for high risk alcohol withdrawal with persistent sinus tachycardia.    Assessment:   # Alcohol use disorder  # Alcohol withdrawal  # Acute metabolic encephalopathy  # Atypical pneumonia  # Hx of alcohol seizures  # Possible pancreatitis  # Liver steatosis  # Alcoholic hepatitis  # Ileus  # Sinus tachycardia  # SIRS  # ARABELLA    Intubated and sedated  Mechanical vent support  CVC, marcelina placed  monitor MAP>65, provide vasopressors to goal  DF<30, no steroids  Lactate resolved, wbc coming down  Abd exam concerning, tense, d/w surgery Dr Sin.   Does not appear to have mechanical obstruction, most likely Ileus  Getting lactulose from below, decompressing from above  Serial abd exams  Monitor UOP  GI/DVT prophy  Prognosis guarded
IMP: This is a 38 yr old man with  alcohol withdrawal seizure with alcohol use disorder, who p/w confusion starting this morning. Admitted for high risk alcohol withdrawal with persistent sinus tachycardia.    Assessment:   # Acute hypoxic resp failure   # Alcohol use disorder  # Alcohol withdrawal  # Acute metabolic encephalopathy  # Atypical pneumonia  # Hx of alcohol seizures  # Possible pancreatitis  # Liver steatosis  # Alcoholic hepatitis  # Sinus tachycardia  # SIRS  # ARABELLA  # Ileus       Plan:   =============NEURO:=============  # Acute metabolic encephalopathy  # Alcohol withdrawal  s/p Ativan however will pursue intubation for protecting the airways  s/p phenobarb IVP x1 on 3/23   -continue phenobarb pushes when pt becomes agitated  - Sedation titrated for SAT SBT     # Alcohol use disorder  # Hx of alcohol seizures  - Pt has been in and out of alcohol rehab  - Most recently came out 6 months ago - as per sister pt was only hospitalized and not in rehab   - SW consult order placed     #intubated (3/22)  -Tolerated SAT SBT and extubated 3/26    =============CARDIO:================  # Sinus tachycardia  - P/w sinus tachycardia  - Likely due to alcohol withdrawal  - EKG = sinus tachycardia  - trop neg      ==========PULM:=============  # Intubated (3/22)    #Atypical pneumonia   # coronavirus positive however not COVID  CT chest : Multifocal bilateral peripheral opacities suggesting atypical pneumonia. Probable compression atelectasis right lung base, related to elevated right hemidiaphragm.  - c/w zosyn       ==========GI:==========  # Constipation  #?acute abdomen with increased abdominal pressure of 25   surgery consulted - no indication for surgery at this time , recs paralyzing the patient   - c/w salam sump suction   - abdominal pressure improving   - 3/24 Bladder pressure 16   - 3/24 started lactulose 10mg q8h  - 3/25 Neostigmine x1, led to bradycardia. Atropine given.  - 3/26 Pt had large BM, will continue lactulose    # Alcoholic hepatitis   #transaminitis  #Hyperbilirubinemia   P/w T bili 6.6 (Dir 5.1), , ALT 41,   Maddrey score = borderline at 31.4 points (32 point is the cutoff for steroid treatment)  MELD-Na score 17 points 6 % 90day mortality   - Transaminitis improving   - T. Hector 6.6 > 5.6 > 5.5>7.5   - 3/24 Maddrey score - 28.7   - NPO for now    # Possible pancreatitis  Lipase 470, possible mid abdominal pain. Negative imaging.  - c/w NPO  - repeat Lipase wnl    # Liver steatosis  # Liver lesion  Likely due to alcohol use disorder  CT = Has hypodense foci with central hyperdensity involving the pericholecystic region and segment 6, indeterminate.   - Rec outpt MRI, hepatology consult once more stable for outpt follow up
IMP: This is a 38 yr old man with  alcohol withdrawal seizure with alcohol use disorder, who p/w confusion starting this morning. Admitted for high risk alcohol withdrawal with persistent sinus tachycardia.    Assessment:   # Alcohol use disorder  # Alcohol withdrawal  # Acute metabolic encephalopathy  # Aspiration pneumonia  # Hx of alcohol seizures  # Liver steatosis  # Alcoholic hepatitis  # Ileus  # Sinus tachycardia  # SIRS  # ARABELLA    Plan:  - Mentation is slowly improving   - Extubated 3/26   - DF<30, no steroids  - Noted with mild abdominal distension   - lactulose change to rifaxamine   - R/c rectal tube    - Hepatology consult appreciated   - Serial abd exams  - Diet per GI recs, advance to regular diet  - Close hemodynamic monitoring  - Suspect delirium due to hepatic encephalopathy  - Avoid opioids   - Cont. albumin for hypoalbuminemia   - IV fluid hydration given on going GI lossess  - Monitor UOP  - OOB to chair with jd lifer  - GI/DVT prophy  - OOB to chair   - Transfer
IMP: This is a 38 yr old man with  alcohol withdrawal seizure with alcohol use disorder, who p/w confusion starting this morning. Admitted for high risk alcohol withdrawal with persistent sinus tachycardia.    Assessment:   # Alcohol use disorder  # Alcohol withdrawal  # Acute metabolic encephalopathy  # Aspiration pneumonia  # Hx of alcohol seizures  # Liver steatosis  # Alcoholic hepatitis  # Ileus  # Sinus tachycardia  # SIRS  # ARABELLA    Plan:  - Mentation is slowly improving   - Extubated 3/26   - DF<30, no steroids  - Noted with mild abdominal distension   - Cont lactulose, decrease dose  - R/c rectal tube    - Hepatology consult appreciated   - Serial abd exams  - Diet per GI recs, advance to regular diet  - Close hemodynamic monitoring  - Suspect delirium due to hepatic encephalopathy  - Avoid opioids   - Cont. albumin for hypoalbuminemia   - IV fluid hydration given on going GI lossess  - Monitor UOP  - OOB to chair with jd lifer  - GI/DVT prophy  - Cont. ICU care.
38M, PMH of alcohol withdrawal seizure with alcohol use disorder, who p/w confusion starting this morning. Admitted for high risk alcohol withdrawal with persistent sinus tachycardia.    Assessment:   # Alcohol use disorder  # Alcohol withdrawal  # Acute metabolic encephalopathy  # Atypical pneumonia  # Hx of alcohol seizures  # Liver steatosis  # Alcoholic hepatitis  # Ileus  # Sinus tachycardia  # SIRS  # ARABELLA    Intubated and sedated  Mechanical vent support  monitor MAP>65, provide vasopressors to goal  DF<30, no steroids  Lactate resolved, wbc coming down  Abd exam c/w Ileus, slightly improved  start lactulose  Serial abd exams  Monitor UOP  GI/DVT prophy
38M, PMH of alcohol withdrawal seizure with alcohol use disorder, who p/w confusion starting this morning. Admitted for high risk alcohol withdrawal with persistent sinus tachycardia.    Assessment:   # Alcohol use disorder  # Alcohol withdrawal  # Acute metabolic encephalopathy  # Atypical pneumonia  # Hx of alcohol seizures  # Liver steatosis  # Alcoholic hepatitis  # Ileus  # Sinus tachycardia  # SIRS  # ARABELLA    Intubated and sedated  Mechanical vent support  monitor MAP>65, provide vasopressors to goal  DF<30, no steroids  Lactate resolved, wbc coming down  Abd exam slightly improved  Does not appear to have mechanical obstruction, most likely Ileus  Getting lactulose from below, decompressing from above  Serial abd exams  Monitor UOP  GI/DVT prophy  Prognosis guarded
38M, PMH of alcohol withdrawal seizure with alcohol use disorder, who p/w confusion starting this morning. Admitted for high risk alcohol withdrawal with persistent sinus tachycardia.    Assessment:   # Alcohol use disorder  # Alcohol withdrawal  # Acute metabolic encephalopathy  # Aspiration pneumonia  # Hx of alcohol seizures  # Liver steatosis  # Alcoholic hepatitis  # Ileus  # Sinus tachycardia  # SIRS  # ARABELLA    Plan:  Extubated 3/26   Monitor respiratory status  DF<30, no steroids  Lactate resolved, wbc coming down  Ileus improving on imaging and on exam  Cont lactulose  Serial abd exams  NGT removed   Diet per GI recs  IV fluid hydration   D/c central line and arterial line   Close hemodynamic monitoring  Monitor for signs of withdrawal   Phenobarbital for CIWA > 8  Avoid opioids   Monitor UOP  GI/DVT prophy  Cont. ICU care
38M PMH alcohol use disorder, who p/w confusion on morning of admission. Patient was admitted to ICU for high risk alcohol withdrawal, requiring precedex and intubation. Pt extubated 3/26. CCB septic shock 2/2 HAP, requiring pressor support and completed course of zosyn. On 4/4, developed fever and found with new HAP, started on meropenem. CCB ileus constipation s/p decompression with rectal tube. Found with alcoholic hepatitis not responding to IV steroids. Now being evaluated by transplant service.    A/P:  Acute alcoholic hepatitis   Sepsis 2/2 pneumonia   Hospital acquired pneumonia   Abdominal distention 2/2 ileus   Liver failure   complicated by hepatic encephalopathy  complicated by coagulopathy  alcohol withdrawal  Sinus tachycardia   Macrocytic Anemia  Corona virus + (non Covid)    EBV+  CMV+  DVT ppx     -discussed with ID Dr. Barney: c/w meropenem 7 total days for new HAP  -alcoholic hepatitis not responding to steroids  -discussed with hepatology Dr Wiley: now planning for possible transfer to [place] transplant service. My phone number provided for turnover and discussion  -triple phase CT with indeterminant lesion  -Abd distention improved  -Mental status stable, c/w Rifaximin, Off lactulose given diarrhea.   -Leukocytosis remains elevated- multifactorial from infection/ steroid use/and Alcoholic hepatitis. Monitor wbc daily.   -daily OOBC  -PT- final recs pending improvement    =================================  I independently reviewed the following tests: N/A  I discussed management with specialists and the plan of care is described above.  I ordered labs and studies: CBC, CMP  I reviewed the following labs to guide my management:                        10.8   33.86 )-----------( [id number]      ( 09 Apr 2024 07:05 )             34.7     04-09    140  |  108  |  14  ----------------------------<  120<H>  4.2   |  24  |  0.62    Ca    8.0<L>      09 Apr 2024 07:05  Phos  2.0     04-09  Mg     2.2     04-09    TPro  5.3<L>  /  Alb  1.8<L>  /  TBili  17.3<H>  /  DBili  13.5<H>  /  AST  159<H>  /  ALT  105<H>  /  AlkPhos  486<H>  04-09
Patient is a 37 y/o M w/ PMHx of alcohol withdrawal seizure with alcohol use disorder, who p/w confusion starting on morning of admission. Patient was admitted to ICU for high risk alcohol withdrawal, did not respond to ativan alone and had to be transitioned to Precedex and later intubated for airway protection- extubated 3/26. His hospital stay was complicated by Septic shock  2/2 possible pneumonia, requiring pressor support, was treated w/ IV Zosyn and completed course. He also developed significant abdominal distention, felt to be 2/2 ileus- started on lactulose, rectal tube placed for decompression, abd distention improved but not resolved. Pt also has alcoholic hepatitis, continues to have elevated T.Bili. Now downgraded to [facility] for further care     Pt was evaluated this afternoon, he denies any new complaints. Was able to lie flat but does get short of breath at times.     Labs reviewed- cbc, bmp, LFTs, PT/INR      PE as above     A/P:  #Acute alcoholic hepatitis w/ up-trending T.bili   #Sepsis 2/2 pneumonia   #Hospital acquired pneumonia   #Abdominal distention 2/2 ileus   #Mild coagulopathy   #Hepatic encephalopathy- waxes wanes   #ETOH withdrawal s/p Precedex/ propofol   #Sinus tachycardia   #Macrocytic Anemia  #Corona virus + (non Covid)    #EBV+  #CMV+  #DVT ppx     Plan:  -C/w IV meropenem- D2 for pneumonia. Prior blood cx negative, repeat blood cx, mycoplasma, strept testing   -T.bili continues to up-trend, MDF- 63.2 points. D/w GI/hepatology service- now recommending to start PO prednisone. Per GI team, ID is aware   -MR abdomen- Hepatomegaly, Small amount of ascites. Distended gallbladder with a small amount of pericholecystic fluid; fluid   may be related to the abdominal ascites.   -Will also check leptospira Ab   -C/w albumin and Lasix 20 mg IV push x1.   -Abd distended, repeat x-ray 4/4  w/ persistent dilated bowel- in particular cecum. C/w rectal tube- there was concern that rectal tube is dislodged but per RN it's in place.   -Mental status stable, waxes wanes- c/w Rifaximin, lactulose.   -MCV elevated, check folate, vitamin B12 .
38M PMH alcohol use disorder, who p/w confusion on morning of admission. Patient was admitted to ICU for high risk alcohol withdrawal, requiring precedex and intubation. Pt extubated 3/26. CCB septic shock 2/2 HAP, requiring pressor support and completed course of zosyn. On 4/4, developed fever and found with new HAP, started on meropenem. CCB ileus constipation s/p decompression with rectal tube. Found with alcoholic hepatitis not responding to IV steroids. Now being evaluated by transplant service.    A/P:  Acute alcoholic hepatitis   Sepsis 2/2 pneumonia   Hospital acquired pneumonia   Abdominal distention 2/2 ileus   Liver failure   complicated by hepatic encephalopathy  complicated by coagulopathy  alcohol withdrawal  Sinus tachycardia   Macrocytic Anemia  Corona virus + (non Covid)    EBV+  CMV+  DVT ppx     -discussed with ID Dr. Barney: s/p meropenem 7 total days for new HAP. Discussed uptrending WBC. Agree to hold off further infectious treatment and monitor clinical status. Currently asx.  -alcoholic hepatitis not responding to steroids  -discussed with hepatology Dr Wiley: transfer to [place] transplant service  -triple phase CT with indeterminant lesion  -Abd distention improved  -Mental status stable, c/w Rifaximin, Off lactulose given diarrhea.   -Leukocytosis increased - discussed with ID as above (ddx steroid use/and Alcoholic hepatitis)  -daily OOBC  -PT- final recs pending improvement    =================================  I independently reviewed the following tests: N/A  I discussed management with specialists and the plan of care is described above.  I ordered labs and studies: CBC, BMP  I reviewed the following labs to guide my management:                        10.0   37.48 )-----------( [id number]      ( 10 Apr 2024 07:35 )             31.4     04-10    137  |  106  |  11  ----------------------------<  86  3.3<L>   |  21<L>  |  0.50    Ca    7.8<L>      10 Apr 2024 07:35  Phos  2.5     04-10  Mg     2.0     04-10    TPro  5.1<L>  /  Alb  1.7<L>  /  TBili  18.6<H>  /  DBili  x   /  AST  149<H>  /  ALT  93<H>  /  AlkPhos  494<H>  04-10
Patient is a 39 y/o M w/ PMHx of alcohol withdrawal seizure with alcohol use disorder, who p/w confusion starting on morning of admission. Patient was admitted to ICU for high risk alcohol withdrawal, did not respond to ativan alone and had to be transitioned to Precedex and later intubated for airway protection- extubated 3/26. His hospital stay was complicated by Septic shock  2/2 possible pneumonia, requiring pressor support, was treated w/ IV Zosyn and completed course. He also developed significant abdominal distention, felt to be 2/2 ileus- started on lactulose, rectal tube placed for decompression, abd distention improved but not resolved. Pt also has alcoholic hepatitis, continues to have elevated T.Bili. Now downgraded to [facility] [place] for further care     Pt was evaluated this afternoon, he denies any new complaints. Abd is still dissented, his rectal tube was dislodged overnight. Mental status is at baseline.     Labs reviewed- cbc, bmp, LFTs, PT/INR      PE as above   b/l 3+ pitting edema     A/P:  #Acute alcoholic hepatitis w/ up-trending T.bili   #Sepsis 2/2 pneumonia   #Hospital acquired pneumonia   #Abdominal distention 2/2 ileus   #Mild coagulopathy   #Hepatic encephalopathy- waxes wanes   #ETOH withdrawal s/p Precedex/ propofol   #Sinus tachycardia   #Macrocytic Anemia  #Corona virus + (non Covid)    #EBV+  #CMV+  #DVT ppx     Plan:  -C/w IV meropenem- D3 for pneumonia. repeat blood cx, mycoplasma, strept negative.  No further fevers   -T.bili stable since yesterday, MDF 4/5- 60.7- poor prognosis. C/w Prednisolone- D2   - leptospira Ab- testing    -s/p albumin and lasix on 4/3-4/4. Will continue   -Abd distended, likley ileus- follow repeat X-ray. Advised patient to mobilize- OOBC   -Mental status stable,  c/w Rifaximin, lactulose- titrate to 2-3 soft stools.   -Leukocytosis remains elevated- multifactorial from infection/ steroid use/and Alcoholic hepatitis. Monitor wbc daily
Patient is a 37 y/o M w/ PMHx of alcohol withdrawal seizure with alcohol use disorder, who p/w confusion starting on morning of admission. Patient was admitted to ICU for high risk alcohol withdrawal, did not respond to ativan alone and had to be transitioned to Precedex and later intubated for airway protection- extubated 3/26. His hospital stay was complicated by Septic shock  2/2 possible pneumonia, requiring pressor support, was treated w/ IV Zosyn and completed course. He also developed significant abdominal distention, felt to be 2/2 ileus- started on lactulose, rectal tube placed for decompression, abd distention improved but not resolved. Pt also has alcoholic hepatitis, continues to have elevated T.Bili. Now downgraded to [facility] for further care     Pt was evaluated this morning, has no complaints. Per RN, not as watery diarrhea but has semi-solid stools. His mobility is limited.     Labs reviewed- cbc, bmp, LFTs       A/P:  #Acute alcoholic hepatitis   #Sepsis 2/2 pneumonia   #Hospital acquired pneumonia   #Abdominal distention 2/2 ileus   #Mild coagulopathy   #Hepatic encephalopathy- waxes wanes   #ETOH withdrawal s/p Precedex/ propofol   #Sinus tachycardia   #Macrocytic Anemia  #Corona virus + (non Covid)    #EBV+  #CMV+  #DVT ppx     Plan:  -C/w IV meropenem- D6 for pneumonia. No further fevers. repeat blood cx, mycoplasma, strept negative.    -T.bili up-trended again, high MELD and MDF. C/w prednisolone- D5, questionable utility as MDF still high, though, Lille score shows good prognosis w/ steroid use. Appreciate Hepatology recs, will send further serologic w/u as recommended   -Get CT abd triple phase as recommended by hepatology service   - leptospira Ab- testing    -hold albumin and Lasix diuresis   -Abd distention improved. Follow daily X-ray   -Mental status stable,  c/w Rifaximin, Off lactulose given diarrhea.   -Leukocytosis remains elevated- multifactorial from infection/ steroid use/and Alcoholic hepatitis. Monitor wbc daily.   -daily OOBC  -PT- final recs pending improvement
Patient is a 39 y/o M w/ PMHx of alcohol withdrawal seizure with alcohol use disorder, who p/w confusion starting on morning of admission. Patient was admitted to ICU for high risk alcohol withdrawal, did not respond to ativan alone and had to be transitioned to Precedex and later intubated for airway protection- extubated 3/26. His hospital stay was complicated by Septic shock  2/2 possible pneumonia, requiring pressor support, was treated w/ IV Zosyn and completed course. He also developed significant abdominal distention, felt to be 2/2 ileus- started on lactulose, rectal tube placed for decompression, abd distention improved but not resolved. Pt also has alcoholic hepatitis, continues to have elevated T.Bili. Now downgraded to [facility] for further care     Pt was evaluated this morning, has no complaints. Per RN, not as watery diarrhea but has semi-solid stools. His mobility is limited.     Labs reviewed- cbc, bmp, LFTs       A/P:  #Acute alcoholic hepatitis   #Sepsis 2/2 pneumonia   #Hospital acquired pneumonia   #Abdominal distention 2/2 ileus   #Mild coagulopathy   #Hepatic encephalopathy- waxes wanes   #ETOH withdrawal s/p Precedex/ propofol   #Sinus tachycardia   #Macrocytic Anemia  #Corona virus + (non Covid)    #EBV+  #CMV+  #DVT ppx     Plan:  -C/w IV meropenem- D5 for pneumonia. No further fevers. repeat blood cx, mycoplasma, strept negative.    -T.bili now plateaued since start of steroids.. C/w Prednisolone- D4  - leptospira Ab- testing    -hold albumin and Lasix diuresis   -Abd distention improved. Follow daily X-ray   -Mental status stable,  c/w Rifaximin, Off lactulose given diarrhea.   -Leukocytosis remains elevated- multifactorial from infection/ steroid use/and Alcoholic hepatitis. Monitor wbc daily.   -daily OOBC  -PT
Patient is a 37 y/o M w/ PMHx of alcohol withdrawal seizure with alcohol use disorder, who p/w confusion starting on morning of admission. Patient was admitted to ICU for high risk alcohol withdrawal, did not respond to ativan alone and had to be transitioned to Precedex and later intubated for airway protection- extubated 3/26. His hospital stay was complicated by Septic shock  2/2 possible pneumonia, requiring pressor support, was treated w/ IV Zosyn and completed course. He also developed significant abdominal distention, felt to be 2/2 ileus- started on lactulose, rectal tube placed for decompression, abd distention improved but not resolved. Pt also has alcoholic hepatitis, continues to have elevated T.Bili. Now downgraded to [facility] for further care     Pt was evaluated this afternoon, he has persistent leukocytosis, spiked low grade temp 100.2F. He looked tachypneic, abd was distended.     Labs reviewed- cbc, bmp, LFTs, blood cx     Chest x-ray reviewed personally, Left ul infiltrate- new     PE as above     A/P:  #Acute alcoholic hepatitis w/ up-trending T.bili   #Sepsis 2/2 pneumonia   #Hospital acquired pneumonia   #Abdominal distention 2/2 ileus   #Mild coagulopathy   #Hepatic encephalopathy- waxes wanes   #ETOH withdrawal s/p Precedex/ propofol   #Sinus tachycardia   #Macrocytic Anemia  #Corona virus + (non Covid)    #EBV+  #CMV+  #DVT ppx     Plan:  -Pt meets sepsis criteria, temp 100.2, , wbc 21k. Chest x-ray obtained showed new infiltrate, UA negative. D/w ID attending Dr. Sosa- started on IV meropenem- D1, repeat blood cx sent today. No concern for SBP as no ascites present on most recent US.   -Patient's BP low, start albumin drip, give Lasix 20 mg IV x1 once BP better   -T.Bili continues to up-trend, Maddrey Score- 36.7, poor prognosis. Not starting steroids as patient septic w/ ongoing fevers. D/w GI team, will follow MR abd n pelvis for further evaluation. Check daily LFTs and PT/INR   -Abd distended, repeat x-ray w/ persistent dilated bowel- in particular cecum. C/w rectal tube, start lactulose   -Mental status stable, waxes wanes- c/w Rifaximin, lactulose.   -MCV elevated, check folate, vitamin B12

## 2024-04-10 NOTE — PROGRESS NOTE ADULT - SUBJECTIVE AND OBJECTIVE BOX
PGY-1 Progress Note discussed with attending    PLEASE CONTACT ON CALL TEAM:  - On Call Team (Please refer to Perez) FROM 5:00 PM - 8:30PM  - Nightfloat Team FROM 8:30 -7:30 AM    INTERVAL HPI/OVERNIGHT EVENTS: No acute events overnight. Pt does notice that his abdomen has increased in size, feels tighter.   MEDICATIONS  (STANDING):  acetylcysteine IVPB 9 Gram(s) IV Intermittent once  folic acid 1 milliGRAM(s) Oral daily  heparin   Injectable 5000 Unit(s) SubCutaneous every 12 hours  influenza   Vaccine 0.5 milliLiter(s) IntraMuscular once  multivitamin 1 Tablet(s) Oral daily  pantoprazole   Suspension 40 milliGRAM(s) Oral daily  rifAXIMin 550 milliGRAM(s) Oral two times a day  simethicone 80 milliGRAM(s) Chew daily  thiamine 100 milliGRAM(s) Oral daily    MEDICATIONS  (PRN):  acetaminophen     Tablet .. 650 milliGRAM(s) Oral every 6 hours PRN Temp greater or equal to 38C (100.4F)      REVIEW OF SYSTEMS:  CONSTITUTIONAL: No fever, weight loss, or fatigue  RESPIRATORY: No cough, wheezing, chills or hemoptysis; No shortness of breath  CARDIOVASCULAR: No chest pain, palpitations, dizziness, or leg swelling  GASTROINTESTINAL: (+) abdominal distention, No abdominal pain. No nausea, vomiting, or hematemesis; No diarrhea or constipation. No melena or hematochezia.  GENITOURINARY: No dysuria or hematuria, urinary frequency  NEUROLOGICAL: No headaches, memory loss, loss of strength, numbness, or tremors  SKIN: No itching, burning, rashes, or lesions     Vital Signs Last 24 Hrs  T(C): 37.3 (10 Apr 2024 12:16), Max: 37.3 (10 Apr 2024 12:16)  T(F): 99.1 (10 Apr 2024 12:16), Max: 99.1 (10 Apr 2024 12:16)  HR: 72 (10 Apr 2024 12:16) (72 - 102)  BP: 111/67 (10 Apr 2024 12:16) (107/69 - 111/69)  BP(mean): --  RR: 18 (10 Apr 2024 12:16) (17 - 18)  SpO2: 98% (10 Apr 2024 12:16) (94% - 98%)    Parameters below as of 10 Apr 2024 12:16  Patient On (Oxygen Delivery Method): room air        PHYSICAL EXAMINATION:  GENERAL: NAD  HEAD:  Atraumatic, Normocephalic  EYES:  (+) conjunctiva and sclera severely jaundiced   NECK: Supple, No JVD, Normal thyroid  CHEST/LUNG: Clear to percussion bilaterally; No rales, rhonchi, wheezing, or rubs, tachypnea noted on RA  HEART: Regular rate and rhythm; No murmurs, rubs, or gallops  ABDOMEN: (+) softer than prior exam, distended, decreased bowel sounds   NERVOUS SYSTEM:  Alert & Oriented X3, no focal neuro deficits   EXTREMITIES:  2+ Peripheral Pulses, No clubbing, cyanosis, (+) +1 pitting edema in b/l LE   SKIN: no rashes, lesions, dry                         10.0   37.48 )-----------( 342      ( 10 Apr 2024 07:35 )             31.4     04-10    137  |  106  |  11  ----------------------------<  86  3.3<L>   |  21<L>  |  0.50    Ca    7.8<L>      10 Apr 2024 07:35  Phos  2.5     04-10  Mg     2.0     04-10    TPro  5.1<L>  /  Alb  1.7<L>  /  TBili  18.6<H>  /  DBili  x   /  AST  149<H>  /  ALT  93<H>  /  AlkPhos  494<H>  04-10    LIVER FUNCTIONS - ( 10 Apr 2024 07:35 )  Alb: 1.7 g/dL / Pro: 5.1 g/dL / ALK PHOS: 494 U/L / ALT: 93 U/L DA / AST: 149 U/L / GGT: x               PT/INR - ( 10 Apr 2024 07:35 )   PT: 26.6 sec;   INR: 2.39 ratio         PTT - ( 10 Apr 2024 07:35 )  PTT:43.8 sec    CAPILLARY BLOOD GLUCOSE      RADIOLOGY & ADDITIONAL TESTS:

## 2024-04-11 VITALS
DIASTOLIC BLOOD PRESSURE: 67 MMHG | HEART RATE: 101 BPM | RESPIRATION RATE: 18 BRPM | TEMPERATURE: 98 F | SYSTOLIC BLOOD PRESSURE: 103 MMHG | OXYGEN SATURATION: 97 %

## 2024-04-11 LAB
ALBUMIN SERPL ELPH-MCNC: 1.6 G/DL — LOW (ref 3.5–5)
ALP SERPL-CCNC: 511 U/L — HIGH (ref 40–120)
ALT FLD-CCNC: 87 U/L DA — HIGH (ref 10–60)
ANION GAP SERPL CALC-SCNC: 6 MMOL/L — SIGNIFICANT CHANGE UP (ref 5–17)
APTT BLD: 45.4 SEC — HIGH (ref 24.5–35.6)
AST SERPL-CCNC: 109 U/L — HIGH (ref 10–40)
BASOPHILS # BLD AUTO: 0.11 K/UL — SIGNIFICANT CHANGE UP (ref 0–0.2)
BASOPHILS NFR BLD AUTO: 0.3 % — SIGNIFICANT CHANGE UP (ref 0–2)
BILIRUB SERPL-MCNC: 18.7 MG/DL — HIGH (ref 0.2–1.2)
BUN SERPL-MCNC: 7 MG/DL — SIGNIFICANT CHANGE UP (ref 7–18)
CALCIUM SERPL-MCNC: 8.2 MG/DL — LOW (ref 8.4–10.5)
CHLORIDE SERPL-SCNC: 106 MMOL/L — SIGNIFICANT CHANGE UP (ref 96–108)
CMV DNA CSF QL NAA+PROBE: ABNORMAL IU/ML
CMV DNA SPEC NAA+PROBE-LOG#: ABNORMAL LOG10IU/ML
CO2 SERPL-SCNC: 24 MMOL/L — SIGNIFICANT CHANGE UP (ref 22–31)
CREAT SERPL-MCNC: 0.5 MG/DL — SIGNIFICANT CHANGE UP (ref 0.5–1.3)
EGFR: 136 ML/MIN/1.73M2 — SIGNIFICANT CHANGE UP
EOSINOPHIL # BLD AUTO: 1.21 K/UL — HIGH (ref 0–0.5)
EOSINOPHIL NFR BLD AUTO: 3.2 % — SIGNIFICANT CHANGE UP (ref 0–6)
FUNGITELL: <31 PG/ML — SIGNIFICANT CHANGE UP
GLUCOSE SERPL-MCNC: 107 MG/DL — HIGH (ref 70–99)
HCT VFR BLD CALC: 31.8 % — LOW (ref 39–50)
HGB BLD-MCNC: 10.1 G/DL — LOW (ref 13–17)
IGA FLD-MCNC: 185 MG/DL — SIGNIFICANT CHANGE UP (ref 84–499)
IGG FLD-MCNC: 777 MG/DL — SIGNIFICANT CHANGE UP (ref 610–1660)
IGM SERPL-MCNC: 107 MG/DL — SIGNIFICANT CHANGE UP (ref 35–242)
IMM GRANULOCYTES NFR BLD AUTO: 5.3 % — HIGH (ref 0–0.9)
INR BLD: 2.5 RATIO — HIGH (ref 0.85–1.18)
KAPPA LC SER QL IFE: 3.47 MG/DL — HIGH (ref 0.33–1.94)
KAPPA/LAMBDA FREE LIGHT CHAIN RATIO, SERUM: 2.12 RATIO — HIGH (ref 0.26–1.65)
LAMBDA LC SER QL IFE: 1.64 MG/DL — SIGNIFICANT CHANGE UP (ref 0.57–2.63)
LYMPHOCYTES # BLD AUTO: 1.8 K/UL — SIGNIFICANT CHANGE UP (ref 1–3.3)
LYMPHOCYTES # BLD AUTO: 4.8 % — LOW (ref 13–44)
MAGNESIUM SERPL-MCNC: 1.9 MG/DL — SIGNIFICANT CHANGE UP (ref 1.6–2.6)
MCHC RBC-ENTMCNC: 31.8 GM/DL — LOW (ref 32–36)
MCHC RBC-ENTMCNC: 34.4 PG — HIGH (ref 27–34)
MCV RBC AUTO: 108.2 FL — HIGH (ref 80–100)
MONOCYTES # BLD AUTO: 1.47 K/UL — HIGH (ref 0–0.9)
MONOCYTES NFR BLD AUTO: 3.9 % — SIGNIFICANT CHANGE UP (ref 2–14)
NEUTROPHILS # BLD AUTO: 31.16 K/UL — HIGH (ref 1.8–7.4)
NEUTROPHILS NFR BLD AUTO: 82.5 % — HIGH (ref 43–77)
NRBC # BLD: 0 /100 WBCS — SIGNIFICANT CHANGE UP (ref 0–0)
PHOSPHATE SERPL-MCNC: 2.3 MG/DL — LOW (ref 2.5–4.5)
PLATELET # BLD AUTO: 298 K/UL — SIGNIFICANT CHANGE UP (ref 150–400)
POTASSIUM SERPL-MCNC: 3.2 MMOL/L — LOW (ref 3.5–5.3)
POTASSIUM SERPL-SCNC: 3.2 MMOL/L — LOW (ref 3.5–5.3)
PROT SERPL-MCNC: 5 G/DL — LOW (ref 6–8.3)
PROTHROM AB SERPL-ACNC: 27.8 SEC — HIGH (ref 9.5–13)
RBC # BLD: 2.94 M/UL — LOW (ref 4.2–5.8)
RBC # FLD: 20.7 % — HIGH (ref 10.3–14.5)
SMOOTH MUSCLE AB SER-ACNC: ABNORMAL
SODIUM SERPL-SCNC: 136 MMOL/L — SIGNIFICANT CHANGE UP (ref 135–145)
WBC # BLD: 37.77 K/UL — HIGH (ref 3.8–10.5)
WBC # FLD AUTO: 37.77 K/UL — HIGH (ref 3.8–10.5)

## 2024-04-11 PROCEDURE — 82436 ASSAY OF URINE CHLORIDE: CPT

## 2024-04-11 PROCEDURE — 84484 ASSAY OF TROPONIN QUANT: CPT

## 2024-04-11 PROCEDURE — 78226 HEPATOBILIARY SYSTEM IMAGING: CPT | Mod: MC

## 2024-04-11 PROCEDURE — 99239 HOSP IP/OBS DSCHRG MGMT >30: CPT

## 2024-04-11 PROCEDURE — 82105 ALPHA-FETOPROTEIN SERUM: CPT

## 2024-04-11 PROCEDURE — 82390 ASSAY OF CERULOPLASMIN: CPT

## 2024-04-11 PROCEDURE — 74176 CT ABD & PELVIS W/O CONTRAST: CPT | Mod: MC

## 2024-04-11 PROCEDURE — 86376 MICROSOMAL ANTIBODY EACH: CPT

## 2024-04-11 PROCEDURE — 96374 THER/PROPH/DIAG INJ IV PUSH: CPT

## 2024-04-11 PROCEDURE — 80048 BASIC METABOLIC PNL TOTAL CA: CPT

## 2024-04-11 PROCEDURE — 86850 RBC ANTIBODY SCREEN: CPT

## 2024-04-11 PROCEDURE — 0225U NFCT DS DNA&RNA 21 SARSCOV2: CPT

## 2024-04-11 PROCEDURE — 86381 MITOCHONDRIAL ANTIBODY EACH: CPT

## 2024-04-11 PROCEDURE — 84133 ASSAY OF URINE POTASSIUM: CPT

## 2024-04-11 PROCEDURE — 85025 COMPLETE CBC W/AUTO DIFF WBC: CPT

## 2024-04-11 PROCEDURE — 76770 US EXAM ABDO BACK WALL COMP: CPT

## 2024-04-11 PROCEDURE — 94003 VENT MGMT INPAT SUBQ DAY: CPT

## 2024-04-11 PROCEDURE — 81001 URINALYSIS AUTO W/SCOPE: CPT

## 2024-04-11 PROCEDURE — 82728 ASSAY OF FERRITIN: CPT

## 2024-04-11 PROCEDURE — 82140 ASSAY OF AMMONIA: CPT

## 2024-04-11 PROCEDURE — 71260 CT THORAX DX C+: CPT | Mod: MC

## 2024-04-11 PROCEDURE — 74174 CTA ABD&PLVS W/CONTRAST: CPT | Mod: MC

## 2024-04-11 PROCEDURE — 83550 IRON BINDING TEST: CPT

## 2024-04-11 PROCEDURE — 80053 COMPREHEN METABOLIC PANEL: CPT

## 2024-04-11 PROCEDURE — 97116 GAIT TRAINING THERAPY: CPT

## 2024-04-11 PROCEDURE — 87798 DETECT AGENT NOS DNA AMP: CPT

## 2024-04-11 PROCEDURE — 94640 AIRWAY INHALATION TREATMENT: CPT

## 2024-04-11 PROCEDURE — 74018 RADEX ABDOMEN 1 VIEW: CPT

## 2024-04-11 PROCEDURE — 80074 ACUTE HEPATITIS PANEL: CPT

## 2024-04-11 PROCEDURE — 87635 SARS-COV-2 COVID-19 AMP PRB: CPT

## 2024-04-11 PROCEDURE — 84100 ASSAY OF PHOSPHORUS: CPT

## 2024-04-11 PROCEDURE — 86682 HELMINTH ANTIBODY: CPT

## 2024-04-11 PROCEDURE — 87899 AGENT NOS ASSAY W/OPTIC: CPT

## 2024-04-11 PROCEDURE — 71045 X-RAY EXAM CHEST 1 VIEW: CPT

## 2024-04-11 PROCEDURE — 87640 STAPH A DNA AMP PROBE: CPT

## 2024-04-11 PROCEDURE — 86720 LEPTOSPIRA ANTIBODY: CPT

## 2024-04-11 PROCEDURE — 84132 ASSAY OF SERUM POTASSIUM: CPT

## 2024-04-11 PROCEDURE — 96375 TX/PRO/DX INJ NEW DRUG ADDON: CPT

## 2024-04-11 PROCEDURE — 85027 COMPLETE CBC AUTOMATED: CPT

## 2024-04-11 PROCEDURE — P9047: CPT

## 2024-04-11 PROCEDURE — 94002 VENT MGMT INPAT INIT DAY: CPT

## 2024-04-11 PROCEDURE — 87522 HEPATITIS C REVRS TRNSCRPJ: CPT

## 2024-04-11 PROCEDURE — 80076 HEPATIC FUNCTION PANEL: CPT

## 2024-04-11 PROCEDURE — 82962 GLUCOSE BLOOD TEST: CPT

## 2024-04-11 PROCEDURE — 87641 MR-STAPH DNA AMP PROBE: CPT

## 2024-04-11 PROCEDURE — 84443 ASSAY THYROID STIM HORMONE: CPT

## 2024-04-11 PROCEDURE — 84295 ASSAY OF SERUM SODIUM: CPT

## 2024-04-11 PROCEDURE — 82248 BILIRUBIN DIRECT: CPT

## 2024-04-11 PROCEDURE — 86038 ANTINUCLEAR ANTIBODIES: CPT

## 2024-04-11 PROCEDURE — 83735 ASSAY OF MAGNESIUM: CPT

## 2024-04-11 PROCEDURE — 87529 HSV DNA AMP PROBE: CPT

## 2024-04-11 PROCEDURE — 97530 THERAPEUTIC ACTIVITIES: CPT

## 2024-04-11 PROCEDURE — 78226 HEPATOBILIARY SYSTEM IMAGING: CPT | Mod: 26

## 2024-04-11 PROCEDURE — 86901 BLOOD TYPING SEROLOGIC RH(D): CPT

## 2024-04-11 PROCEDURE — 99285 EMERGENCY DEPT VISIT HI MDM: CPT | Mod: 25

## 2024-04-11 PROCEDURE — 87799 DETECT AGENT NOS DNA QUANT: CPT

## 2024-04-11 PROCEDURE — 97110 THERAPEUTIC EXERCISES: CPT

## 2024-04-11 PROCEDURE — A9537: CPT

## 2024-04-11 PROCEDURE — 84300 ASSAY OF URINE SODIUM: CPT

## 2024-04-11 PROCEDURE — 87449 NOS EACH ORGANISM AG IA: CPT

## 2024-04-11 PROCEDURE — 87086 URINE CULTURE/COLONY COUNT: CPT

## 2024-04-11 PROCEDURE — 99233 SBSQ HOSP IP/OBS HIGH 50: CPT

## 2024-04-11 PROCEDURE — 86704 HEP B CORE ANTIBODY TOTAL: CPT

## 2024-04-11 PROCEDURE — 93005 ELECTROCARDIOGRAM TRACING: CPT

## 2024-04-11 PROCEDURE — 85610 PROTHROMBIN TIME: CPT

## 2024-04-11 PROCEDURE — 83690 ASSAY OF LIPASE: CPT

## 2024-04-11 PROCEDURE — 83540 ASSAY OF IRON: CPT

## 2024-04-11 PROCEDURE — 74019 RADEX ABDOMEN 2 VIEWS: CPT

## 2024-04-11 PROCEDURE — 82247 BILIRUBIN TOTAL: CPT

## 2024-04-11 PROCEDURE — 70450 CT HEAD/BRAIN W/O DYE: CPT | Mod: MC

## 2024-04-11 PROCEDURE — 82330 ASSAY OF CALCIUM: CPT

## 2024-04-11 PROCEDURE — 80307 DRUG TEST PRSMV CHEM ANLYZR: CPT

## 2024-04-11 PROCEDURE — 86255 FLUORESCENT ANTIBODY SCREEN: CPT

## 2024-04-11 PROCEDURE — 83036 HEMOGLOBIN GLYCOSYLATED A1C: CPT

## 2024-04-11 PROCEDURE — 36415 COLL VENOUS BLD VENIPUNCTURE: CPT

## 2024-04-11 PROCEDURE — 76700 US EXAM ABDOM COMPLETE: CPT

## 2024-04-11 PROCEDURE — 83986 ASSAY PH BODY FLUID NOS: CPT

## 2024-04-11 PROCEDURE — 86900 BLOOD TYPING SEROLOGIC ABO: CPT

## 2024-04-11 PROCEDURE — 80061 LIPID PANEL: CPT

## 2024-04-11 PROCEDURE — 82803 BLOOD GASES ANY COMBINATION: CPT

## 2024-04-11 PROCEDURE — 74182 MRI ABDOMEN W/CONTRAST: CPT | Mod: MC

## 2024-04-11 PROCEDURE — 87040 BLOOD CULTURE FOR BACTERIA: CPT

## 2024-04-11 PROCEDURE — 85730 THROMBOPLASTIN TIME PARTIAL: CPT

## 2024-04-11 PROCEDURE — 97161 PT EVAL LOW COMPLEX 20 MIN: CPT

## 2024-04-11 PROCEDURE — 86790 VIRUS ANTIBODY NOS: CPT

## 2024-04-11 PROCEDURE — 84478 ASSAY OF TRIGLYCERIDES: CPT

## 2024-04-11 PROCEDURE — 82784 ASSAY IGA/IGD/IGG/IGM EACH: CPT

## 2024-04-11 PROCEDURE — 76705 ECHO EXAM OF ABDOMEN: CPT

## 2024-04-11 PROCEDURE — 86738 MYCOPLASMA ANTIBODY: CPT

## 2024-04-11 PROCEDURE — A9585: CPT

## 2024-04-11 PROCEDURE — 86706 HEP B SURFACE ANTIBODY: CPT

## 2024-04-11 PROCEDURE — 83605 ASSAY OF LACTIC ACID: CPT

## 2024-04-11 RX ORDER — SODIUM,POTASSIUM PHOSPHATES 278-250MG
1 POWDER IN PACKET (EA) ORAL ONCE
Refills: 0 | Status: DISCONTINUED | OUTPATIENT
Start: 2024-04-11 | End: 2024-04-11

## 2024-04-11 RX ORDER — POTASSIUM CHLORIDE 20 MEQ
10 PACKET (EA) ORAL
Refills: 0 | Status: COMPLETED | OUTPATIENT
Start: 2024-04-11 | End: 2024-04-11

## 2024-04-11 RX ORDER — PIPERACILLIN AND TAZOBACTAM 4; .5 G/20ML; G/20ML
3.38 INJECTION, POWDER, LYOPHILIZED, FOR SOLUTION INTRAVENOUS ONCE
Refills: 0 | Status: DISCONTINUED | OUTPATIENT
Start: 2024-04-12 | End: 2024-04-11

## 2024-04-11 RX ORDER — PIPERACILLIN AND TAZOBACTAM 4; .5 G/20ML; G/20ML
3.38 INJECTION, POWDER, LYOPHILIZED, FOR SOLUTION INTRAVENOUS
Qty: 0 | Refills: 0 | DISCHARGE
Start: 2024-04-11

## 2024-04-11 RX ORDER — PIPERACILLIN AND TAZOBACTAM 4; .5 G/20ML; G/20ML
3.38 INJECTION, POWDER, LYOPHILIZED, FOR SOLUTION INTRAVENOUS EVERY 8 HOURS
Refills: 0 | Status: DISCONTINUED | OUTPATIENT
Start: 2024-04-12 | End: 2024-04-11

## 2024-04-11 RX ORDER — PIPERACILLIN AND TAZOBACTAM 4; .5 G/20ML; G/20ML
3.38 INJECTION, POWDER, LYOPHILIZED, FOR SOLUTION INTRAVENOUS ONCE
Refills: 0 | Status: COMPLETED | OUTPATIENT
Start: 2024-04-11 | End: 2024-04-11

## 2024-04-11 RX ORDER — PIPERACILLIN AND TAZOBACTAM 4; .5 G/20ML; G/20ML
3.38 INJECTION, POWDER, LYOPHILIZED, FOR SOLUTION INTRAVENOUS ONCE
Refills: 0 | Status: DISCONTINUED | OUTPATIENT
Start: 2024-04-11 | End: 2024-04-11

## 2024-04-11 RX ORDER — POTASSIUM PHOSPHATE, MONOBASIC POTASSIUM PHOSPHATE, DIBASIC 236; 224 MG/ML; MG/ML
15 INJECTION, SOLUTION INTRAVENOUS ONCE
Refills: 0 | Status: COMPLETED | OUTPATIENT
Start: 2024-04-11 | End: 2024-04-11

## 2024-04-11 RX ORDER — POTASSIUM CHLORIDE 20 MEQ
40 PACKET (EA) ORAL EVERY 4 HOURS
Refills: 0 | Status: DISCONTINUED | OUTPATIENT
Start: 2024-04-11 | End: 2024-04-11

## 2024-04-11 RX ADMIN — POTASSIUM PHOSPHATE, MONOBASIC POTASSIUM PHOSPHATE, DIBASIC 62.5 MILLIMOLE(S): 236; 224 INJECTION, SOLUTION INTRAVENOUS at 17:15

## 2024-04-11 RX ADMIN — Medication 100 MILLIEQUIVALENT(S): at 15:44

## 2024-04-11 RX ADMIN — PIPERACILLIN AND TAZOBACTAM 200 GRAM(S): 4; .5 INJECTION, POWDER, LYOPHILIZED, FOR SOLUTION INTRAVENOUS at 17:15

## 2024-04-11 RX ADMIN — Medication 100 MILLIEQUIVALENT(S): at 16:51

## 2024-04-11 RX ADMIN — Medication 100 MILLIEQUIVALENT(S): at 17:15

## 2024-04-11 NOTE — PROGRESS NOTE ADULT - SUBJECTIVE AND OBJECTIVE BOX
PGY-1 Progress Note discussed with attending    PLEASE CONTACT ON CALL TEAM:  - On Call Team (Please refer to Perez) FROM 5:00 PM - 8:30PM  - Nightfloat Team FROM 8:30 -7:30 AM    INTERVAL HPI/OVERNIGHT EVENTS: no acute events overnight. pt NPO for HIDA scan this afternoon. Pt still having bowel movements, not in any pain.     MEDICATIONS  (STANDING):  folic acid 1 milliGRAM(s) Oral daily  heparin   Injectable 5000 Unit(s) SubCutaneous every 12 hours  influenza   Vaccine 0.5 milliLiter(s) IntraMuscular once  multivitamin 1 Tablet(s) Oral daily  pantoprazole   Suspension 40 milliGRAM(s) Oral daily  potassium chloride    Tablet ER 40 milliEquivalent(s) Oral every 4 hours  potassium phosphate / sodium phosphate Powder (PHOS-NaK) 1 Packet(s) Oral once  rifAXIMin 550 milliGRAM(s) Oral two times a day  simethicone 80 milliGRAM(s) Chew daily  thiamine 100 milliGRAM(s) Oral daily    MEDICATIONS  (PRN):  acetaminophen     Tablet .. 650 milliGRAM(s) Oral every 6 hours PRN Temp greater or equal to 38C (100.4F)      REVIEW OF SYSTEMS:  CONSTITUTIONAL: No fever, weight loss, or fatigue  RESPIRATORY: No cough, wheezing, chills or hemoptysis; No shortness of breath  CARDIOVASCULAR: No chest pain, palpitations, dizziness, or leg swelling  GASTROINTESTINAL: (+) abdominal distention, scleral icterus. No nausea, vomiting, or hematemesis; No diarrhea or constipation. No melena or hematochezia.  GENITOURINARY: No dysuria or hematuria, urinary frequency  NEUROLOGICAL: No headaches, memory loss, loss of strength, numbness, or tremors  SKIN: No itching, burning, rashes, or lesions     Vital Signs Last 24 Hrs  T(C): 36.9 (11 Apr 2024 05:24), Max: 37.3 (10 Apr 2024 20:26)  T(F): 98.5 (11 Apr 2024 05:24), Max: 99.1 (10 Apr 2024 20:26)  HR: 113 (11 Apr 2024 05:24) (110 - 113)  BP: 114/75 (11 Apr 2024 05:24) (114/75 - 116/76)  BP(mean): --  RR: 17 (11 Apr 2024 05:24) (17 - 19)  SpO2: 95% (11 Apr 2024 05:24) (95% - 97%)    Parameters below as of 11 Apr 2024 05:24  Patient On (Oxygen Delivery Method): room air        PHYSICAL EXAMINATION:  GENERAL: NAD  HEAD:  Atraumatic, Normocephalic  EYES:  (+) conjunctiva and sclera severely jaundiced   NECK: Supple, No JVD, Normal thyroid  CHEST/LUNG: Clear to percussion bilaterally; No rales, rhonchi, wheezing, or rubs, tachypnea noted on RA  HEART: Regular rate and rhythm; No murmurs, rubs, or gallops  ABDOMEN: (+) softer than prior exam, distended, decreased bowel sounds   NERVOUS SYSTEM:  Alert & Oriented X3, no focal neuro deficits   EXTREMITIES:  2+ Peripheral Pulses, No clubbing, cyanosis, (+) +1 pitting edema in b/l LE   SKIN: no rashes, lesions, dry                           10.1   37.77 )-----------( 298      ( 11 Apr 2024 10:28 )             31.8     04-11    136  |  106  |  7   ----------------------------<  107<H>  3.2<L>   |  24  |  0.50    Ca    8.2<L>      11 Apr 2024 10:28  Phos  2.3     04-11  Mg     1.9     04-11    TPro  5.0<L>  /  Alb  1.6<L>  /  TBili  18.7<H>  /  DBili  x   /  AST  109<H>  /  ALT  87<H>  /  AlkPhos  511<H>  04-11    LIVER FUNCTIONS - ( 11 Apr 2024 10:28 )  Alb: 1.6 g/dL / Pro: 5.0 g/dL / ALK PHOS: 511 U/L / ALT: 87 U/L DA / AST: 109 U/L / GGT: x               PT/INR - ( 11 Apr 2024 10:28 )   PT: 27.8 sec;   INR: 2.50 ratio         PTT - ( 11 Apr 2024 10:28 )  PTT:45.4 sec    CAPILLARY BLOOD GLUCOSE      RADIOLOGY & ADDITIONAL TESTS:

## 2024-04-11 NOTE — DISCHARGE NOTE NURSING/CASE MANAGEMENT/SOCIAL WORK - PATIENT PORTAL LINK FT
You can access the FollowMyHealth Patient Portal offered by [facility] by registering at the following website: [email or web address]. By joining [facility]’s FollowMyHealth portal, you will also be able to view your health information using other applications (apps) compatible with our system.

## 2024-04-11 NOTE — PROGRESS NOTE ADULT - REASON FOR ADMISSION
Alcohol withdrawal

## 2024-04-11 NOTE — PROGRESS NOTE ADULT - PROBLEM SELECTOR PLAN 1
p/w acute metabolic encephalopathy 2/2 alcohol withdrawal  - s/p Ativan, phenobarb, ketamine drip, and Precedex drip in the setting of mechanical ventilation and AMS  - now extubated, off all sedation, mental status improved, A&Ox2-3  - c/w rifaximin , PPI , thiamine, folic acid, simethicone   - s/p lactulose   - Maddrey score 36.7  - s/p Prednisolone 40 mg daily [started on 4/4-4/8]   - s/p albumin q6  - MELD score 31   - ALP/AST/ALT/ Tbili: 486>494>511/159>146>109/105>93>87/17.3>18.6> 18.7 [worsening despite 4 day prednisone course]   - Abdominal ultrasound - Hepatosplenomegaly. No evidence for intrahepatic biliary duct dilatation. Increased caliber of the extrahepatic CBD measuring 8 mm. Gallbladder sludge. No ascites.   - MRCP- Enlarged with fatty infiltration. Possible hemangiomas. Common bile duct measures 3 mm. Small amount of ascites. Distended gallbladder with a small amount of pericholecystic fluid; fluid may be related to the abdominal ascites.  - Hepatology following  - pt is a possible liver transplant candidate

## 2024-04-11 NOTE — DISCHARGE NOTE NURSING/CASE MANAGEMENT/SOCIAL WORK - NSDCPEFALRISK_GEN_ALL_CORE
For information on Fall & Injury Prevention, visit: [email or web address] OR  [email or web address] OR  https://www.cdc.gov/steadi/patient.html

## 2024-04-11 NOTE — PROGRESS NOTE ADULT - SUBJECTIVE AND OBJECTIVE BOX
Chief Complaint:  Patient is a 38y old  Male who presents with a chief complaint of Alcohol withdrawal (11 Apr 2024 17:22)      Reason for consult:    Interval Events:     Hospital Medications:  acetaminophen     Tablet .. 650 milliGRAM(s) Oral every 6 hours PRN  folic acid 1 milliGRAM(s) Oral daily  heparin   Injectable 5000 Unit(s) SubCutaneous every 12 hours  influenza   Vaccine 0.5 milliLiter(s) IntraMuscular once  multivitamin 1 Tablet(s) Oral daily  pantoprazole   Suspension 40 milliGRAM(s) Oral daily  piperacillin/tazobactam IVPB.- 3.375 Gram(s) IV Intermittent once  rifAXIMin 550 milliGRAM(s) Oral two times a day  simethicone 80 milliGRAM(s) Chew daily  thiamine 100 milliGRAM(s) Oral daily      ROS:   General:  No  fevers, chills  Eyes:  Good vision, no reported pain  ENT:  No sore throat, pain, runny nose  CV:  No pain, palpitations  Pulm:  No dyspnea, cough  GI:  Still reports distention, no pain, no N/V, had BM, no reported bleeding  :  No  dysuria  Muscle:  No pain, but generalized weakness  Neuro:  No memory problems  Skin: Jaundice    PHYSICAL EXAM:   Vital Signs:  Vital Signs Last 24 Hrs  T(C): 36.9 (11 Apr 2024 13:09), Max: 37.3 (10 Apr 2024 20:26)  T(F): 98.4 (11 Apr 2024 13:09), Max: 99.1 (10 Apr 2024 20:26)  HR: 101 (11 Apr 2024 13:09) (101 - 113)  BP: 103/67 (11 Apr 2024 13:09) (103/67 - 116/76)  BP(mean): 79 (11 Apr 2024 13:09) (79 - 79)  RR: 18 (11 Apr 2024 13:09) (17 - 19)  SpO2: 97% (11 Apr 2024 13:09) (95% - 97%)    Parameters below as of 11 Apr 2024 13:09  Patient On (Oxygen Delivery Method): room air      Daily     Daily     GENERAL: no acute distress  NEURO: AAOX3, no asterixis  HEENT: icteric sclera  CHEST: no respiratory distress, no accessory muscle use, decreased breath sound on R base  CARDIAC: regular rate, rhythm  ABDOMEN: soft, non-tender, distended, no rebound or guarding, BS+  EXTREMITIES: b/l LE edema, symmetric, non-tender  SKIN: jaundice    LABS: reviewed                        10.1   37.77 )-----------( 298      ( 11 Apr 2024 10:28 )             31.8     04-11    136  |  106  |  7   ----------------------------<  107<H>  3.2<L>   |  24  |  0.50    Ca    8.2<L>      11 Apr 2024 10:28  Phos  2.3     04-11  Mg     1.9     04-11    TPro  5.0<L>  /  Alb  1.6<L>  /  TBili  18.7<H>  /  DBili  x   /  AST  109<H>  /  ALT  87<H>  /  AlkPhos  511<H>  04-11    LIVER FUNCTIONS - ( 11 Apr 2024 10:28 )  Alb: 1.6 g/dL / Pro: 5.0 g/dL / ALK PHOS: 511 U/L / ALT: 87 U/L DA / AST: 109 U/L / GGT: x             Interval Diagnostic Studies: see sunrise for full report   Chief Complaint:  Patient is a 38y old  Male who presents with a chief complaint of Alcohol withdrawal (11 Apr 2024 17:22)      Reason for consult: ALD    Interval Events: Noted to have pos HIDA scan, surgery consult in progress. Awaiting bed to be transferred to [facility].    Hospital Medications:  acetaminophen     Tablet .. 650 milliGRAM(s) Oral every 6 hours PRN  folic acid 1 milliGRAM(s) Oral daily  heparin   Injectable 5000 Unit(s) SubCutaneous every 12 hours  influenza   Vaccine 0.5 milliLiter(s) IntraMuscular once  multivitamin 1 Tablet(s) Oral daily  pantoprazole   Suspension 40 milliGRAM(s) Oral daily  piperacillin/tazobactam IVPB.- 3.375 Gram(s) IV Intermittent once  rifAXIMin 550 milliGRAM(s) Oral two times a day  simethicone 80 milliGRAM(s) Chew daily  thiamine 100 milliGRAM(s) Oral daily      ROS:   General:  No  fevers, chills  Eyes:  Good vision, no reported pain  ENT:  No sore throat, pain, runny nose  CV:  No pain, palpitations  Pulm:  No dyspnea, cough  GI:  Still reports distention, no pain, no N/V, had BM, no reported bleeding  :  No  dysuria  Muscle:  No pain, but generalized weakness  Neuro:  No memory problems  Skin: Jaundice    PHYSICAL EXAM:   Vital Signs:  Vital Signs Last 24 Hrs  T(C): 36.9 (11 Apr 2024 13:09), Max: 37.3 (10 Apr 2024 20:26)  T(F): 98.4 (11 Apr 2024 13:09), Max: 99.1 (10 Apr 2024 20:26)  HR: 101 (11 Apr 2024 13:09) (101 - 113)  BP: 103/67 (11 Apr 2024 13:09) (103/67 - 116/76)  BP(mean): 79 (11 Apr 2024 13:09) (79 - 79)  RR: 18 (11 Apr 2024 13:09) (17 - 19)  SpO2: 97% (11 Apr 2024 13:09) (95% - 97%)    Parameters below as of 11 Apr 2024 13:09  Patient On (Oxygen Delivery Method): room air      Daily     Daily     GENERAL: no acute distress  NEURO: AAOX3, no asterixis  HEENT: icteric sclera  CHEST: no respiratory distress, no accessory muscle use, decreased breath sound on R base  CARDIAC: regular rate, rhythm  ABDOMEN: soft, non-tender, distended, no rebound or guarding, BS+  EXTREMITIES: b/l LE edema, symmetric, non-tender  SKIN: jaundice    LABS: reviewed                        10.1   37.77 )-----------( 298      ( 11 Apr 2024 10:28 )             31.8     04-11    136  |  106  |  7   ----------------------------<  107<H>  3.2<L>   |  24  |  0.50    Ca    8.2<L>      11 Apr 2024 10:28  Phos  2.3     04-11  Mg     1.9     04-11    TPro  5.0<L>  /  Alb  1.6<L>  /  TBili  18.7<H>  /  DBili  x   /  AST  109<H>  /  ALT  87<H>  /  AlkPhos  511<H>  04-11    LIVER FUNCTIONS - ( 11 Apr 2024 10:28 )  Alb: 1.6 g/dL / Pro: 5.0 g/dL / ALK PHOS: 511 U/L / ALT: 87 U/L DA / AST: 109 U/L / GGT: x             Interval Diagnostic Studies: see sunrise for full report

## 2024-04-11 NOTE — PROGRESS NOTE ADULT - PROBLEM SELECTOR PLAN 2
- pt found to be septic earlier in hospital course, thought to be secondary to coronavirus, no other specific source   - BCx from admission (3/21)- NGTD   - s/p zosyn course 3/23-/3/30  - Hossein repeat BCx 4/3 - NGTD (pt still presenting septic (WC 20s, and tachycardic, tachypnea)  - leukocytosis worsening [ likely multifactorial - infection, 2/2 steroid, alcoholic hepatitis]   - LFTs improving   - repeat U/A 4/3 negative   - CXR 4/3- showing increased infiltrates b/l   - s/p meropenem 1g q8 (4/3-4/10)  - obstructive pattern on LFTs, can be hepatocellular in nature due to the alcoholic hepatitis   - ID Dr. Sosa  - will do bedside POCUS in the afternoon to see if ascites worsened, possible paracentesis

## 2024-04-11 NOTE — DISCHARGE NOTE NURSING/CASE MANAGEMENT/SOCIAL WORK - NSDCVIVACCINE_GEN_ALL_CORE_FT
Tdap; 19-Oct-2022 21:34; Hannah Cao (RN); [employer]; [id number] (Exp. Date: 29-Nov-2024); IntraMuscular; Deltoid Left.; 0.5 milliLiter(s); VIS (VIS Published: 09-May-2013, VIS Presented: 19-Oct-2022);

## 2024-04-11 NOTE — PROGRESS NOTE ADULT - PROBLEM SELECTOR PROBLEM 1
Alcoholic hepatitis

## 2024-04-11 NOTE — PROGRESS NOTE ADULT - ASSESSMENT
Brief hospital course:   38y obese (BMI 30) Male w/ Hx of AUD (since age 21, heavier last 2 weeks, last drink on the day of admission), Hx of withdrawal seizures, prior alcohol rehab, presented to [facility] ER 3/21/24 w/ AMS in setting of hyperammonemia (172), worsening abdominal distention and jaundice (serum bilirubin 6.6) and was admitted to MICU for withdrawal, aspirational pneumonia, required intubation (3/22- 3/26/24), found to be positive for coronavirus (non COVID-19), and noted increased intraabdominal pressure, ileus, for what surgery and GI has been following.     CT a/p 3/21/24 showed marked hepatomegaly (29 cm) w/ severe hepatic steatosis. MDF was < 32 on admission. There was no biliary dilation on CT 3/21/24 and US abd 4/2/24, and there was no signs of cholecystitis either on above. Notably, his lipase was 479 on admission.   Hospital course was c/b sepsis, septic shock, requiring pressor support (3/23-3/27), in setting of pos UA, infiltrates on CXR, +coronavirus and distended abdomen.  He was on Zosyn 3/23-3/30/24, and before that on ceftriaxone 3/21-3/23/24 w/ metronidazole 3/22/24. BCx 2 sets was negative from 3/21 and 4/3 and UCx was neg from 4/3.  MRI abd w/wo and MRCP 4/3/24 showed normal bile ducts, distended GB w/ small layering sludge, small pericholecystic fluid. Liver appeared enlarged w/ fatty infiltration, some regions in R hepatic lobe likely representing focal fatty infiltrations w/ associated liver lesions, possibly hemangiomas per report. It was limited b/o motion artifact. Noted small amount of ascites, not sufficient for diagnostic paracentesis.    He was transferred to medicine on 4/2/24 and has been afebrile since 4/3/24. His mental status has improved, and now AAOx3. His abdominal distention reportedly improved, off lactulose, but AXR 4/8/24 still reported mid abdominal small and large bowel air filled bowel ileus. Notably, on MRI 4/3/24 bowels were unremarkable.   He has been on meropenem since 4/3/24 and after d/w ID, he was started on prednisolone for alcoholic hepatitis on 4/4/24 (when bilirubin was already 16.7), as infection appeared to be controlled.   Bilirubin remained unchanged on steroid as per day #4, although Lille score reports good prognosis b/o young age.  ALP has been rising 299->414, ALT has been slightly rising, INR has been worsening.  Current MELD 3.0 30.   He was exposed to COVID-19 on 4/8, but his tested negative.   He was accepted for transfer to [facility] transplant hepatology.   CTA w/ iv was done instead of multiphase CT a/p, and showed enlarged fatty liver w/ scattered lesions, remaining indeterminate, mildly increased ascites, mildly distended GB w/ perihepatic / pericholecystic fluid, and b/l perinephric stranding.       # Alcoholic hepatitis w/ new onset jaundice, elevated transaminases (AST>ALT), and hepatomegaly w/ hepatic steatosis  # Elevated ALP  # Mild coagulopathy  # Hepatic encephalopathy - improved  # Heterogenous foci in GB fossa and segment 6 / indeterminate liver lesions (reported that possible focal fatty infiltration around hemangiomas)  # AUD w/ withdrawal  # Pancreatitis  # Sepsis, septic shock  - resolved  # PNA  # Acute cholecystitis  - MELD 3.0 22->31  - Jaundice, marked hepatomegaly, severe steatosis likely due to alcoholic hepatitis; MDF was < 32 on admission, but during hospitalization worsened  - No signs of extrahepatic biliary obstruction (normal bile ducts on CT 3/21/24, US abd 4/2/24, and MRCP 4/3/24).   - No signs of cholecystitis either on CT and US. MRCP 4/3/24 showed distended GB w/ small layering sludge or contracted bile, small pericholecystic fluid. No abdominal tenderness, Harrington neg on exam.  - Liver appeared enlarged w/ fatty infiltration, regions of R hepatic lobe likely focal fatty infiltrations w/ associated liver lesions, possibly hemangiomas per report. It was limited b/o motion artifact. Noted small amount of ascites.   - Liver workup so far: Hep A IgM neg, HBsAg neg, HBcAb IgM neg, HBcAb neg, HBsAb neg, HCV ab/RNA neg, Hep E IgM/PCR neg. CMV and EBV PCRs detectable but the actual value below detection limit. Leptospirosis serology neg.  TRESA and AMA neg, SMA 1:20, IG panel wnl. Ceruloplasmin 39. Ferritin 1105- 667.   - Infectious workup so far: BCx 2x 3/21 and 4/3 negative, UCx 4/3 negative. Ascites was not sufficient for Dx paracentesis.   - Lipase was 479->86.   - He was started on prednisolone 40 mg 4/4/24 after ID clearance. Bilirubin unchanged as per day #4, although Lille score reported good prognosis b/o young age.  ALP was rising 299->414, ALT slightly rising, INR worsening. Thus steroid was stopped 4/8/24. (Got NAC).  - Last fever 4/3 100.4, T max as per 4/8 99.8F. Been on Meropenem since 4/3/24, noted infiltrates on CXR 4/3/24 and still reported ileus picture on AXR 4/8/24.   - HIDA today positive for acute cholecystitis  - Fungitell neg.    - Please, resume antibiotic coverage, f/u w/ ID.   - Pending surgery consultation, and might need IR consultation if not responding to antibiotics.   - Please, obtain repeat cultures (BCx) b/o worsening MELD.  - To d/w ID if any role of adding empiric antifungal coverage.   - Check if ascites pocket for Dx paracentesis.  - C/w close monitoring of LFTs, including INR, MELD labs.  - Consider obtaining TTE  - Still can send HSV, VZV PCRs, LKM, Strongyloides serology.  - Aspiration, seizure, fall precautions; Avoid sedation; C/w folic, thiamine. C/w rifaximin. Lactulose been held in setting of abdominal distention, concern for ileus on AXR (4/8/24), no obstruction on CT a/p (4/8/24). Having ~2BM/day w/o lactulose.  Consider stool studies if diarrhea of lactulose.   - Will need short term MRI f/u for liver lesions.  - F/u w/ [facility] transplant hepatology regarding transfer (Has assigned bed per email, but not ready yet and once ready transfer center will call the floor.)  - Will need alcohol rehab once acute medical issues resolved    Thank you for consult  Will monitor  D/w primary team

## 2024-04-11 NOTE — PROGRESS NOTE ADULT - PROBLEM SELECTOR PLAN 5
- heparin sub q BID

## 2024-04-11 NOTE — PROGRESS NOTE ADULT - PROVIDER SPECIALTY LIST ADULT
Critical Care
Hepatology
Hepatology
Infectious Disease
Internal Medicine
Internal Medicine
Critical Care
Gastroenterology
Hepatology
Infectious Disease
Internal Medicine
Internal Medicine
Critical Care
Gastroenterology
Gastroenterology
Hepatology
Hepatology
Infectious Disease
Internal Medicine
Internal Medicine
Surgery
Critical Care
Critical Care
Gastroenterology
Hepatology
Surgery
Surgery
Critical Care
Critical Care
Internal Medicine

## 2024-04-11 NOTE — PROGRESS NOTE ADULT - SUBJECTIVE AND OBJECTIVE BOX
INTERVAL HPI/OVERNIGHT EVENTS:  seen and examined   Pt resting comfortably. No acute complaints.   denies abdominal pain  jaundice      MEDICATIONS  (STANDING):  folic acid 1 milliGRAM(s) Oral daily  heparin   Injectable 5000 Unit(s) SubCutaneous every 12 hours  influenza   Vaccine 0.5 milliLiter(s) IntraMuscular once  multivitamin 1 Tablet(s) Oral daily  pantoprazole   Suspension 40 milliGRAM(s) Oral daily  piperacillin/tazobactam IVPB.- 3.375 Gram(s) IV Intermittent once  rifAXIMin 550 milliGRAM(s) Oral two times a day  simethicone 80 milliGRAM(s) Chew daily  thiamine 100 milliGRAM(s) Oral daily    MEDICATIONS  (PRN):  acetaminophen     Tablet .. 650 milliGRAM(s) Oral every 6 hours PRN Temp greater or equal to 38C (100.4F)      Vital Signs Last 24 Hrs  T(C): 36.9 (11 Apr 2024 13:09), Max: 37.3 (10 Apr 2024 20:26)  T(F): 98.4 (11 Apr 2024 13:09), Max: 99.1 (10 Apr 2024 20:26)  HR: 101 (11 Apr 2024 13:09) (101 - 113)  BP: 103/67 (11 Apr 2024 13:09) (103/67 - 116/76)  BP(mean): 79 (11 Apr 2024 13:09) (79 - 79)  RR: 18 (11 Apr 2024 13:09) (17 - 19)  SpO2: 97% (11 Apr 2024 13:09) (95% - 97%)    Parameters below as of 11 Apr 2024 13:09  Patient On (Oxygen Delivery Method): room air        Physical:  General: NAD. scleral icterus, jaundice throughout   Respirations: Unlabored  Abdomen: Softly distended, nontender. No rebound, no guarding, no peritonitis     I&O's Detail      LABS:                        10.1   37.77 )-----------( 298      ( 11 Apr 2024 10:28 )             31.8             04-11    136  |  106  |  7   ----------------------------<  107<H>  3.2<L>   |  24  |  0.50    Ca    8.2<L>      11 Apr 2024 10:28  Phos  2.3     04-11  Mg     1.9     04-11    TPro  5.0<L>  /  Alb  1.6<L>  /  TBili  18.7<H>  /  DBili  x   /  AST  109<H>  /  ALT  87<H>  /  AlkPhos  511<H>  04-11        < from: MR Abdomen w/ IV Cont (04.03.24 @ 17:22) >  IMPRESSION:    Examination limited secondary to motion artifact.    Hepatomegaly with fatty infiltration of the liver.    Areas of increased signal loss on the out of phase imaging measuring 3.1   and 1.6 cm in the right hepatic lobe, likely areas of more focal fatty  infiltration with superimposed liver lesions, possibly hemangiomas; a   follow-up pre and post IV contrast MRI of the abdomen is recommended in 3   months to assess for stability.    Limited evaluation of the liver adjacent to the gallbladder; abnormality   on the prior CT is also likely areas of more focal fatty infiltration;   continued attention is recommended on the follow-up study.    Small amount of ascites.    Distended gallbladder with a small amount of pericholecystic fluid; fluid   may be related to the abdominal ascites.    --- End of Report ---            SALVADOR GARCIA MD; Attending Radiologist  This document has been electronically signed. Apr 4 2024  2:07PM    < end of copied text >  < from: [facility]at[facility] (04.11.24 @ 14:34) >    ACC: [id number] EXAM:  [facility] HE[facility]   ORDERED BY: COSME RIVERA     PROCEDURE DATE:  04/11/2024          INTERPRETATION:  CLINICAL INFORMATION: Alcoholic hepatitis. Decompensated   liver cirrhosis.    DURATION of DYNAMIC SERIES: 60 mn  RADIOPHARMACEUTICAL: 3.2 mCi Tc-99m-Mebrofenin, I.V.  PHARMACOLOGIC INTERVENTION: None.    TECHNIQUE: Dynamic imaging of the anterior abdomen was performed   following radiopharmaceutical injection. Static images of the abdomen in   the anterior, right anterior oblique, and right lateral views were   obtained immediately thereafter.    5 hour delayed images were obtained.    COMPARISON: None    OTHER STUDIES USED FOR CORRELATION: CT CAP 4/8/2024.    FINDINGS: There is prompt, homogeneous uptake of radiopharmaceutical by   the hepatocytes. Activity is first seen in the bowel on delayed images at   5 hours. The gallbladder is not visualized at any time during the study.    There is very poor clearance of activity from the liver at the end of the   study and persistence of blood pool activity at the end of the study most   consistent with severe liver failure.    IMPRESSION: Abnormal hepatobiliary scan.    Findings are suggestive of acute cholecystitis.    Findings were discussed with Dr. Scott Jerome 4/11/2024 2:40 PM by Dr. Begum.    --- End of Report ---             NENITA BEGUM MD; Attending Nuclear Medicine  This document has been electronically signed. Apr 11 2024  2:41PM    < end of copied text >

## 2024-04-11 NOTE — PROGRESS NOTE ADULT - PROBLEM SELECTOR PROBLEM 4
Hepatic encephalopathy

## 2024-04-12 LAB
EBV DNA SERPL NAA+PROBE-ACNC: ABNORMAL IU/ML
EBVPCR LOG: ABNORMAL LOG10IU/ML
STRONGYLOIDES AB SER-ACNC: NEGATIVE — SIGNIFICANT CHANGE UP

## 2024-04-13 LAB
LKM AB SER-ACNC: <20.1 UNITS — SIGNIFICANT CHANGE UP (ref 0–20)
VZV DNA, PCR RESULT: NEGATIVE — SIGNIFICANT CHANGE UP

## 2024-07-24 NOTE — ED ADULT NURSE NOTE - CAS EDN DISCHARGE INTERVENTIONS
Arm band on/IV intact